# Patient Record
Sex: FEMALE | Race: WHITE | NOT HISPANIC OR LATINO | Employment: FULL TIME | URBAN - NONMETROPOLITAN AREA
[De-identification: names, ages, dates, MRNs, and addresses within clinical notes are randomized per-mention and may not be internally consistent; named-entity substitution may affect disease eponyms.]

---

## 2022-09-01 ENCOUNTER — TELEPHONE (OUTPATIENT)
Dept: FAMILY MEDICINE CLINIC | Facility: CLINIC | Age: 47
End: 2022-09-01

## 2022-09-01 NOTE — TELEPHONE ENCOUNTER
Caller: Veronica Goldsmith    Relationship to patient: Self    Best call back number: [phone]    Date of exposure: UNKNOWN    Date of positive COVID19 test: 09/01/2022    Date if possible COVID19 exposure: N/A    COVID19 symptoms: COUGH, HEADACHE, CONGESTION, CHEST PRESSURE, BODY ACHES, CHILLS, FEVER, LOSS OF TASTE     Date of initial quarantine: 09/01/2022    Additional information or concerns: PATIENT STARTED HAVING SYMPTOMS YESTERDAY. SHE TOOK A HOME COVID TEST AND IT WAS POSITIVE TODAY 09/01/22. PATIENT NEEDS TO KNOW IF SHE NEEDS TO BE SEEN OR IF MEDICATION CAN BE CALLED IN AS SHE IS VERY SICK. PLEASE ADVISE ASA     What is the patients preferred pharmacy: [facility] [address] [address]  [phone]  - [phone] FX

## 2022-09-02 NOTE — TELEPHONE ENCOUNTER
Veronica needs to also monitor her symptoms.  She needs to continue to push fluids and watch for signs of dehydration.  If her oxygen level drops or she has worsening symptoms she may need to go to the emergency department.  Otherwise if her symptoms worsen she should call the on-call number this weekend.

## 2022-10-07 ENCOUNTER — OFFICE VISIT (OUTPATIENT)
Dept: FAMILY MEDICINE CLINIC | Facility: CLINIC | Age: 47
End: 2022-10-07

## 2022-10-07 VITALS
OXYGEN SATURATION: 96 % | WEIGHT: 187 LBS | DIASTOLIC BLOOD PRESSURE: 68 MMHG | HEART RATE: 70 BPM | SYSTOLIC BLOOD PRESSURE: 100 MMHG | BODY MASS INDEX: 31.16 KG/M2 | HEIGHT: 65 IN | TEMPERATURE: 97.7 F

## 2022-10-07 DIAGNOSIS — I10 PRIMARY HYPERTENSION: Primary | ICD-10-CM

## 2022-10-07 DIAGNOSIS — E78.2 MIXED HYPERLIPIDEMIA: ICD-10-CM

## 2022-10-07 DIAGNOSIS — K21.9 GASTROESOPHAGEAL REFLUX DISEASE WITHOUT ESOPHAGITIS: ICD-10-CM

## 2022-10-07 DIAGNOSIS — E11.9 TYPE 2 DIABETES MELLITUS WITHOUT COMPLICATION, WITHOUT LONG-TERM CURRENT USE OF INSULIN: ICD-10-CM

## 2022-10-07 DIAGNOSIS — Z00.00 WELL ADULT EXAM: ICD-10-CM

## 2022-10-07 DIAGNOSIS — F41.9 ANXIETY: ICD-10-CM

## 2022-10-07 DIAGNOSIS — K75.81 NASH (NONALCOHOLIC STEATOHEPATITIS): ICD-10-CM

## 2022-10-07 DIAGNOSIS — Z23 NEED FOR VACCINATION: ICD-10-CM

## 2022-10-07 LAB
ALBUMIN SERPL-MCNC: 4.5 G/DL (ref 3.5–5.2)
ALBUMIN UR-MCNC: 2.2 MG/DL
ALBUMIN/GLOB SERPL: 1.7 G/DL
ALP SERPL-CCNC: 93 U/L (ref 39–117)
ALT SERPL W P-5'-P-CCNC: 19 U/L (ref 1–33)
ANION GAP SERPL CALCULATED.3IONS-SCNC: 6.7 MMOL/L (ref 5–15)
AST SERPL-CCNC: 26 U/L (ref 1–32)
BILIRUB SERPL-MCNC: 0.5 MG/DL (ref 0–1.2)
BUN SERPL-MCNC: 9 MG/DL (ref 6–20)
BUN/CREAT SERPL: 9.6 (ref 7–25)
CALCIUM SPEC-SCNC: 9.7 MG/DL (ref 8.6–10.5)
CHLORIDE SERPL-SCNC: 105 MMOL/L (ref 98–107)
CHOLEST SERPL-MCNC: 103 MG/DL (ref 0–200)
CO2 SERPL-SCNC: 29.3 MMOL/L (ref 22–29)
CREAT SERPL-MCNC: 0.94 MG/DL (ref 0.57–1)
DEPRECATED RDW RBC AUTO: 41.6 FL (ref 37–54)
EGFRCR SERPLBLD CKD-EPI 2021: 76.4 ML/MIN/1.73
ERYTHROCYTE [DISTWIDTH] IN BLOOD BY AUTOMATED COUNT: 12.8 % (ref 12.3–15.4)
GLOBULIN UR ELPH-MCNC: 2.7 GM/DL
GLUCOSE SERPL-MCNC: 72 MG/DL (ref 65–99)
HBA1C MFR BLD: 5.4 % (ref 4.8–5.6)
HCT VFR BLD AUTO: 46.3 % (ref 34–46.6)
HCV AB SER DONR QL: NORMAL
HDLC SERPL-MCNC: 30 MG/DL (ref 40–60)
HGB BLD-MCNC: 16.1 G/DL (ref 12–15.9)
LDLC SERPL CALC-MCNC: 56 MG/DL (ref 0–100)
LDLC/HDLC SERPL: 1.88 {RATIO}
MCH RBC QN AUTO: 31.1 PG (ref 26.6–33)
MCHC RBC AUTO-ENTMCNC: 34.8 G/DL (ref 31.5–35.7)
MCV RBC AUTO: 89.4 FL (ref 79–97)
PLATELET # BLD AUTO: 169 10*3/MM3 (ref 140–450)
PMV BLD AUTO: 11.2 FL (ref 6–12)
POTASSIUM SERPL-SCNC: 4.6 MMOL/L (ref 3.5–5.2)
PROT SERPL-MCNC: 7.2 G/DL (ref 6–8.5)
RBC # BLD AUTO: 5.18 10*6/MM3 (ref 3.77–5.28)
SODIUM SERPL-SCNC: 141 MMOL/L (ref 136–145)
TRIGL SERPL-MCNC: 83 MG/DL (ref 0–150)
TSH SERPL DL<=0.05 MIU/L-ACNC: 1.26 UIU/ML (ref 0.27–4.2)
VIT B12 BLD-MCNC: 248 PG/ML (ref 211–946)
VLDLC SERPL-MCNC: 17 MG/DL (ref 5–40)
WBC NRBC COR # BLD: 8.19 10*3/MM3 (ref 3.4–10.8)

## 2022-10-07 PROCEDURE — 99396 PREV VISIT EST AGE 40-64: CPT | Performed by: FAMILY MEDICINE

## 2022-10-07 PROCEDURE — 90732 PPSV23 VACC 2 YRS+ SUBQ/IM: CPT | Performed by: FAMILY MEDICINE

## 2022-10-07 PROCEDURE — 82043 UR ALBUMIN QUANTITATIVE: CPT | Performed by: FAMILY MEDICINE

## 2022-10-07 PROCEDURE — 90471 IMMUNIZATION ADMIN: CPT | Performed by: FAMILY MEDICINE

## 2022-10-07 PROCEDURE — 80061 LIPID PANEL: CPT | Performed by: FAMILY MEDICINE

## 2022-10-07 PROCEDURE — 99214 OFFICE O/P EST MOD 30 MIN: CPT | Performed by: FAMILY MEDICINE

## 2022-10-07 PROCEDURE — 82607 VITAMIN B-12: CPT | Performed by: FAMILY MEDICINE

## 2022-10-07 PROCEDURE — 80050 GENERAL HEALTH PANEL: CPT | Performed by: FAMILY MEDICINE

## 2022-10-07 PROCEDURE — 86803 HEPATITIS C AB TEST: CPT | Performed by: FAMILY MEDICINE

## 2022-10-07 PROCEDURE — 83036 HEMOGLOBIN GLYCOSYLATED A1C: CPT | Performed by: FAMILY MEDICINE

## 2022-10-07 RX ORDER — MELOXICAM 15 MG/1
15 TABLET ORAL DAILY
COMMUNITY
Start: 2022-09-26 | End: 2023-02-11

## 2022-10-07 RX ORDER — AMLODIPINE BESYLATE 10 MG/1
10 TABLET ORAL DAILY
COMMUNITY
Start: 2022-09-26 | End: 2022-10-07

## 2022-10-07 RX ORDER — ALBUTEROL SULFATE 90 UG/1
AEROSOL, METERED RESPIRATORY (INHALATION)
COMMUNITY
Start: 2022-09-26

## 2022-10-07 RX ORDER — ROSUVASTATIN CALCIUM 40 MG/1
40 TABLET, COATED ORAL DAILY
COMMUNITY
Start: 2022-09-26 | End: 2023-01-26

## 2022-10-07 RX ORDER — SEMAGLUTIDE 1.34 MG/ML
INJECTION, SOLUTION SUBCUTANEOUS
COMMUNITY
Start: 2022-09-26 | End: 2022-10-07 | Stop reason: SDUPTHER

## 2022-10-07 RX ORDER — LOSARTAN POTASSIUM 25 MG/1
25 TABLET ORAL DAILY
COMMUNITY
Start: 2022-09-02 | End: 2023-02-10 | Stop reason: SDUPTHER

## 2022-10-07 RX ORDER — SEMAGLUTIDE 2.68 MG/ML
2 INJECTION, SOLUTION SUBCUTANEOUS WEEKLY
Qty: 3 ML | Refills: 11 | Status: SHIPPED | OUTPATIENT
Start: 2022-10-07 | End: 2023-03-03 | Stop reason: SDUPTHER

## 2022-10-07 RX ORDER — ESOMEPRAZOLE MAGNESIUM 40 MG/1
40 CAPSULE, DELAYED RELEASE ORAL 2 TIMES DAILY
COMMUNITY
Start: 2022-09-26 | End: 2023-01-26

## 2022-10-07 NOTE — PROGRESS NOTES
Female Physical Note      Date: 10/07/2022   Patient Name: Veronica Goldsmith  : 1975   MRN: [id number]     Chief Complaint:    Chief Complaint   Patient presents with   • Follow-up     3 month DM,HTN       History of Present Illness: Veronica Goldsmith is a 47 y.o. female who is here today for their annual health maintenance and physical.  Patient has been doing well since last being seen without problems noted.  She does continue take her medication as prescribed without difficulty.  Patient has lost approximately 30 pounds of weight since she has started her medications.  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  She is eating and sleeping relatively well.  She denies any change in activity level.  She has not had chest pain, PND, shortness of breath orthopnea.  She denies any change in bowel bladder habits except for constipation as result of using the Ozempic.  No other complaints as mentioned.  She does continue with her arthritis medicine as well as her reflux medication blood pressure medicine and her Zoloft.  Patient has had a full series of hepatitis B and she will obtain that.  She probably will not receive the COVID-vaccine booster.  She will make arrangements for her to have a eye exam.      Subjective      Review of Systems:   Review of Systems   Constitutional: Negative for activity change, appetite change and fatigue.   Respiratory: Negative for cough and shortness of breath.    Cardiovascular: Negative for chest pain, palpitations and leg swelling.   Gastrointestinal: Negative for abdominal pain, constipation, diarrhea, nausea and vomiting.   Genitourinary: Negative for dysuria, flank pain, frequency and urgency.   Neurological: Negative for dizziness, weakness and memory problem.       Past Medical History, Social History, Family History and Care Team were all reviewed with patient and updated as appropriate.     Medications:     Current Outpatient  Medications:   •  albuterol sulfate  (90 Base) MCG/ACT inhaler, INHALE 3 PUFFS BY MOUTH EVERY 6 HOURS AS NEEDED, Disp: , Rfl:   •  esomeprazole (nexIUM) 40 MG capsule, Take 40 mg by mouth 2 (Two) Times a Day., Disp: , Rfl:   •  losartan (COZAAR) 25 MG tablet, Take 25 mg by mouth Daily., Disp: , Rfl:   •  meloxicam (MOBIC) 15 MG tablet, Take 15 mg by mouth Daily., Disp: , Rfl:   •  rosuvastatin (CRESTOR) 40 MG tablet, Take 40 mg by mouth Daily., Disp: , Rfl:   •  sertraline (ZOLOFT) 50 MG tablet, Take 50 mg by mouth Daily., Disp: , Rfl:   •  Semaglutide, 2 MG/DOSE, (Ozempic, 2 MG/DOSE,) 8 MG/3ML solution pen-injector, Inject 2 mg under the skin into the appropriate area as directed 1 (One) Time Per Week., Disp: 3 mL, Rfl: 11    Allergies:   Allergies   Allergen Reactions   • Amoxicillin Provider Review Needed     patient denies 4/13/2022   • Drug Class [Tetracyclines & Related] Provider Review Needed   • Sulfa Antibiotics Other (See Comments)     fever       Immunizations:  Health Maintenance Summary          Overdue - ANNUAL PHYSICAL (Yearly) Overdue - never done    No completion, postpone, or frequency change history exists for this topic.          Overdue - Pneumococcal Vaccine 0-64 (1 - PCV) Overdue - never done    No completion, postpone, or frequency change history exists for this topic.          Overdue - COVID-19 Vaccine (3 - Booster for Pfizer series) Overdue since 8/15/2022    06/20/2022  Imm Admin: Covid-19 (Pfizer) Gray Cap    05/24/2022  Imm Admin: Covid-19 (Pfizer) Gray Cap          Ordered - HEPATITIS C SCREENING (Once) Ordered on 10/7/2022    No completion, postpone, or frequency change history exists for this topic.          Ordered - LIPID PANEL (Yearly) Ordered on 10/7/2022    No completion, postpone, or frequency change history exists for this topic.          COLORECTAL CANCER SCREENING (COLONOSCOPY - Every 10 Years) Next due on 3/30/2028    03/30/2018   COLONOSCOPY          TDAP/TD  VACCINES (2 - Td or Tdap) Next due on 2/8/2029 02/08/2019  Imm Admin: Tdap          INFLUENZA VACCINE  Completed    10/04/2022  Imm Admin: FluLaval/Fluzone >6mos    02/08/2019  Imm Admin: Influenza, Unspecified    02/08/2019  Imm Admin: Flu Vaccine Quad PF >36MO    01/09/2019  Imm Admin: Flublok 18+yrs                 Orders Placed This Encounter   Procedures   • Pneumococcal Polysaccharide Vaccine 23-Valent Greater Than or Equal To 1yo Subcutaneous / IM        Colorectal Screening: Up-to-date.  Last Completed Colonoscopy          COLORECTAL CANCER SCREENING (COLONOSCOPY - Every 10 Years) Next due on 3/30/2028    03/30/2018   COLONOSCOPY              Pap: Patient has had a complete total abdominal hysterectomy.  Last Completed Pap Smear     This patient has no relevant Health Maintenance data.         Mammogram: We will schedule at next appointment.  Last Completed Mammogram     This patient has no relevant Health Maintenance data.           CT for Smoker (Age 50-80, 20 pk yr):   Not applicable.  Bone Density/DEXA (Age 65 or high risk): Low risk.    Hep C (Age 18-79 once): Ordered.  HIV (Age 15-65 once): No results found for: HIV1X2  A1c: No results found for: HGBA1C   Lipid panel:  No results found for: LIPIDEXCLUSI    The ASCVD Risk score (Todd DULCE Jr., et al., 2013) failed to calculate for the following reasons:    Cannot find a previous HDL lab    Cannot find a previous total cholesterol lab    Dermatology: Up-to-date.  Ophthalmologist: Up-to-date  Dentist: Up-to-date    Tobacco Use: High Risk   • Smoking Tobacco Use: Current Every Day Smoker   • Smokeless Tobacco Use: Never Used       Social History     Substance and Sexual Activity   Alcohol Use Not Currently        Social History     Substance and Sexual Activity   Drug Use Never        Diet/Physical activity: Well-balanced/exercises daily.    Sexual Health: No problems.   Menopause: No symptoms  Menstrual Cycles: None due to total abdominal  "hysterectomy.    Depression: PHQ-2 Depression Screening  PHQ-9 Total Score: 0     Measures:   Advanced Care Planning:   Patient does not have an advance directive, information provided.    Smoking Cessation:   less than 3 minutes spent counseling Not agreeable to stopping    Objective     Physical Exam:  Vital Signs:   Vitals:    10/07/22 1126   BP: 100/68   Pulse: 70   Temp: 97.7 °F (36.5 °C)   SpO2: 96%   Weight: 84.8 kg (187 lb)   Height: 165.1 cm (65\")   PainSc:   4   PainLoc: Hip  Comment: right     Body mass index is 31.12 kg/m².     Physical Exam  Vitals and nursing note reviewed.   Constitutional:       Appearance: Normal appearance. She is well-developed.   HENT:      Head: Normocephalic and atraumatic.      Right Ear: Tympanic membrane, ear canal and external ear normal.      Left Ear: Tympanic membrane, ear canal and external ear normal.      Nose: Nose normal.      Mouth/Throat:      Mouth: Mucous membranes are dry.      Pharynx: Oropharynx is clear.   Eyes:      General: Lids are normal.      Extraocular Movements: Extraocular movements intact.      Conjunctiva/sclera: Conjunctivae normal.      Pupils: Pupils are equal, round, and reactive to light.   Neck:      Thyroid: No thyroid mass or thyromegaly.      Trachea: Trachea normal.   Cardiovascular:      Rate and Rhythm: Normal rate and regular rhythm.      Pulses: Normal pulses. No decreased pulses.      Heart sounds: Normal heart sounds.   Pulmonary:      Effort: Pulmonary effort is normal.      Breath sounds: Normal breath sounds.   Abdominal:      General: Abdomen is flat. Bowel sounds are normal.      Palpations: Abdomen is soft.      Tenderness: There is no abdominal tenderness.   Musculoskeletal:         General: Normal range of motion.      Cervical back: Normal, full passive range of motion without pain and neck supple.      Thoracic back: Normal.      Lumbar back: Normal.      Right lower leg: No edema.      Left lower leg: No edema. "   Lymphadenopathy:      Cervical: No cervical adenopathy.   Skin:     General: Skin is warm and dry.   Neurological:      General: No focal deficit present.      Mental Status: She is alert and oriented to person, place, and time.      Cranial Nerves: Cranial nerves are intact.      Sensory: Sensation is intact.      Motor: Motor function is intact.      Coordination: Coordination is intact.   Psychiatric:         Attention and Perception: Attention normal.         Mood and Affect: Mood normal.         Speech: Speech normal.         Behavior: Behavior normal.         POCT Results (if applicable);   No results found for this or any previous visit.     Procedures    Assessment / Plan      Assessment/Plan:   Diagnoses and all orders for this visit:    1. Primary hypertension (Primary)   Patient's blood pressure is relatively on the low side.  We will hold her amlodipine and consider cutting her losartan in half if it remains low.  She does become dizzy at times when she rises and we will continue to monitor and make adjustments based on these findings.    2. Mixed hyperlipidemia   Patient is due for lipid profile we will make arrangements for this and just to keep her LDLs less than 70.    3. Type 2 diabetes mellitus without complication, without long-term current use of insulin (HCC)   Patient needs a hemoglobin A1c we will further pursue and treat accordingly.  She did have a diabetic foot exam that did not reveal any abnormality except for dry skin.  She has been started to continue to do as she is doing and she is doing well.  She had full neurovascular function distally.    4. TAM (nonalcoholic steatohepatitis)   Patient is tolerating Ozempic I do hope that this will continue to improve her visceral fat.  She is also been instructed to try to get 300 minutes of aerobic exercise weekly.  -     Semaglutide, 2 MG/DOSE, (Ozempic, 2 MG/DOSE,) 8 MG/3ML solution pen-injector; Inject 2 mg under the skin into the  appropriate area as directed 1 (One) Time Per Week.  Dispense: 3 mL; Refill: 11    5. Anxiety   Stable at present time we will continue to monitor her symptoms and adjust accordingly.    6. Gastroesophageal reflux disease without esophagitis   Doing well currently no adjustments are necessary.    7. Need for vaccination   Patient received her pneumococcal vaccine today.  She received her flu vaccine at work on Tuesday.  -     Pneumococcal Polysaccharide Vaccine 23-Valent Greater Than or Equal To 1yo Subcutaneous / IM    8. Well adult exam  Patient appears to be doing well with respect to her wellness exam.  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  We did discuss anticipatory guidance as well as safety issues and she we will continue to try to discontinue her smoking.  We will obtain laboratory data to see what this reveals and will make adjustments as necessary.  We did discuss her care gaps that she will see if she can find hepatitis B series.  She will consider the COVID-19 vaccine.  She will also make arrangements for her diabetic eye exam.  Other wise we will continue as we are doing as she is doing well.-     CBC (No Diff); Future  -     Comprehensive Metabolic Panel; Future  -     Hepatitis C Antibody; Future  -     Hemoglobin A1c; Future  -     Lipid Panel; Future  -     MicroAlbumin, Urine, Random - Urine, Clean Catch; Future  -     TSH Rfx On Abnormal To Free T4; Future  -     Vitamin B12; Future         Healthcare Maintenance:  Counseling provided based on age appropriate USPSTF guidelines.  BMI is >= 30 and <35. (Class 1 Obesity). The following options were offered after discussion;: exercise counseling/recommendations, nutrition counseling/recommendations and pharmacological intervention options    Veronica Goldsmith voices understanding and acceptance of this advice and will call back with any further questions or concerns.  AVS with preventive healthcare tips printed for patient.     Follow Up:   No follow-ups on file.         At [facility], we believe that sharing information builds trust and better relationships. You are receiving this note because you recently visited [facility]. It is possible you will see health information before a provider has talked with you about it. This kind of information can be easy to misunderstand. To help you fully understand what it means for your health, we urge you to discuss this note with your provider.    Greg Levine MD  [facility]

## 2022-11-09 ENCOUNTER — OFFICE VISIT (OUTPATIENT)
Dept: FAMILY MEDICINE CLINIC | Facility: CLINIC | Age: 47
End: 2022-11-09

## 2022-11-09 VITALS
HEART RATE: 80 BPM | WEIGHT: 185 LBS | SYSTOLIC BLOOD PRESSURE: 102 MMHG | OXYGEN SATURATION: 98 % | BODY MASS INDEX: 30.82 KG/M2 | HEIGHT: 65 IN | DIASTOLIC BLOOD PRESSURE: 70 MMHG | TEMPERATURE: 97.8 F

## 2022-11-09 DIAGNOSIS — R11.2 NAUSEA AND VOMITING, UNSPECIFIED VOMITING TYPE: Primary | ICD-10-CM

## 2022-11-09 PROBLEM — E78.2 MIXED HYPERLIPIDEMIA: Status: ACTIVE | Noted: 2022-11-09

## 2022-11-09 PROBLEM — K21.9 GASTROESOPHAGEAL REFLUX DISEASE WITHOUT ESOPHAGITIS: Status: ACTIVE | Noted: 2022-11-09

## 2022-11-09 PROBLEM — F41.9 ANXIETY: Status: ACTIVE | Noted: 2022-11-09

## 2022-11-09 PROBLEM — E11.9 TYPE 2 DIABETES MELLITUS, WITHOUT LONG-TERM CURRENT USE OF INSULIN: Status: ACTIVE | Noted: 2022-11-09

## 2022-11-09 PROBLEM — K75.81 NASH (NONALCOHOLIC STEATOHEPATITIS): Status: ACTIVE | Noted: 2022-11-09

## 2022-11-09 PROBLEM — I10 ESSENTIAL HYPERTENSION, BENIGN: Status: ACTIVE | Noted: 2022-11-09

## 2022-11-09 PROBLEM — G62.9 PERIPHERAL NEUROPATHY: Status: ACTIVE | Noted: 2022-11-09

## 2022-11-09 PROCEDURE — 99213 OFFICE O/P EST LOW 20 MIN: CPT | Performed by: PHYSICIAN ASSISTANT

## 2022-11-09 RX ORDER — ONDANSETRON 8 MG/1
8 TABLET, ORALLY DISINTEGRATING ORAL EVERY 8 HOURS PRN
Qty: 30 TABLET | Refills: 1 | Status: SHIPPED | OUTPATIENT
Start: 2022-11-09 | End: 2023-02-10

## 2022-11-09 NOTE — PROGRESS NOTES
"    Follow Up Office Visit      Date: 2022   Patient Name: Veronica Goldsmith  : 1975   MRN: [id number]     Chief Complaint:    Chief Complaint   Patient presents with   • Vomiting     Vomiting x 2 days, with coffee ground emesis past 2 episodes, occurred approx. 2 weeks ago, denies fever       History of Present Illness: Veronica Goldsmith is a 47 y.o. female who is here today for intermittent nausea for the past few days.  Noted a quarter size area of \"coffee ground\" look.  Has had no dark stools.  Notes that she resumed Ozempic after being off for a month before vomiting started.    Subjective      Review of Systems:   Review of Systems   Constitutional: Negative.    HENT: Negative.    Respiratory: Negative.    Cardiovascular: Negative.    Gastrointestinal: Positive for diarrhea, nausea, vomiting, GERD and indigestion. Negative for abdominal distention, abdominal pain and blood in stool.       I have reviewed the patients family history, social history, past medical history, past surgical history and have updated it as appropriate.     Medications:     Current Outpatient Medications:   •  albuterol sulfate  (90 Base) MCG/ACT inhaler, INHALE 3 PUFFS BY MOUTH EVERY 6 HOURS AS NEEDED, Disp: , Rfl:   •  esomeprazole (nexIUM) 40 MG capsule, Take 40 mg by mouth 2 (Two) Times a Day., Disp: , Rfl:   •  losartan (COZAAR) 25 MG tablet, Take 25 mg by mouth Daily., Disp: , Rfl:   •  meloxicam (MOBIC) 15 MG tablet, Take 15 mg by mouth Daily., Disp: , Rfl:   •  rosuvastatin (CRESTOR) 40 MG tablet, Take 40 mg by mouth Daily., Disp: , Rfl:   •  Semaglutide, 2 MG/DOSE, (Ozempic, 2 MG/DOSE,) 8 MG/3ML solution pen-injector, Inject 2 mg under the skin into the appropriate area as directed 1 (One) Time Per Week., Disp: 3 mL, Rfl: 11  •  sertraline (ZOLOFT) 50 MG tablet, Take 50 mg by mouth Daily., Disp: , Rfl:   •  ondansetron ODT (Zofran ODT) 8 MG disintegrating tablet, Place 1 tablet on the tongue Every 8 (Eight) Hours As " "Needed for Nausea or Vomiting., Disp: 30 tablet, Rfl: 1    Allergies:   Allergies   Allergen Reactions   • Amoxicillin Provider Review Needed     patient denies 4/13/2022   • Drug Class [Tetracyclines & Related] Provider Review Needed     Patient denies   • Sulfa Antibiotics Other (See Comments)     fever       Objective     Physical Exam: Please see above  Vital Signs:   Vitals:    11/09/22 1102   BP: 102/70   Pulse: 80   Temp: 97.8 °F (36.6 °C)   SpO2: 98%   Weight: 83.9 kg (185 lb)   Height: 165.1 cm (65\")   PainSc:   2   PainLoc: Rib Cage     Body mass index is 30.79 kg/m².    Physical Exam  Vitals and nursing note reviewed.   Constitutional:       General: She is not in acute distress.     Appearance: She is not toxic-appearing.   Cardiovascular:      Rate and Rhythm: Normal rate and regular rhythm.      Heart sounds: No murmur heard.    No friction rub. No gallop.   Neurological:      Mental Status: She is alert.         Procedures    Assessment / Plan      Assessment/Plan:   1. Nausea and vomiting, unspecified vomiting type  May be due to resumption of Ozempic at a high dose.  This one episode of coffee ground emesis has not been repeated.  We will back off on Ozempic dose and she was given a starter package to gradually increase dose.  If she has more coffee-like emesis or dark stools she will seek care immediately.  - ondansetron ODT (Zofran ODT) 8 MG disintegrating tablet; Place 1 tablet on the tongue Every 8 (Eight) Hours As Needed for Nausea or Vomiting.  Dispense: 30 tablet; Refill: 1       Follow Up:   No follow-ups on file.      At [facility], we believe that sharing information builds trust and better relationships. You are receiving this note because you recently visited [facility]. It is possible you will see health information before a provider has talked with you about it. This kind of information can be easy to misunderstand. To help you fully understand what it means for your health, we " urge you to discuss this note with your provider.    Anastasiya Levine PA-C  [facility]

## 2022-12-05 ENCOUNTER — OFFICE VISIT (OUTPATIENT)
Dept: FAMILY MEDICINE CLINIC | Facility: CLINIC | Age: 47
End: 2022-12-05

## 2022-12-05 VITALS
DIASTOLIC BLOOD PRESSURE: 70 MMHG | WEIGHT: 184 LBS | TEMPERATURE: 99.1 F | BODY MASS INDEX: 30.66 KG/M2 | SYSTOLIC BLOOD PRESSURE: 142 MMHG | OXYGEN SATURATION: 100 % | HEIGHT: 65 IN | HEART RATE: 96 BPM

## 2022-12-05 DIAGNOSIS — N21.9 CALCULUS OF LOWER URINARY TRACT: Primary | ICD-10-CM

## 2022-12-05 DIAGNOSIS — M62.838 MUSCLE SPASM: ICD-10-CM

## 2022-12-05 PROCEDURE — 99214 OFFICE O/P EST MOD 30 MIN: CPT | Performed by: FAMILY MEDICINE

## 2022-12-05 RX ORDER — CYCLOBENZAPRINE HCL 10 MG
10 TABLET ORAL 3 TIMES DAILY PRN
Qty: 90 TABLET | Refills: 3 | Status: SHIPPED | OUTPATIENT
Start: 2022-12-05

## 2022-12-05 RX ORDER — PRENATAL VIT 91/IRON/FOLIC/DHA 28-975-200
COMBINATION PACKAGE (EA) ORAL
COMMUNITY
Start: 2022-11-25 | End: 2023-02-10

## 2022-12-05 RX ORDER — HYDROCODONE BITARTRATE AND ACETAMINOPHEN 7.5; 325 MG/1; MG/1
TABLET ORAL
COMMUNITY
Start: 2022-12-02 | End: 2023-02-10

## 2022-12-05 RX ORDER — TAMSULOSIN HYDROCHLORIDE 0.4 MG/1
1 CAPSULE ORAL DAILY
Qty: 30 CAPSULE | Refills: 11 | Status: SHIPPED | OUTPATIENT
Start: 2022-12-05 | End: 2023-02-17 | Stop reason: SDUPTHER

## 2022-12-05 NOTE — PROGRESS NOTES
Follow Up Office Visit      Date: 2022   Patient Name: Veronica Goldsmith  : 1975   MRN: [id number]     Chief Complaint:    Chief Complaint   Patient presents with   • Flank Pain     @[facility] ER for kidney stone, continues to have flank pain/spams       History of Present Illness: Veronica Goldsmith is a 47 y.o. female who is here today for evaluation of a recent emergency department visit where she was noted to have a sign.  Patient was in her usual state of health until last Thursday when she developed intense right flank pain.  Patient presented to the emergency department at [facility] where she was noted to have a 7 mm nonobstructing kidney stone on the right side with mild hydronephrosis.  She was given pain medicine and patient states that she is feeling improved since last being seen.  Her current pain score is 4 out of 10.  She has not had any narcotics over the previous 24 hours but states that she has had to take 5 of the Lortab since in the emergency department.  As far she knows she has never had kidney stones in the past.  She has been drinking plenty of fluids and feels a little bit nauseous at times but is not throwing up.  No other problems been NOAA currently as she denies fever or chills.  She has noted no hematuria.  She has been screening her urine and has not seen any kidney stones at present time.    Subjective      Review of Systems:   Review of Systems   Constitutional: Negative for activity change, appetite change and fatigue.   Respiratory: Negative for cough and shortness of breath.    Cardiovascular: Negative for chest pain, palpitations and leg swelling.   Gastrointestinal: Negative for abdominal pain, constipation, diarrhea, nausea and vomiting.   Genitourinary: Positive for flank pain. Negative for dysuria, frequency and urgency.   Neurological: Negative for dizziness, weakness and memory problem.       I have reviewed the patients family history, social history, past medical  history, past surgical history and have updated it as appropriate.     Medications:     Current Outpatient Medications:   •  albuterol sulfate  (90 Base) MCG/ACT inhaler, INHALE 3 PUFFS BY MOUTH EVERY 6 HOURS AS NEEDED, Disp: , Rfl:   •  esomeprazole (nexIUM) 40 MG capsule, Take 40 mg by mouth 2 (Two) Times a Day., Disp: , Rfl:   •  HYDROcodone-acetaminophen (NORCO) 7.5-325 MG per tablet, TAKE ONE TABLET EVERY 4 TO 6 HOURS AS NEEDED FOR PAIN, Disp: , Rfl:   •  losartan (COZAAR) 25 MG tablet, Take 25 mg by mouth Daily. Taking 1/2, Disp: , Rfl:   •  meloxicam (MOBIC) 15 MG tablet, Take 15 mg by mouth Daily., Disp: , Rfl:   •  ondansetron ODT (Zofran ODT) 8 MG disintegrating tablet, Place 1 tablet on the tongue Every 8 (Eight) Hours As Needed for Nausea or Vomiting., Disp: 30 tablet, Rfl: 1  •  rosuvastatin (CRESTOR) 40 MG tablet, Take 40 mg by mouth Daily., Disp: , Rfl:   •  Semaglutide, 2 MG/DOSE, (Ozempic, 2 MG/DOSE,) 8 MG/3ML solution pen-injector, Inject 2 mg under the skin into the appropriate area as directed 1 (One) Time Per Week., Disp: 3 mL, Rfl: 11  •  sertraline (ZOLOFT) 50 MG tablet, Take 50 mg by mouth Daily., Disp: , Rfl:   •  terbinafine (lamISIL) 1 % cream, APPLY TO AFFECTED AREA TWO TIMES A DAY, Disp: , Rfl:   •  cyclobenzaprine (FLEXERIL) 10 MG tablet, Take 1 tablet by mouth 3 (Three) Times a Day As Needed for Muscle Spasms., Disp: 90 tablet, Rfl: 3  •  tamsulosin (FLOMAX) 0.4 MG capsule 24 hr capsule, Take 1 capsule by mouth Daily., Disp: 30 capsule, Rfl: 11    Allergies:   Allergies   Allergen Reactions   • Sulfa Antibiotics Unknown - High Severity     fever   • Drug Class [Tetracyclines & Related] Rash     Patient denies       Immunizations:   Immunization History   Administered Date(s) Administered   • Covid-19 (Pfizer) Gray Cap 05/24/2022, 06/20/2022   • Flu Vaccine Quad PF >36MO 02/08/2019   • FluLaval/Fluzone >6mos 10/04/2022   • Flublok 18+yrs 01/09/2019   • Hepatitis A 01/09/2019   •  "Influenza, Unspecified 02/08/2019   • MMR 02/08/2019   • Pneumococcal Polysaccharide (PPSV23) 10/07/2022   • Tdap 02/08/2019        Objective     Physical Exam: Please see above  Vital Signs:   Vitals:    12/05/22 0835   BP: 142/70   BP Location: Left arm   Patient Position: Sitting   Cuff Size: Adult   Pulse: 96   Temp: 99.1 °F (37.3 °C)   TempSrc: Temporal   SpO2: 100%   Weight: 83.5 kg (184 lb)   Height: 165.1 cm (65\")   PainSc:   4   PainLoc: Abdomen     Body mass index is 30.62 kg/m².  BMI is >= 30 and <35. (Class 1 Obesity). The following options were offered after discussion;: exercise counseling/recommendations, nutrition counseling/recommendations and pharmacological intervention options       Physical Exam  Vitals and nursing note reviewed.   Constitutional:       Appearance: Normal appearance.   HENT:      Head: Normocephalic and atraumatic.      Nose: Nose normal.      Mouth/Throat:      Pharynx: Oropharynx is clear.   Eyes:      Extraocular Movements: Extraocular movements intact.      Pupils: Pupils are equal, round, and reactive to light.   Neck:      Thyroid: No thyroid mass or thyromegaly.      Trachea: Trachea normal.   Cardiovascular:      Rate and Rhythm: Normal rate and regular rhythm.      Pulses: Normal pulses. No decreased pulses.      Heart sounds: Normal heart sounds.   Pulmonary:      Effort: Pulmonary effort is normal.      Breath sounds: Normal breath sounds.   Abdominal:      General: Abdomen is flat. Bowel sounds are normal.      Palpations: Abdomen is soft.      Tenderness: There is no abdominal tenderness. There is no right CVA tenderness or left CVA tenderness.   Musculoskeletal:      Cervical back: Neck supple.      Right lower leg: No edema.      Left lower leg: No edema.   Lymphadenopathy:      Cervical: No cervical adenopathy.   Skin:     General: Skin is warm and dry.   Neurological:      General: No focal deficit present.      Mental Status: She is alert and oriented to " person, place, and time.      Cranial Nerves: Cranial nerves are intact.      Sensory: Sensation is intact.      Motor: Motor function is intact.      Coordination: Coordination is intact.   Psychiatric:         Attention and Perception: Attention normal.         Mood and Affect: Mood normal.         Speech: Speech normal.         Behavior: Behavior normal.         Procedures    Results:   Labs:   Hemoglobin A1C   Date Value Ref Range Status   10/07/2022 5.40 4.80 - 5.60 % Final     TSH   Date Value Ref Range Status   10/07/2022 1.260 0.270 - 4.200 uIU/mL Final        POCT Results (if applicable):   Results for orders placed or performed in visit on 10/07/22   CBC (No Diff)    Specimen: Arm, Left; Blood   Result Value Ref Range    WBC 8.19 3.40 - 10.80 10*3/mm3    RBC 5.18 3.77 - 5.28 10*6/mm3    Hemoglobin 16.1 (H) 12.0 - 15.9 g/dL    Hematocrit 46.3 34.0 - 46.6 %    MCV 89.4 79.0 - 97.0 fL    MCH 31.1 26.6 - 33.0 pg    MCHC 34.8 31.5 - 35.7 g/dL    RDW 12.8 12.3 - 15.4 %    RDW-SD 41.6 37.0 - 54.0 fl    MPV 11.2 6.0 - 12.0 fL    Platelets 169 140 - 450 10*3/mm3   Comprehensive Metabolic Panel    Specimen: Arm, Left; Blood   Result Value Ref Range    Glucose 72 65 - 99 mg/dL    BUN 9 6 - 20 mg/dL    Creatinine 0.94 0.57 - 1.00 mg/dL    Sodium 141 136 - 145 mmol/L    Potassium 4.6 3.5 - 5.2 mmol/L    Chloride 105 98 - 107 mmol/L    CO2 29.3 (H) 22.0 - 29.0 mmol/L    Calcium 9.7 8.6 - 10.5 mg/dL    Total Protein 7.2 6.0 - 8.5 g/dL    Albumin 4.50 3.50 - 5.20 g/dL    ALT (SGPT) 19 1 - 33 U/L    AST (SGOT) 26 1 - 32 U/L    Alkaline Phosphatase 93 39 - 117 U/L    Total Bilirubin 0.5 0.0 - 1.2 mg/dL    Globulin 2.7 gm/dL    A/G Ratio 1.7 g/dL    BUN/Creatinine Ratio 9.6 7.0 - 25.0    Anion Gap 6.7 5.0 - 15.0 mmol/L    eGFR 76.4 >60.0 mL/min/1.73   Hepatitis C Antibody    Specimen: Arm, Left; Blood   Result Value Ref Range    Hepatitis C Ab Non-Reactive Non-Reactive   Hemoglobin A1c    Specimen: Arm, Left; Blood   Result  Value Ref Range    Hemoglobin A1C 5.40 4.80 - 5.60 %   Lipid Panel    Specimen: Arm, Left; Blood   Result Value Ref Range    Total Cholesterol 103 0 - 200 mg/dL    Triglycerides 83 0 - 150 mg/dL    HDL Cholesterol 30 (L) 40 - 60 mg/dL    LDL Cholesterol  56 0 - 100 mg/dL    VLDL Cholesterol 17 5 - 40 mg/dL    LDL/HDL Ratio 1.88    MicroAlbumin, Urine, Random - Urine, Clean Catch    Specimen: Urine, Clean Catch   Result Value Ref Range    Microalbumin, Urine 2.2 mg/dL   TSH Rfx On Abnormal To Free T4    Specimen: Arm, Left; Blood   Result Value Ref Range    TSH 1.260 0.270 - 4.200 uIU/mL   Vitamin B12    Specimen: Arm, Left; Blood   Result Value Ref Range    Vitamin B-12 248 211 - 946 pg/mL       Imaging:   No valid procedures specified.     Measures:   Advanced Care Planning:   Patient does not have an advance directive, information provided.    Smoking Cessation:   less than 3 minutes spent counseling Will try to cut down    Assessment / Plan      Assessment/Plan:   Diagnoses and all orders for this visit:    1. Calculus of lower urinary tract (Primary)  Patient does appear to be suffering from a kidney stone on the right side.  I am uncertain if she will be able to pass this stone but currently is doing better with respect to his symptoms.  We will continue to have him push fluids and we will pursue with starting her on Flomax.  She will continue to drink lemonade and if she has worsening symptoms we will not only continue the narcotic pain medicine but will also refer her to a urologist for further assessment and treatment.  Hopefully she will be able to pass the stone on her own.  She has been instructed to strain her urine and to bring the stone in for analysis.  -     tamsulosin (FLOMAX) 0.4 MG capsule 24 hr capsule; Take 1 capsule by mouth Daily.  Dispense: 30 capsule; Refill: 11    2. Muscle spasm  Patient has had some muscle spasm symptomatology.  This most likely is due to the kidney stone causing her  symptoms.  We will try her on cyclobenzaprine.  She has tolerated this in the past with some mild drowsiness.  We will monitor her symptoms and if she has other problems or complaints further assessment treatment will be necessary.  -     cyclobenzaprine (FLEXERIL) 10 MG tablet; Take 1 tablet by mouth 3 (Three) Times a Day As Needed for Muscle Spasms.  Dispense: 90 tablet; Refill: 3        Follow Up:   No follow-ups on file.      At [facility], we believe that sharing information builds trust and better relationships. You are receiving this note because you recently visited [facility]. It is possible you will see health information before a provider has talked with you about it. This kind of information can be easy to misunderstand. To help you fully understand what it means for your health, we urge you to discuss this note with your provider.    Greg Levine MD  [facility]

## 2022-12-30 ENCOUNTER — OFFICE VISIT (OUTPATIENT)
Dept: FAMILY MEDICINE CLINIC | Facility: CLINIC | Age: 47
End: 2022-12-30

## 2022-12-30 VITALS
OXYGEN SATURATION: 96 % | TEMPERATURE: 97.7 F | BODY MASS INDEX: 31.16 KG/M2 | DIASTOLIC BLOOD PRESSURE: 76 MMHG | HEIGHT: 65 IN | SYSTOLIC BLOOD PRESSURE: 128 MMHG | HEART RATE: 76 BPM | WEIGHT: 187 LBS

## 2022-12-30 DIAGNOSIS — L02.91 ABSCESS: Primary | ICD-10-CM

## 2022-12-30 PROCEDURE — 99213 OFFICE O/P EST LOW 20 MIN: CPT

## 2022-12-30 RX ORDER — CEPHALEXIN 500 MG/1
500 CAPSULE ORAL 4 TIMES DAILY
Qty: 28 CAPSULE | Refills: 0 | Status: SHIPPED | OUTPATIENT
Start: 2022-12-30 | End: 2022-12-30 | Stop reason: DRUGHIGH

## 2022-12-30 RX ORDER — CEPHALEXIN 500 MG/1
500 CAPSULE ORAL 2 TIMES DAILY
Qty: 14 CAPSULE | Refills: 0 | Status: SHIPPED | OUTPATIENT
Start: 2022-12-30 | End: 2023-01-06

## 2022-12-30 RX ORDER — MUPIROCIN CALCIUM 20 MG/G
1 CREAM TOPICAL 3 TIMES DAILY
Qty: 15 G | Refills: 0 | Status: SHIPPED | OUTPATIENT
Start: 2022-12-30 | End: 2023-02-10
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
## 2023-01-26 RX ORDER — ROSUVASTATIN CALCIUM 40 MG/1
TABLET, COATED ORAL
Qty: 90 TABLET | Refills: 3 | Status: SHIPPED | OUTPATIENT
Start: 2023-01-26

## 2023-01-26 RX ORDER — ESOMEPRAZOLE MAGNESIUM 40 MG/1
CAPSULE, DELAYED RELEASE ORAL
Qty: 60 CAPSULE | Refills: 3 | Status: SHIPPED | OUTPATIENT
Start: 2023-01-26

## 2023-01-26 RX ORDER — AMLODIPINE BESYLATE 10 MG/1
TABLET ORAL
Qty: 90 TABLET | Refills: 3 | Status: SHIPPED | OUTPATIENT
Start: 2023-01-26 | End: 2023-02-10

## 2023-02-03 PROBLEM — N20.0 RIGHT KIDNEY STONE: Status: ACTIVE | Noted: 2023-02-03

## 2023-02-10 ENCOUNTER — OFFICE VISIT (OUTPATIENT)
Dept: FAMILY MEDICINE CLINIC | Facility: CLINIC | Age: 48
End: 2023-02-10
Payer: COMMERCIAL

## 2023-02-10 VITALS
OXYGEN SATURATION: 98 % | HEART RATE: 78 BPM | TEMPERATURE: 98.2 F | DIASTOLIC BLOOD PRESSURE: 100 MMHG | WEIGHT: 184.2 LBS | BODY MASS INDEX: 30.65 KG/M2 | SYSTOLIC BLOOD PRESSURE: 150 MMHG

## 2023-02-10 DIAGNOSIS — I10 PRIMARY HYPERTENSION: Primary | ICD-10-CM

## 2023-02-10 DIAGNOSIS — E11.9 TYPE 2 DIABETES MELLITUS WITHOUT COMPLICATION, WITHOUT LONG-TERM CURRENT USE OF INSULIN: ICD-10-CM

## 2023-02-10 DIAGNOSIS — F17.210 CIGARETTE NICOTINE DEPENDENCE WITHOUT COMPLICATION: ICD-10-CM

## 2023-02-10 DIAGNOSIS — N28.9 RENAL INSUFFICIENCY: ICD-10-CM

## 2023-02-10 DIAGNOSIS — F41.9 ANXIETY: ICD-10-CM

## 2023-02-10 DIAGNOSIS — N21.9 CALCULUS OF LOWER URINARY TRACT: ICD-10-CM

## 2023-02-10 DIAGNOSIS — K75.81 NASH (NONALCOHOLIC STEATOHEPATITIS): ICD-10-CM

## 2023-02-10 DIAGNOSIS — E78.2 MIXED HYPERLIPIDEMIA: ICD-10-CM

## 2023-02-10 LAB
BILIRUB UR QL STRIP: NEGATIVE
CLARITY UR: CLEAR
COLOR UR: YELLOW
GLUCOSE UR STRIP-MCNC: NEGATIVE MG/DL
HBA1C MFR BLD: 5.3 % (ref 4.8–5.6)
HGB UR QL STRIP.AUTO: NEGATIVE
KETONES UR QL STRIP: NEGATIVE
LEUKOCYTE ESTERASE UR QL STRIP.AUTO: NEGATIVE
NITRITE UR QL STRIP: NEGATIVE
PH UR STRIP.AUTO: 7.5 [PH] (ref 5–8)
PROT UR QL STRIP: NEGATIVE
SP GR UR STRIP: 1.02 (ref 1–1.03)
UROBILINOGEN UR QL STRIP: NORMAL

## 2023-02-10 PROCEDURE — 81003 URINALYSIS AUTO W/O SCOPE: CPT | Performed by: FAMILY MEDICINE

## 2023-02-10 PROCEDURE — 80061 LIPID PANEL: CPT | Performed by: FAMILY MEDICINE

## 2023-02-10 PROCEDURE — 83036 HEMOGLOBIN GLYCOSYLATED A1C: CPT | Performed by: FAMILY MEDICINE

## 2023-02-10 PROCEDURE — 99214 OFFICE O/P EST MOD 30 MIN: CPT | Performed by: FAMILY MEDICINE

## 2023-02-10 PROCEDURE — 80053 COMPREHEN METABOLIC PANEL: CPT | Performed by: FAMILY MEDICINE

## 2023-02-10 RX ORDER — CLOTRIMAZOLE AND BETAMETHASONE DIPROPIONATE 10; .64 MG/G; MG/G
CREAM TOPICAL
Qty: 15 G | Refills: 0 | Status: SHIPPED | OUTPATIENT
Start: 2023-02-10

## 2023-02-10 RX ORDER — LOSARTAN POTASSIUM 25 MG/1
25 TABLET ORAL DAILY
Qty: 90 TABLET | Refills: 3
Start: 2023-02-10

## 2023-02-10 NOTE — PROGRESS NOTES
Follow Up Office Visit      Date: 02/10/2023   Patient Name: Veronica Goldsmith  : 1975   MRN: [id number]     Chief Complaint:    Chief Complaint   Patient presents with   • Annual Exam       History of Present Illness: Veronica Goldsmith is a 47 y.o. female who is here today for follow-up of her medical conditions.  Patient has been doing well since last being seen without too many problems noted.  She does tolerate the Ozempic but has slowly had to increase the dose as she has had some nausea associated with it.  She has tolerated her other medications without difficulty.  She denies any change in her activity, appetite or sleep.  She has no other cardiovascular, respiratory, gastrointestinal, urologic or neurologic complaints except for occasional diarrhea constipation as related to the Ozempic.    Subjective      Review of Systems:   Review of Systems   Constitutional: Negative for activity change, appetite change and fatigue.   Respiratory: Negative for cough and shortness of breath.    Cardiovascular: Negative for chest pain, palpitations and leg swelling.   Gastrointestinal: Positive for constipation and diarrhea. Negative for abdominal distention, abdominal pain, blood in stool, nausea, vomiting, GERD and indigestion.   Genitourinary: Negative for dysuria, flank pain, frequency and urgency.   Musculoskeletal: Negative for arthralgias, back pain, gait problem, joint swelling and myalgias.   Neurological: Negative for dizziness, weakness and memory problem.   Psychiatric/Behavioral: Negative for sleep disturbance and depressed mood. The patient is not nervous/anxious.        I have reviewed the patients family history, social history, past medical history, past surgical history and have updated it as appropriate.     Medications:     Current Outpatient Medications:   •  albuterol sulfate  (90 Base) MCG/ACT inhaler, INHALE 3 PUFFS BY MOUTH EVERY 6 HOURS AS NEEDED, Disp: , Rfl:   •  cyclobenzaprine  (FLEXERIL) 10 MG tablet, Take 1 tablet by mouth 3 (Three) Times a Day As Needed for Muscle Spasms., Disp: 90 tablet, Rfl: 3  •  esomeprazole (nexIUM) 40 MG capsule, TAKE ONE CAPSULE BY MOUTH TWO TIMES A DAY, Disp: 60 capsule, Rfl: 3  •  losartan (COZAAR) 25 MG tablet, Take 1 tablet by mouth Daily. Taking 1/2, Disp: 90 tablet, Rfl: 3  •  meloxicam (MOBIC) 15 MG tablet, Take 15 mg by mouth Daily., Disp: , Rfl:   •  rosuvastatin (CRESTOR) 40 MG tablet, TAKE 1 TABLET BY MOUTH DAILY, Disp: 90 tablet, Rfl: 3  •  Semaglutide, 2 MG/DOSE, (Ozempic, 2 MG/DOSE,) 8 MG/3ML solution pen-injector, Inject 2 mg under the skin into the appropriate area as directed 1 (One) Time Per Week., Disp: 3 mL, Rfl: 11  •  sertraline (ZOLOFT) 50 MG tablet, TAKE ONE TABLET BY MOUTH EVERY DAY, Disp: 90 tablet, Rfl: 3  •  tamsulosin (FLOMAX) 0.4 MG capsule 24 hr capsule, Take 1 capsule by mouth Daily., Disp: 30 capsule, Rfl: 11  •  clotrimazole-betamethasone (LOTRISONE) 1-0.05 % cream, APPLY TO AFFECTED AREA TWO TIMES A DAY, Disp: 15 g, Rfl: 0    Allergies:   Allergies   Allergen Reactions   • Sulfa Antibiotics Unknown - High Severity     fever       Immunizations:   Immunization History   Administered Date(s) Administered   • Covid-19 (Pfizer) Gray Cap 05/24/2022, 06/20/2022   • Flu Vaccine Quad PF >36MO 02/08/2019   • FluLaval/Fluzone >6mos 10/04/2022   • Flublok 18+yrs 01/09/2019   • Hepatitis A 01/09/2019   • Influenza, Unspecified 02/08/2019   • MMR 02/08/2019   • Pneumococcal Polysaccharide (PPSV23) 10/07/2022   • Tdap 02/08/2019        Objective     Physical Exam: Please see above  Vital Signs:   Vitals:    02/10/23 0950   BP: 150/100   BP Location: Left arm   Patient Position: Sitting   Cuff Size: Adult   Pulse: 78   Temp: 98.2 °F (36.8 °C)   TempSrc: Temporal   SpO2: 98%   Weight: 83.6 kg (184 lb 3.2 oz)   PainSc: 0-No pain     Body mass index is 30.65 kg/m².  BMI is >= 30 and <35. (Class 1 Obesity). The following options were offered  after discussion;: exercise counseling/recommendations, nutrition counseling/recommendations and pharmacological intervention options       Physical Exam  Vitals and nursing note reviewed.   Constitutional:       Appearance: Normal appearance.   HENT:      Head: Normocephalic and atraumatic.      Nose: Nose normal.      Mouth/Throat:      Pharynx: Oropharynx is clear.   Eyes:      Extraocular Movements: Extraocular movements intact.      Pupils: Pupils are equal, round, and reactive to light.   Neck:      Thyroid: No thyroid mass or thyromegaly.      Trachea: Trachea normal.   Cardiovascular:      Rate and Rhythm: Normal rate and regular rhythm.      Pulses: Normal pulses. No decreased pulses.      Heart sounds: Normal heart sounds.   Pulmonary:      Effort: Pulmonary effort is normal.      Breath sounds: Normal breath sounds.   Abdominal:      General: Abdomen is flat. Bowel sounds are normal.      Palpations: Abdomen is soft.      Tenderness: There is no abdominal tenderness.   Musculoskeletal:      Cervical back: Neck supple.      Right lower leg: No edema.      Left lower leg: No edema.   Lymphadenopathy:      Cervical: No cervical adenopathy.   Skin:     General: Skin is warm and dry.   Neurological:      General: No focal deficit present.      Mental Status: She is alert and oriented to person, place, and time.      Sensory: Sensation is intact.      Motor: Motor function is intact.      Coordination: Coordination is intact.   Psychiatric:         Attention and Perception: Attention normal.         Mood and Affect: Mood normal.         Speech: Speech normal.         Behavior: Behavior normal.         Procedures    Results:   Labs:   Hemoglobin A1C   Date Value Ref Range Status   02/10/2023 5.30 4.80 - 5.60 % Final     TSH   Date Value Ref Range Status   10/07/2022 1.260 0.270 - 4.200 uIU/mL Final        POCT Results (if applicable):   Results for orders placed or performed in visit on 02/10/23   Comprehensive  Metabolic Panel    Specimen: Arm, Left; Blood   Result Value Ref Range    Glucose 93 65 - 99 mg/dL    BUN 10 6 - 20 mg/dL    Creatinine 1.22 (H) 0.57 - 1.00 mg/dL    Sodium 142 136 - 145 mmol/L    Potassium 4.0 3.5 - 5.2 mmol/L    Chloride 106 98 - 107 mmol/L    CO2 27.0 22.0 - 29.0 mmol/L    Calcium 9.4 8.6 - 10.5 mg/dL    Total Protein 7.3 6.0 - 8.5 g/dL    Albumin 4.4 3.5 - 5.2 g/dL    ALT (SGPT) 22 1 - 33 U/L    AST (SGOT) 20 1 - 32 U/L    Alkaline Phosphatase 96 39 - 117 U/L    Total Bilirubin 0.3 0.0 - 1.2 mg/dL    Globulin 2.9 gm/dL    A/G Ratio 1.5 g/dL    BUN/Creatinine Ratio 8.2 7.0 - 25.0    Anion Gap 9.0 5.0 - 15.0 mmol/L    eGFR 55.9 (L) >60.0 mL/min/1.73   Hemoglobin A1c    Specimen: Arm, Left; Blood   Result Value Ref Range    Hemoglobin A1C 5.30 4.80 - 5.60 %   Lipid Panel    Specimen: Arm, Left; Blood   Result Value Ref Range    Total Cholesterol 113 0 - 200 mg/dL    Triglycerides 116 0 - 150 mg/dL    HDL Cholesterol 31 (L) 40 - 60 mg/dL    LDL Cholesterol  61 0 - 100 mg/dL    VLDL Cholesterol 21 5 - 40 mg/dL    LDL/HDL Ratio 1.90    Urinalysis With Culture If Indicated - Urine, Clean Catch    Specimen: Urine, Clean Catch   Result Value Ref Range    Color, UA Yellow Yellow, Straw    Appearance, UA Clear Clear    pH, UA 7.5 5.0 - 8.0    Specific Gravity, UA 1.017 1.005 - 1.030    Glucose, UA Negative Negative    Ketones, UA Negative Negative    Bilirubin, UA Negative Negative    Blood, UA Negative Negative    Protein, UA Negative Negative    Leuk Esterase, UA Negative Negative    Nitrite, UA Negative Negative    Urobilinogen, UA 0.2 E.U./dL 0.2 - 1.0 E.U./dL       Imaging:   No valid procedures specified.     Measures:   Advanced Care Planning:   Patient does not have an advance directive, information provided.    Smoking Cessation:   less than 3 minutes spent counseling Not agreeable to stopping    Assessment / Plan      Assessment/Plan:   Diagnoses and all orders for this visit:    1. Primary  hypertension (Primary)   Patient's blood pressure is a little bit elevated today.  She has discontinued her amlodipine previously.  We will increase her losartan back up to a whole tablet daily.  If her blood pressure remains elevated we can increase her losartan up to 100 mg a day.  If it does continue to remain elevated we will restart her amlodipine.  -     Comprehensive Metabolic Panel; Future  -     Urinalysis With Culture If Indicated - Urine, Clean Catch; Future  -     losartan (COZAAR) 25 MG tablet; Take 1 tablet by mouth Daily. Taking 1/2  Dispense: 90 tablet; Refill: 3  -     Comprehensive Metabolic Panel  -     Urinalysis With Culture If Indicated - Urine, Clean Catch    2. Mixed hyperlipidemia   Patient has tolerated her Crestor thus far.  We will obtain a lipid profile and make adjustments to keep her LDL less than 70.  -     Lipid Panel; Future  -     Lipid Panel    3. Type 2 diabetes mellitus without complication, without long-term current use of insulin (HCC)   Patient is tolerating her medications at present time.  We will obtain hemoglobin A1c and will adjust to keep her hemoglobin A1c less than 7.0%.  -     Hemoglobin A1c; Future  -     Hemoglobin A1c    4. TAM (nonalcoholic steatohepatitis)   Patient has lost some weight.  It does appear that she is tolerating the GLP-1 agonist relatively well.  We will continue to have her increase it as to hopefully continue to lose weight.  Hopefully her liver function test will remain improved and that she has no increase in her liver function test secondary to fatty liver.    5. Anxiety   Patient's anxiety is doing well at present time.  We will continue on current regimen and will monitor and if she has other complaints further assessment treatment will be necessary.    6. Cigarette nicotine dependence without complication   Patient does continue to smoke.  We have encouraged her to discontinue her smoking.  She will consider this in the future.    7.  Calculus of lower urinary tract   Patient still having intermittent complaints of pain in her right side.  She had a recent CT scan that revealed that she had a kidney stone at the UPJ junction.  We will continue to push fluids and monitor and will treat accordingly.  -     Ambulatory Referral to Urology    8. Renal insufficiency   Patient did have a laboratory data that showed that she has some renal insufficiency.  We will obtain a repeat BMP after she has discontinued her meloxicam.-     Basic Metabolic Panel; Future        Follow Up:   Return in about 3 months (around 5/10/2023) for Recheck.      At [facility], we believe that sharing information builds trust and better relationships. You are receiving this note because you recently visited [facility]. It is possible you will see health information before a provider has talked with you about it. This kind of information can be easy to misunderstand. To help you fully understand what it means for your health, we urge you to discuss this note with your provider.    Greg Levine MD  [facility]

## 2023-02-11 LAB
ALBUMIN SERPL-MCNC: 4.4 G/DL (ref 3.5–5.2)
ALBUMIN/GLOB SERPL: 1.5 G/DL
ALP SERPL-CCNC: 96 U/L (ref 39–117)
ALT SERPL W P-5'-P-CCNC: 22 U/L (ref 1–33)
ANION GAP SERPL CALCULATED.3IONS-SCNC: 9 MMOL/L (ref 5–15)
AST SERPL-CCNC: 20 U/L (ref 1–32)
BILIRUB SERPL-MCNC: 0.3 MG/DL (ref 0–1.2)
BUN SERPL-MCNC: 10 MG/DL (ref 6–20)
BUN/CREAT SERPL: 8.2 (ref 7–25)
CALCIUM SPEC-SCNC: 9.4 MG/DL (ref 8.6–10.5)
CHLORIDE SERPL-SCNC: 106 MMOL/L (ref 98–107)
CHOLEST SERPL-MCNC: 113 MG/DL (ref 0–200)
CO2 SERPL-SCNC: 27 MMOL/L (ref 22–29)
CREAT SERPL-MCNC: 1.22 MG/DL (ref 0.57–1)
EGFRCR SERPLBLD CKD-EPI 2021: 55.9 ML/MIN/1.73
GLOBULIN UR ELPH-MCNC: 2.9 GM/DL
GLUCOSE SERPL-MCNC: 93 MG/DL (ref 65–99)
HDLC SERPL-MCNC: 31 MG/DL (ref 40–60)
LDLC SERPL CALC-MCNC: 61 MG/DL (ref 0–100)
LDLC/HDLC SERPL: 1.9 {RATIO}
POTASSIUM SERPL-SCNC: 4 MMOL/L (ref 3.5–5.2)
PROT SERPL-MCNC: 7.3 G/DL (ref 6–8.5)
SODIUM SERPL-SCNC: 142 MMOL/L (ref 136–145)
TRIGL SERPL-MCNC: 116 MG/DL (ref 0–150)
VLDLC SERPL-MCNC: 21 MG/DL (ref 5–40)

## 2023-02-17 ENCOUNTER — OFFICE VISIT (OUTPATIENT)
Dept: UROLOGY | Facility: CLINIC | Age: 48
End: 2023-02-17
Payer: COMMERCIAL

## 2023-02-17 VITALS
BODY MASS INDEX: 31.41 KG/M2 | HEART RATE: 78 BPM | RESPIRATION RATE: 18 BRPM | WEIGHT: 184 LBS | SYSTOLIC BLOOD PRESSURE: 138 MMHG | HEIGHT: 64 IN | TEMPERATURE: 97.6 F | DIASTOLIC BLOOD PRESSURE: 90 MMHG | OXYGEN SATURATION: 98 %

## 2023-02-17 DIAGNOSIS — N20.0 KIDNEY STONE: Primary | ICD-10-CM

## 2023-02-17 DIAGNOSIS — N28.1 BILATERAL RENAL CYSTS: ICD-10-CM

## 2023-02-17 PROCEDURE — 99204 OFFICE O/P NEW MOD 45 MIN: CPT | Performed by: NURSE PRACTITIONER

## 2023-02-17 RX ORDER — ONDANSETRON 4 MG/1
4 TABLET, FILM COATED ORAL EVERY 8 HOURS PRN
Qty: 28 TABLET | Refills: 0 | Status: SHIPPED | OUTPATIENT
Start: 2023-02-17 | End: 2023-03-03

## 2023-02-17 RX ORDER — TAMSULOSIN HYDROCHLORIDE 0.4 MG/1
1 CAPSULE ORAL NIGHTLY
Qty: 30 CAPSULE | Refills: 0 | Status: SHIPPED | OUTPATIENT
Start: 2023-02-17 | End: 2023-03-02 | Stop reason: HOSPADM

## 2023-02-17 RX ORDER — ACETAMINOPHEN 500 MG
500 TABLET ORAL EVERY 6 HOURS PRN
Qty: 56 TABLET | Refills: 0 | Status: SHIPPED | OUTPATIENT
Start: 2023-02-17 | End: 2023-03-02 | Stop reason: HOSPADM
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
## 2023-02-20 DIAGNOSIS — N20.0 KIDNEY STONE: Primary | ICD-10-CM

## 2023-02-20 RX ORDER — SODIUM CHLORIDE 9 MG/ML
100 INJECTION, SOLUTION INTRAVENOUS CONTINUOUS
Status: CANCELLED | OUTPATIENT
Start: 2023-02-20

## 2023-02-20 RX ORDER — ACETAMINOPHEN 325 MG/1
975 TABLET ORAL ONCE
Status: CANCELLED | OUTPATIENT
Start: 2023-02-20 | End: 2023-02-20

## 2023-02-23 ENCOUNTER — TELEPHONE (OUTPATIENT)
Dept: PREADMISSION TESTING | Facility: HOSPITAL | Age: 48
End: 2023-02-23

## 2023-02-23 ENCOUNTER — PREP FOR SURGERY (OUTPATIENT)
Dept: OTHER | Facility: HOSPITAL | Age: 48
End: 2023-02-23
Payer: COMMERCIAL

## 2023-02-24 ENCOUNTER — PRE-ADMISSION TESTING (OUTPATIENT)
Dept: PREADMISSION TESTING | Facility: HOSPITAL | Age: 48
End: 2023-02-24
Payer: COMMERCIAL

## 2023-02-24 ENCOUNTER — APPOINTMENT (OUTPATIENT)
Dept: GENERAL RADIOLOGY | Facility: HOSPITAL | Age: 48
End: 2023-02-24
Payer: COMMERCIAL

## 2023-02-24 ENCOUNTER — HOSPITAL ENCOUNTER (OUTPATIENT)
Dept: GENERAL RADIOLOGY | Facility: HOSPITAL | Age: 48
Discharge: HOME | End: 2023-02-24
Payer: COMMERCIAL

## 2023-02-24 VITALS — BODY MASS INDEX: 30.99 KG/M2 | HEIGHT: 65 IN | WEIGHT: 186 LBS

## 2023-02-24 DIAGNOSIS — N20.0 KIDNEY STONE: ICD-10-CM

## 2023-02-24 DIAGNOSIS — N28.9 RENAL INSUFFICIENCY: ICD-10-CM

## 2023-02-24 LAB
ANION GAP SERPL CALCULATED.3IONS-SCNC: 7.1 MMOL/L (ref 5–15)
BUN SERPL-MCNC: 17 MG/DL (ref 6–20)
BUN/CREAT SERPL: 15.3 (ref 7–25)
CALCIUM SPEC-SCNC: 9.2 MG/DL (ref 8.6–10.5)
CHLORIDE SERPL-SCNC: 104 MMOL/L (ref 98–107)
CO2 SERPL-SCNC: 27.9 MMOL/L (ref 22–29)
CREAT SERPL-MCNC: 1.11 MG/DL (ref 0.57–1)
DEPRECATED RDW RBC AUTO: 44.2 FL (ref 37–54)
EGFRCR SERPLBLD CKD-EPI 2021: 62.6 ML/MIN/1.73
ERYTHROCYTE [DISTWIDTH] IN BLOOD BY AUTOMATED COUNT: 13.6 % (ref 12.3–15.4)
GLUCOSE SERPL-MCNC: 99 MG/DL (ref 65–99)
HCT VFR BLD AUTO: 45.4 % (ref 34–46.6)
HGB BLD-MCNC: 15.7 G/DL (ref 12–15.9)
MCH RBC QN AUTO: 30.5 PG (ref 26.6–33)
MCHC RBC AUTO-ENTMCNC: 34.6 G/DL (ref 31.5–35.7)
MCV RBC AUTO: 88.3 FL (ref 79–97)
PLATELET # BLD AUTO: 198 10*3/MM3 (ref 140–450)
PMV BLD AUTO: 11 FL (ref 6–12)
POTASSIUM SERPL-SCNC: 3.7 MMOL/L (ref 3.5–5.2)
RBC # BLD AUTO: 5.14 10*6/MM3 (ref 3.77–5.28)
SODIUM SERPL-SCNC: 139 MMOL/L (ref 136–145)
WBC NRBC COR # BLD: 8.98 10*3/MM3 (ref 3.4–10.8)

## 2023-02-24 PROCEDURE — 85027 COMPLETE CBC AUTOMATED: CPT

## 2023-02-24 PROCEDURE — 80048 BASIC METABOLIC PNL TOTAL CA: CPT

## 2023-02-24 PROCEDURE — 36415 COLL VENOUS BLD VENIPUNCTURE: CPT

## 2023-02-24 PROCEDURE — 74018 RADEX ABDOMEN 1 VIEW: CPT

## 2023-02-24 RX ORDER — AMLODIPINE BESYLATE 10 MG/1
10 TABLET ORAL DAILY
COMMUNITY

## 2023-03-02 ENCOUNTER — ANESTHESIA (OUTPATIENT)
Dept: PERIOP | Facility: HOSPITAL | Age: 48
End: 2023-03-02
Payer: COMMERCIAL

## 2023-03-02 ENCOUNTER — HOSPITAL ENCOUNTER (OUTPATIENT)
Facility: HOSPITAL | Age: 48
Setting detail: HOSPITAL OUTPATIENT SURGERY
Discharge: HOME | End: 2023-03-02
Attending: UROLOGY | Admitting: UROLOGY
Payer: COMMERCIAL

## 2023-03-02 ENCOUNTER — ANESTHESIA EVENT (OUTPATIENT)
Dept: PERIOP | Facility: HOSPITAL | Age: 48
End: 2023-03-02
Payer: COMMERCIAL

## 2023-03-02 VITALS
SYSTOLIC BLOOD PRESSURE: 112 MMHG | OXYGEN SATURATION: 95 % | RESPIRATION RATE: 14 BRPM | HEART RATE: 80 BPM | DIASTOLIC BLOOD PRESSURE: 84 MMHG | TEMPERATURE: 96.8 F

## 2023-03-02 DIAGNOSIS — N20.0 KIDNEY STONE: ICD-10-CM

## 2023-03-02 LAB — GLUCOSE BLDC GLUCOMTR-MCNC: 116 MG/DL (ref 70–130)

## 2023-03-02 PROCEDURE — 25010000002 FENTANYL CITRATE (PF) 100 MCG/2ML SOLUTION: Performed by: NURSE ANESTHETIST, CERTIFIED REGISTERED

## 2023-03-02 PROCEDURE — 52356 CYSTO/URETERO W/LITHOTRIPSY: CPT | Performed by: UROLOGY

## 2023-03-02 PROCEDURE — C1758 CATHETER, URETERAL: HCPCS | Performed by: UROLOGY

## 2023-03-02 PROCEDURE — 82962 GLUCOSE BLOOD TEST: CPT

## 2023-03-02 PROCEDURE — 0 CEFAZOLIN SODIUM-DEXTROSE 2-3 GM-%(50ML) RECONSTITUTED SOLUTION: Performed by: UROLOGY

## 2023-03-02 PROCEDURE — C2617 STENT, NON-COR, TEM W/O DEL: HCPCS | Performed by: UROLOGY

## 2023-03-02 PROCEDURE — 25010000002 ONDANSETRON PER 1 MG: Performed by: NURSE ANESTHETIST, CERTIFIED REGISTERED

## 2023-03-02 PROCEDURE — 25010000002 DEXAMETHASONE PER 1 MG: Performed by: NURSE ANESTHETIST, CERTIFIED REGISTERED

## 2023-03-02 PROCEDURE — 25010000002 PROPOFOL 200 MG/20ML EMULSION: Performed by: NURSE ANESTHETIST, CERTIFIED REGISTERED

## 2023-03-02 PROCEDURE — C1769 GUIDE WIRE: HCPCS | Performed by: UROLOGY

## 2023-03-02 PROCEDURE — 74420 UROGRAPHY RTRGR +-KUB: CPT | Performed by: UROLOGY

## 2023-03-02 PROCEDURE — 25510000001 IOPAMIDOL 61 % SOLUTION: Performed by: UROLOGY

## 2023-03-02 DEVICE — URETERAL STENT
Type: IMPLANTABLE DEVICE | Site: URETER | Status: FUNCTIONAL
Brand: CONTOUR™

## 2023-03-02 RX ORDER — EPHEDRINE SULFATE 5 MG/ML
INJECTION INTRAVENOUS AS NEEDED
Status: DISCONTINUED | OUTPATIENT
Start: 2023-03-02 | End: 2023-03-02 | Stop reason: SURG

## 2023-03-02 RX ORDER — PROPOFOL 10 MG/ML
INJECTION, EMULSION INTRAVENOUS AS NEEDED
Status: DISCONTINUED | OUTPATIENT
Start: 2023-03-02 | End: 2023-03-02 | Stop reason: SURG

## 2023-03-02 RX ORDER — SODIUM CHLORIDE 9 MG/ML
100 INJECTION, SOLUTION INTRAVENOUS CONTINUOUS
Status: DISCONTINUED | OUTPATIENT
Start: 2023-03-02 | End: 2023-03-02 | Stop reason: HOSPADM

## 2023-03-02 RX ORDER — PHENAZOPYRIDINE HYDROCHLORIDE 100 MG/1
100 TABLET, FILM COATED ORAL 3 TIMES DAILY PRN
Qty: 21 TABLET | Refills: 0 | Status: SHIPPED | OUTPATIENT
Start: 2023-03-02

## 2023-03-02 RX ORDER — LORAZEPAM 2 MG/ML
1 INJECTION INTRAMUSCULAR ONCE AS NEEDED
Status: DISCONTINUED | OUTPATIENT
Start: 2023-03-02 | End: 2023-03-02 | Stop reason: HOSPADM

## 2023-03-02 RX ORDER — MAGNESIUM HYDROXIDE 1200 MG/15ML
LIQUID ORAL AS NEEDED
Status: DISCONTINUED | OUTPATIENT
Start: 2023-03-02 | End: 2023-03-02 | Stop reason: HOSPADM

## 2023-03-02 RX ORDER — ACETAMINOPHEN 500 MG
1000 TABLET ORAL EVERY 6 HOURS
Qty: 30 TABLET | Refills: 0 | Status: SHIPPED | OUTPATIENT
Start: 2023-03-02 | End: 2023-03-06

## 2023-03-02 RX ORDER — ONDANSETRON 2 MG/ML
4 INJECTION INTRAMUSCULAR; INTRAVENOUS ONCE AS NEEDED
Status: DISCONTINUED | OUTPATIENT
Start: 2023-03-02 | End: 2023-03-02 | Stop reason: HOSPADM

## 2023-03-02 RX ORDER — OXYCODONE HYDROCHLORIDE 5 MG/1
5 TABLET ORAL EVERY 6 HOURS PRN
Qty: 5 TABLET | Refills: 0 | Status: SHIPPED | OUTPATIENT
Start: 2023-03-02

## 2023-03-02 RX ORDER — TAMSULOSIN HYDROCHLORIDE 0.4 MG/1
1 CAPSULE ORAL NIGHTLY
Qty: 10 CAPSULE | Refills: 0 | Status: SHIPPED | OUTPATIENT
Start: 2023-03-02

## 2023-03-02 RX ORDER — MEPERIDINE HYDROCHLORIDE 25 MG/ML
25 INJECTION INTRAMUSCULAR; INTRAVENOUS; SUBCUTANEOUS ONCE AS NEEDED
Status: DISCONTINUED | OUTPATIENT
Start: 2023-03-02 | End: 2023-03-02 | Stop reason: HOSPADM

## 2023-03-02 RX ORDER — ONDANSETRON 2 MG/ML
INJECTION INTRAMUSCULAR; INTRAVENOUS AS NEEDED
Status: DISCONTINUED | OUTPATIENT
Start: 2023-03-02 | End: 2023-03-02 | Stop reason: SURG

## 2023-03-02 RX ORDER — DOCUSATE SODIUM 100 MG/1
100 CAPSULE, LIQUID FILLED ORAL 2 TIMES DAILY
Qty: 15 CAPSULE | Refills: 1 | Status: SHIPPED | OUTPATIENT
Start: 2023-03-02

## 2023-03-02 RX ORDER — LIDOCAINE HYDROCHLORIDE 20 MG/ML
INJECTION, SOLUTION INTRAVENOUS AS NEEDED
Status: DISCONTINUED | OUTPATIENT
Start: 2023-03-02 | End: 2023-03-02 | Stop reason: SURG

## 2023-03-02 RX ORDER — CEFAZOLIN SODIUM 2 G/50ML
2 SOLUTION INTRAVENOUS ONCE
Status: COMPLETED | OUTPATIENT
Start: 2023-03-02 | End: 2023-03-02

## 2023-03-02 RX ORDER — FENTANYL CITRATE 50 UG/ML
INJECTION, SOLUTION INTRAMUSCULAR; INTRAVENOUS AS NEEDED
Status: DISCONTINUED | OUTPATIENT
Start: 2023-03-02 | End: 2023-03-02 | Stop reason: SURG

## 2023-03-02 RX ORDER — OXYBUTYNIN CHLORIDE 10 MG/1
10 TABLET, EXTENDED RELEASE ORAL DAILY PRN
Qty: 10 TABLET | Refills: 0 | Status: SHIPPED | OUTPATIENT
Start: 2023-03-02

## 2023-03-02 RX ORDER — CIPROFLOXACIN 500 MG/1
500 TABLET, FILM COATED ORAL 2 TIMES DAILY
Qty: 6 TABLET | Refills: 0 | Status: SHIPPED | OUTPATIENT
Start: 2023-03-02 | End: 2023-03-29

## 2023-03-02 RX ORDER — MORPHINE SULFATE 2 MG/ML
2 INJECTION, SOLUTION INTRAMUSCULAR; INTRAVENOUS
Status: DISCONTINUED | OUTPATIENT
Start: 2023-03-02 | End: 2023-03-02 | Stop reason: HOSPADM

## 2023-03-02 RX ORDER — ACETAMINOPHEN 325 MG/1
975 TABLET ORAL ONCE
Status: COMPLETED | OUTPATIENT
Start: 2023-03-02 | End: 2023-03-02

## 2023-03-02 RX ORDER — DEXAMETHASONE SODIUM PHOSPHATE 4 MG/ML
INJECTION, SOLUTION INTRA-ARTICULAR; INTRALESIONAL; INTRAMUSCULAR; INTRAVENOUS; SOFT TISSUE AS NEEDED
Status: DISCONTINUED | OUTPATIENT
Start: 2023-03-02 | End: 2023-03-02 | Stop reason: SURG

## 2023-03-02 RX ADMIN — DEXAMETHASONE SODIUM PHOSPHATE 4 MG: 4 INJECTION, SOLUTION INTRA-ARTICULAR; INTRALESIONAL; INTRAMUSCULAR; INTRAVENOUS; SOFT TISSUE at 09:03

## 2023-03-02 RX ADMIN — PROPOFOL 200 MG: 10 INJECTION, EMULSION INTRAVENOUS at 09:02

## 2023-03-02 RX ADMIN — FENTANYL CITRATE 100 MCG: 50 INJECTION INTRAMUSCULAR; INTRAVENOUS at 09:11

## 2023-03-02 RX ADMIN — SODIUM CHLORIDE 100 ML/HR: 9 INJECTION, SOLUTION INTRAVENOUS at 07:45

## 2023-03-02 RX ADMIN — ACETAMINOPHEN 975 MG: 325 TABLET, FILM COATED ORAL at 07:33

## 2023-03-02 RX ADMIN — EPHEDRINE SULFATE 10 MG: 5 INJECTION INTRAVENOUS at 09:24

## 2023-03-02 RX ADMIN — ONDANSETRON 4 MG: 2 SOLUTION INTRAMUSCULAR; INTRAVENOUS at 09:03

## 2023-03-02 RX ADMIN — CEFAZOLIN SODIUM 2 G: 2 SOLUTION INTRAVENOUS at 09:11

## 2023-03-02 RX ADMIN — LIDOCAINE HYDROCHLORIDE 60 MG: 20 INJECTION, SOLUTION INTRAVENOUS at 09:02

## 2023-03-02 NOTE — OP NOTE
Preoperative diagnosis  right ureteral stone    Postoperative diagnosis  right ureteral stone    Procedure performed  1.  Flexible cystoscopy  2.  right retrograde pyelogram  3.  right ureteroscopy with holmium-YAG laser lithotripsy and basket extraction of stone fragments (37134)  4.  right ureteral stent placement 6 Fr x 24 cm  5.  Fluoroscopy time < 1 hour    Surgeon  Aaron Mart MD    Anesthesia  General    Complications  None    Specimen  Stone fragments for biochemical analysis    EBL  Minimal    Findings  Cystoscopy revealed no tumors, stones or other mucosal abnormalities.  Retrograde pyelogram revealed a delicate system with identification of the stones seen on imaging.    Ureteroscopy revealed right distal stone.     Indications  47 y.o. female with a history of right ureteral stone agreed to undergo the above named procedure after discussion of the alternatives, risks and benefits. Informed consent was obtained.      Procedure  The patient was taken to the operating room and placed supine on the operating table.  Pre-operative antibiotics were administered.  Bilateral lower extremity SCDs were placed.  After induction of general anesthesia the patient was positioned in dorsal lithotomy, prepped and draped in a sterile fashion.  A time-out was performed.      A 14 Surinamese flexible cystoscope was passed carefully via urethra into the bladder.  The right ureteral orifice was identified and a Sensor wire was passed retrograde to the level of the kidney and confirmed by fluoroscopy.  The flexible scope was off-loaded and the bladder emptied with a straight catheter.  A dilator was passed without difficulty and then removed.  The semirigid ureteroscope was passed carefully along the Sensor wire through the urethra and into the distal ureter.  The stone was encountered and fragmented with a 365-micron holmium YAG laser fiber at laser settings of 0.2 Joules and a frequency of 50 Hz.  The fragments were  extracted.  At the completion of the procedure, all clinically significant fragments were removed from the ureter and only dust-like fragments remained.  The ureteroscope was withdrawn.  A 5-Wolof open-ended was passed over the wire and the wire removed.  A retrograde pyelogram was performed by slowly injecting 5 mL of 50% Omnipaque contrast.  A 6 Fr x 24 cm stent was positioned with the upper end in the upper pole and the lower in the bladder confirmed by fluoroscopy. The bladder was emptied and the procedure was complete. The patient tolerated the procedure well and was stable throughout.    The patient will will remove stent at home in 1 week

## 2023-03-02 NOTE — DISCHARGE INSTRUCTIONS
Home Care After Ureteroscopy and Laser Lithotripsy  The following instructions will help you care for yourself, or be cared for upon your return home today.    These are guidelines for your care right after surgery only.     Diet  Drink plenty of liquids and eat light meals today.    Start your regular diet tomorrow.    Activity  Start normal activities in twenty-four (24) hours.    Wound Care and Hygiene  No restrictions, start normal routine.    Anesthesia Precautions & Expectations  After anesthesia, rest for 24 hours.    Do not drive, drink alcoholic beverages or make any important decisions during this time.  General anesthesia may cause a sore throat, jaw discomfort or muscle aches.    These symptoms can last for one or two days.     What to Expect after Surgery  Mild pain with voiding.  Frequency or urgency.  Bladder cramps.  Minimal bleeding with voiding.    Call your Doctor  Passing clots in urine preventing bladder emptying  Severe pain not controlled by oral medication  Temperature above 101.5 degrees  Inability to urinate within eight (8) hours after surgery    After Stent Placement  It is common to have blood tinged urine for 3-5 days.  It is common to have pain in your side and in your back when you urinate for 3-5 days.  It is common to have urgency with urination.  This is a temporary stent and you will remove at home in 1 week from your surgery.     Other Contacts  Urology Office:  [id number]   [place] [id number] [phone] office  [phone] fax    Other Instructions  Take tamsulosin daily until the stent comes out.  Take oxybutynin daily as needed for bladder spasm while the stent is in.  Take Pyridium up to 3 times daily as needed for burning with urination.   Take Tylenol scheduled every 6 hours for the first 3 days.  Take the oxycodone on top of that as needed.  Take all of the antibiotics.     To remove the stent, take off the wrapping, grasp the string, and  pull until you see both curly ends of the blue plastic stent come out.  Take some pain medication before you do this.  Pull stents in 1 week.Do this in the morning so that you can call the office if there are issues.    Follow up Appointment  Please call Dr. Mart's office to schedule follow-up appointment in 8 weeks with kidney ultrasound beforehand.

## 2023-03-02 NOTE — ANESTHESIA POSTPROCEDURE EVALUATION
Patient: Veronica Goldsmith    Procedure Summary     Date: 03/02/23 Room / Location: [facility] /  [facility]    Anesthesia Start: 0901 Anesthesia Stop: 0953    Procedure: URETEROSCOPY LASER LITHOTRIPSY WITH STENT INSERTION (Right) Diagnosis:       Kidney stone      (Kidney stone [N20.0])    Surgeons: Aaron Mart MD Provider: Macario Rivers CRNA    Anesthesia Type: general ASA Status: 3          Anesthesia Type: general    Vitals  Vitals Value Taken Time   BP     Temp     Pulse     Resp     SpO2 96 % 03/02/23 0952   Vitals shown include unvalidated device data.        Post Anesthesia Care and Evaluation    Patient location during evaluation: PACU  Patient participation: complete - patient participated  Level of consciousness: awake  Pain score: 3  Pain management: adequate    Airway patency: patent  Anesthetic complications: No anesthetic complications  PONV Status: controlled  Cardiovascular status: acceptable and stable  Respiratory status: acceptable and face mask  Hydration status: acceptable    Comments: SEE NURSING NOTES FOR POST OP VITAL SIGNS

## 2023-03-02 NOTE — H&P
CC  No chief complaint on file.       HPI  Veronica Goldsmith is a 47 y.o. with history of   1. Kidney stone         No recent fevers or new LUTS  Does not take any blood thinners    Past Medical History  Past Medical History:   Diagnosis Date   • Acne rosacea, erythematous telangiectatic type    • Acute bronchitis    • Acute sinusitis    • Acute upper respiratory infection    • Allergic contact dermatitis    • Allergic rhinitis    • Allergies    • Anxiety and depression    • Asthma    • Back pain, chronic    • Benign paroxysmal positional vertigo    • Blood clots in stool    • Candidal dermatitis    • Candidiasis of vulva and vagina    • Cellulitis     AND ABSCESS, right lower abdomen   • Cervical pain    • Chest pain, unspecified     nothing recent   • Conjunctivitis    • Contusion of ear    • Corneal abrasion    • Depression    • Diabetes mellitus (HCC)    • Dizziness and giddiness    • Dyspnea, unspecified    • Fever and chills    • Fibromyalgia    • Flu vaccine need    • Foot pain, right    • GERD (gastroesophageal reflux disease)    • Hand pain, left    • Hearing loss, right    • Heart palpitations    • Hemorrhoids, external    • Herpes simplex without complication    • Herpes zoster lesion    • Hypertension    • Impetigo herpetiformis    • Injury to tibial blood vessels, unspecified laterality, initial encounter    • Kidney stones    • Left humeral fracture    • Low back pain    • Lymphadenopathy    • Migraine headache    • Mixed hyperlipidemia    • TAM (nonalcoholic steatohepatitis)    • Neuropathy    • Oral candidiasis    • Plantar fasciitis    • Salpingitis and oophoritis, unspecified     not specified as acute, subacute, or chronic   • Scabies    • Stress    • Swelling of limb    • Symptoms involving abdomen and pelvis     OTHER SYMPTOMS INVOLVING ABDOMEN AND PELVIS, ABDOMINAL OR PELVIC SWELLING, MASS, OR LUMP, OTHER SPECIFIED S   • Tattoo    • Tendon pain    • Tobacco use disorder    • Unspecified viral  hepatitis without hepatic coma    • Urinary tract infection    • Vaginitis and vulvovaginitis    • Vaginitis, atrophic        Past Surgical History  Past Surgical History:   Procedure Laterality Date   • BILATERAL OOPHORECTOMY     • CARPAL TUNNEL RELEASE Bilateral    • CHOLECYSTECTOMY     • COLONOSCOPY  03/30/2018   • HUMERUS FRACTURE SURGERY Left    • ROTATOR CUFF REPAIR Right    • TOTAL ABDOMINAL HYSTERECTOMY     • TUBAL ABDOMINAL LIGATION         Medications    Current Facility-Administered Medications:   •  acetaminophen (TYLENOL) tablet 975 mg, 975 mg, Oral, Once, Aaron Mart MD  •  ceFAZolin Sodium-Dextrose (ANCEF) IVPB (duplex) 2 g, 2 g, Intravenous, Once, Aaron Mart MD  •  sodium chloride 0.9 % infusion, 100 mL/hr, Intravenous, Continuous, Aaron Mart MD    Allergies  Allergies   Allergen Reactions   • Sulfa Antibiotics Unknown - High Severity     fever       Social History  Social History     Socioeconomic History   • Marital status:    Tobacco Use   • Smoking status: Every Day     Packs/day: 1.00     Years: 29.00     Pack years: 29.00     Types: Cigarettes     Start date: 1993   • Smokeless tobacco: Never   Vaping Use   • Vaping Use: Never used   Substance and Sexual Activity   • Alcohol use: Not Currently   • Drug use: Never   • Sexual activity: Defer       Review of Systems  Constitutional: No fevers or chills  Skin: Negative for rash  Endocrine: No heat/cold intolerance   Cardiovascular: Negative for chest pain or dyspnea on exertion  Respiratory: Negative for shortness of breath or wheezing  Gastrointestinal: No constipation, nausea or vomiting  Genitourinary: Negative for new lower urinary tract symptoms, current gross hematuria or dysuria.  Musculoskeletal: No flank pain  Neurological:  Negative for frequent headaches or dizziness  Lymph/Heme: Negative for leg swelling or calf pain.    Physical Exam  Visit Vitals  /75 (BP Location: Right arm, Patient  Position: Sitting)   Pulse 78   Temp 98.4 °F (36.9 °C) (Temporal)   Resp 20   SpO2 98%     Constitutional: NAD, WDWN.   HEENT: NCAT. Conjunctivae normal.  MMM.    Cardiovascular: Regular rate.  Pulmonary/Chest: Respirations are even and unlabored bilaterally.  Abdominal: Soft. No distension, tenderness, masses or guarding. No CVA tenderness.  Neurological: A + O x 3.  Cranial Nerves II-XII grossly intact. Normal gait.  Extremities: MELISSA x 4, Warm. No clubbing.  No cyanosis.    Skin: Pink, warm and dry.  No rashes noted.  Psychiatric:  Normal mood and affect    Labs & Imaging  Lab Results   Component Value Date    GLUCOSE 99 02/24/2023    CALCIUM 9.2 02/24/2023     02/24/2023    K 3.7 02/24/2023    CO2 27.9 02/24/2023     02/24/2023    BUN 17 02/24/2023    CREATININE 1.11 (H) 02/24/2023    BCR 15.3 02/24/2023    ANIONGAP 7.1 02/24/2023     Lab Results   Component Value Date    WBC 8.98 02/24/2023    HGB 15.7 02/24/2023    HCT 45.4 02/24/2023    MCV 88.3 02/24/2023     02/24/2023     Brief Urine Lab Results  (Last result in the past 365 days)      Color   Clarity   Blood   Leuk Est   Nitrite   Protein   CREAT   Urine HCG        02/10/23 1025 Yellow   Clear   Negative   Negative   Negative   Negative                    XR Abdomen KUB    Result Date: 2/24/2023  PROCEDURE: XR ABDOMEN KUB-  HISTORY: kidney stone  FINDINGS: The visualized intestinal gas pattern appears unremarkable without evidence to suggest obstruction.  No abnormal radiopacities are seen in the abdomen.  Postcholecystectomy changes are noted.      Unremarkable abdominal view.  This report was signed and finalized on 2/24/2023 4:47 PM by Ivan Melo MD.          Assessment  Veronica Goldsmith is a 47 y.o. female who presents with the following diagnosis:  1. Kidney stone         Plan  1. To OR for R URETEROSCOPY LASER LITHOTRIPSY WITH STENT INSERTION     Aaron Mart MD

## 2023-03-02 NOTE — ANESTHESIA PREPROCEDURE EVALUATION
Anesthesia Evaluation     Patient summary reviewed and Nursing notes reviewed   no history of anesthetic complications:  NPO Solid Status: > 8 hours  NPO Liquid Status: > 8 hours           Airway   Mallampati: II  TM distance: >3 FB  Neck ROM: full  Possible difficult intubation  Dental - normal exam     Pulmonary - normal exam   (+) a smoker Current, asthma,shortness of breath,   Cardiovascular - normal exam    Rhythm: regular  Rate: normal    (+) hypertension, hyperlipidemia,       Neuro/Psych  (+) headaches, numbness, psychiatric history Anxiety and Depression,    GI/Hepatic/Renal/Endo    (+) obesity,  GERD,  hepatitis, liver disease, renal disease stones, diabetes mellitus,     Musculoskeletal     (+) arthralgias, back pain, chronic pain, neck pain,   Abdominal  - normal exam    Abdomen: soft.  Bowel sounds: normal.   Substance History      OB/GYN negative ob/gyn ROS   (-)  Pregnant    Comment: +Hyst      Other        ROS/Med Hx Other: Crt 1.11                  Anesthesia Plan    ASA 3     general     (Risks and benefits discussed including risk of aspiration, recall and dental damage. All patient questions answered. Will continue with POC.)  intravenous induction     Anesthetic plan, risks, benefits, and alternatives have been provided, discussed and informed consent has been obtained with: patient.  Pre-procedure education provided      CODE STATUS:

## 2023-03-02 NOTE — ANESTHESIA PROCEDURE NOTES
Airway  Urgency: elective    Date/Time: 3/2/2023 9:01 AM  Airway not difficult    General Information and Staff    Patient location during procedure: OR  CRNA/CAA: Macario Rivers CRNA    Indications and Patient Condition  Indications for airway management: airway protection    Preoxygenated: yes  Mask difficulty assessment: 0 - not attempted    Final Airway Details  Final airway type: supraglottic airway      Successful airway: classic  Size 3     Number of attempts at approach: 1    Additional Comments  Airway pressure leak at <20cm/h20

## 2023-03-03 RX ORDER — SEMAGLUTIDE 2.68 MG/ML
2 INJECTION, SOLUTION SUBCUTANEOUS WEEKLY
Qty: 3 ML | Refills: 11 | Status: SHIPPED | OUTPATIENT
Start: 2023-03-03 | End: 2023-03-07 | Stop reason: SDUPTHER

## 2023-03-07 RX ORDER — SEMAGLUTIDE 2.68 MG/ML
2 INJECTION, SOLUTION SUBCUTANEOUS WEEKLY
Qty: 3 ML | Refills: 11 | Status: SHIPPED | OUTPATIENT
Start: 2023-03-07

## 2023-03-13 ENCOUNTER — TELEPHONE (OUTPATIENT)
Dept: FAMILY MEDICINE CLINIC | Facility: CLINIC | Age: 48
End: 2023-03-13
Payer: COMMERCIAL

## 2023-03-13 NOTE — TELEPHONE ENCOUNTER
TY, HER  CALLED, WANTING TO KNOW IF YOU WILL GIVE HER OFF MARCH 2 THRU MARCH 8, 2023 SINCE SHE HAD HER KIDNEY STONE SURGERY, OR DOES SHE NEED TO GET IT FROM THE DOCTOR THAT DID HER SURGERY?

## 2023-03-13 NOTE — TELEPHONE ENCOUNTER
We will need to get from the surgeon.  If there is paperwork such as FMLA etc. I do not have the information to be able to complete correctly.

## 2023-03-16 LAB
ANION GAP SERPL CALCULATED.3IONS-SCNC: 8.2 MMOL/L (ref 5–15)
BUN SERPL-MCNC: 11 MG/DL (ref 6–20)
BUN/CREAT SERPL: 9.2 (ref 7–25)
CALCIUM SPEC-SCNC: 9.6 MG/DL (ref 8.6–10.5)
CHLORIDE SERPL-SCNC: 104 MMOL/L (ref 98–107)
CO2 SERPL-SCNC: 28.8 MMOL/L (ref 22–29)
CREAT SERPL-MCNC: 1.2 MG/DL (ref 0.57–1)
EGFRCR SERPLBLD CKD-EPI 2021: 57 ML/MIN/1.73
GLUCOSE SERPL-MCNC: 117 MG/DL (ref 65–99)
POTASSIUM SERPL-SCNC: 4.7 MMOL/L (ref 3.5–5.2)
SODIUM SERPL-SCNC: 141 MMOL/L (ref 136–145)

## 2023-03-16 PROCEDURE — 80048 BASIC METABOLIC PNL TOTAL CA: CPT | Performed by: FAMILY MEDICINE

## 2023-03-29 ENCOUNTER — OFFICE VISIT (OUTPATIENT)
Dept: FAMILY MEDICINE CLINIC | Facility: CLINIC | Age: 48
End: 2023-03-29
Payer: COMMERCIAL

## 2023-03-29 VITALS
WEIGHT: 186 LBS | HEART RATE: 82 BPM | TEMPERATURE: 98 F | HEIGHT: 65 IN | BODY MASS INDEX: 30.99 KG/M2 | RESPIRATION RATE: 17 BRPM | DIASTOLIC BLOOD PRESSURE: 84 MMHG | OXYGEN SATURATION: 98 % | SYSTOLIC BLOOD PRESSURE: 128 MMHG

## 2023-03-29 DIAGNOSIS — R05.1 ACUTE COUGH: ICD-10-CM

## 2023-03-29 DIAGNOSIS — H69.83 DYSFUNCTION OF BOTH EUSTACHIAN TUBES: Primary | ICD-10-CM

## 2023-03-29 RX ORDER — PREDNISONE 10 MG/1
TABLET ORAL
Qty: 36 TABLET | Refills: 0 | Status: SHIPPED | OUTPATIENT
Start: 2023-03-29 | End: 2023-04-06

## 2023-03-29 RX ORDER — FLUTICASONE PROPIONATE 50 MCG
2 SPRAY, SUSPENSION (ML) NASAL DAILY
Qty: 9.9 ML | Refills: 2 | Status: SHIPPED | OUTPATIENT
Start: 2023-03-29

## 2023-03-29 RX ORDER — BENZONATATE 100 MG/1
100 CAPSULE ORAL 3 TIMES DAILY PRN
Qty: 30 CAPSULE | Refills: 0 | Status: SHIPPED | OUTPATIENT
Start: 2023-03-29

## 2023-03-29 NOTE — PROGRESS NOTES
Office Note     Name: Veronica Goldsmith    : 1975     MRN: [id number]     Chief Complaint  Sinus pressure, pressure in ears, cough and congestion    History of Present Illness:  Veronica Goldsmith is a 47 y.o. female who presents today for symptoms of sinus pressure, pressure in ears, cough and congestion.  She reports that her symptoms have been present for 2 to 3 days.  She reports the cough is dry, no sputum production or hemoptysis.  She does report she has been a little wheezy at night.  She does report a history of asthma.  She has been doing nebulizer treatments at home and this has helped to relieve it.  She does report that occasionally when she leans her head forward, she experiences a sensation of dizziness.  But she reports that when she stands up, this is resolved.  She denies lightheadedness or presyncope.  She denies other neurologic symptoms such as changes in vision, slurred speech, facial asymmetry, headache, weakness, asymmetric weakness.  She does report history of vertigo.  She does report that sometimes if she turns her head quickly, this worsens the dizziness.  She denies any ringing in the ears or decreased ability to hear.  She also reports that she is more tired than normal and her appetite is down.  She does report that her  has been sick recently as well.      Subjective     Review of Systems:   Review of Systems   Constitutional: Positive for appetite change and fatigue. Negative for chills and fever.   HENT: Positive for congestion, ear pain, postnasal drip, rhinorrhea, sinus pressure and sore throat. Negative for ear discharge and trouble swallowing.    Eyes: Negative for blurred vision and double vision.   Respiratory: Positive for cough and wheezing. Negative for chest tightness and shortness of breath.    Cardiovascular: Negative for chest pain.   Gastrointestinal: Negative for abdominal pain, constipation, diarrhea, nausea and vomiting.   Musculoskeletal: Negative for  arthralgias and myalgias.   Neurological: Positive for dizziness. Negative for tremors, syncope, facial asymmetry, weakness, light-headedness, numbness, headache and confusion.       I have reviewed the patients family history, social history, past medical history, past surgical history and have updated it as appropriate.     Past Medical History:   Past Medical History:   Diagnosis Date   • Acne rosacea, erythematous telangiectatic type    • Acute bronchitis    • Acute sinusitis    • Acute upper respiratory infection    • Allergic contact dermatitis    • Allergic rhinitis    • Allergies    • Anxiety and depression    • Asthma    • Back pain, chronic    • Benign paroxysmal positional vertigo    • Blood clots in stool    • Candidal dermatitis    • Candidiasis of vulva and vagina    • Cellulitis     AND ABSCESS, right lower abdomen   • Cervical pain    • Chest pain, unspecified     nothing recent   • Conjunctivitis    • Contusion of ear    • Corneal abrasion    • Depression    • Diabetes mellitus (HCC)    • Dizziness and giddiness    • Dyspnea, unspecified    • Fever and chills    • Fibromyalgia    • Flu vaccine need    • Foot pain, right    • GERD (gastroesophageal reflux disease)    • Hand pain, left    • Hearing loss, right    • Heart palpitations    • Hemorrhoids, external    • Herpes simplex without complication    • Herpes zoster lesion    • Hypertension    • Impetigo herpetiformis    • Injury to tibial blood vessels, unspecified laterality, initial encounter    • Kidney stones    • Left humeral fracture    • Low back pain    • Lymphadenopathy    • Migraine headache    • Mixed hyperlipidemia    • TAM (nonalcoholic steatohepatitis)    • Neuropathy    • Oral candidiasis    • Plantar fasciitis    • Salpingitis and oophoritis, unspecified     not specified as acute, subacute, or chronic   • Scabies    • Stress    • Swelling of limb    • Symptoms involving abdomen and pelvis     OTHER SYMPTOMS INVOLVING ABDOMEN AND  PELVIS, ABDOMINAL OR PELVIC SWELLING, MASS, OR LUMP, OTHER SPECIFIED S   • Tattoo    • Tendon pain    • Tobacco use disorder    • 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    • Urinary tract infection    • Vaginitis and vulvovaginitis    • Vaginitis, atrophic        Past Surgical History:   Past Surgical History:   Procedure Laterality Date   • BILATERAL OOPHORECTOMY     • CARPAL TUNNEL RELEASE Bilateral    • CHOLECYSTECTOMY     • COLONOSCOPY  03/30/2018   • HUMERUS FRACTURE SURGERY Left    • ROTATOR CUFF REPAIR Right    • TOTAL ABDOMINAL HYSTERECTOMY     • TUBAL ABDOMINAL LIGATION     • URETEROSCOPY LASER LITHOTRIPSY WITH STENT INSERTION Right 3/2/2023    Procedure: URETEROSCOPY LASER LITHOTRIPSY WITH STENT INSERTION;  Surgeon: Aaron Mart MD;  Location: [facility];  Service: Urology;  Laterality: Right;       Family History:   Family History   Problem Relation Age of Onset   • Diabetes Mother    • Esophageal cancer Father    • Hypertension Father    • Cancer Father    • Cancer Maternal Grandmother    • Diabetes Maternal Grandmother    • Cancer Paternal Grandmother    • Cancer Paternal Grandfather    • Stroke Other    • Cervical cancer Other    • Diabetes Other    • Esophageal cancer Other    • Hypertension Other    • Lung cancer Other    • Pancreatic cancer Other    • Skin cancer Other        Social History:   Social History     Socioeconomic History   • Marital status:    Tobacco Use   • Smoking status: Every Day     Packs/day: 1.00     Years: 29.00     Pack years: 29.00     Types: Cigarettes     Start date: 1993   • Smokeless tobacco: Never   Vaping Use   • Vaping Use: Never used   Substance and Sexual Activity   • Alcohol use: Not Currently   • Drug use: Never   • Sexual activity: Defer       Immunizations:   Immunization History   Administered Date(s) Administered   • Covid-19 (Pfizer) Gray Cap 05/24/2022, 06/20/2022   • Flu Vaccine Quad PF >36MO 02/08/2019   • FluLaval/Fluzone >6mos  10/04/2022   • Flublok 18+yrs 01/09/2019   • Hepatitis A 01/09/2019   • Influenza, Unspecified 02/08/2019   • MMR 02/08/2019   • Pneumococcal Polysaccharide (PPSV23) 10/07/2022   • Tdap 02/08/2019        Medications:     Current Outpatient Medications:   •  albuterol sulfate  (90 Base) MCG/ACT inhaler, INHALE 3 PUFFS BY MOUTH EVERY 6 HOURS AS NEEDED, Disp: , Rfl:   •  amLODIPine (NORVASC) 10 MG tablet, Take 1 tablet by mouth Daily., Disp: , Rfl:   •  benzonatate (Tessalon Perles) 100 MG capsule, Take 1 capsule by mouth 3 (Three) Times a Day As Needed for Cough., Disp: 30 capsule, Rfl: 0  •  clotrimazole-betamethasone (LOTRISONE) 1-0.05 % cream, APPLY TO AFFECTED AREA TWO TIMES A DAY, Disp: 15 g, Rfl: 0  •  cyclobenzaprine (FLEXERIL) 10 MG tablet, Take 1 tablet by mouth 3 (Three) Times a Day As Needed for Muscle Spasms., Disp: 90 tablet, Rfl: 3  •  docusate sodium (Colace) 100 MG capsule, Take 1 capsule by mouth 2 (Two) Times a Day if taking oxycodone, Disp: 15 capsule, Rfl: 1  •  esomeprazole (nexIUM) 40 MG capsule, TAKE ONE CAPSULE BY MOUTH TWO TIMES A DAY, Disp: 60 capsule, Rfl: 3  •  fluticasone (FLONASE) 50 MCG/ACT nasal spray, 2 sprays into the nostril(s) as directed by provider Daily., Disp: 9.9 mL, Rfl: 2  •  losartan (COZAAR) 25 MG tablet, Take 1 tablet by mouth Daily. Taking 1/2, Disp: 90 tablet, Rfl: 3  •  oxybutynin XL (Ditropan XL) 10 MG 24 hr tablet, Take 1 tablet by mouth Daily As Needed for bladder spasm, Disp: 10 tablet, Rfl: 0  •  oxyCODONE (Roxicodone) 5 MG immediate release tablet, Take 1 tablet by mouth Every 6 (Six) Hours As Needed for Moderate Pain or Severe Pain., Disp: 5 tablet, Rfl: 0  •  phenazopyridine (PYRIDIUM) 100 MG tablet, Take 1 tablet by mouth 3 (Three) Times a Day As Needed (urinary burning)., Disp: 21 tablet, Rfl: 0  •  predniSONE (DELTASONE) 10 MG tablet, Take 8 tablets by mouth Daily for 1 day, THEN 7 tablets Daily for 1 day, THEN 6 tablets Daily for 1 day, THEN 5 tablets  "Daily for 1 day, THEN 4 tablets Daily for 1 day, THEN 3 tablets Daily for 1 day, THEN 2 tablets Daily for 1 day, THEN 1 tablet Daily for 1 day., Disp: 36 tablet, Rfl: 0  •  rosuvastatin (CRESTOR) 40 MG tablet, TAKE 1 TABLET BY MOUTH DAILY, Disp: 90 tablet, Rfl: 3  •  Semaglutide, 2 MG/DOSE, (Ozempic, 2 MG/DOSE,) 8 MG/3ML solution pen-injector, Inject 2 mg under the skin into the appropriate area as directed 1 (One) Time Per Week., Disp: 3 mL, Rfl: 11  •  sertraline (ZOLOFT) 50 MG tablet, TAKE ONE TABLET BY MOUTH EVERY DAY, Disp: 90 tablet, Rfl: 3  •  tamsulosin (FLOMAX) 0.4 MG capsule 24 hr capsule, Take 1 capsule by mouth Every Night., Disp: 10 capsule, Rfl: 0    Allergies:   Allergies   Allergen Reactions   • Sulfa Antibiotics Unknown - High Severity     fever       Objective     Vital Signs  Vitals:    03/29/23 1357   BP: 128/84   BP Location: Left arm   Patient Position: Sitting   Cuff Size: Large Adult   Pulse: 82   Resp: 17   Temp: 98 °F (36.7 °C)   SpO2: 98%   Weight: 84.4 kg (186 lb)   Height: 163.8 cm (64.5\")     Estimated body mass index is 31.43 kg/m² as calculated from the following:    Height as of this encounter: 163.8 cm (64.5\").    Weight as of this encounter: 84.4 kg (186 lb).          Physical Exam  Vitals and nursing note reviewed.   Constitutional:       General: She is not in acute distress.     Appearance: Normal appearance. She is not ill-appearing, toxic-appearing or diaphoretic.   HENT:      Head: Normocephalic and atraumatic.      Right Ear: Ear canal and external ear normal. Tympanic membrane is not perforated, erythematous, retracted or bulging.      Left Ear: Ear canal and external ear normal. Tympanic membrane is not perforated, erythematous, retracted or bulging.      Ears:      Comments: Fluid is present behind both TMs bilaterally     Nose: Congestion present. No rhinorrhea.      Mouth/Throat:      Pharynx: Uvula midline. No pharyngeal swelling, oropharyngeal exudate or posterior " oropharyngeal erythema.      Tonsils: No tonsillar exudate.      Comments: Drainage is visible in posterior pharynx  Eyes:      General:         Right eye: No discharge.         Left eye: No discharge.      Extraocular Movements: Extraocular movements intact.      Pupils: Pupils are equal, round, and reactive to light.   Cardiovascular:      Rate and Rhythm: Normal rate and regular rhythm.      Heart sounds: No murmur heard.    No friction rub. No gallop.   Pulmonary:      Effort: No respiratory distress.      Breath sounds: No wheezing, rhonchi or rales.   Musculoskeletal:      Cervical back: Normal range of motion. No rigidity.      Comments: Shoulder abduction and adduction strength 5 out of 5 bilaterally   Lymphadenopathy:      Cervical: No cervical adenopathy.   Skin:     General: Skin is warm.   Neurological:      Mental Status: She is alert and oriented to person, place, and time.      Cranial Nerves: Cranial nerves 2-12 are intact. No dysarthria or facial asymmetry.      Motor: No tremor, abnormal muscle tone or pronator drift.      Coordination: Romberg sign negative. Coordination normal. Finger-Nose-Finger Test and Heel to Shin Test normal.      Gait: Gait normal.      Comments: Hints exam normal   strength equal bilaterally   Psychiatric:         Mood and Affect: Mood normal.         Thought Content: Thought content normal.          Assessment and Plan     1. Dysfunction of both eustachian tubes  -We did discuss that her symptoms appear to be consistent with sinusitis and eustachian tube dysfunction.  -We discussed that because her symptoms have only been present for 3 days, we will treat supportively.  She does not need antibiotic at this time.  We discussed that we treat sinusitis supportively, and unless symptoms have been present for greater than 7 to 10 days.  -Due to the pressure on her ears and occasional dizziness, I have prescribed a course of steroids.  Discussed symptoms of steroids such as  increased hunger, bloating, irritability, increase in blood pressure and blood sugars, and jitteriness.  She has tolerated steroids well in the past.  -I have also prescribed a prescription of Flonase to use twice daily.  -We discussed she can also rinse out her sinuses with saline.  She should never use tap water to rinse out sinuses that include introduce bacteria into the sinuses.  -Other supportive care options include increasing hydration, Vicks VapoRub on the chest, Tylenol for fever.  -Please return to care if symptoms worsen, persist, or fail to improve.  -Patient verbalizes understanding and has no further questions.  - predniSONE (DELTASONE) 10 MG tablet; Take 8 tablets by mouth Daily for 1 day, THEN 7 tablets Daily for 1 day, THEN 6 tablets Daily for 1 day, THEN 5 tablets Daily for 1 day, THEN 4 tablets Daily for 1 day, THEN 3 tablets Daily for 1 day, THEN 2 tablets Daily for 1 day, THEN 1 tablet Daily for 1 day.  Dispense: 36 tablet; Refill: 0  - fluticasone (FLONASE) 50 MCG/ACT nasal spray; 2 sprays into the nostril(s) as directed by provider Daily.  Dispense: 9.9 mL; Refill: 2    2. Acute cough  -Prescription of Tessalon Perles have been sent to the pharmacy to have as needed for cough.  Cough is currently interfering with ability to sleep at night.  Instructed her to drink plenty of water when she takes this medicine.  -Honey is also a natural cough suppressant that can be beneficial.  If not getting relief with either of these, she may try Delsym over-the-counter.  -Please let me know if cough becomes very productive, if you have hemoptysis, or if you begin experiencing shortness of air.  -She verbalizes understanding and has no further questions.  - benzonatate (Tessalon Perles) 100 MG capsule; Take 1 capsule by mouth 3 (Three) Times a Day As Needed for Cough.  Dispense: 30 capsule; Refill: 0       Follow Up  Return if symptoms worsen or fail to improve.    Bambi Cormier PA-C  [facility]

## 2023-04-06 ENCOUNTER — TELEPHONE (OUTPATIENT)
Dept: FAMILY MEDICINE CLINIC | Facility: CLINIC | Age: 48
End: 2023-04-06
Payer: COMMERCIAL

## 2023-04-06 NOTE — TELEPHONE ENCOUNTER
Caller: GoldsmithVeronica    Relationship: Self    Best call back number:     [phone]     CLICK CHARGE NURSE (1)    What medication are you requesting:     ANTIBIOTIC    What are your current symptoms:     FLUID BEHIND EARS  DRAINAGE  COUGH  GREEN/CLEAR MUCUS  PRESSURE BEHIND EYES    How long have you been experiencing symptoms:     1 1/2 WEEKS    Have you had these symptoms before:    [x] Yes  [] No    Have you been treated for these symptoms before:   [x] Yes  [] No    If a prescription is needed, what is your preferred pharmacy and phone number:      [facility] - [place], [place]    TELEPHONE COTACT:    [phone]    ASHOK ALLISON

## 2023-04-13 ENCOUNTER — OFFICE VISIT (OUTPATIENT)
Dept: FAMILY MEDICINE CLINIC | Facility: CLINIC | Age: 48
End: 2023-04-13
Payer: COMMERCIAL

## 2023-04-13 VITALS
RESPIRATION RATE: 17 BRPM | HEART RATE: 81 BPM | DIASTOLIC BLOOD PRESSURE: 80 MMHG | WEIGHT: 184 LBS | OXYGEN SATURATION: 97 % | TEMPERATURE: 98 F | HEIGHT: 65 IN | SYSTOLIC BLOOD PRESSURE: 118 MMHG | BODY MASS INDEX: 30.66 KG/M2

## 2023-04-13 DIAGNOSIS — E11.9 TYPE 2 DIABETES MELLITUS WITHOUT COMPLICATION, WITHOUT LONG-TERM CURRENT USE OF INSULIN: Primary | ICD-10-CM

## 2023-04-13 DIAGNOSIS — I10 PRIMARY HYPERTENSION: ICD-10-CM

## 2023-04-13 DIAGNOSIS — E78.2 MIXED HYPERLIPIDEMIA: ICD-10-CM

## 2023-04-13 DIAGNOSIS — K75.81 NASH (NONALCOHOLIC STEATOHEPATITIS): ICD-10-CM

## 2023-04-13 DIAGNOSIS — J20.9 ACUTE BRONCHITIS, UNSPECIFIED ORGANISM: ICD-10-CM

## 2023-04-13 DIAGNOSIS — F17.210 CIGARETTE NICOTINE DEPENDENCE WITHOUT COMPLICATION: ICD-10-CM

## 2023-04-13 DIAGNOSIS — R30.0 DYSURIA: ICD-10-CM

## 2023-04-13 LAB
BILIRUB BLD-MCNC: ABNORMAL MG/DL
CLARITY, POC: ABNORMAL
COLOR UR: ABNORMAL
EXPIRATION DATE: ABNORMAL
GLUCOSE UR STRIP-MCNC: NEGATIVE MG/DL
KETONES UR QL: ABNORMAL
LEUKOCYTE EST, POC: ABNORMAL
Lab: ABNORMAL
NITRITE UR-MCNC: NEGATIVE MG/ML
PH UR: 6 [PH] (ref 5–8)
PROT UR STRIP-MCNC: ABNORMAL MG/DL
RBC # UR STRIP: NEGATIVE /UL
SP GR UR: 1.03 (ref 1–1.03)
UROBILINOGEN UR QL: NORMAL

## 2023-04-13 RX ORDER — CEFDINIR 300 MG/1
300 CAPSULE ORAL 2 TIMES DAILY
Qty: 20 CAPSULE | Refills: 0 | Status: SHIPPED | OUTPATIENT
Start: 2023-04-13

## 2023-04-13 RX ORDER — PREDNISONE 10 MG/1
TABLET ORAL
Qty: 36 TABLET | Refills: 0 | Status: SHIPPED | OUTPATIENT
Start: 2023-04-13 | End: 2023-04-21

## 2023-04-13 NOTE — PROGRESS NOTES
Follow Up Office Visit      Date: 2023   Patient Name: Veronica Goldsmith  : 1975   MRN: [id number]     Chief Complaint:    Chief Complaint   Patient presents with   • Sinus Problem   • Cough   • Facial Pain     Saw Bambi 2 WEEKS AGO ,  finished steroids     • bladder spasms     Urgency, frequency and incont.       History of Present Illness: Veronica Goldsmith is a 47 y.o. female who is here today for follow-up.  Patient has been having little bit of issues with persistent cough and congestion.  She was seen by Bambi Cormier PA-C 2 weeks ago and was started on steroids.  She initially had improvement but now has had recurrence of her symptoms.  She states that she has been having some problems with cough as well as wheeze.  She has a bit of sinus congestion but no fever at present time.  She is coughing up and blowing out relatively thick stuff.  She also has problems with some urinary frequency and urgency.  She has had some enuresis as well.  Otherwise she continues to take her other medications as prescribed without side effects.  She has tolerated her Ozempic and is slowly increasing the dose to 2 mg weekly.  She denies any change in her activity level.  Her appetite has been diminished due to the medications.  Sleep has been doing about the same.  No other cardiovascular, respiratory, gastrointestinal, urologic or neurologic complaints.    Subjective      Review of Systems:   Review of Systems   Constitutional: Negative for activity change, appetite change and fatigue.   HENT: Positive for congestion and sinus pressure.    Respiratory: Positive for cough and wheezing. Negative for shortness of breath.    Cardiovascular: Negative for chest pain, palpitations and leg swelling.   Gastrointestinal: Negative for abdominal distention, abdominal pain, blood in stool, constipation, diarrhea, nausea, vomiting, GERD and indigestion.   Genitourinary: Positive for difficulty urinating, dysuria, frequency and urgency.  Negative for flank pain.   Musculoskeletal: Negative for arthralgias, back pain, gait problem, joint swelling and myalgias.   Neurological: Negative for dizziness, weakness and memory problem.   Psychiatric/Behavioral: Negative for sleep disturbance and depressed mood. The patient is not nervous/anxious.        I have reviewed the patients family history, social history, past medical history, past surgical history and have updated it as appropriate.     Medications:     Current Outpatient Medications:   •  albuterol sulfate  (90 Base) MCG/ACT inhaler, INHALE 3 PUFFS BY MOUTH EVERY 6 HOURS AS NEEDED, Disp: , Rfl:   •  amLODIPine (NORVASC) 10 MG tablet, Take 1 tablet by mouth Daily., Disp: , Rfl:   •  cefdinir (OMNICEF) 300 MG capsule, Take 1 capsule by mouth 2 (Two) Times a Day., Disp: 20 capsule, Rfl: 0  •  cyclobenzaprine (FLEXERIL) 10 MG tablet, Take 1 tablet by mouth 3 (Three) Times a Day As Needed for Muscle Spasms., Disp: 90 tablet, Rfl: 3  •  docusate sodium (Colace) 100 MG capsule, Take 1 capsule by mouth 2 (Two) Times a Day if taking oxycodone, Disp: 15 capsule, Rfl: 1  •  esomeprazole (nexIUM) 40 MG capsule, TAKE ONE CAPSULE BY MOUTH TWO TIMES A DAY, Disp: 60 capsule, Rfl: 3  •  fluticasone (FLONASE) 50 MCG/ACT nasal spray, 2 sprays into the nostril(s) as directed by provider Daily., Disp: 9.9 mL, Rfl: 2  •  losartan (COZAAR) 25 MG tablet, Take 1 tablet by mouth Daily. Taking 1/2, Disp: 90 tablet, Rfl: 3  •  oxybutynin XL (Ditropan XL) 10 MG 24 hr tablet, Take 1 tablet by mouth Daily As Needed for bladder spasm, Disp: 10 tablet, Rfl: 0  •  predniSONE (DELTASONE) 10 MG tablet, Take 8 tablets by mouth Daily for 1 day, THEN 7 tablets Daily for 1 day, THEN 6 tablets Daily for 1 day, THEN 5 tablets Daily for 1 day, THEN 4 tablets Daily for 1 day, THEN 3 tablets Daily for 1 day, THEN 2 tablets Daily for 1 day, THEN 1 tablet Daily for 1 day., Disp: 36 tablet, Rfl: 0  •  rosuvastatin (CRESTOR) 40 MG tablet,  "TAKE 1 TABLET BY MOUTH DAILY, Disp: 90 tablet, Rfl: 3  •  Semaglutide, 2 MG/DOSE, (Ozempic, 2 MG/DOSE,) 8 MG/3ML solution pen-injector, Inject 2 mg under the skin into the appropriate area as directed 1 (One) Time Per Week., Disp: 3 mL, Rfl: 11  •  sertraline (ZOLOFT) 50 MG tablet, TAKE ONE TABLET BY MOUTH EVERY DAY, Disp: 90 tablet, Rfl: 3  •  tamsulosin (FLOMAX) 0.4 MG capsule 24 hr capsule, Take 1 capsule by mouth Every Night., Disp: 10 capsule, Rfl: 0    Allergies:   Allergies   Allergen Reactions   • Sulfa Antibiotics Unknown - High Severity     fever       Immunizations:   Immunization History   Administered Date(s) Administered   • Covid-19 (Pfizer) Gray Cap 05/24/2022, 06/20/2022   • Flu Vaccine Quad PF >36MO 02/08/2019   • Flu Vaccine Split Quad 01/09/2019   • FluLaval/Fluzone >6mos 10/04/2022   • Flublok 18+yrs 01/09/2019   • Hepatitis A 01/09/2019   • Influenza, Unspecified 02/08/2019   • MMR 02/08/2019   • Pneumococcal Polysaccharide (PPSV23) 10/07/2022   • Tdap 02/08/2019        Objective     Physical Exam: Please see above  Vital Signs:   Vitals:    04/13/23 0845   BP: 118/80   BP Location: Right arm   Patient Position: Sitting   Cuff Size: Large Adult   Pulse: 81   Resp: 17   Temp: 98 °F (36.7 °C)   SpO2: 97%   Weight: 83.5 kg (184 lb)   Height: 163.8 cm (64.5\")     Body mass index is 31.1 kg/m².  BMI is >= 30 and <35. (Class 1 Obesity). The following options were offered after discussion;: exercise counseling/recommendations, nutrition counseling/recommendations and pharmacological intervention options       Physical Exam  Vitals and nursing note reviewed.   Constitutional:       Appearance: Normal appearance.   HENT:      Head: Normocephalic and atraumatic.      Nose: Nose normal.      Mouth/Throat:      Pharynx: Oropharynx is clear.   Eyes:      Extraocular Movements: Extraocular movements intact.      Pupils: Pupils are equal, round, and reactive to light.   Neck:      Thyroid: No thyroid mass or " thyromegaly.      Trachea: Trachea normal.   Cardiovascular:      Rate and Rhythm: Normal rate and regular rhythm.      Pulses: Normal pulses. No decreased pulses.      Heart sounds: Normal heart sounds.   Pulmonary:      Effort: Pulmonary effort is normal.      Breath sounds: Examination of the right-upper field reveals wheezing. Examination of the left-upper field reveals wheezing. Examination of the right-middle field reveals wheezing. Examination of the left-middle field reveals wheezing. Examination of the right-lower field reveals wheezing. Examination of the left-lower field reveals wheezing. Wheezing present.   Abdominal:      General: Abdomen is flat. Bowel sounds are normal.      Palpations: Abdomen is soft.      Tenderness: There is no abdominal tenderness.   Musculoskeletal:      Cervical back: Neck supple.      Right lower leg: No edema.      Left lower leg: No edema.   Lymphadenopathy:      Cervical: No cervical adenopathy.   Skin:     General: Skin is warm and dry.   Neurological:      General: No focal deficit present.      Mental Status: She is alert and oriented to person, place, and time.      Sensory: Sensation is intact.      Motor: Motor function is intact.      Coordination: Coordination is intact.   Psychiatric:         Attention and Perception: Attention normal.         Mood and Affect: Mood normal.         Speech: Speech normal.         Behavior: Behavior normal.         Procedures    Results:   Labs:   Hemoglobin A1C   Date Value Ref Range Status   02/10/2023 5.30 4.80 - 5.60 % Final     TSH   Date Value Ref Range Status   10/07/2022 1.260 0.270 - 4.200 uIU/mL Final        POCT Results (if applicable):   Results for orders placed or performed in visit on 04/13/23   POCT urinalysis dipstick, automated    Specimen: Urine   Result Value Ref Range    Color Erika Yellow, Straw, Dark Yellow, Erika    Clarity, UA Cloudy (A) Clear    Specific Gravity  1.030 1.005 - 1.030    pH, Urine 6.0 5.0 - 8.0     Leukocytes Large (3+) (A) Negative    Nitrite, UA Negative Negative    Protein, POC 2+ (A) Negative mg/dL    Glucose, UA Negative Negative mg/dL    Ketones, UA 1+ (A) Negative    Urobilinogen, UA Normal Normal, 0.2 E.U./dL    Bilirubin Moderate (2+) (A) Negative    Blood, UA Negative Negative    Lot Number [id number]     Expiration Date 21,024        Imaging:   No valid procedures specified.     Measures:   Advanced Care Planning:   Patient does not have an advance directive, information provided.    Smoking Cessation:   less than 3 minutes spent counseling Will try to cut down    Assessment / Plan      Assessment/Plan:   Diagnoses and all orders for this visit:    1. Type 2 diabetes mellitus without complication, without long-term current use of insulin (Primary)   Patient has tolerated the GLP-1 at present time.  She is slowly increasing the dose.  We will continue to have her increase the dose up to 2 mg weekly.  Patient has lost approximately 42 pounds since the initiation of the medication.  We will plan on checking her hemoglobin A1c during the next visit.    2. Primary hypertension   Blood pressure is doing well at present time.  No adjustments are necessary currently.    3. Mixed hyperlipidemia   We will plan on checking a lipid profile when she returns to clinic in May.  We have encouraged her to continue with the medication at present time.    4. TAM (nonalcoholic steatohepatitis)   I will suspect that her fatty liver has improved since she has lost weight.  We will recheck her liver function test and will pursue as necessary.  If her liver function tests remain elevated we may consider the FibroSure testing or possibly checking her liver elasticity.    5. Cigarette nicotine dependence without complication   We have encouraged patient to discontinue smoking.  She is having some issues with respect to breathing and hopefully discontinuing the smoking will be beneficial to her overall general  health.    6. Dysuria   Patient does have dysuria frequency as well as urgency.  Her urinalysis was suggestive of having a bladder infection.  Hopefully the cephalosporin will take care of the infection and she will contact us if her symptoms do not improve.  -     POCT urinalysis dipstick, automated    7. Acute bronchitis, unspecified organism   Patient does appear to have continuation of her symptoms despite the use of prednisone.  She does continue to have some wheezing aspect.  We will restart a course of steroids and obtain a chest x-ray.  Since she is having productive cough we will try her on a cephalosporin and will monitor her symptoms.  If she has other concerns prior to her next scheduled follow-up in 10 days she will contact us.  -     predniSONE (DELTASONE) 10 MG tablet; Take 8 tablets by mouth Daily for 1 day, THEN 7 tablets Daily for 1 day, THEN 6 tablets Daily for 1 day, THEN 5 tablets Daily for 1 day, THEN 4 tablets Daily for 1 day, THEN 3 tablets Daily for 1 day, THEN 2 tablets Daily for 1 day, THEN 1 tablet Daily for 1 day.  Dispense: 36 tablet; Refill: 0  -     cefdinir (OMNICEF) 300 MG capsule; Take 1 capsule by mouth 2 (Two) Times a Day.  Dispense: 20 capsule; Refill: 0  -     XR Chest PA & Lateral; Future        Follow Up:   No follow-ups on file.      At [facility], we believe that sharing information builds trust and better relationships. You are receiving this note because you recently visited [facility]. It is possible you will see health information before a provider has talked with you about it. This kind of information can be easy to misunderstand. To help you fully understand what it means for your health, we urge you to discuss this note with your provider.    Greg Levine MD  [facility]

## 2023-04-17 DIAGNOSIS — I10 PRIMARY HYPERTENSION: ICD-10-CM

## 2023-04-17 RX ORDER — CLOTRIMAZOLE AND BETAMETHASONE DIPROPIONATE 10; .64 MG/G; MG/G
CREAM TOPICAL
Qty: 45 G | Refills: 0 | Status: SHIPPED | OUTPATIENT
Start: 2023-04-17

## 2023-04-17 RX ORDER — LOSARTAN POTASSIUM 25 MG/1
TABLET ORAL
Qty: 90 TABLET | Refills: 3 | Status: SHIPPED | OUTPATIENT
Start: 2023-04-17

## 2023-04-19 ENCOUNTER — HOSPITAL ENCOUNTER (OUTPATIENT)
Dept: ULTRASOUND IMAGING | Facility: HOSPITAL | Age: 48
Discharge: HOME | End: 2023-04-19
Admitting: UROLOGY
Payer: COMMERCIAL

## 2023-04-19 DIAGNOSIS — N20.0 KIDNEY STONE: ICD-10-CM

## 2023-04-19 PROCEDURE — 76775 US EXAM ABDO BACK WALL LIM: CPT

## 2023-04-21 ENCOUNTER — OFFICE VISIT (OUTPATIENT)
Dept: FAMILY MEDICINE CLINIC | Facility: CLINIC | Age: 48
End: 2023-04-21
Payer: COMMERCIAL

## 2023-04-21 VITALS
TEMPERATURE: 98 F | RESPIRATION RATE: 16 BRPM | DIASTOLIC BLOOD PRESSURE: 70 MMHG | BODY MASS INDEX: 31.41 KG/M2 | OXYGEN SATURATION: 97 % | HEART RATE: 72 BPM | HEIGHT: 64 IN | SYSTOLIC BLOOD PRESSURE: 134 MMHG | WEIGHT: 184 LBS

## 2023-04-21 DIAGNOSIS — F17.210 CIGARETTE NICOTINE DEPENDENCE WITHOUT COMPLICATION: ICD-10-CM

## 2023-04-21 DIAGNOSIS — J20.9 ACUTE BRONCHITIS, UNSPECIFIED ORGANISM: Primary | ICD-10-CM

## 2023-04-21 DIAGNOSIS — N28.1 COMPLEX RENAL CYST: ICD-10-CM

## 2023-04-21 NOTE — PROGRESS NOTES
Follow Up Office Visit      Date: 2023   Patient Name: Veronica Goldsmith  : 1975   MRN: [id number]     Chief Complaint:    Chief Complaint   Patient presents with   • Bronchitis     Fu   currently on meds for UTI   has 2 more days left       History of Present Illness: Veronica Goldsmith is a 47 y.o. female who is here today for follow-up.  Patient's been doing relatively well since last being seen.  She states that her breathing is greatly improved.  She is no longer wheezing but does continue with a nonproductive cough..  Patient does continue to take her antibiotics as prescribed.  She is also using her inhalers without difficulty.  Patient does continue to smoke however.  No other complaints been noted currently.  She has continued with normal activity, appetite and sleep.  Patient denies any other cardiovascular, respiratory,, urologic or neurologic complaints    Subjective      Review of Systems:   Review of Systems   Constitutional: Negative for activity change, appetite change and fatigue.   Respiratory: Positive for cough. Negative for shortness of breath and wheezing.    Cardiovascular: Negative for chest pain, palpitations and leg swelling.   Gastrointestinal: Negative for abdominal pain, constipation, diarrhea, nausea and vomiting.   Genitourinary: Negative for dysuria, flank pain, frequency and urgency.   Musculoskeletal: Negative for arthralgias, back pain, gait problem and myalgias.   Neurological: Negative for dizziness, tremors, weakness, light-headedness, headache and memory problem.   Psychiatric/Behavioral: Negative for sleep disturbance and depressed mood. The patient is not nervous/anxious.        I have reviewed the patients family history, social history, past medical history, past surgical history and have updated it as appropriate.     Medications:     Current Outpatient Medications:   •  albuterol sulfate  (90 Base) MCG/ACT inhaler, INHALE 3 PUFFS BY MOUTH EVERY 6 HOURS AS  NEEDED, Disp: , Rfl:   •  amLODIPine (NORVASC) 10 MG tablet, Take 1 tablet by mouth Daily., Disp: , Rfl:   •  cefdinir (OMNICEF) 300 MG capsule, Take 1 capsule by mouth 2 (Two) Times a Day., Disp: 20 capsule, Rfl: 0  •  clotrimazole-betamethasone (LOTRISONE) 1-0.05 % cream, APPLY TO AFFECTED AREA TWO TIMES A DAY, Disp: 45 g, Rfl: 0  •  cyclobenzaprine (FLEXERIL) 10 MG tablet, Take 1 tablet by mouth 3 (Three) Times a Day As Needed for Muscle Spasms., Disp: 90 tablet, Rfl: 3  •  docusate sodium (Colace) 100 MG capsule, Take 1 capsule by mouth 2 (Two) Times a Day if taking oxycodone, Disp: 15 capsule, Rfl: 1  •  esomeprazole (nexIUM) 40 MG capsule, TAKE ONE CAPSULE BY MOUTH TWO TIMES A DAY, Disp: 60 capsule, Rfl: 3  •  fluticasone (FLONASE) 50 MCG/ACT nasal spray, 2 sprays into the nostril(s) as directed by provider Daily., Disp: 9.9 mL, Rfl: 2  •  losartan (COZAAR) 25 MG tablet, TAKE 1 TABLET BY MOUTH DAILY, Disp: 90 tablet, Rfl: 3  •  oxybutynin XL (Ditropan XL) 10 MG 24 hr tablet, Take 1 tablet by mouth Daily As Needed for bladder spasm, Disp: 10 tablet, Rfl: 0  •  rosuvastatin (CRESTOR) 40 MG tablet, TAKE 1 TABLET BY MOUTH DAILY, Disp: 90 tablet, Rfl: 3  •  Semaglutide, 2 MG/DOSE, (Ozempic, 2 MG/DOSE,) 8 MG/3ML solution pen-injector, Inject 2 mg under the skin into the appropriate area as directed 1 (One) Time Per Week., Disp: 3 mL, Rfl: 11  •  sertraline (ZOLOFT) 50 MG tablet, TAKE ONE TABLET BY MOUTH EVERY DAY, Disp: 90 tablet, Rfl: 3  •  tamsulosin (FLOMAX) 0.4 MG capsule 24 hr capsule, Take 1 capsule by mouth Every Night., Disp: 10 capsule, Rfl: 0    Allergies:   Allergies   Allergen Reactions   • Sulfa Antibiotics Unknown - High Severity     fever       Immunizations:   Immunization History   Administered Date(s) Administered   • Covid-19 (Pfizer) Gray Cap 05/24/2022, 06/20/2022   • Flu Vaccine Quad PF >36MO 02/08/2019   • Flu Vaccine Split Quad 01/09/2019   • FluLaval/Fluzone >6mos 10/04/2022   • Flublok  "18+yrs 01/09/2019   • Hepatitis A 01/09/2019   • Influenza, Unspecified 02/08/2019   • MMR 02/08/2019   • Pneumococcal Polysaccharide (PPSV23) 10/07/2022   • Tdap 02/08/2019        Objective     Physical Exam: Please see above  Vital Signs:   Vitals:    04/21/23 0926   BP: 134/70   BP Location: Left arm   Patient Position: Sitting   Cuff Size: Large Adult   Pulse: 72   Resp: 16   Temp: 98 °F (36.7 °C)   SpO2: 97%   Weight: 83.5 kg (184 lb)   Height: 162.6 cm (64\")     Body mass index is 31.58 kg/m².  BMI is >= 30 and <35. (Class 1 Obesity). The following options were offered after discussion;: exercise counseling/recommendations, nutrition counseling/recommendations and pharmacological intervention options       Physical Exam  Vitals and nursing note reviewed.   Constitutional:       Appearance: Normal appearance.   HENT:      Head: Normocephalic and atraumatic.      Nose: Nose normal.      Mouth/Throat:      Pharynx: Oropharynx is clear.   Eyes:      Extraocular Movements: Extraocular movements intact.      Pupils: Pupils are equal, round, and reactive to light.   Neck:      Thyroid: No thyroid mass or thyromegaly.      Trachea: Trachea normal.   Cardiovascular:      Rate and Rhythm: Normal rate and regular rhythm.      Pulses: Normal pulses. No decreased pulses.      Heart sounds: Normal heart sounds.   Pulmonary:      Effort: Pulmonary effort is normal.      Breath sounds: Normal breath sounds.   Abdominal:      General: Abdomen is flat. Bowel sounds are normal.      Palpations: Abdomen is soft.      Tenderness: There is no abdominal tenderness.   Musculoskeletal:      Cervical back: Neck supple.      Right lower leg: No edema.      Left lower leg: No edema.   Lymphadenopathy:      Cervical: No cervical adenopathy.   Skin:     General: Skin is warm and dry.   Neurological:      General: No focal deficit present.      Mental Status: She is alert and oriented to person, place, and time.      Sensory: Sensation is " intact.      Motor: Motor function is intact.      Coordination: Coordination is intact.   Psychiatric:         Attention and Perception: Attention normal.         Mood and Affect: Mood normal.         Speech: Speech normal.         Behavior: Behavior normal.         Procedures    Results:   Labs:   Hemoglobin A1C   Date Value Ref Range Status   02/10/2023 5.30 4.80 - 5.60 % Final     TSH   Date Value Ref Range Status   10/07/2022 1.260 0.270 - 4.200 uIU/mL Final        POCT Results (if applicable):   Results for orders placed or performed in visit on 04/13/23   POCT urinalysis dipstick, automated    Specimen: Urine   Result Value Ref Range    Color Erika Yellow, Straw, Dark Yellow, Erika    Clarity, UA Cloudy (A) Clear    Specific Gravity  1.030 1.005 - 1.030    pH, Urine 6.0 5.0 - 8.0    Leukocytes Large (3+) (A) Negative    Nitrite, UA Negative Negative    Protein, POC 2+ (A) Negative mg/dL    Glucose, UA Negative Negative mg/dL    Ketones, UA 1+ (A) Negative    Urobilinogen, UA Normal Normal, 0.2 E.U./dL    Bilirubin Moderate (2+) (A) Negative    Blood, UA Negative Negative    Lot Number [id number]     Expiration Date 21,024        Imaging:   No valid procedures specified.     Measures:   Advanced Care Planning:   Patient does not have an advance directive, information provided.    Smoking Cessation:   less than 3 minutes spent counseling Will try to cut down    Assessment / Plan      Assessment/Plan:   Diagnoses and all orders for this visit:    1. Acute bronchitis, unspecified organism (Primary)   Patient's acute bronchitis has improved.  She does continue to have a slightly prolonged expiratory phase.  She does not have any appreciable wheeze at present time.  We will continue with our current regiment and will obtain the PFT when she returns to clinic for follow-up.  Patient may have some underlying COPD that has not been diagnosed as of yet.  We have encouraged her to discontinue smoking.    2.  Cigarette nicotine dependence without complication   Patient does continue to smoke.  We have encouraged her to discontinue her smoking.  Patient will try to cut back on her smoking.  We will provide any assistance as necessary.    3. Complex renal cyst   Patient had an abnormal CT scan performed that was suggestive of underlying renal cyst.  She had an ultrasound obtained that did reveal a complex renal cyst.  It was recommended by the radiologist that she have a follow-up in 3 to 6 months.  Patient does have an appointment with urologist in the near future.  We will continue to monitor and will do as requested by the specialist.      Follow Up:   Return As scheduled with a PFT at that time..      At [facility], we believe that sharing information builds trust and better relationships. You are receiving this note because you recently visited [facility]. It is possible you will see health information before a provider has talked with you about it. This kind of information can be easy to misunderstand. To help you fully understand what it means for your health, we urge you to discuss this note with your provider.    Greg Levine MD  [facility]

## 2023-05-08 ENCOUNTER — OFFICE VISIT (OUTPATIENT)
Dept: UROLOGY | Facility: CLINIC | Age: 48
End: 2023-05-08
Payer: COMMERCIAL

## 2023-05-08 ENCOUNTER — OFFICE VISIT (OUTPATIENT)
Dept: FAMILY MEDICINE CLINIC | Facility: CLINIC | Age: 48
End: 2023-05-08
Payer: COMMERCIAL

## 2023-05-08 VITALS
BODY MASS INDEX: 203.76 KG/M2 | OXYGEN SATURATION: 95 % | TEMPERATURE: 98.7 F | DIASTOLIC BLOOD PRESSURE: 94 MMHG | WEIGHT: 184 LBS | HEART RATE: 98 BPM | SYSTOLIC BLOOD PRESSURE: 138 MMHG

## 2023-05-08 VITALS
BODY MASS INDEX: 41.52 KG/M2 | HEART RATE: 83 BPM | WEIGHT: 179.4 LBS | SYSTOLIC BLOOD PRESSURE: 158 MMHG | OXYGEN SATURATION: 95 % | TEMPERATURE: 98 F | HEIGHT: 55 IN | DIASTOLIC BLOOD PRESSURE: 112 MMHG

## 2023-05-08 DIAGNOSIS — N28.1 BILATERAL RENAL CYSTS: Primary | ICD-10-CM

## 2023-05-08 DIAGNOSIS — L23.9 ALLERGIC DERMATITIS: ICD-10-CM

## 2023-05-08 DIAGNOSIS — N20.0 KIDNEY STONE: ICD-10-CM

## 2023-05-08 DIAGNOSIS — J45.30 MILD PERSISTENT ASTHMA, UNSPECIFIED WHETHER COMPLICATED: Primary | ICD-10-CM

## 2023-05-08 DIAGNOSIS — N39.41 URGE INCONTINENCE: ICD-10-CM

## 2023-05-08 PROCEDURE — 99214 OFFICE O/P EST MOD 30 MIN: CPT | Performed by: PHYSICIAN ASSISTANT

## 2023-05-08 PROCEDURE — 99214 OFFICE O/P EST MOD 30 MIN: CPT | Performed by: UROLOGY

## 2023-05-08 RX ORDER — FLUTICASONE FUROATE, UMECLIDINIUM BROMIDE AND VILANTEROL TRIFENATATE 200; 62.5; 25 UG/1; UG/1; UG/1
1 POWDER RESPIRATORY (INHALATION) DAILY
Qty: 28 EACH | Refills: 1 | Status: SHIPPED | OUTPATIENT
Start: 2023-05-08

## 2023-05-08 RX ORDER — CETIRIZINE HYDROCHLORIDE 10 MG/1
10 TABLET ORAL DAILY
Qty: 30 TABLET | Refills: 1 | Status: SHIPPED | OUTPATIENT
Start: 2023-05-08

## 2023-05-08 RX ORDER — ASPIRIN 81 MG/1
81 TABLET ORAL DAILY
COMMUNITY

## 2023-05-08 RX ORDER — OXYBUTYNIN CHLORIDE 10 MG/1
10 TABLET, EXTENDED RELEASE ORAL DAILY
Qty: 30 TABLET | Refills: 11 | Status: SHIPPED | OUTPATIENT
Start: 2023-05-08

## 2023-05-08 NOTE — ADDENDUM NOTE
Addended by: SANTIAGO ELENA on: 5/8/2023 11:33 AM     Modules accepted: Orders, Level of Service

## 2023-05-08 NOTE — PROGRESS NOTES
Chief Complaint   Patient presents with   • Post-op     8 week post op f/u        HPI  Ms. Goldsmith is a 47 y.o. female with history below in assessment, who presents for follow up.     At this visit no flank pain    Past Medical History:   Diagnosis Date   • Acne rosacea, erythematous telangiectatic type    • Acute bronchitis    • Acute sinusitis    • Acute upper respiratory infection    • Allergic contact dermatitis    • Allergic rhinitis    • Allergies    • Anxiety and depression    • Asthma    • Back pain, chronic    • Benign paroxysmal positional vertigo    • Blood clots in stool    • Candidal dermatitis    • Candidiasis of vulva and vagina    • Cellulitis     AND ABSCESS, right lower abdomen   • Cervical pain    • Chest pain, unspecified     nothing recent   • Conjunctivitis    • Contusion of ear    • Corneal abrasion    • Depression    • Diabetes mellitus    • Dizziness and giddiness    • Dyspnea, unspecified    • Fever and chills    • Fibromyalgia    • Flu vaccine need    • Foot pain, right    • GERD (gastroesophageal reflux disease)    • Hand pain, left    • Hearing loss, right    • Heart palpitations    • Hemorrhoids, external    • Herpes simplex without complication    • Herpes zoster lesion    • Hypertension    • Impetigo herpetiformis    • Injury to tibial blood vessels, unspecified laterality, initial encounter    • Kidney cyst     Right kidney   • Kidney stones    • Left humeral fracture    • Low back pain    • Lymphadenopathy    • Migraine headache    • Mixed hyperlipidemia    • TAM (nonalcoholic steatohepatitis)    • Neuropathy    • Oral candidiasis    • Plantar fasciitis    • Salpingitis and oophoritis, unspecified     not specified as acute, subacute, or chronic   • Scabies    • Stress    • Swelling of limb    • Symptoms involving abdomen and pelvis     OTHER SYMPTOMS INVOLVING ABDOMEN AND PELVIS, ABDOMINAL OR PELVIC SWELLING, MASS, OR LUMP, OTHER SPECIFIED S   • Tattoo    • Tendon pain    •  Tobacco use disorder    • 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    • Urinary tract infection    • Vaginitis and vulvovaginitis    • Vaginitis, atrophic        Past Surgical History:   Procedure Laterality Date   • BILATERAL OOPHORECTOMY     • CARPAL TUNNEL RELEASE Bilateral    • CHOLECYSTECTOMY     • COLONOSCOPY  03/30/2018   • HUMERUS FRACTURE SURGERY Left    • ROTATOR CUFF REPAIR Right    • TOTAL ABDOMINAL HYSTERECTOMY     • TUBAL ABDOMINAL LIGATION     • URETEROSCOPY LASER LITHOTRIPSY WITH STENT INSERTION Right 3/2/2023    Procedure: URETEROSCOPY LASER LITHOTRIPSY WITH STENT INSERTION;  Surgeon: Aaron Mart MD;  Location: [facility];  Service: Urology;  Laterality: Right;         Current Outpatient Medications:   •  albuterol sulfate  (90 Base) MCG/ACT inhaler, INHALE 3 PUFFS BY MOUTH EVERY 6 HOURS AS NEEDED, Disp: , Rfl:   •  amLODIPine (NORVASC) 10 MG tablet, Take 1 tablet by mouth Daily., Disp: , Rfl:   •  aspirin 81 MG EC tablet, Take 1 tablet by mouth Daily., Disp: , Rfl:   •  clotrimazole-betamethasone (LOTRISONE) 1-0.05 % cream, APPLY TO AFFECTED AREA TWO TIMES A DAY, Disp: 45 g, Rfl: 0  •  esomeprazole (nexIUM) 40 MG capsule, TAKE ONE CAPSULE BY MOUTH TWO TIMES A DAY, Disp: 60 capsule, Rfl: 3  •  fluticasone (FLONASE) 50 MCG/ACT nasal spray, 2 sprays into the nostril(s) as directed by provider Daily., Disp: 9.9 mL, Rfl: 2  •  losartan (COZAAR) 25 MG tablet, TAKE 1 TABLET BY MOUTH DAILY, Disp: 90 tablet, Rfl: 3  •  rosuvastatin (CRESTOR) 40 MG tablet, TAKE 1 TABLET BY MOUTH DAILY, Disp: 90 tablet, Rfl: 3  •  Semaglutide, 2 MG/DOSE, (Ozempic, 2 MG/DOSE,) 8 MG/3ML solution pen-injector, Inject 2 mg under the skin into the appropriate area as directed 1 (One) Time Per Week., Disp: 3 mL, Rfl: 11  •  sertraline (ZOLOFT) 50 MG tablet, TAKE ONE TABLET BY MOUTH EVERY DAY, Disp: 90 tablet, Rfl: 3  •  cefdinir (OMNICEF) 300 MG capsule, Take 1 capsule by mouth 2 (Two) Times a Day. (Patient  "not taking: Reported on 5/8/2023), Disp: 20 capsule, Rfl: 0  •  cyclobenzaprine (FLEXERIL) 10 MG tablet, Take 1 tablet by mouth 3 (Three) Times a Day As Needed for Muscle Spasms. (Patient not taking: Reported on 5/8/2023), Disp: 90 tablet, Rfl: 3     Physical Exam  Visit Vitals  BP (!) 158/112 (BP Location: Right arm, Patient Position: Sitting, Cuff Size: Adult)   Pulse 83   Temp 98 °F (36.7 °C) (Temporal)   Ht 64 cm (25.2\") Comment: patient stated   Wt 81.4 kg (179 lb 6.4 oz)   SpO2 95%   .67 kg/m²       Labs  Brief Urine Lab Results  (Last result in the past 365 days)      Color   Clarity   Blood   Leuk Est   Nitrite   Protein   CREAT   Urine HCG        04/13/23 0903 Erika   Cloudy   Negative   Large (3+)   Negative   2+                 Lab Results   Component Value Date    GLUCOSE 117 (H) 03/16/2023    CALCIUM 9.6 03/16/2023     03/16/2023    K 4.7 03/16/2023    CO2 28.8 03/16/2023     03/16/2023    BUN 11 03/16/2023    CREATININE 1.20 (H) 03/16/2023    BCR 9.2 03/16/2023    ANIONGAP 8.2 03/16/2023       Lab Results   Component Value Date    WBC 8.98 02/24/2023    HGB 15.7 02/24/2023    HCT 45.4 02/24/2023    MCV 88.3 02/24/2023     02/24/2023            Radiographic Studies  US Renal Bilateral  Result Date: 4/20/2023  Normal left renal ultrasound.  Complex cystic lesion superior pole right kidney appears similar to outside noncontrast CT of 12/02/2022, recommend 3-6 month follow-up.  This report was signed and finalized on 4/20/2023 8:02 AM by Jessika Wheeler MD.      I have reviewed the above labs and imaging.     Assessment  47 y.o. female with comples right renal cyst and recent UPJ stone, s/p URS/LL. FU renal US WNL    Plan  1. CT RMP in 6 mo        "

## 2023-05-10 NOTE — PROGRESS NOTES
Follow Up Office Visit      Date: 2023   Patient Name: Veronica Goldsmith  : 1975   MRN: [id number]     Chief Complaint:    Chief Complaint   Patient presents with   • Rash      Whelping Skin irritation behind ear and back of neck itches if scratches it burns, sensitive with water        History of Present Illness: Veronica Goldsmith is a 47 y.o. female who is here today for itching and erythema behind both of her ears.  She initially had hive type reaction now the skin just feels rough and irritated.  This was followed after a 2-day history of nausea vomiting and diarrhea last week other members of the household had as well.  She also continues to have considerable amount of wheezing despite recent antibiotics.  She continues to have spasms of coughing that is  relieved by her albuterol her dose inhaler only for her to return as soon as it starts to wear off.    Subjective      Review of Systems:   Review of Systems   Constitutional: Negative.    HENT: Positive for postnasal drip and rhinorrhea.    Eyes: Negative.    Respiratory: Positive for cough, chest tightness and shortness of breath.    Cardiovascular: Negative.    Gastrointestinal: Negative.        I have reviewed the patients family history, social history, past medical history, past surgical history and have updated it as appropriate.     Medications:     Current Outpatient Medications:   •  albuterol sulfate  (90 Base) MCG/ACT inhaler, INHALE 3 PUFFS BY MOUTH EVERY 6 HOURS AS NEEDED, Disp: , Rfl:   •  amLODIPine (NORVASC) 10 MG tablet, Take 1 tablet by mouth Daily., Disp: , Rfl:   •  aspirin 81 MG EC tablet, Take 1 tablet by mouth Daily., Disp: , Rfl:   •  clotrimazole-betamethasone (LOTRISONE) 1-0.05 % cream, APPLY TO AFFECTED AREA TWO TIMES A DAY, Disp: 45 g, Rfl: 0  •  esomeprazole (nexIUM) 40 MG capsule, TAKE ONE CAPSULE BY MOUTH TWO TIMES A DAY, Disp: 60 capsule, Rfl: 3  •  fluticasone (FLONASE) 50 MCG/ACT nasal spray, 2 sprays into the  nostril(s) as directed by provider Daily., Disp: 9.9 mL, Rfl: 2  •  losartan (COZAAR) 25 MG tablet, TAKE 1 TABLET BY MOUTH DAILY, Disp: 90 tablet, Rfl: 3  •  oxybutynin XL (Ditropan XL) 10 MG 24 hr tablet, Take 1 tablet by mouth Daily., Disp: 30 tablet, Rfl: 11  •  rosuvastatin (CRESTOR) 40 MG tablet, TAKE 1 TABLET BY MOUTH DAILY, Disp: 90 tablet, Rfl: 3  •  Semaglutide, 2 MG/DOSE, (Ozempic, 2 MG/DOSE,) 8 MG/3ML solution pen-injector, Inject 2 mg under the skin into the appropriate area as directed 1 (One) Time Per Week., Disp: 3 mL, Rfl: 11  •  sertraline (ZOLOFT) 50 MG tablet, TAKE ONE TABLET BY MOUTH EVERY DAY, Disp: 90 tablet, Rfl: 3  •  cetirizine (zyrTEC) 10 MG tablet, Take 1 tablet by mouth Daily., Disp: 30 tablet, Rfl: 1  •  Fluticasone-Umeclidin-Vilant (Trelegy Ellipta) 200-62.5-25 MCG/ACT aerosol powder , Inhale 1 puff Daily., Disp: 28 each, Rfl: 1    Allergies:   Allergies   Allergen Reactions   • Sulfa Antibiotics Unknown - High Severity     fever       Objective     Physical Exam: Please see above  Vital Signs:   Vitals:    05/08/23 1523   BP: 138/94   BP Location: Left arm   Patient Position: Sitting   Cuff Size: Adult   Pulse: 98   Temp: 98.7 °F (37.1 °C)   TempSrc: Temporal   SpO2: 95%   Weight: 83.5 kg (184 lb)   PainSc: 0-No pain     Body mass index is 203.76 kg/m².    Physical Exam  Constitutional:       General: She is not in acute distress.     Appearance: She is not ill-appearing or toxic-appearing.   Eyes:      Extraocular Movements: Extraocular movements intact.      Pupils: Pupils are equal, round, and reactive to light.   Cardiovascular:      Rate and Rhythm: Normal rate.      Heart sounds: No murmur heard.    No friction rub. No gallop.   Pulmonary:      Effort: Pulmonary effort is normal.      Breath sounds: Wheezing (diffuse inspiratory wheeze) present.   Skin:     Coloration: Skin is not cyanotic or mottled.      Findings: Rash (fine maculopapular rash posterior to both ears.) present.    Neurological:      Mental Status: She is alert.         Procedures    Assessment / Plan      Assessment/Plan:   1. Mild persistent asthma, unspecified whether complicated  May be associated with recent infection as well as seasonal allergies.  She has not being able to control her wheezing with just her albuterol inhaler we will start a long-acting bronchioldilator with a steroid component.  The patient has been strongly encouraged to discontinue smoking as her likelihood of COPD is greatly increased.  We have discussed using patches versus oral medications.  She will consider these things.  - Fluticasone-Umeclidin-Vilant (Trelegy Ellipta) 200-62.5-25 MCG/ACT aerosol powder ; Inhale 1 puff Daily.  Dispense: 28 each; Refill: 1    2. Allergic dermatitis  Versus viral exanthem  - cetirizine (zyrTEC) 10 MG tablet; Take 1 tablet by mouth Daily.  Dispense: 30 tablet; Refill: 1       Follow Up:   No follow-ups on file.      At [facility], we believe that sharing information builds trust and better relationships. You are receiving this note because you recently visited [facility]. It is possible you will see health information before a provider has talked with you about it. This kind of information can be easy to misunderstand. To help you fully understand what it means for your health, we urge you to discuss this note with your provider.    Anastasiya eLvine PA-C  [facility]

## 2023-05-11 ENCOUNTER — OFFICE VISIT (OUTPATIENT)
Dept: FAMILY MEDICINE CLINIC | Facility: CLINIC | Age: 48
End: 2023-05-11
Payer: COMMERCIAL

## 2023-05-11 VITALS
OXYGEN SATURATION: 96 % | RESPIRATION RATE: 16 BRPM | BODY MASS INDEX: 42.58 KG/M2 | DIASTOLIC BLOOD PRESSURE: 84 MMHG | SYSTOLIC BLOOD PRESSURE: 126 MMHG | HEIGHT: 55 IN | HEART RATE: 74 BPM | TEMPERATURE: 97 F | WEIGHT: 184 LBS

## 2023-05-11 DIAGNOSIS — J44.1 COPD WITH EXACERBATION: Primary | ICD-10-CM

## 2023-05-11 PROCEDURE — 99213 OFFICE O/P EST LOW 20 MIN: CPT | Performed by: PHYSICIAN ASSISTANT

## 2023-05-11 NOTE — PROGRESS NOTES
Follow Up Office Visit      Date: 2023   Patient Name: Veronica Goldsmith  : 1975   MRN: [id number]     Chief Complaint:    Chief Complaint   Patient presents with   • Cough     Fu with Trelegy       History of Present Illness: Veronica Goldsmith is a 47 y.o. female who is here today for follow-up of recent COPD exacerbation.  She reports that she has had considerable improvement in the past 48 hours since beginning Trelegy.  She still has a productive cough but it is all clear sputum.  Her chest still feels tight but she is much more functional than she was prior to beginning the medication.  She has also been using her albuterol inhaler every 6 hours 3 puffs.  She continues to smoke but does request that we send and the nicotine inhaler so she can begin trying to wean off of cigarettes.  She offers no no new complaints today.    Subjective      Review of Systems:   Review of Systems   Respiratory: Positive for cough (Productive of clear sputum), chest tightness (Less than previous) and wheezing (About 60% better).    Cardiovascular: Negative.        I have reviewed the patients family history, social history, past medical history, past surgical history and have updated it as appropriate.     Medications:     Current Outpatient Medications:   •  albuterol sulfate  (90 Base) MCG/ACT inhaler, INHALE 3 PUFFS BY MOUTH EVERY 6 HOURS AS NEEDED, Disp: , Rfl:   •  amLODIPine (NORVASC) 10 MG tablet, Take 1 tablet by mouth Daily., Disp: , Rfl:   •  aspirin 81 MG EC tablet, Take 1 tablet by mouth Daily., Disp: , Rfl:   •  cetirizine (zyrTEC) 10 MG tablet, Take 1 tablet by mouth Daily., Disp: 30 tablet, Rfl: 1  •  clotrimazole-betamethasone (LOTRISONE) 1-0.05 % cream, APPLY TO AFFECTED AREA TWO TIMES A DAY, Disp: 45 g, Rfl: 0  •  esomeprazole (nexIUM) 40 MG capsule, TAKE ONE CAPSULE BY MOUTH TWO TIMES A DAY, Disp: 60 capsule, Rfl: 3  •  fluticasone (FLONASE) 50 MCG/ACT nasal spray, 2 sprays into the nostril(s) as  "directed by provider Daily., Disp: 9.9 mL, Rfl: 2  •  Fluticasone-Umeclidin-Vilant (Trelegy Ellipta) 200-62.5-25 MCG/ACT aerosol powder , Inhale 1 puff Daily., Disp: 28 each, Rfl: 1  •  losartan (COZAAR) 25 MG tablet, TAKE 1 TABLET BY MOUTH DAILY, Disp: 90 tablet, Rfl: 3  •  oxybutynin XL (Ditropan XL) 10 MG 24 hr tablet, Take 1 tablet by mouth Daily., Disp: 30 tablet, Rfl: 11  •  rosuvastatin (CRESTOR) 40 MG tablet, TAKE 1 TABLET BY MOUTH DAILY, Disp: 90 tablet, Rfl: 3  •  Semaglutide, 2 MG/DOSE, (Ozempic, 2 MG/DOSE,) 8 MG/3ML solution pen-injector, Inject 2 mg under the skin into the appropriate area as directed 1 (One) Time Per Week., Disp: 3 mL, Rfl: 11  •  sertraline (ZOLOFT) 50 MG tablet, TAKE ONE TABLET BY MOUTH EVERY DAY, Disp: 90 tablet, Rfl: 3    Allergies:   Allergies   Allergen Reactions   • Sulfa Antibiotics Unknown - High Severity     fever       Objective     Physical Exam: Please see above  Vital Signs:   Vitals:    05/11/23 1419   BP: 126/84   BP Location: Right arm   Patient Position: Sitting   Cuff Size: Adult   Pulse: 74   Resp: 16   Temp: 97 °F (36.1 °C)   SpO2: 96%   Weight: 83.5 kg (184 lb)   Height: 65 cm (25.59\")     Body mass index is 197.54 kg/m².  Class 3 Severe Obesity (BMI >=40). Obesity-related health conditions include the following: GERD. Obesity is unchanged. BMI is is above average; BMI management plan is completed. We discussed portion control and increasing exercise.       Physical Exam  Vitals and nursing note reviewed.   Cardiovascular:      Rate and Rhythm: Normal rate and regular rhythm.      Heart sounds: No murmur heard.    No friction rub. No gallop.   Pulmonary:      Breath sounds: Wheezing (Decreased wheezing in the upper apices but continued rales and wheeze in the right lower lobe) present.         Procedures    Assessment / Plan      Assessment/Plan:   1. COPD with exacerbation  Due to the persistence of her wheeze we will schedule every 6 hour neb nebulizer " treatments with albuterol she has this at home.  She will return to clinic if she has increasing fever shortness of breath.  I will also send in the occasions requested for smoking cessation      Follow Up:   No follow-ups on file.      At [facility], we believe that sharing information builds trust and better relationships. You are receiving this note because you recently visited [facility]. It is possible you will see health information before a provider has talked with you about it. This kind of information can be easy to misunderstand. To help you fully understand what it means for your health, we urge you to discuss this note with your provider.    Anastasiya Levine PA-C  [facility]

## 2023-07-27 DIAGNOSIS — L23.9 ALLERGIC DERMATITIS: ICD-10-CM

## 2023-07-27 RX ORDER — ESOMEPRAZOLE MAGNESIUM 40 MG/1
CAPSULE, DELAYED RELEASE ORAL
Qty: 180 CAPSULE | Refills: 1 | Status: SHIPPED | OUTPATIENT
Start: 2023-07-27

## 2023-07-27 RX ORDER — CETIRIZINE HYDROCHLORIDE 10 MG/1
TABLET ORAL
Qty: 30 TABLET | Refills: 12 | Status: SHIPPED | OUTPATIENT
Start: 2023-07-27

## 2023-08-16 ENCOUNTER — OFFICE VISIT (OUTPATIENT)
Dept: FAMILY MEDICINE CLINIC | Facility: CLINIC | Age: 48
End: 2023-08-16
Payer: COMMERCIAL

## 2023-08-16 VITALS
TEMPERATURE: 97.7 F | RESPIRATION RATE: 16 BRPM | HEIGHT: 64 IN | SYSTOLIC BLOOD PRESSURE: 152 MMHG | WEIGHT: 179 LBS | BODY MASS INDEX: 30.56 KG/M2 | OXYGEN SATURATION: 95 % | HEART RATE: 76 BPM | DIASTOLIC BLOOD PRESSURE: 98 MMHG

## 2023-08-16 DIAGNOSIS — I10 PRIMARY HYPERTENSION: ICD-10-CM

## 2023-08-16 DIAGNOSIS — N28.9 RENAL INSUFFICIENCY: ICD-10-CM

## 2023-08-16 DIAGNOSIS — E78.2 MIXED HYPERLIPIDEMIA: ICD-10-CM

## 2023-08-16 DIAGNOSIS — J45.30 MILD PERSISTENT ASTHMA, UNSPECIFIED WHETHER COMPLICATED: ICD-10-CM

## 2023-08-16 DIAGNOSIS — F41.9 ANXIETY: ICD-10-CM

## 2023-08-16 DIAGNOSIS — K75.81 NASH (NONALCOHOLIC STEATOHEPATITIS): ICD-10-CM

## 2023-08-16 DIAGNOSIS — E11.9 TYPE 2 DIABETES MELLITUS WITHOUT COMPLICATION, WITHOUT LONG-TERM CURRENT USE OF INSULIN: Primary | ICD-10-CM

## 2023-08-16 DIAGNOSIS — F17.210 CIGARETTE NICOTINE DEPENDENCE WITHOUT COMPLICATION: ICD-10-CM

## 2023-08-16 LAB
ALBUMIN SERPL-MCNC: 4.2 G/DL (ref 3.5–5.2)
ALBUMIN/GLOB SERPL: 1.6 G/DL
ALP SERPL-CCNC: 85 U/L (ref 39–117)
ALT SERPL W P-5'-P-CCNC: 24 U/L (ref 1–33)
ANION GAP SERPL CALCULATED.3IONS-SCNC: 10.8 MMOL/L (ref 5–15)
AST SERPL-CCNC: 27 U/L (ref 1–32)
BILIRUB SERPL-MCNC: 0.3 MG/DL (ref 0–1.2)
BILIRUB UR QL STRIP: NEGATIVE
BUN SERPL-MCNC: 13 MG/DL (ref 6–20)
BUN/CREAT SERPL: 10.7 (ref 7–25)
CALCIUM SPEC-SCNC: 9.8 MG/DL (ref 8.6–10.5)
CHLORIDE SERPL-SCNC: 106 MMOL/L (ref 98–107)
CLARITY UR: CLEAR
CO2 SERPL-SCNC: 26.2 MMOL/L (ref 22–29)
COLOR UR: YELLOW
CREAT SERPL-MCNC: 1.21 MG/DL (ref 0.57–1)
EGFRCR SERPLBLD CKD-EPI 2021: 56.1 ML/MIN/1.73
GLOBULIN UR ELPH-MCNC: 2.7 GM/DL
GLUCOSE SERPL-MCNC: 81 MG/DL (ref 65–99)
GLUCOSE UR STRIP-MCNC: NEGATIVE MG/DL
HBA1C MFR BLD: 5 % (ref 4.8–5.6)
HGB UR QL STRIP.AUTO: NEGATIVE
KETONES UR QL STRIP: NEGATIVE
LEUKOCYTE ESTERASE UR QL STRIP.AUTO: NEGATIVE
NITRITE UR QL STRIP: NEGATIVE
PH UR STRIP.AUTO: 7 [PH] (ref 5–8)
POTASSIUM SERPL-SCNC: 4.1 MMOL/L (ref 3.5–5.2)
PROT SERPL-MCNC: 6.9 G/DL (ref 6–8.5)
PROT UR QL STRIP: ABNORMAL
SODIUM SERPL-SCNC: 143 MMOL/L (ref 136–145)
SP GR UR STRIP: 1.01 (ref 1–1.03)
UROBILINOGEN UR QL STRIP: ABNORMAL

## 2023-08-16 PROCEDURE — 83036 HEMOGLOBIN GLYCOSYLATED A1C: CPT | Performed by: FAMILY MEDICINE

## 2023-08-16 PROCEDURE — 81003 URINALYSIS AUTO W/O SCOPE: CPT | Performed by: FAMILY MEDICINE

## 2023-08-16 PROCEDURE — 80053 COMPREHEN METABOLIC PANEL: CPT | Performed by: FAMILY MEDICINE

## 2023-08-16 PROCEDURE — 82043 UR ALBUMIN QUANTITATIVE: CPT | Performed by: FAMILY MEDICINE

## 2023-08-16 RX ORDER — CYCLOBENZAPRINE HCL 10 MG
10 TABLET ORAL 3 TIMES DAILY PRN
COMMUNITY
Start: 2023-07-27 | End: 2023-08-16

## 2023-08-17 ENCOUNTER — TELEPHONE (OUTPATIENT)
Dept: FAMILY MEDICINE CLINIC | Facility: CLINIC | Age: 48
End: 2023-08-17

## 2023-08-17 LAB — ALBUMIN UR-MCNC: 4.5 MG/DL

## 2023-08-26 DIAGNOSIS — M62.838 MUSCLE SPASM: ICD-10-CM

## 2023-08-26 DIAGNOSIS — H69.83 DYSFUNCTION OF BOTH EUSTACHIAN TUBES: ICD-10-CM

## 2023-08-27 RX ORDER — CYCLOBENZAPRINE HCL 10 MG
TABLET ORAL
Qty: 90 TABLET | Refills: 3 | Status: SHIPPED | OUTPATIENT
Start: 2023-08-27

## 2023-08-27 RX ORDER — FLUTICASONE PROPIONATE 50 MCG
SPRAY, SUSPENSION (ML) NASAL
Qty: 16 G | Refills: 2 | Status: SHIPPED | OUTPATIENT
Start: 2023-08-27

## 2023-10-30 ENCOUNTER — HOSPITAL ENCOUNTER (OUTPATIENT)
Dept: CT IMAGING | Facility: HOSPITAL | Age: 48
Discharge: HOME | End: 2023-10-30
Admitting: UROLOGY
Payer: COMMERCIAL

## 2023-10-30 DIAGNOSIS — N28.1 BILATERAL RENAL CYSTS: ICD-10-CM

## 2023-10-30 DIAGNOSIS — N20.0 KIDNEY STONE: ICD-10-CM

## 2023-10-30 PROCEDURE — 25510000001 IOPAMIDOL 61 % SOLUTION: Performed by: UROLOGY

## 2023-10-30 PROCEDURE — 74170 CT ABD WO CNTRST FLWD CNTRST: CPT

## 2023-10-30 RX ADMIN — IOPAMIDOL 100 ML: 612 INJECTION, SOLUTION INTRAVENOUS at 12:09

## 2023-11-06 ENCOUNTER — OFFICE VISIT (OUTPATIENT)
Dept: UROLOGY | Facility: CLINIC | Age: 48
End: 2023-11-06
Payer: COMMERCIAL

## 2023-11-06 VITALS
HEIGHT: 64 IN | OXYGEN SATURATION: 98 % | RESPIRATION RATE: 17 BRPM | HEART RATE: 50 BPM | SYSTOLIC BLOOD PRESSURE: 138 MMHG | WEIGHT: 179 LBS | TEMPERATURE: 98.2 F | BODY MASS INDEX: 30.56 KG/M2 | DIASTOLIC BLOOD PRESSURE: 95 MMHG

## 2023-11-06 DIAGNOSIS — N18.31 STAGE 3A CHRONIC KIDNEY DISEASE: ICD-10-CM

## 2023-11-06 DIAGNOSIS — N28.89 RENAL MASS, RIGHT: ICD-10-CM

## 2023-11-06 DIAGNOSIS — N20.0 KIDNEY STONE: ICD-10-CM

## 2023-11-06 DIAGNOSIS — N28.1 BILATERAL RENAL CYSTS: Primary | ICD-10-CM

## 2023-11-06 PROCEDURE — 99215 OFFICE O/P EST HI 40 MIN: CPT | Performed by: UROLOGY

## 2023-11-06 PROCEDURE — 51798 US URINE CAPACITY MEASURE: CPT | Performed by: UROLOGY

## 2023-11-06 NOTE — PROGRESS NOTES
CC  Right renal cyst/mass    HPI  Ms. Goldsmith is a 48 y.o. female with history below in assessment, who presents for follow up.     At this visit patient here to discuss CT    Past Medical History:   Diagnosis Date    Acne rosacea, erythematous telangiectatic type     Acute bronchitis     Acute sinusitis     Acute upper respiratory infection     Allergic contact dermatitis     Allergic rhinitis     Allergies     Anxiety and depression     Asthma     Back pain, chronic     Benign paroxysmal positional vertigo     Blood clots in stool     Candidal dermatitis     Candidiasis of vulva and vagina     Cellulitis     AND ABSCESS, right lower abdomen    Cervical pain     Chest pain, unspecified     nothing recent    Conjunctivitis     Contusion of ear     Corneal abrasion     Depression     Diabetes mellitus     Dizziness and giddiness     Dyspnea, unspecified     Fever and chills     Fibromyalgia     Flu vaccine need     Foot pain, right     GERD (gastroesophageal reflux disease)     Hand pain, left     Hearing loss, right     Heart palpitations     Hemorrhoids, external     Herpes simplex without complication     Herpes zoster lesion     Hypertension     Impetigo herpetiformis     Injury to tibial blood vessels, unspecified laterality, initial encounter     Kidney cyst     Right kidney    Kidney stones     Left humeral fracture     Low back pain     Lymphadenopathy     Migraine headache     Mixed hyperlipidemia     TAM (nonalcoholic steatohepatitis)     Neuropathy     Oral candidiasis     Plantar fasciitis     Salpingitis and oophoritis, unspecified     not specified as acute, subacute, or chronic    Scabies     Stress     Swelling of limb     Symptoms involving abdomen and pelvis     OTHER SYMPTOMS INVOLVING ABDOMEN AND PELVIS, ABDOMINAL OR PELVIC SWELLING, MASS, OR LUMP, OTHER SPECIFIED S    Tattoo     Tendon pain     Tobacco use disorder     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     Urinary tract infection      Vaginitis and vulvovaginitis     Vaginitis, atrophic        Past Surgical History:   Procedure Laterality Date    BILATERAL OOPHORECTOMY      CARPAL TUNNEL RELEASE Bilateral     CHOLECYSTECTOMY      COLONOSCOPY  03/30/2018    HUMERUS FRACTURE SURGERY Left     ROTATOR CUFF REPAIR Right     TOTAL ABDOMINAL HYSTERECTOMY      TUBAL ABDOMINAL LIGATION      URETEROSCOPY LASER LITHOTRIPSY WITH STENT INSERTION Right 3/2/2023    Procedure: URETEROSCOPY LASER LITHOTRIPSY WITH STENT INSERTION;  Surgeon: Aaron Mart MD;  Location: [facility];  Service: Urology;  Laterality: Right;         Current Outpatient Medications:     albuterol sulfate  (90 Base) MCG/ACT inhaler, INHALE 3 PUFFS BY MOUTH EVERY 6 HOURS AS NEEDED, Disp: , Rfl:     amLODIPine (NORVASC) 10 MG tablet, Take 1 tablet by mouth Daily., Disp: , Rfl:     aspirin 81 MG EC tablet, Take 1 tablet by mouth Daily., Disp: , Rfl:     cetirizine (zyrTEC) 10 MG tablet, TAKE ONE TABLET BY MOUTH EVERY DAY, Disp: 30 tablet, Rfl: 12    cyclobenzaprine (FLEXERIL) 10 MG tablet, TAKE ONE TABLET BY MOUTH THREE TIMES A DAY AS NEEDED FOR MUSCLE SPASMS., Disp: 90 tablet, Rfl: 3    esomeprazole (nexIUM) 40 MG capsule, TAKE ONE CAPSULE BY MOUTH TWO TIMES A DAY, Disp: 180 capsule, Rfl: 1    fluticasone (FLONASE) 50 MCG/ACT nasal spray, INHALE 2 SPRAYS IN EACH NOSTRIL EVERY DAY AS DIRECTED, Disp: 16 g, Rfl: 2    Fluticasone-Umeclidin-Vilant (Trelegy Ellipta) 200-62.5-25 MCG/ACT aerosol powder , Inhale 1 puff Daily., Disp: 28 each, Rfl: 1    losartan (COZAAR) 25 MG tablet, TAKE 1 TABLET BY MOUTH DAILY, Disp: 90 tablet, Rfl: 3    nicotine (NICOTROL) 10 MG inhaler, Inhale 1 puff As Needed for Smoking Cessation., Disp: 168 each, Rfl: 1    rosuvastatin (CRESTOR) 40 MG tablet, TAKE 1 TABLET BY MOUTH DAILY, Disp: 90 tablet, Rfl: 3    Semaglutide, 2 MG/DOSE, (Ozempic, 2 MG/DOSE,) 8 MG/3ML solution pen-injector, Inject 2 mg under the skin into the appropriate area as directed 1  (One) Time Per Week., Disp: 3 mL, Rfl: 11    sertraline (ZOLOFT) 50 MG tablet, TAKE ONE TABLET BY MOUTH EVERY DAY, Disp: 90 tablet, Rfl: 3     Physical Exam  There were no vitals taken for this visit.    Labs  Brief Urine Lab Results  (Last result in the past 365 days)        Color   Clarity   Blood   Leuk Est   Nitrite   Protein   CREAT   Urine HCG        08/16/23 0823 Yellow   Clear   Negative   Negative   Negative   Trace                   Lab Results   Component Value Date    GLUCOSE 81 08/16/2023    CALCIUM 9.8 08/16/2023     08/16/2023    K 4.1 08/16/2023    CO2 26.2 08/16/2023     08/16/2023    BUN 13 08/16/2023    CREATININE 1.21 (H) 08/16/2023    BCR 10.7 08/16/2023    ANIONGAP 10.8 08/16/2023       Lab Results   Component Value Date    WBC 8.98 02/24/2023    HGB 15.7 02/24/2023    HCT 45.4 02/24/2023    MCV 88.3 02/24/2023     02/24/2023     Lab Results   Component Value Date    HGBA1C 5.00 08/16/2023        Radiographic Studies  CT Abdomen With & Without Contrast  Result Date: 10/30/2023  Multiloculated cystic mass in the upper pole of the right kidney is minimally larger than on the prior exam. There is a small component associated with enhancing septa measuring up to 4 mm in thickness. Continued follow-up is recommended in 6 months using renal mass protocol.      CTDI: 9.07 mGy DLP: 1598.61 mGy.cm   This study was performed with techniques to keep radiation doses as low as reasonably achievable (ALARA). Individualized dose reduction techniques using automated exposure control or adjustment of mA and/or kV according to the patient size were employed.     Images were reviewed, interpreted, and dictated by Dr. Ivan Melo MD Transcribed by Concepcion Jurado PA-C.  This report was signed and finalized on 10/30/2023 2:28 PM by Ivan Melo MD.        March 2023 October 2023    I have reviewed the above labs and imaging. Renal cystic mass has almost doubled in size.      Assessment  48 y.o. female with right complex renal cyst and recent UPJ stone, s/p URS/LL.  The renal cyst now has enhancing septations, consistent with Bosniak 3 classification.  Excision is generally recommended.  I explained to her that biopsy is very difficult, next to impossible and almost never has enough tissue for diagnosis.  In general, 55 to 72% of Bosniak 3 lesions are malignant.     We discussed observation / AS, vs partial nephrectomy (priscilla given her CKDIIIa and DM), vs lap radical nephrectomy. I offered referral to discuss partial nephrectomy vs lap radical nephrectomy with me here in [place].  We discussed the risks and benefits of each option.    Plan  1. Referral to Dr. Arriaga to discuss RAPN  2. Fu with me in 4 weeks to discuss decision.     I spent a total of 41 minutes with the patient and the chart engaging in data gathering and interpretation, patient interaction, as well as counseling on the risks, benefits, and alternatives of the therapy and coordinating care.

## 2023-11-08 ENCOUNTER — TELEPHONE (OUTPATIENT)
Dept: UROLOGY | Facility: CLINIC | Age: 48
End: 2023-11-08

## 2023-11-08 DIAGNOSIS — N28.89 RENAL MASS, RIGHT: Primary | ICD-10-CM

## 2023-11-08 RX ORDER — SODIUM CHLORIDE 9 MG/ML
125 INJECTION, SOLUTION INTRAVENOUS CONTINUOUS
OUTPATIENT
Start: 2023-11-08

## 2023-11-08 NOTE — TELEPHONE ENCOUNTER
I spoke to patient and let her know the provider Dr. Aaron Mart has been messaged to ask about appt.

## 2023-11-08 NOTE — TELEPHONE ENCOUNTER
Caller: MEGAN RESENDIZ     Relationship: SELF    Best call back number: [phone]    What is the best time to reach you: ANYTIME     Who are you requesting to speak with (clinical staff, provider,  specific staff member): DR. ELENA    What was the call regarding: PT SAID SHE DISCUSSED A RADICAL NEPHRECTOMY AND DR. ELENA ASKED ABOUT DOING A PARTIAL, BUT PT HAS DECIDED TO GO AHEAD DO THE FULL THING.     PT SAYS SHE HAS AN APPT FOR DEC 4TH, WANTS TO KNOW IF SHE SHOULD KEEP THIS APPT OR GO AHEAD WITH SCHEDULING PROCEDURE.

## 2023-11-09 PROBLEM — N28.89 RENAL MASS, RIGHT: Status: ACTIVE | Noted: 2023-11-08

## 2023-11-14 ENCOUNTER — TELEPHONE (OUTPATIENT)
Dept: FAMILY MEDICINE CLINIC | Facility: CLINIC | Age: 48
End: 2023-11-14
Payer: COMMERCIAL

## 2023-11-14 DIAGNOSIS — L02.91 ABSCESS: Primary | ICD-10-CM

## 2023-11-14 RX ORDER — AMOXICILLIN AND CLAVULANATE POTASSIUM 875; 125 MG/1; MG/1
1 TABLET, FILM COATED ORAL 2 TIMES DAILY
Qty: 20 TABLET | Refills: 0 | Status: SHIPPED | OUTPATIENT
Start: 2023-11-14 | End: 2023-11-16

## 2023-11-14 NOTE — TELEPHONE ENCOUNTER
This patient was briefly seen in the office today.  She was here with her  who had an appointment.  She did show me a skin abscess present to her left lower abdomen.  She reports it has been present for 3 to 4 days.  It did burst and have purulent drainage 2 days ago.  It is now flat.  She has had no fever or chills.  She is requesting antibiotic at present time.  She has allergy to sulfa but no penicillin allergies or other antibiotic allergies to her knowledge.  Discussed that I can send in Augmentin to the pharmacy.  Did discuss possible GI side effects.  Did encourage her to apply warm compresses to promote natural drainage.  She also has mupirocin at home, which I encouraged her to apply to this area.  She does have follow-up appointment with Dr. Levine in 2 days.  We will relook at the area at that time.  If she develops any fever/chills or worsening of her present symptoms, she is to return to care sooner.  There is no fluctuance to the area that warrants I&D at present time.

## 2023-11-16 ENCOUNTER — OFFICE VISIT (OUTPATIENT)
Dept: FAMILY MEDICINE CLINIC | Facility: CLINIC | Age: 48
End: 2023-11-16
Payer: COMMERCIAL

## 2023-11-16 ENCOUNTER — CLINICAL SUPPORT (OUTPATIENT)
Dept: FAMILY MEDICINE CLINIC | Facility: CLINIC | Age: 48
End: 2023-11-16
Payer: COMMERCIAL

## 2023-11-16 VITALS
HEART RATE: 75 BPM | OXYGEN SATURATION: 97 % | DIASTOLIC BLOOD PRESSURE: 86 MMHG | SYSTOLIC BLOOD PRESSURE: 130 MMHG | RESPIRATION RATE: 18 BRPM | WEIGHT: 182 LBS | BODY MASS INDEX: 31.07 KG/M2 | TEMPERATURE: 97 F | HEIGHT: 64 IN

## 2023-11-16 DIAGNOSIS — F17.210 CIGARETTE NICOTINE DEPENDENCE WITHOUT COMPLICATION: ICD-10-CM

## 2023-11-16 DIAGNOSIS — Z23 NEED FOR VACCINATION: ICD-10-CM

## 2023-11-16 DIAGNOSIS — I10 PRIMARY HYPERTENSION: ICD-10-CM

## 2023-11-16 DIAGNOSIS — K75.81 NASH (NONALCOHOLIC STEATOHEPATITIS): ICD-10-CM

## 2023-11-16 DIAGNOSIS — K21.9 GASTROESOPHAGEAL REFLUX DISEASE WITHOUT ESOPHAGITIS: ICD-10-CM

## 2023-11-16 DIAGNOSIS — N28.9 RENAL INSUFFICIENCY: ICD-10-CM

## 2023-11-16 DIAGNOSIS — Z00.00 WELL ADULT EXAM: ICD-10-CM

## 2023-11-16 DIAGNOSIS — E11.9 TYPE 2 DIABETES MELLITUS WITHOUT COMPLICATION, WITHOUT LONG-TERM CURRENT USE OF INSULIN: ICD-10-CM

## 2023-11-16 DIAGNOSIS — Z00.00 WELL ADULT EXAM: Primary | ICD-10-CM

## 2023-11-16 DIAGNOSIS — E78.2 MIXED HYPERLIPIDEMIA: ICD-10-CM

## 2023-11-16 DIAGNOSIS — F41.9 ANXIETY: ICD-10-CM

## 2023-11-16 DIAGNOSIS — J45.30 MILD PERSISTENT ASTHMA, UNSPECIFIED WHETHER COMPLICATED: ICD-10-CM

## 2023-11-16 LAB
DEPRECATED RDW RBC AUTO: 45.9 FL (ref 37–54)
ERYTHROCYTE [DISTWIDTH] IN BLOOD BY AUTOMATED COUNT: 13.1 % (ref 12.3–15.4)
HBA1C MFR BLD: 5.5 % (ref 4.8–5.6)
HCT VFR BLD AUTO: 48.1 % (ref 34–46.6)
HGB BLD-MCNC: 16.3 G/DL (ref 12–15.9)
MCH RBC QN AUTO: 32 PG (ref 26.6–33)
MCHC RBC AUTO-ENTMCNC: 33.9 G/DL (ref 31.5–35.7)
MCV RBC AUTO: 94.5 FL (ref 79–97)
PLATELET # BLD AUTO: 183 10*3/MM3 (ref 140–450)
PMV BLD AUTO: 11.5 FL (ref 6–12)
RBC # BLD AUTO: 5.09 10*6/MM3 (ref 3.77–5.28)
WBC NRBC COR # BLD AUTO: 7.81 10*3/MM3 (ref 3.4–10.8)

## 2023-11-16 PROCEDURE — 36415 COLL VENOUS BLD VENIPUNCTURE: CPT | Performed by: FAMILY MEDICINE

## 2023-11-16 PROCEDURE — 83036 HEMOGLOBIN GLYCOSYLATED A1C: CPT | Performed by: FAMILY MEDICINE

## 2023-11-16 PROCEDURE — 80050 GENERAL HEALTH PANEL: CPT | Performed by: FAMILY MEDICINE

## 2023-11-16 PROCEDURE — 80061 LIPID PANEL: CPT | Performed by: FAMILY MEDICINE

## 2023-11-16 PROCEDURE — 82607 VITAMIN B-12: CPT | Performed by: FAMILY MEDICINE

## 2023-11-16 RX ORDER — SEMAGLUTIDE 1.34 MG/ML
1 INJECTION, SOLUTION SUBCUTANEOUS WEEKLY
Qty: 3 ML | Refills: 11 | Status: SHIPPED | OUTPATIENT
Start: 2023-11-16

## 2023-11-16 NOTE — PROGRESS NOTES
Female Physical Note      Date: 2023   Patient Name: Veronica Goldsmith  : 1975   MRN: [id number]     Chief Complaint:    Chief Complaint   Patient presents with    Annual Exam    Diabetes    Hypertension       History of Present Illness: Veronica Goldsmith is a 48 y.o. female who is here today for their annual health maintenance and physical.  Patient has been doing relatively well since last being seen.  She is scheduled to have a hand assist nephrectomy in  December.  Patient has had some difficulty getting her Ozempic dose and has not had a dose for 2 to 3 weeks.  She does continue to take her other medications without difficulty without any side effects.  She has continue with her usual activity, appetite and sleep.  She denies any other cardiovascular, respiratory, gastrointestinal, urologic or neurologic complaints.      Subjective      Review of Systems:   Review of Systems   Constitutional:  Negative for activity change, appetite change and fatigue.   Respiratory:  Negative for cough and shortness of breath.    Cardiovascular:  Negative for chest pain, palpitations and leg swelling.   Gastrointestinal:  Negative for abdominal distention, abdominal pain, blood in stool, constipation, diarrhea, nausea, vomiting, GERD and indigestion.   Genitourinary:  Negative for dysuria, flank pain, frequency and urgency.   Musculoskeletal:  Negative for arthralgias, joint swelling and myalgias.   Neurological:  Negative for dizziness, tremors, seizures, weakness, light-headedness, numbness, headache, memory problem and confusion.   Psychiatric/Behavioral:  Negative for sleep disturbance and depressed mood. The patient is not nervous/anxious.        Past Medical History, Social History, Family History and Care Team were all reviewed with patient and updated as appropriate.     Medications:     Current Outpatient Medications:     albuterol sulfate  (90 Base) MCG/ACT inhaler, INHALE 3 PUFFS BY MOUTH EVERY 6  HOURS AS NEEDED, Disp: , Rfl:     aspirin 81 MG EC tablet, Take 1 tablet by mouth Daily., Disp: , Rfl:     cetirizine (zyrTEC) 10 MG tablet, TAKE ONE TABLET BY MOUTH EVERY DAY, Disp: 30 tablet, Rfl: 12    esomeprazole (nexIUM) 40 MG capsule, TAKE ONE CAPSULE BY MOUTH TWO TIMES A DAY, Disp: 180 capsule, Rfl: 1    fluticasone (FLONASE) 50 MCG/ACT nasal spray, INHALE 2 SPRAYS IN EACH NOSTRIL EVERY DAY AS DIRECTED, Disp: 16 g, Rfl: 2    Fluticasone-Umeclidin-Vilant (Trelegy Ellipta) 200-62.5-25 MCG/ACT aerosol powder , Inhale 1 puff Daily., Disp: 28 each, Rfl: 1    losartan (COZAAR) 25 MG tablet, TAKE 1 TABLET BY MOUTH DAILY, Disp: 90 tablet, Rfl: 3    nicotine (NICOTROL) 10 MG inhaler, Inhale 1 puff As Needed for Smoking Cessation., Disp: 168 each, Rfl: 1    Ozempic, 1 MG/DOSE, 4 MG/3ML solution pen-injector, Inject 1 mg under the skin into the appropriate area as directed 1 (One) Time Per Week., Disp: 3 mL, Rfl: 11    rosuvastatin (CRESTOR) 40 MG tablet, TAKE 1 TABLET BY MOUTH DAILY, Disp: 90 tablet, Rfl: 3    sertraline (ZOLOFT) 50 MG tablet, TAKE ONE TABLET BY MOUTH EVERY DAY, Disp: 90 tablet, Rfl: 3    Allergies:   Allergies   Allergen Reactions    Sulfa Antibiotics Unknown - High Severity     fever       Immunizations:  Health Maintenance Summary            Postponed - COVID-19 Vaccine (3 - 2023-24 season) Postponed until 12/12/2112 11/16/2023  Postponed until 12/12/2112 by Greg Levine MD (Patient Refused)    02/10/2023  Postponed until 3/1/2024 by Greg Levine MD (Pending event - Advised.)    06/20/2022  Imm Admin: Covid-19 (Pfizer) Gray Cap Monovalent    05/24/2022  Imm Admin: Covid-19 (Pfizer) Gray Cap Monovalent              DIABETIC EYE EXAM (Yearly) Next due on 1/4/2024 01/04/2023  SCANNED - EYE EXAM    11/23/2020   DIABETES EYE EXAM              Ordered - LIPID PANEL (Yearly) Ordered on 11/16/2023      02/10/2023  Lipid Panel    10/07/2022  Lipid Panel               Ordered - HEMOGLOBIN A1C (Every 6 Months) Ordered on 11/16/2023 08/16/2023  Hemoglobin A1C component of Hemoglobin A1c    02/10/2023  Hemoglobin A1C component of Hemoglobin A1c    10/07/2022  Hemoglobin A1C component of Hemoglobin A1c              BMI FOLLOWUP (Yearly) Next due on 5/11/2024 11/16/2023  Registry Metric: BMI Follow-up    05/11/2023  SmartData: WORKFLOW - QUALITY MEASUREMENT - BMI FOLLOW UP CARE PLAN DOCUMENTED    05/11/2023  SmartData: WORKFLOW - QUALITY MEASUREMENT - DOCUMENTED WEIGHT FOLLOW-UP PLAN    04/21/2023  SmartData: WORKFLOW - QUALITY MEASUREMENT - DOCUMENTED WEIGHT FOLLOW-UP PLAN    04/13/2023  SmartData: WORKFLOW - QUALITY MEASUREMENT - DOCUMENTED WEIGHT FOLLOW-UP PLAN    Only the first 5 history entries have been loaded, but more history exists.              URINE MICROALBUMIN (Yearly) Next due on 8/16/2024 08/16/2023  Microalbumin, Urine component of MicroAlbumin, Urine, Random - Urine, Clean Catch    10/07/2022  Microalbumin, Urine component of MicroAlbumin, Urine, Random - Urine, Clean Catch              Pneumococcal Vaccine 0-64 (2 - PCV) Next due on 11/16/2024 11/16/2023  Imm Admin: Pneumococcal Polysaccharide (PPSV23)    10/07/2022  Imm Admin: Pneumococcal Polysaccharide (PPSV23)              ANNUAL PHYSICAL (Yearly) Next due on 11/16/2024 11/16/2023  Done    10/07/2022  Done              DIABETIC FOOT EXAM (Yearly) Next due on 11/16/2024 11/16/2023  Done    10/07/2022  Done              COLORECTAL CANCER SCREENING (COLONOSCOPY - Every 10 Years) Next due on 3/30/2028      03/30/2018  [facility] COLONOSCOPY              TDAP/TD VACCINES (2 - Td or Tdap) Next due on 2/8/2029 02/08/2019  Imm Admin: Tdap              HEPATITIS C SCREENING  Completed      10/07/2022  Hepatitis C Antibody              INFLUENZA VACCINE  Completed      10/10/2023  Imm Admin: Influenza, Unspecified    10/04/2022  Imm Admin: Fluzone (or Fluarix & Flulaval for VFC) >6mos     "02/08/2019  Imm Admin: Influenza, Unspecified    02/08/2019  Imm Admin: Flu Vaccine Quad PF >36MO    01/09/2019  Imm Admin: Flublok 18+yrs    Only the first 5 history entries have been loaded, but more history exists.              Discontinued - Hepatitis B  Discontinued      02/10/2023  Frequency changed to Never by Greg Levine MD (Immunity confirmed 7/7/2022.)                     Orders Placed This Encounter   Procedures    Pneumococcal Polysaccharide Vaccine 23-Valent Greater Than or Equal To 1yo Subcutaneous / IM        Colorectal Screening:   Up-to-date.  Last Completed Colonoscopy            COLORECTAL CANCER SCREENING (COLONOSCOPY - Every 10 Years) Next due on 3/30/2028      03/30/2018   COLONOSCOPY                  Pap: Patient had complete total abdominal hysterectomy.  Last Completed Pap Smear       This patient has no relevant Health Maintenance data.           Mammogram: Up-to-date.  Last Completed Mammogram       This patient has no relevant Health Maintenance data.             CT for Smoker (Age 50-80, 20 pk yr):   Deferred.  Bone Density/DEXA (Age 65 or high risk): Deferred.  Hep C (Age 18-79 once): Previously ordered.  HIV (Age 15-65 once): No results found for: \"HIV1X2\"  A1c:   Hemoglobin A1C   Date Value Ref Range Status   08/16/2023 5.00 4.80 - 5.60 % Final      Lipid panel:  No results found for: \"LIPIDEXCLUSI\"    [unfilled]    Dermatology: Up-to-date if necessary.  Ophthalmologist: Up-to-date.  Dentist: Up-to-date.    Tobacco Use: High Risk (11/16/2023)    Patient History     Smoking Tobacco Use: Every Day     Smokeless Tobacco Use: Never     Passive Exposure: Current       Social History     Substance and Sexual Activity   Alcohol Use Not Currently        Social History     Substance and Sexual Activity   Drug Use Never        Diet/Physical activity: Well-balanced diet/daily exercise.    Sexual Health: No issues at present time.   Menopause: No issues at present " "time.  Menstrual Cycles: Amenorrheic.    Depression: PHQ-2 Depression Screening  PHQ-9 Total Score: 0     Measures:   Advanced Care Planning:   Patient has an advance directive in EMR which is still valid.     Smoking Cessation:   less than 3 minutes spent counseling Will try to cut down    Objective     Physical Exam:  Vital Signs:   Vitals:    11/16/23 0820   BP: 130/86   BP Location: Right arm   Patient Position: Sitting   Cuff Size: Adult   Pulse: 75   Resp: 18   Temp: 97 °F (36.1 °C)   TempSrc: Temporal   SpO2: 97%   Weight: 82.6 kg (182 lb)   Height: 162.6 cm (64.02\")     Body mass index is 31.22 kg/m².     Physical Exam  Vitals and nursing note reviewed.   Constitutional:       Appearance: Normal appearance.   HENT:      Head: Normocephalic and atraumatic.      Nose: Nose normal.      Mouth/Throat:      Pharynx: Oropharynx is clear.   Eyes:      Extraocular Movements: Extraocular movements intact.      Pupils: Pupils are equal, round, and reactive to light.   Neck:      Thyroid: No thyroid mass or thyromegaly.      Trachea: Trachea normal.   Cardiovascular:      Rate and Rhythm: Normal rate and regular rhythm.      Pulses: Normal pulses. No decreased pulses.           Dorsalis pedis pulses are 2+ on the right side and 2+ on the left side.        Posterior tibial pulses are 2+ on the right side and 2+ on the left side.      Heart sounds: Normal heart sounds.   Pulmonary:      Effort: Pulmonary effort is normal.      Breath sounds: Normal breath sounds.   Abdominal:      General: Abdomen is flat. Bowel sounds are normal.      Palpations: Abdomen is soft.      Tenderness: There is no abdominal tenderness.   Musculoskeletal:      Cervical back: Neck supple.      Right lower leg: No edema.      Left lower leg: No edema.      Right foot: No deformity.      Left foot: No deformity.   Feet:      Right foot:      Protective Sensation: 5 sites tested.  5 sites sensed.      Skin integrity: Skin integrity normal. No " ulcer, skin breakdown, erythema or callus.      Toenail Condition: Right toenails are normal.      Left foot:      Protective Sensation: 5 sites tested.  5 sites sensed.      Skin integrity: Skin integrity normal. No ulcer, skin breakdown, erythema or callus.      Toenail Condition: Left toenails are normal.   Lymphadenopathy:      Cervical: No cervical adenopathy.   Skin:     General: Skin is warm and dry.   Neurological:      General: No focal deficit present.      Mental Status: She is alert and oriented to person, place, and time.      Sensory: Sensation is intact.      Motor: Motor function is intact.      Coordination: Coordination is intact.   Psychiatric:         Attention and Perception: Attention normal.         Mood and Affect: Mood normal.         Speech: Speech normal.         Behavior: Behavior normal.         POCT Results (if applicable);   Results for orders placed or performed in visit on 08/16/23   Comprehensive Metabolic Panel    Specimen: Arm, Left; Blood   Result Value Ref Range    Glucose 81 65 - 99 mg/dL    BUN 13 6 - 20 mg/dL    Creatinine 1.21 (H) 0.57 - 1.00 mg/dL    Sodium 143 136 - 145 mmol/L    Potassium 4.1 3.5 - 5.2 mmol/L    Chloride 106 98 - 107 mmol/L    CO2 26.2 22.0 - 29.0 mmol/L    Calcium 9.8 8.6 - 10.5 mg/dL    Total Protein 6.9 6.0 - 8.5 g/dL    Albumin 4.2 3.5 - 5.2 g/dL    ALT (SGPT) 24 1 - 33 U/L    AST (SGOT) 27 1 - 32 U/L    Alkaline Phosphatase 85 39 - 117 U/L    Total Bilirubin 0.3 0.0 - 1.2 mg/dL    Globulin 2.7 gm/dL    A/G Ratio 1.6 g/dL    BUN/Creatinine Ratio 10.7 7.0 - 25.0    Anion Gap 10.8 5.0 - 15.0 mmol/L    eGFR 56.1 (L) >60.0 mL/min/1.73   Hemoglobin A1c    Specimen: Arm, Left; Blood   Result Value Ref Range    Hemoglobin A1C 5.00 4.80 - 5.60 %   MicroAlbumin, Urine, Random - Urine, Clean Catch    Specimen: Urine, Clean Catch   Result Value Ref Range    Microalbumin, Urine 4.5 mg/dL   Urinalysis without microscopic (no culture) - Urine, Clean Catch     Specimen: Urine, Clean Catch   Result Value Ref Range    Color, UA Yellow Yellow, Straw    Appearance, UA Clear Clear    pH, UA 7.0 5.0 - 8.0    Specific Gravity, UA 1.014 1.005 - 1.030    Glucose, UA Negative Negative    Ketones, UA Negative Negative    Bilirubin, UA Negative Negative    Blood, UA Negative Negative    Protein, UA Trace (A) Negative    Leuk Esterase, UA Negative Negative    Nitrite, UA Negative Negative    Urobilinogen, UA 0.2 E.U./dL 0.2 - 1.0 E.U./dL        Procedures    Assessment / Plan      Assessment/Plan:   Diagnoses and all orders for this visit:    1. Well adult exam (Primary)  Patient did have a wellness exam performed today that did not reveal any abnormality.  She did well with respect to her anxiety/depression symptomatology.  We had discussed anticipatory guidance as well as safety concerns.  We will monitor her symptoms and if there is other questions or concerns further assessment may be necessary before her scheduled follow-up.  -     CBC (No Diff); Future  -     Comprehensive Metabolic Panel; Future  -     Hemoglobin A1c; Future  -     Lipid Panel; Future  -     Vitamin B12; Future  -     TSH Rfx On Abnormal To Free T4; Future    2. Type 2 diabetes mellitus without complication, without long-term current use of insulin  Patient states that she has been doing relatively well with respect to her diabetes but has not had any Ozempic in quite some time.  We will send a prescription for 1 mg dose and she will continue with this dosing until the 2 mg becomes available.  She also had diabetic foot exam that did not reveal any abnormality.  We have encouraged daily inspection as well as continuation of close observation for signs of callus/ulcers etc.  We have also encouraged her to not take the Ozempic 2 weeks prior to her nephrectomy due to risk of aspiration.  This is a chronic condition that requires modification of her dosing.  -     Ozempic, 1 MG/DOSE, 4 MG/3ML solution pen-injector;  Inject 1 mg under the skin into the appropriate area as directed 1 (One) Time Per Week.  Dispense: 3 mL; Refill: 11    3. Primary hypertension   Blood pressure doing well at present time.  No adjustments are necessary currently.    4. Mixed hyperlipidemia   We will obtain lipid profile and will make adjustments as necessary.  Our goal is for LDL be less than 70.    5. TAM (nonalcoholic steatohepatitis)   We will obtain liver function test.  If he does continue to show increase in her LFTs we will make arrangements for possible FibroSure and/or elastography.    6. Cigarette nicotine dependence without complication   She does continue to smoke.  We have encouraged her to reduce her dose.  She will continue her current diet and will pursue and provide assistance as necessary.    7. Renal insufficiency   We will repeat her renal function test.  She has been avoiding anti-inflammatories and has been drinking plain fluids.      8. Mild persistent asthma, unspecified whether complicated   Patient's been well with respect to her asthma.  She has been having some issues with thrush intermittently.  She does use the medicine at night.  We have encouraged her to try using the medication prior to brushing her teeth early in the morning.  We will have her attempt using this on a daily basis to see if it helps with her breathing.  We have also encouraged her to discontinue smoking.    9. Anxiety   Anxiety is doing well present time.  No adjustments are necessary.    10. Gastroesophageal reflux disease without esophagitis   Reflux is doing well.  If she does develop alarm symptoms we will refer for an EGD.    11. Need for vaccination  Patient will receive a pneumonia shot today.  -     Pneumococcal Polysaccharide Vaccine 23-Valent Greater Than or Equal To 1yo Subcutaneous / IM         Healthcare Maintenance:  Counseling provided based on age appropriate USPSTF guidelines.       Veronica Goldsmith voices understanding and acceptance of  this advice and will call back with any further questions or concerns. AVS with preventive healthcare tips printed for patient.     Follow Up:   Return in about 3 months (around 2/16/2024).    I  At [facility], we believe that sharing information builds trust and better relationships. You are receiving this note because you recently visited [facility]. It is possible you will see health information before a provider has talked with you about it. This kind of information can be easy to misunderstand. To help you fully understand what it means for your health, we urge you to discuss this note with your provider.    Greg Levine MD  [facility]

## 2023-11-17 LAB
ALBUMIN SERPL-MCNC: 4.4 G/DL (ref 3.5–5.2)
ALBUMIN/GLOB SERPL: 1.6 G/DL
ALP SERPL-CCNC: 91 U/L (ref 39–117)
ALT SERPL W P-5'-P-CCNC: 28 U/L (ref 1–33)
ANION GAP SERPL CALCULATED.3IONS-SCNC: 7.5 MMOL/L (ref 5–15)
AST SERPL-CCNC: 26 U/L (ref 1–32)
BILIRUB SERPL-MCNC: 0.4 MG/DL (ref 0–1.2)
BUN SERPL-MCNC: 12 MG/DL (ref 6–20)
BUN/CREAT SERPL: 11.4 (ref 7–25)
CALCIUM SPEC-SCNC: 9.2 MG/DL (ref 8.6–10.5)
CHLORIDE SERPL-SCNC: 107 MMOL/L (ref 98–107)
CHOLEST SERPL-MCNC: 143 MG/DL (ref 0–200)
CO2 SERPL-SCNC: 28.5 MMOL/L (ref 22–29)
CREAT SERPL-MCNC: 1.05 MG/DL (ref 0.57–1)
EGFRCR SERPLBLD CKD-EPI 2021: 66.5 ML/MIN/1.73
GLOBULIN UR ELPH-MCNC: 2.8 GM/DL
GLUCOSE SERPL-MCNC: 100 MG/DL (ref 65–99)
HDLC SERPL-MCNC: 35 MG/DL (ref 40–60)
LDLC SERPL CALC-MCNC: 89 MG/DL (ref 0–100)
LDLC/HDLC SERPL: 2.52 {RATIO}
POTASSIUM SERPL-SCNC: 4.6 MMOL/L (ref 3.5–5.2)
PROT SERPL-MCNC: 7.2 G/DL (ref 6–8.5)
SODIUM SERPL-SCNC: 143 MMOL/L (ref 136–145)
TRIGL SERPL-MCNC: 99 MG/DL (ref 0–150)
TSH SERPL DL<=0.05 MIU/L-ACNC: 1.1 UIU/ML (ref 0.27–4.2)
VIT B12 BLD-MCNC: 217 PG/ML (ref 211–946)
VLDLC SERPL-MCNC: 19 MG/DL (ref 5–40)

## 2023-11-22 ENCOUNTER — PRIOR AUTHORIZATION (OUTPATIENT)
Dept: FAMILY MEDICINE CLINIC | Facility: CLINIC | Age: 48
End: 2023-11-22
Payer: COMMERCIAL

## 2023-11-28 ENCOUNTER — TELEPHONE (OUTPATIENT)
Dept: FAMILY MEDICINE CLINIC | Facility: CLINIC | Age: 48
End: 2023-11-28
Payer: COMMERCIAL

## 2023-11-28 DIAGNOSIS — E11.9 TYPE 2 DIABETES MELLITUS WITHOUT COMPLICATION, WITHOUT LONG-TERM CURRENT USE OF INSULIN: Primary | ICD-10-CM

## 2023-11-28 RX ORDER — TIRZEPATIDE 10 MG/.5ML
10 INJECTION, SOLUTION SUBCUTANEOUS WEEKLY
Qty: 2 ML | Refills: 11 | Status: SHIPPED | OUTPATIENT
Start: 2023-11-28

## 2023-11-28 NOTE — TELEPHONE ENCOUNTER
I will try switching her over to Mounjaro.  Will initially start 10 mg weekly as she is taking 1 mg of the Ozempic.  If she has not been using the  Ozempic, please let me know so I can adjust dosing.

## 2023-12-05 ENCOUNTER — TELEPHONE (OUTPATIENT)
Dept: PREADMISSION TESTING | Facility: HOSPITAL | Age: 48
End: 2023-12-05

## 2023-12-06 ENCOUNTER — PRE-ADMISSION TESTING (OUTPATIENT)
Dept: PREADMISSION TESTING | Facility: HOSPITAL | Age: 48
End: 2023-12-06
Payer: COMMERCIAL

## 2023-12-06 ENCOUNTER — PREP FOR SURGERY (OUTPATIENT)
Dept: OTHER | Facility: HOSPITAL | Age: 48
End: 2023-12-06
Payer: COMMERCIAL

## 2023-12-06 VITALS — WEIGHT: 181 LBS | BODY MASS INDEX: 31.05 KG/M2

## 2023-12-06 DIAGNOSIS — N28.89 RENAL MASS, RIGHT: Primary | ICD-10-CM

## 2023-12-06 DIAGNOSIS — N28.89 RENAL MASS, RIGHT: ICD-10-CM

## 2023-12-06 LAB
ABO GROUP BLD: NORMAL
ALBUMIN SERPL-MCNC: 4.6 G/DL (ref 3.5–5.2)
ALBUMIN/GLOB SERPL: 1.4 G/DL
ALP SERPL-CCNC: 105 U/L (ref 39–117)
ALT SERPL W P-5'-P-CCNC: 19 U/L (ref 1–33)
ANION GAP SERPL CALCULATED.3IONS-SCNC: 10.2 MMOL/L (ref 5–15)
APTT PPP: 28 SECONDS (ref 23.5–35.5)
AST SERPL-CCNC: 20 U/L (ref 1–32)
BASOPHILS # BLD AUTO: 0.07 10*3/MM3 (ref 0–0.2)
BASOPHILS NFR BLD AUTO: 0.6 % (ref 0–1.5)
BILIRUB SERPL-MCNC: 0.2 MG/DL (ref 0–1.2)
BUN SERPL-MCNC: 12 MG/DL (ref 6–20)
BUN/CREAT SERPL: 11.3 (ref 7–25)
CALCIUM SPEC-SCNC: 9.4 MG/DL (ref 8.6–10.5)
CHLORIDE SERPL-SCNC: 107 MMOL/L (ref 98–107)
CO2 SERPL-SCNC: 25.8 MMOL/L (ref 22–29)
CREAT SERPL-MCNC: 1.06 MG/DL (ref 0.57–1)
DEPRECATED RDW RBC AUTO: 45.1 FL (ref 37–54)
EGFRCR SERPLBLD CKD-EPI 2021: 65.7 ML/MIN/1.73
EOSINOPHIL # BLD AUTO: 0.4 10*3/MM3 (ref 0–0.4)
EOSINOPHIL NFR BLD AUTO: 3.4 % (ref 0.3–6.2)
ERYTHROCYTE [DISTWIDTH] IN BLOOD BY AUTOMATED COUNT: 13.4 % (ref 12.3–15.4)
GLOBULIN UR ELPH-MCNC: 3.3 GM/DL
GLUCOSE SERPL-MCNC: 79 MG/DL (ref 65–99)
HCT VFR BLD AUTO: 46.8 % (ref 34–46.6)
HGB BLD-MCNC: 16.1 G/DL (ref 12–15.9)
IMM GRANULOCYTES # BLD AUTO: 0.04 10*3/MM3 (ref 0–0.05)
IMM GRANULOCYTES NFR BLD AUTO: 0.3 % (ref 0–0.5)
INR PPP: 0.94 (ref 0.9–1.1)
LYMPHOCYTES # BLD AUTO: 3.34 10*3/MM3 (ref 0.7–3.1)
LYMPHOCYTES NFR BLD AUTO: 28 % (ref 19.6–45.3)
MCH RBC QN AUTO: 31.9 PG (ref 26.6–33)
MCHC RBC AUTO-ENTMCNC: 34.4 G/DL (ref 31.5–35.7)
MCV RBC AUTO: 92.9 FL (ref 79–97)
MONOCYTES # BLD AUTO: 0.72 10*3/MM3 (ref 0.1–0.9)
MONOCYTES NFR BLD AUTO: 6 % (ref 5–12)
NEUTROPHILS NFR BLD AUTO: 61.7 % (ref 42.7–76)
NEUTROPHILS NFR BLD AUTO: 7.36 10*3/MM3 (ref 1.7–7)
NRBC BLD AUTO-RTO: 0 /100 WBC (ref 0–0.2)
PLATELET # BLD AUTO: 210 10*3/MM3 (ref 140–450)
PMV BLD AUTO: 11 FL (ref 6–12)
POTASSIUM SERPL-SCNC: 4.2 MMOL/L (ref 3.5–5.2)
PROT SERPL-MCNC: 7.9 G/DL (ref 6–8.5)
PROTHROMBIN TIME: 13 SECONDS (ref 12.3–15.1)
RBC # BLD AUTO: 5.04 10*6/MM3 (ref 3.77–5.28)
RH BLD: POSITIVE
SODIUM SERPL-SCNC: 143 MMOL/L (ref 136–145)
WBC NRBC COR # BLD AUTO: 11.93 10*3/MM3 (ref 3.4–10.8)

## 2023-12-06 PROCEDURE — 85730 THROMBOPLASTIN TIME PARTIAL: CPT

## 2023-12-06 PROCEDURE — 86901 BLOOD TYPING SEROLOGIC RH(D): CPT

## 2023-12-06 PROCEDURE — 80053 COMPREHEN METABOLIC PANEL: CPT

## 2023-12-06 PROCEDURE — 86900 BLOOD TYPING SEROLOGIC ABO: CPT

## 2023-12-06 PROCEDURE — 85025 COMPLETE CBC W/AUTO DIFF WBC: CPT

## 2023-12-06 PROCEDURE — 36415 COLL VENOUS BLD VENIPUNCTURE: CPT

## 2023-12-06 PROCEDURE — 85610 PROTHROMBIN TIME: CPT

## 2023-12-06 RX ORDER — SODIUM CHLORIDE 9 MG/ML
125 INJECTION, SOLUTION INTRAVENOUS CONTINUOUS
OUTPATIENT
Start: 2023-12-06

## 2023-12-06 NOTE — DISCHARGE INSTRUCTIONS
Introduction to anesthesia video viewed by pt in PAT.      PAT PASS GIVEN/REVIEWED WITH PT.  VERBALIZED UNDERSTANDING OF THE FOLLOWING:  DO NOT EAT, DRINK, SMOKE, USE SMOKELESS TOBACCO OR CHEW GUM AFTER MIDNIGHT THE NIGHT BEFORE SURGERY.  THIS ALSO INCLUDES HARD CANDIES AND MINTS.    DO NOT SHAVE THE AREA TO BE OPERATED ON AT LEAST 48 HOURS PRIOR TO THE PROCEDURE.  DO NOT WEAR MAKE UP OR NAIL POLISH.  DO NOT LEAVE IN ANY PIERCING OR WEAR JEWELRY THE DAY OF SURGERY.      DO NOT USE ADHESIVES IF YOU WEAR DENTURES.    DO NOT WEAR EYE CONTACTS; BRING IN YOUR GLASSES.    ONLY TAKE MEDICATION THE MORNING OF YOUR PROCEDURE IF INSTRUCTED BY YOUR SURGEON WITH ENOUGH WATER TO SWALLOW THE MEDICATION.  IF YOUR SURGEON DID NOT SPECIFY WHICH MEDICATIONS TO TAKE, YOU WILL NEED TO CALL THEIR OFFICE FOR FURTHER INSTRUCTIONS AND DO AS THEY INSTRUCT.    LEAVE ANYTHING YOU CONSIDER VALUABLE AT HOME.    YOU WILL NEED TO ARRANGE FOR SOMEONE TO DRIVE YOU HOME AFTER SURGERY.  IT IS RECOMMENDED THAT YOU DO NOT DRIVE, WORK, DRINK ALCOHOL OR MAKE MAJOR DECISIONS FOR AT LEAST 24 HOURS AFTER YOUR PROCEDURE IS COMPLETE.      THE DAY OF YOUR PROCEDURE, BRING IN THE FOLLOWING IF APPLICABLE:   PICTURE ID AND INSURANCE/MEDICARE OR MEDICAID CARDS/ANY CO-PAY THAT MAY BE DUE   COPY OF ADVANCED DIRECTIVE/LIVING WILL/POWER OR    CPAP/BIPAP/INHALERS   SKIN PREP SHEET   YOUR PREADMISSION TESTING PASS (IF NOT A PHONE HISTORY)    Chlorhexidine wipes along with instruction/verification sheet given to pt.  Instructed pt to date, time, and initial the verification sheet once skin prep has been  completed, and to return to [facility] the day of the procedure.  Pt. Verbalizes understanding.     Pt informed to return to [facility] for a type and screen lab two days prior to nephrectomy.      Medication instructions given to pt by RN per anesthesia protocol.  Pt referred back to surgeon for further instructions if he/she is on any blood  thinners.    Verbalized understanding of above instructions.

## 2023-12-11 DIAGNOSIS — B37.0 STOMATITIS MONILIAL: Primary | ICD-10-CM

## 2023-12-11 RX ORDER — CLOTRIMAZOLE 10 MG/1
10 LOZENGE ORAL; TOPICAL
Qty: 40 TABLET | Refills: 1 | Status: SHIPPED | OUTPATIENT
Start: 2023-12-11

## 2023-12-19 ENCOUNTER — LAB (OUTPATIENT)
Dept: LAB | Facility: HOSPITAL | Age: 48
End: 2023-12-19
Payer: COMMERCIAL

## 2023-12-20 ENCOUNTER — ANESTHESIA (OUTPATIENT)
Dept: PERIOP | Facility: HOSPITAL | Age: 48
End: 2023-12-20
Payer: COMMERCIAL

## 2023-12-20 ENCOUNTER — ANESTHESIA EVENT (OUTPATIENT)
Dept: PERIOP | Facility: HOSPITAL | Age: 48
End: 2023-12-20
Payer: COMMERCIAL

## 2023-12-20 ENCOUNTER — ANESTHESIA EVENT CONVERTED (OUTPATIENT)
Dept: ANESTHESIOLOGY | Facility: HOSPITAL | Age: 48
End: 2023-12-20
Payer: COMMERCIAL

## 2023-12-20 ENCOUNTER — HOSPITAL ENCOUNTER (INPATIENT)
Facility: HOSPITAL | Age: 48
LOS: 1 days | Discharge: HOME | End: 2023-12-21
Attending: UROLOGY | Admitting: UROLOGY
Payer: COMMERCIAL

## 2023-12-20 DIAGNOSIS — N28.89 RENAL MASS, RIGHT: ICD-10-CM

## 2023-12-20 LAB
ABO GROUP BLD: NORMAL
BLD GP AB SCN SERPL QL: NEGATIVE
GLUCOSE BLDC GLUCOMTR-MCNC: 145 MG/DL (ref 70–130)
GLUCOSE BLDC GLUCOMTR-MCNC: 164 MG/DL (ref 70–130)
GLUCOSE BLDC GLUCOMTR-MCNC: 176 MG/DL (ref 70–130)
RH BLD: POSITIVE
T&S EXPIRATION DATE: NORMAL

## 2023-12-20 PROCEDURE — S0260 H&P FOR SURGERY: HCPCS | Performed by: UROLOGY

## 2023-12-20 PROCEDURE — 25010000002 PROPOFOL 10 MG/ML EMULSION: Performed by: NURSE ANESTHETIST, CERTIFIED REGISTERED

## 2023-12-20 PROCEDURE — 86920 COMPATIBILITY TEST SPIN: CPT

## 2023-12-20 PROCEDURE — 86850 RBC ANTIBODY SCREEN: CPT | Performed by: UROLOGY

## 2023-12-20 PROCEDURE — 25010000002 MIDAZOLAM PER 1MG: Performed by: NURSE ANESTHETIST, CERTIFIED REGISTERED

## 2023-12-20 PROCEDURE — 25010000002 SUGAMMADEX 200 MG/2ML SOLUTION: Performed by: NURSE ANESTHETIST, CERTIFIED REGISTERED

## 2023-12-20 PROCEDURE — 25010000002 DEXAMETHASONE PER 1 MG: Performed by: NURSE ANESTHETIST, CERTIFIED REGISTERED

## 2023-12-20 PROCEDURE — 25810000003 SODIUM CHLORIDE 0.9 % SOLUTION: Performed by: UROLOGY

## 2023-12-20 PROCEDURE — 86901 BLOOD TYPING SEROLOGIC RH(D): CPT | Performed by: UROLOGY

## 2023-12-20 PROCEDURE — 25010000002 HALOPERIDOL LACTATE PER 5 MG: Performed by: NURSE ANESTHETIST, CERTIFIED REGISTERED

## 2023-12-20 PROCEDURE — 25010000002 ROPIVACAINE PER 1 MG: Performed by: NURSE ANESTHETIST, CERTIFIED REGISTERED

## 2023-12-20 PROCEDURE — 25010000002 ONDANSETRON PER 1 MG: Performed by: FAMILY MEDICINE

## 2023-12-20 PROCEDURE — 0TT04ZZ RESECTION OF RIGHT KIDNEY, PERCUTANEOUS ENDOSCOPIC APPROACH: ICD-10-PCS | Performed by: UROLOGY

## 2023-12-20 PROCEDURE — 63710000001 INSULIN ASPART PER 5 UNITS: Performed by: FAMILY MEDICINE

## 2023-12-20 PROCEDURE — 25010000002 HYDROMORPHONE PER 4 MG: Performed by: NURSE ANESTHETIST, CERTIFIED REGISTERED

## 2023-12-20 PROCEDURE — 82948 REAGENT STRIP/BLOOD GLUCOSE: CPT

## 2023-12-20 PROCEDURE — 25010000002 ONDANSETRON PER 1 MG: Performed by: NURSE ANESTHETIST, CERTIFIED REGISTERED

## 2023-12-20 PROCEDURE — 25010000002 LABETALOL 5 MG/ML SOLUTION: Performed by: NURSE ANESTHETIST, CERTIFIED REGISTERED

## 2023-12-20 PROCEDURE — 86900 BLOOD TYPING SEROLOGIC ABO: CPT | Performed by: UROLOGY

## 2023-12-20 PROCEDURE — 25810000003 SODIUM CHLORIDE 0.9 % SOLUTION: Performed by: FAMILY MEDICINE

## 2023-12-20 PROCEDURE — 25010000002 CEFAZOLIN SODIUM-DEXTROSE 2-3 GM-%(50ML) RECONSTITUTED SOLUTION: Performed by: UROLOGY

## 2023-12-20 PROCEDURE — G0378 HOSPITAL OBSERVATION PER HR: HCPCS

## 2023-12-20 PROCEDURE — 25010000002 FENTANYL CITRATE PF 50 MCG/ML SOLUTION PREFILLED SYRINGE: Performed by: NURSE ANESTHETIST, CERTIFIED REGISTERED

## 2023-12-20 PROCEDURE — 99221 1ST HOSP IP/OBS SF/LOW 40: CPT | Performed by: FAMILY MEDICINE

## 2023-12-20 PROCEDURE — 50545 LAPARO RADICAL NEPHRECTOMY: CPT | Performed by: UROLOGY

## 2023-12-20 DEVICE — DEV CONTRL TISS STRATAFIX SPIRAL MNCRYL UD 3/0 PLS 60CM: Type: IMPLANTABLE DEVICE | Site: ABDOMEN | Status: FUNCTIONAL

## 2023-12-20 DEVICE — ENDOPATH ECHELON ENDOSCOPIC LINEAR CUTTER RELOADS, WHITE, 60MM
Type: IMPLANTABLE DEVICE | Site: ABDOMEN | Status: FUNCTIONAL
Brand: ECHELON ENDOPATH

## 2023-12-20 DEVICE — FLOSEAL WITH RECOTHROM - 10ML.
Type: IMPLANTABLE DEVICE | Site: ABDOMEN | Status: FUNCTIONAL
Brand: FLOSEAL HEMOSTATIC MATRIX

## 2023-12-20 DEVICE — ABSORBABLE HEMOSTAT (OXIDIZED REGENERATED CELLULOSE, U.S.P.)
Type: IMPLANTABLE DEVICE | Site: ABDOMEN | Status: FUNCTIONAL
Brand: SURGICEL

## 2023-12-20 RX ORDER — ROCURONIUM BROMIDE 50 MG/5 ML
SYRINGE (ML) INTRAVENOUS AS NEEDED
Status: DISCONTINUED | OUTPATIENT
Start: 2023-12-20 | End: 2023-12-20 | Stop reason: SURG

## 2023-12-20 RX ORDER — LORAZEPAM 2 MG/ML
1 INJECTION INTRAMUSCULAR
Status: DISCONTINUED | OUTPATIENT
Start: 2023-12-20 | End: 2023-12-20 | Stop reason: HOSPADM

## 2023-12-20 RX ORDER — ACETAMINOPHEN 650 MG/1
650 SUPPOSITORY RECTAL EVERY 6 HOURS
Status: DISCONTINUED | OUTPATIENT
Start: 2023-12-20 | End: 2023-12-21 | Stop reason: HOSPADM

## 2023-12-20 RX ORDER — NALOXONE HCL 0.4 MG/ML
0.1 VIAL (ML) INJECTION
Status: DISCONTINUED | OUTPATIENT
Start: 2023-12-20 | End: 2023-12-21 | Stop reason: HOSPADM

## 2023-12-20 RX ORDER — BISACODYL 5 MG/1
5 TABLET, DELAYED RELEASE ORAL DAILY PRN
Status: DISCONTINUED | OUTPATIENT
Start: 2023-12-20 | End: 2023-12-21 | Stop reason: HOSPADM

## 2023-12-20 RX ORDER — NITROGLYCERIN 0.4 MG/1
0.4 TABLET SUBLINGUAL
Status: DISCONTINUED | OUTPATIENT
Start: 2023-12-20 | End: 2023-12-21 | Stop reason: HOSPADM

## 2023-12-20 RX ORDER — ALBUTEROL SULFATE 90 UG/1
2 AEROSOL, METERED RESPIRATORY (INHALATION)
Status: DISCONTINUED | OUTPATIENT
Start: 2023-12-20 | End: 2023-12-21 | Stop reason: HOSPADM

## 2023-12-20 RX ORDER — SODIUM CHLORIDE 0.9 % (FLUSH) 0.9 %
10 SYRINGE (ML) INJECTION EVERY 12 HOURS SCHEDULED
Status: DISCONTINUED | OUTPATIENT
Start: 2023-12-20 | End: 2023-12-21 | Stop reason: HOSPADM

## 2023-12-20 RX ORDER — BISACODYL 10 MG
10 SUPPOSITORY, RECTAL RECTAL DAILY PRN
Status: DISCONTINUED | OUTPATIENT
Start: 2023-12-20 | End: 2023-12-20 | Stop reason: SDUPTHER

## 2023-12-20 RX ORDER — HYDRALAZINE HYDROCHLORIDE 20 MG/ML
10 INJECTION INTRAMUSCULAR; INTRAVENOUS EVERY 6 HOURS PRN
Status: DISCONTINUED | OUTPATIENT
Start: 2023-12-20 | End: 2023-12-21 | Stop reason: HOSPADM

## 2023-12-20 RX ORDER — POLYETHYLENE GLYCOL 3350 17 G/17G
17 POWDER, FOR SOLUTION ORAL DAILY PRN
Status: DISCONTINUED | OUTPATIENT
Start: 2023-12-20 | End: 2023-12-21 | Stop reason: HOSPADM

## 2023-12-20 RX ORDER — CHOLECALCIFEROL (VITAMIN D3) 125 MCG
5 CAPSULE ORAL NIGHTLY PRN
Status: DISCONTINUED | OUTPATIENT
Start: 2023-12-20 | End: 2023-12-21 | Stop reason: HOSPADM

## 2023-12-20 RX ORDER — HYDRALAZINE HYDROCHLORIDE 20 MG/ML
5 INJECTION INTRAMUSCULAR; INTRAVENOUS ONCE AS NEEDED
Status: DISCONTINUED | OUTPATIENT
Start: 2023-12-20 | End: 2023-12-20 | Stop reason: HOSPADM

## 2023-12-20 RX ORDER — MIDAZOLAM HYDROCHLORIDE 2 MG/2ML
INJECTION, SOLUTION INTRAMUSCULAR; INTRAVENOUS AS NEEDED
Status: DISCONTINUED | OUTPATIENT
Start: 2023-12-20 | End: 2023-12-20 | Stop reason: SURG

## 2023-12-20 RX ORDER — OXYCODONE HYDROCHLORIDE 5 MG/1
5 TABLET ORAL EVERY 6 HOURS PRN
Qty: 10 TABLET | Refills: 0 | Status: SHIPPED | OUTPATIENT
Start: 2023-12-20

## 2023-12-20 RX ORDER — CEFAZOLIN SODIUM 2 G/50ML
2000 SOLUTION INTRAVENOUS EVERY 8 HOURS
Status: COMPLETED | OUTPATIENT
Start: 2023-12-20 | End: 2023-12-21

## 2023-12-20 RX ORDER — FENTANYL CITRATE 50 UG/ML
INJECTION, SOLUTION INTRAMUSCULAR; INTRAVENOUS AS NEEDED
Status: DISCONTINUED | OUTPATIENT
Start: 2023-12-20 | End: 2023-12-20 | Stop reason: SURG

## 2023-12-20 RX ORDER — MAGNESIUM HYDROXIDE 1200 MG/15ML
LIQUID ORAL AS NEEDED
Status: DISCONTINUED | OUTPATIENT
Start: 2023-12-20 | End: 2023-12-20 | Stop reason: HOSPADM

## 2023-12-20 RX ORDER — ACETAMINOPHEN 500 MG
1000 TABLET ORAL EVERY 6 HOURS
Qty: 30 TABLET | Refills: 0 | Status: SHIPPED | OUTPATIENT
Start: 2023-12-20 | End: 2023-12-23

## 2023-12-20 RX ORDER — SODIUM CHLORIDE 0.9 % (FLUSH) 0.9 %
10 SYRINGE (ML) INJECTION AS NEEDED
Status: DISCONTINUED | OUTPATIENT
Start: 2023-12-20 | End: 2023-12-21 | Stop reason: HOSPADM

## 2023-12-20 RX ORDER — ONDANSETRON 2 MG/ML
4 INJECTION INTRAMUSCULAR; INTRAVENOUS EVERY 6 HOURS PRN
Status: DISCONTINUED | OUTPATIENT
Start: 2023-12-20 | End: 2023-12-21 | Stop reason: HOSPADM

## 2023-12-20 RX ORDER — ONDANSETRON 2 MG/ML
INJECTION INTRAMUSCULAR; INTRAVENOUS AS NEEDED
Status: DISCONTINUED | OUTPATIENT
Start: 2023-12-20 | End: 2023-12-20 | Stop reason: SURG

## 2023-12-20 RX ORDER — NICOTINE POLACRILEX 4 MG
15 LOZENGE BUCCAL
Status: DISCONTINUED | OUTPATIENT
Start: 2023-12-20 | End: 2023-12-21 | Stop reason: HOSPADM

## 2023-12-20 RX ORDER — SEMAGLUTIDE 1.34 MG/ML
1 INJECTION, SOLUTION SUBCUTANEOUS WEEKLY
COMMUNITY

## 2023-12-20 RX ORDER — ONDANSETRON 2 MG/ML
4 INJECTION INTRAMUSCULAR; INTRAVENOUS ONCE AS NEEDED
Status: DISCONTINUED | OUTPATIENT
Start: 2023-12-20 | End: 2023-12-20 | Stop reason: HOSPADM

## 2023-12-20 RX ORDER — CEFAZOLIN SODIUM 2 G/50ML
2000 SOLUTION INTRAVENOUS ONCE
Status: COMPLETED | OUTPATIENT
Start: 2023-12-20 | End: 2023-12-20

## 2023-12-20 RX ORDER — KETAMINE HCL IN NACL, ISO-OSM 100MG/10ML
SYRINGE (ML) INJECTION AS NEEDED
Status: DISCONTINUED | OUTPATIENT
Start: 2023-12-20 | End: 2023-12-20 | Stop reason: SURG

## 2023-12-20 RX ORDER — LABETALOL HYDROCHLORIDE 5 MG/ML
5 INJECTION, SOLUTION INTRAVENOUS
Status: DISCONTINUED | OUTPATIENT
Start: 2023-12-20 | End: 2023-12-20 | Stop reason: HOSPADM

## 2023-12-20 RX ORDER — ROPIVACAINE HYDROCHLORIDE 5 MG/ML
INJECTION, SOLUTION EPIDURAL; INFILTRATION; PERINEURAL AS NEEDED
Status: DISCONTINUED | OUTPATIENT
Start: 2023-12-20 | End: 2023-12-20 | Stop reason: SURG

## 2023-12-20 RX ORDER — DEXAMETHASONE SODIUM PHOSPHATE 4 MG/ML
INJECTION, SOLUTION INTRA-ARTICULAR; INTRALESIONAL; INTRAMUSCULAR; INTRAVENOUS; SOFT TISSUE AS NEEDED
Status: DISCONTINUED | OUTPATIENT
Start: 2023-12-20 | End: 2023-12-20 | Stop reason: SURG

## 2023-12-20 RX ORDER — PANTOPRAZOLE SODIUM 40 MG/1
40 TABLET, DELAYED RELEASE ORAL
Status: DISCONTINUED | OUTPATIENT
Start: 2023-12-21 | End: 2023-12-21 | Stop reason: HOSPADM

## 2023-12-20 RX ORDER — SODIUM CHLORIDE 9 MG/ML
40 INJECTION, SOLUTION INTRAVENOUS AS NEEDED
Status: DISCONTINUED | OUTPATIENT
Start: 2023-12-20 | End: 2023-12-21 | Stop reason: HOSPADM

## 2023-12-20 RX ORDER — HALOPERIDOL 5 MG/ML
INJECTION INTRAMUSCULAR AS NEEDED
Status: DISCONTINUED | OUTPATIENT
Start: 2023-12-20 | End: 2023-12-20 | Stop reason: SURG

## 2023-12-20 RX ORDER — OXYCODONE HYDROCHLORIDE 5 MG/1
5 TABLET ORAL EVERY 4 HOURS PRN
Status: DISCONTINUED | OUTPATIENT
Start: 2023-12-20 | End: 2023-12-21 | Stop reason: HOSPADM

## 2023-12-20 RX ORDER — INSULIN ASPART 100 [IU]/ML
2-7 INJECTION, SOLUTION INTRAVENOUS; SUBCUTANEOUS
Status: DISCONTINUED | OUTPATIENT
Start: 2023-12-20 | End: 2023-12-21 | Stop reason: HOSPADM

## 2023-12-20 RX ORDER — DEXTROSE MONOHYDRATE 25 G/50ML
25 INJECTION, SOLUTION INTRAVENOUS
Status: DISCONTINUED | OUTPATIENT
Start: 2023-12-20 | End: 2023-12-21 | Stop reason: HOSPADM

## 2023-12-20 RX ORDER — BISACODYL 10 MG
10 SUPPOSITORY, RECTAL RECTAL DAILY PRN
Status: DISCONTINUED | OUTPATIENT
Start: 2023-12-20 | End: 2023-12-21 | Stop reason: HOSPADM

## 2023-12-20 RX ORDER — ACETAMINOPHEN 325 MG/1
650 TABLET ORAL EVERY 6 HOURS
Status: DISCONTINUED | OUTPATIENT
Start: 2023-12-20 | End: 2023-12-21 | Stop reason: HOSPADM

## 2023-12-20 RX ORDER — ASPIRIN 81 MG/1
81 TABLET ORAL DAILY
Status: DISCONTINUED | OUTPATIENT
Start: 2023-12-20 | End: 2023-12-21 | Stop reason: HOSPADM

## 2023-12-20 RX ORDER — PROPOFOL 10 MG/ML
VIAL (ML) INTRAVENOUS AS NEEDED
Status: DISCONTINUED | OUTPATIENT
Start: 2023-12-20 | End: 2023-12-20 | Stop reason: SURG

## 2023-12-20 RX ORDER — SODIUM CHLORIDE 9 MG/ML
125 INJECTION, SOLUTION INTRAVENOUS CONTINUOUS
Status: DISCONTINUED | OUTPATIENT
Start: 2023-12-20 | End: 2023-12-21 | Stop reason: HOSPADM

## 2023-12-20 RX ORDER — CETIRIZINE HYDROCHLORIDE 10 MG/1
10 TABLET ORAL DAILY
Status: DISCONTINUED | OUTPATIENT
Start: 2023-12-20 | End: 2023-12-21 | Stop reason: HOSPADM

## 2023-12-20 RX ORDER — DOCUSATE SODIUM 100 MG/1
100 CAPSULE, LIQUID FILLED ORAL 2 TIMES DAILY
Qty: 15 CAPSULE | Refills: 1 | Status: SHIPPED | OUTPATIENT
Start: 2023-12-20

## 2023-12-20 RX ORDER — SODIUM CHLORIDE 9 MG/ML
125 INJECTION, SOLUTION INTRAVENOUS CONTINUOUS
Status: DISCONTINUED | OUTPATIENT
Start: 2023-12-20 | End: 2023-12-20

## 2023-12-20 RX ORDER — HYDROMORPHONE HYDROCHLORIDE 2 MG/ML
INJECTION, SOLUTION INTRAMUSCULAR; INTRAVENOUS; SUBCUTANEOUS AS NEEDED
Status: DISCONTINUED | OUTPATIENT
Start: 2023-12-20 | End: 2023-12-20 | Stop reason: SURG

## 2023-12-20 RX ORDER — ONDANSETRON 4 MG/1
4 TABLET, ORALLY DISINTEGRATING ORAL EVERY 6 HOURS PRN
Status: DISCONTINUED | OUTPATIENT
Start: 2023-12-20 | End: 2023-12-21 | Stop reason: HOSPADM

## 2023-12-20 RX ORDER — AMOXICILLIN 250 MG
2 CAPSULE ORAL 2 TIMES DAILY
Status: DISCONTINUED | OUTPATIENT
Start: 2023-12-20 | End: 2023-12-21 | Stop reason: HOSPADM

## 2023-12-20 RX ORDER — DOCUSATE SODIUM 100 MG/1
100 CAPSULE, LIQUID FILLED ORAL 2 TIMES DAILY
Status: DISCONTINUED | OUTPATIENT
Start: 2023-12-20 | End: 2023-12-21 | Stop reason: HOSPADM

## 2023-12-20 RX ORDER — NICOTINE 21 MG/24HR
1 PATCH, TRANSDERMAL 24 HOURS TRANSDERMAL EVERY 24 HOURS
Status: DISCONTINUED | OUTPATIENT
Start: 2023-12-20 | End: 2023-12-21 | Stop reason: HOSPADM

## 2023-12-20 RX ADMIN — MIDAZOLAM HYDROCHLORIDE 2 MG: 1 INJECTION, SOLUTION INTRAMUSCULAR; INTRAVENOUS at 07:27

## 2023-12-20 RX ADMIN — SODIUM CHLORIDE 125 ML/HR: 9 INJECTION, SOLUTION INTRAVENOUS at 16:10

## 2023-12-20 RX ADMIN — OXYCODONE 5 MG: 5 TABLET ORAL at 16:09

## 2023-12-20 RX ADMIN — SUGAMMADEX 200 MG: 100 INJECTION, SOLUTION INTRAVENOUS at 09:54

## 2023-12-20 RX ADMIN — HYDROMORPHONE HYDROCHLORIDE 1 MG: 2 INJECTION, SOLUTION INTRAMUSCULAR; INTRAVENOUS; SUBCUTANEOUS at 08:59

## 2023-12-20 RX ADMIN — SODIUM CHLORIDE: 9 INJECTION, SOLUTION INTRAVENOUS at 08:08

## 2023-12-20 RX ADMIN — PROPOFOL 200 MG: 10 INJECTION, EMULSION INTRAVENOUS at 07:31

## 2023-12-20 RX ADMIN — Medication 10 MG: at 09:24

## 2023-12-20 RX ADMIN — CEFAZOLIN SODIUM 2000 MG: 2 SOLUTION INTRAVENOUS at 22:26

## 2023-12-20 RX ADMIN — SODIUM CHLORIDE 125 ML/HR: 9 INJECTION, SOLUTION INTRAVENOUS at 07:01

## 2023-12-20 RX ADMIN — Medication 1 PATCH: at 16:09

## 2023-12-20 RX ADMIN — ACETAMINOPHEN 650 MG: 325 TABLET, FILM COATED ORAL at 16:09

## 2023-12-20 RX ADMIN — SODIUM CHLORIDE: 9 INJECTION, SOLUTION INTRAVENOUS at 09:26

## 2023-12-20 RX ADMIN — LABETALOL HYDROCHLORIDE 5 MG: 5 INJECTION, SOLUTION INTRAVENOUS at 10:32

## 2023-12-20 RX ADMIN — ONDANSETRON 4 MG: 2 INJECTION INTRAMUSCULAR; INTRAVENOUS at 08:02

## 2023-12-20 RX ADMIN — Medication 30 MG: at 07:52

## 2023-12-20 RX ADMIN — HYDROMORPHONE HYDROCHLORIDE 1 MG: 2 INJECTION, SOLUTION INTRAMUSCULAR; INTRAVENOUS; SUBCUTANEOUS at 09:24

## 2023-12-20 RX ADMIN — ONDANSETRON 4 MG: 2 INJECTION INTRAMUSCULAR; INTRAVENOUS at 22:25

## 2023-12-20 RX ADMIN — ACETAMINOPHEN 650 MG: 325 TABLET, FILM COATED ORAL at 22:25

## 2023-12-20 RX ADMIN — CEFAZOLIN SODIUM 2000 MG: 2 SOLUTION INTRAVENOUS at 16:10

## 2023-12-20 RX ADMIN — DEXAMETHASONE SODIUM PHOSPHATE 8 MG: 4 INJECTION, SOLUTION INTRA-ARTICULAR; INTRALESIONAL; INTRAMUSCULAR; INTRAVENOUS; SOFT TISSUE at 08:02

## 2023-12-20 RX ADMIN — Medication 50 MG: at 07:31

## 2023-12-20 RX ADMIN — LIDOCAINE HYDROCHLORIDE 60 MG: 20 INJECTION, SOLUTION INTRAVENOUS at 07:31

## 2023-12-20 RX ADMIN — INSULIN ASPART 2 UNITS: 100 INJECTION, SOLUTION INTRAVENOUS; SUBCUTANEOUS at 18:08

## 2023-12-20 RX ADMIN — Medication 20 MG: at 08:55

## 2023-12-20 RX ADMIN — HALOPERIDOL LACTATE 0.5 MG: 5 INJECTION, SOLUTION INTRAMUSCULAR at 08:13

## 2023-12-20 RX ADMIN — Medication 20 MG: at 08:23

## 2023-12-20 RX ADMIN — ASPIRIN 81 MG: 81 TABLET, COATED ORAL at 16:10

## 2023-12-20 RX ADMIN — CETIRIZINE HYDROCHLORIDE 10 MG: 10 TABLET, FILM COATED ORAL at 16:09

## 2023-12-20 RX ADMIN — ROPIVACAINE HYDROCHLORIDE 30 ML: 5 INJECTION EPIDURAL; INFILTRATION; PERINEURAL at 07:40

## 2023-12-20 RX ADMIN — FENTANYL CITRATE 50 MCG: 50 INJECTION, SOLUTION INTRAMUSCULAR; INTRAVENOUS at 07:52

## 2023-12-20 RX ADMIN — FENTANYL CITRATE 50 MCG: 50 INJECTION, SOLUTION INTRAMUSCULAR; INTRAVENOUS at 07:31

## 2023-12-20 RX ADMIN — LABETALOL HYDROCHLORIDE 5 MG: 5 INJECTION, SOLUTION INTRAVENOUS at 10:56

## 2023-12-20 RX ADMIN — DOCUSATE SODIUM 100 MG: 100 CAPSULE, LIQUID FILLED ORAL at 22:25

## 2023-12-20 RX ADMIN — CEFAZOLIN SODIUM 2000 MG: 2 SOLUTION INTRAVENOUS at 07:27

## 2023-12-20 RX ADMIN — Medication 20 MG: at 07:31

## 2023-12-20 RX ADMIN — DOCUSATE SODIUM 50MG AND SENNOSIDES 8.6MG 2 TABLET: 8.6; 5 TABLET, FILM COATED ORAL at 22:26

## 2023-12-20 NOTE — H&P
CC  No chief complaint on file.       HPI  Veronica Goldsmith is a 48 y.o. with history of   1. Renal mass, right         No recent fevers or new LUTS  Does not take any blood thinners    Past Medical History  Past Medical History:   Diagnosis Date    Acne rosacea, erythematous telangiectatic type     Acute bronchitis     history of    Acute sinusitis     Acute upper respiratory infection     hisotry of    Allergic contact dermatitis     Allergic rhinitis     Anxiety and depression     Asthma     Back pain, chronic     Benign paroxysmal positional vertigo     Blood clots in stool     Candidal dermatitis     Candidiasis of vulva and vagina     Cellulitis     AND ABSCESS, right lower abdomen    Cervical pain     Conjunctivitis     Contusion of ear     Corneal abrasion     Depression     Diabetes mellitus     type 2 - A1C better after weight loss with use of Semiglutide    Dyspnea, unspecified     Fibromyalgia     Flu vaccine need     Foot pain, right     GERD (gastroesophageal reflux disease)     Hand pain, left     Hearing loss, right     Heart palpitations     Hemorrhoids, external     Herpes simplex without complication     Herpes zoster lesion     Hypertension     Impetigo herpetiformis     Injury to tibial blood vessels, unspecified laterality, initial encounter     Kidney cyst     Right kidney    Kidney stones     Left humeral fracture     Low back pain     Lymphadenopathy     Migraine headache     Mixed hyperlipidemia     TAM (nonalcoholic steatohepatitis)     Neuropathy     Oral candidiasis     Plantar fasciitis     Recent weight loss     60lbs with use of Ozempic - per pt 12/6/23    Salpingitis and oophoritis, unspecified     not specified as acute, subacute, or chronic    Scabies     Seasonal allergies     Stress     Swelling of limb     Symptoms involving abdomen and pelvis     OTHER SYMPTOMS INVOLVING ABDOMEN AND PELVIS, ABDOMINAL OR PELVIC SWELLING, MASS, OR LUMP, OTHER SPECIFIED S    Tattoo     Tendon pain      Tobacco use disorder     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     Urinary tract infection     Vaginitis and vulvovaginitis     Vaginitis, atrophic        Past Surgical History  Past Surgical History:   Procedure Laterality Date    BILATERAL OOPHORECTOMY      CARPAL TUNNEL RELEASE Bilateral     CHOLECYSTECTOMY      COLONOSCOPY  03/30/2018    HUMERUS FRACTURE SURGERY Left     has implants - plate, screws    LIPOMA EXCISION      low back    ROTATOR CUFF REPAIR Right     TOTAL ABDOMINAL HYSTERECTOMY      TUBAL ABDOMINAL LIGATION      URETEROSCOPY LASER LITHOTRIPSY WITH STENT INSERTION Right 03/02/2023    Procedure: URETEROSCOPY LASER LITHOTRIPSY WITH STENT INSERTION;  Surgeon: Aaron Mart MD;  Location: [facility];  Service: Urology;  Laterality: Right;       Medications    Current Facility-Administered Medications:     ceFAZolin Sodium-Dextrose (ANCEF) IVPB (duplex) 2,000 mg, 2,000 mg, Intravenous, Once, Aaron Mart MD    sodium chloride 0.9 % infusion, 125 mL/hr, Intravenous, Continuous, Aaron Mart MD, Last Rate: 125 mL/hr at 12/20/23 0701, 125 mL/hr at 12/20/23 0701    Allergies  Allergies   Allergen Reactions    Sulfa Antibiotics Unknown - High Severity     high fever - pt states 106F    Morphine Other (See Comments)     drop in O2 sats; needed oxygent; pt states she has to be reminded to breathe;        Social History  Social History     Socioeconomic History    Marital status:    Tobacco Use    Smoking status: Every Day     Packs/day: 1.00     Years: 29.00     Additional pack years: 0.00     Total pack years: 29.00     Types: Cigarettes     Start date: 1993     Passive exposure: Current    Smokeless tobacco: Never   Vaping Use    Vaping Use: Never used   Substance and Sexual Activity    Alcohol use: Not Currently    Drug use: Never    Sexual activity: Defer       Review of Systems  Constitutional: No fevers or chills  Skin: Negative for rash  Endocrine: No  heat/cold intolerance   Cardiovascular: Negative for chest pain or dyspnea on exertion  Respiratory: Negative for shortness of breath or wheezing  Gastrointestinal: No constipation, nausea or vomiting  Genitourinary: Negative for new lower urinary tract symptoms, current gross hematuria or dysuria.  Musculoskeletal: No flank pain  Neurological:  Negative for frequent headaches or dizziness  Lymph/Heme: Negative for leg swelling or calf pain.    Physical Exam  Visit Vitals  /93 (BP Location: Right arm, Patient Position: Sitting)   Pulse 81   Temp 97.7 °F (36.5 °C) (Temporal)   Resp 20   SpO2 99%     Constitutional: NAD, WDWN.   HEENT: NCAT. Conjunctivae normal.  MMM.    Cardiovascular: Regular rate.  Pulmonary/Chest: Respirations are even and unlabored bilaterally.  Abdominal: Soft. No distension, tenderness, masses or guarding. No CVA tenderness.  Neurological: A + O x 3.  Cranial Nerves II-XII grossly intact. Normal gait.  Extremities: MELISSA x 4, Warm. No clubbing.  No cyanosis.    Skin: Pink, warm and dry.  No rashes noted.  Psychiatric:  Normal mood and affect    Labs & Imaging  Lab Results   Component Value Date    GLUCOSE 79 12/06/2023    CALCIUM 9.4 12/06/2023     12/06/2023    K 4.2 12/06/2023    CO2 25.8 12/06/2023     12/06/2023    BUN 12 12/06/2023    CREATININE 1.06 (H) 12/06/2023    BCR 11.3 12/06/2023    ANIONGAP 10.2 12/06/2023     Lab Results   Component Value Date    WBC 11.93 (H) 12/06/2023    HGB 16.1 (H) 12/06/2023    HCT 46.8 (H) 12/06/2023    MCV 92.9 12/06/2023     12/06/2023     Brief Urine Lab Results  (Last result in the past 365 days)        Color   Clarity   Blood   Leuk Est   Nitrite   Protein   CREAT   Urine HCG        08/16/23 0823 Yellow   Clear   Negative   Negative   Negative   Trace                      No results found.        Assessment  Veronica Goldsmith is a 48 y.o. female who presents with the following diagnosis:  1. Renal mass, right         Plan  1. To OR  for NEPHRECTOMY LAPAROSCOPIC HAND ASSISTED right    Aaron Mart MD

## 2023-12-20 NOTE — PLAN OF CARE
Problem: Adult Inpatient Plan of Care  Goal: Plan of Care Review  Outcome: Ongoing, Progressing  Flowsheets  Taken 12/20/2023 1848 by Lina Hairston, RN  Progress: no change  Taken 12/20/2023 1329 by Fay Joseph, RN  Plan of Care Reviewed With: patient   Goal Outcome Evaluation:           Progress: no change          Patient admitted to 3rd floor from the PACU. Patient resting well. Pain controlled with PRN medications as needed. No acute events to report, will continue to monitor.

## 2023-12-20 NOTE — CONSULTS
[facility]   CONSULTATION      Name:  Veronica Goldsmith   Age:  48 y.o.  Sex:  female  :  1975  MRN:  [id number]   Visit Number:  [id number]  Admission Date:  2023  Date Of Service:  23  Primary Care Physician:  Greg Levine MD    Consulting Physician:    Aleah Amador MD    Referring Physician:    Dr. Mart    Reason For Consult:    Medical management    Chief Complaint:     Status post right laparoscopic nephrectomy    History Of Presenting Illness:      Patient is a 48 years old female with a past medical history of asthma, depression, diabetes mellitus type 2, fibromyalgia, GERD and hypertension who is currently being admitted after she had right nephrectomy done by Dr. Mart on  for a right renal mass.  Patient has tolerated well after surgery.      Patient laying comfortably in bed with no distress.  She reports mild tenderness on her right side/flank as expected postsurgically.  Patient with no other acute complaints.  She has a history of diabetes mellitus type 2 for which she reports that she has been controlled and her A1c was 5.1.  She is currently taking Ozempic.  Losartan for hypertension. Preop labs reviewed.  Patient vitals are stable, afebrile and on 2 L nasal cannula.    Review Of Systems:     All systems were reviewed and negative except for: As per HPI    Past Medical History: Patient  has a past medical history of Acne rosacea, erythematous telangiectatic type, Acute bronchitis, Acute sinusitis, Acute upper respiratory infection, Allergic contact dermatitis, Allergic rhinitis, Anxiety and depression, Asthma, Back pain, chronic, Benign paroxysmal positional vertigo, Blood clots in stool, Candidal dermatitis, Candidiasis of vulva and vagina, Cellulitis, Cervical pain, Conjunctivitis, Contusion of ear, Corneal abrasion, Depression, Diabetes mellitus, Dyspnea, unspecified, Fibromyalgia, Flu vaccine need, Foot pain, right, GERD  (gastroesophageal reflux disease), Hand pain, left, Hearing loss, right, Heart palpitations, Hemorrhoids, external, Herpes simplex without complication, Herpes zoster lesion, Hypertension, Impetigo herpetiformis, Injury to tibial blood vessels, unspecified laterality, initial encounter, Kidney cyst, Kidney stones, Left humeral fracture, Low back pain, Lymphadenopathy, Migraine headache, Mixed hyperlipidemia, TAM (nonalcoholic steatohepatitis), Neuropathy, Oral candidiasis, Plantar fasciitis, Recent weight loss, Salpingitis and oophoritis, unspecified, Scabies, Seasonal allergies, Stress, Swelling of limb, Symptoms involving abdomen and pelvis, Tattoo, Tendon pain, Tobacco use disorder, 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, Urinary tract infection, Vaginitis and vulvovaginitis, and Vaginitis, atrophic.    Past Surgical History: Patient  has a past surgical history that includes Cholecystectomy; Total abdominal hysterectomy; Bilateral oophorectomy; Colonoscopy (03/30/2018); Humerus fracture surgery (Left); Rotator cuff repair (Right); Tubal ligation; Carpal tunnel release (Bilateral); ureteroscopy laser lithotripsy with stent insertion (Right, 03/02/2023); and Lipoma Excision.    Social History: Patient  reports that she has been smoking cigarettes. She started smoking about 30 years ago. She has a 29.00 pack-year smoking history. She has been exposed to tobacco smoke. She has never used smokeless tobacco. She reports that she does not currently use alcohol. She reports that she does not use drugs.    Family History: Patient's family history has been reviewed and found to be noncontributory.     Allergies:      Sulfa antibiotics and Morphine    Home Medications:    Prior to Admission Medications       Prescriptions Last Dose Informant Patient Reported? Taking?    albuterol sulfate  (90 Base) MCG/ACT inhaler 12/20/2023  Yes Yes    INHALE 3 PUFFS BY MOUTH EVERY 6 HOURS AS NEEDED    aspirin 81 MG EC  tablet 12/13/2023  Yes No    Take 1 tablet by mouth Daily.    cetirizine (zyrTEC) 10 MG tablet 12/19/2023  No Yes    TAKE ONE TABLET BY MOUTH EVERY DAY    clotrimazole (MYCELEX) 10 MG vinay 12/19/2023  No Yes    Take 1 tablet by mouth 5 (Five) Times a Day.    esomeprazole (nexIUM) 40 MG capsule 12/19/2023  No Yes    TAKE ONE CAPSULE BY MOUTH TWO TIMES A DAY    fluticasone (FLONASE) 50 MCG/ACT nasal spray Unknown  No No    INHALE 2 SPRAYS IN EACH NOSTRIL EVERY DAY AS DIRECTED    Patient not taking:  Reported on 12/6/2023    Fluticasone-Umeclidin-Vilant (Trelegy Ellipta) 200-62.5-25 MCG/ACT aerosol powder  12/20/2023  No Yes    Inhale 1 puff Daily.    losartan (COZAAR) 25 MG tablet 12/19/2023  No Yes    TAKE 1 TABLET BY MOUTH DAILY    nicotine (NICOTROL) 10 MG inhaler Unknown  No No    Inhale 1 puff As Needed for Smoking Cessation.    rosuvastatin (CRESTOR) 40 MG tablet 12/19/2023  No Yes    TAKE 1 TABLET BY MOUTH DAILY    Semaglutide,0.25 or 0.5MG/DOS, (Ozempic, 0.25 or 0.5 MG/DOSE,) 2 MG/1.5ML solution pen-injector 11/29/2023  Yes No    Inject 1 mg under the skin into the appropriate area as directed 1 (One) Time Per Week.    sertraline (ZOLOFT) 50 MG tablet Unknown  No No    TAKE ONE TABLET BY MOUTH EVERY DAY    Patient not taking:  Reported on 12/6/2023    Tirzepatide (Mounjaro) 10 MG/0.5ML solution pen-injector   No No    Inject 0.5 mL under the skin into the appropriate area as directed 1 (One) Time Per Week.    Patient not taking:  Reported on 12/6/2023             Medication Review:     I have reviewed the patient's active and prn medications.      Vital Signs:    Temp:  [97.4 °F (36.3 °C)-98.3 °F (36.8 °C)] 97.4 °F (36.3 °C)  Heart Rate:  [54-94] 65  Resp:  [14-20] 16  BP: (134-197)/() 154/92    There were no vitals filed for this visit.    There is no height or weight on file to calculate BMI.    Physical Exam:     General Appearance:  Alert and cooperative.  No acute distress.   Head:  Atraumatic and  "normocephalic.   Eyes: Conjunctivae and sclerae normal, no icterus. No pallor.   Ears:  Ears with no abnormalities noted.   Throat: No oral lesions, no thrush, oral mucosa moist.   Neck: Supple, trachea midline, no thyromegaly.   Back:   No kyphoscoliosis present. No tenderness to palpation.   Lungs:   Breath sounds heard bilaterally equally.  No crackles or wheezing. No pleural rub or bronchial breathing.   Heart:  Normal S1 and S2, no murmur, no gallop, no rub. No JVD.   Abdomen:   Normal bowel sounds, no masses, no organomegaly. nondistended, no rebound tenderness.  Right flank surgical wound with wound adhesive glue and appears with no bleeding or drainage.  Mild tenderness to right flank as expected postsurgically otherwise abdomen nontender and soft..   Extremities: Supple, no edema, no cyanosis, no clubbing.   Pulses: Pulses palpable bilaterally.   Skin: No bleeding or rash.   Neurologic: Alert and oriented x 3. No facial asymmetry. Moves all four limbs. No tremors.      Laboratory data:          Invalid input(s): \"LABALBU\", \"PROT\"                                    Invalid input(s): \"USDES\", \"NITRITITE\", \"BACT\", \"EP\"  Pain Management Panel           No data to display                    Radiology:    Transversus Abdominis Plane (TAP) Block - Bilateral    Result Date: 12/20/2023  Dominic Cervantes CRNA     12/20/2023  7:53 AM Transversus Abdominis Plane (TAP) Block - Bilateral Patient reassessed immediately prior to procedure Start time: 12/20/2023 7:35 AM Stop time: 12/20/2023 7:40 AM Reason for block: at surgeon's request and post-op pain management Performed by CRNA/CAA: Dominic Cervantes CRNA Preanesthetic Checklist Completed: patient identified, IV checked, site marked, risks and benefits discussed, surgical consent, monitors and equipment checked, pre-op evaluation and timeout performed Prep: Pt Position: supine Sterile barriers:gloves, cap, sterile barriers and mask Prep: ChloraPrep Patient " monitoring: blood pressure monitoring, continuous pulse oximetry and EKG Procedure Performed under: general Guidance:ultrasound guided ULTRASOUND INTERPRETATION.  Using ultrasound guidance a 20 G gauge needle was placed in close proximity to the nerve, at which point, under ultrasound guidance anesthetic was injected in the area of the nerve and spread of the anesthesia was seen on ultrasound in close proximity thereto.  There were no abnormalities seen on ultrasound; a digital image was taken; and the patient tolerated the procedure with no complications. Images:still images obtained Laterality:Bilateral Block Type:TAP Injection Technique:single-shot Needle Type:echogenic Resistance on Injection: none Post Assessment Injection Assessment: negative aspiration for heme, no paresthesia on injection and incremental injection Patient Tolerance:comfortable throughout block Complications:no Additional Notes Procedure:      BILATERAL TAP BLOCKS                          Patient analgesia was achieved with General Anesthesia The pt was placed in the Supine Position and under Ultrasound guidance, an echogenic or touhy needle was advanced with Normal Saline hydro dissection of tissue.  The Internal Oblique and Transversus Abdominus muscles were visualized.  At or before the aponeurosis of Internal Oblique, the local anesthetic spread was visualized in the Transversus Abdominus Plane. Injection was made incrementally with aspiration every 5 mls.  There was no intravascular injection;  injection pressure was normal; there was no neural injection; and the procedure was completed without difficulty.      Assessment:     1.Status post right laparoscopic nephrectomy   2.  Hypertension  3.  Diabetes mellitus type 2  4.  Fibromyalgia  5.  Depression  6.  GERD  7.  Hyperlipidemia  8. nicotine dependence    Recommendations/Plan:     Patient is admitted for observation.    Status post right laparoscopic nephrectomy  -had right  nephrectomy done by Dr. Mart on 12/24 for a right renal mass  -Pain control  -On IV fluids  -Antiemetics and supportive care  -Receiving Ancef per surgery recommendations  -SCDs for DVT prophylaxis for now.  -Monitor with a.m. labs    Hypertension  -Holding lisinopril  -Hydralazine as needed    Diabetes mellitus type 2  -Sliding scale insulin coverage while in the hospital  -Will check hemoglobin A1c with a.m. labs    Nicotine dependence  -Nicotine patch ordered  -Counseling on smoking cessation    -Continue home meds as warranted.  -Further orders as indicated per clinical course.    Thank you for this consult. Please do not hesitate to call me for any questions.    Aleah Amador MD  12/20/23  14:39 EST    Dictated utilizing Dragon dictation.

## 2023-12-20 NOTE — OP NOTE
Preoperative diagnosis  Right renal mass    Postoperative diagnosis  Right renal mass    Procedure performed  1.  Right laparoscopic hand-assisted radical nephrectomy    Attending surgeon  Aaron Mart MD    Anesthesia  General    EBL  20 mL    Complications  None    Specimen  Right kidney with mass    Findings  Gross margins around mass looked intact    Indications  Veronica Goldsmith is a 48 y.o. patient who was found to have a right cystic renal mass. After discussing different treatment options, she opted for a laparoscopic radical nephrectomy.  SHe now presents for this procedure.   Informed consent was obtained.    Procedure  The patient was taken to the operating room and placed supine on the operating table.  Pre-operative antibiotics were administered.  Bilateral lower extremity SCDs were placed.  After induction of general anesthesia, a Brasher catheter was placed without difficulty as was an orogastric tube.  The patient then received a four-quadrant TAP / rectus block with anesthesia. He was then repositioned in the lateral position with his left side up. All bony prominences were appropriately padded. He was secured to the table with beanbag, foam and tape.  He was prepped and draped in a sterile fashion and a timeout was performed.    Initial entrance was gained by making a right lower quadrant incision just long enough for the hand port.  Electrocautery dissection was carried down to the fascia which was incised and opened.  The epigastrics were encountered identified clipped and  with minimal bleeding. The peritoneum was opened sharply and care was taken not to injure any bowel.  After this was open, the Manuel retractor was then placed into the wound and the GelPort cover was placed over top.  The camera trocar was then placed through the GelPort and the abdomen was insufflated.  We then placed a a 5-mm trocar under direct vision for the camera and a 12 mm port for the working  instrument/stapler and a line from the hand port to the ASIS. The right colon was mobilized from the pelvis to around the hepatic flexure. A good plane was developed between the colonic mesentery and Gerota's fascia. This was fully mobilized medially.  I then kocherized the duodenum down enough to visualize the IVC.  I then dissected a plane along the IVC and identify the right renal vein.  The ureter and gonadal vein were identified inferiorly and reflected medially.  A window was created beneath the lower pole of the kidney along the IVC and then carried up towards the hilum. Dissection commenced along the psoas fascia up towards the level of the renal hilum in the area posterior was dissected. A single renal artery and renal vein were identified.  A window was created cephalad to the hilum to allow entry of the stapler. The renal artery and renal vein were divided with an Endo LUNA stapling device. The lateral and superior attachments were taken down.     The right adrenal gland was spared.  We did have to clip the vein, as it was oozing. All the posterior, lateral and superior attachments were fully taken down. The ureter was clipped and divided and gonadal vein was clipped and ligated. The specimen was then fully freed and placed in an Endo Catch bag.     The specimen was then extracted through the GelPort.  We closed the peritoneum and rectus fascias together in a running #1 PDS. Subcutaneous tissues were irrigated and closed with 3-0 Vicryl suture and skin incisions were closed with subcuticular stratafix suture. Dermabond was applied. The patient was repositioned supine.    The patient tolerated the procedure well.  All sponge and instrument counts were correct x 2. The patient was taken to the recovery unit without incident.

## 2023-12-20 NOTE — ANESTHESIA POSTPROCEDURE EVALUATION
Patient: Veronica Goldsmith    Procedure Summary       Date: 12/20/23 Room / Location: [facility] OR  /  [facility]    Anesthesia Start: 0727 Anesthesia Stop: 1021    Procedure: NEPHRECTOMY LAPAROSCOPIC HAND ASSISTED (Right: Abdomen) Diagnosis:       Renal mass, right      (Renal mass, right [N28.89])    Surgeons: Aaron Mart MD Provider: Selvin Patel CRNA    Anesthesia Type: general with block ASA Status: 3            Anesthesia Type: general with block    Vitals  No vitals data found for the desired time range.          Post Anesthesia Care and Evaluation    Patient location during evaluation: PACU  Patient participation: complete - patient participated  Level of consciousness: awake and alert  Pain score: 2  Pain management: satisfactory to patient    Airway patency: patent  Anesthetic complications: No anesthetic complications  PONV Status: none  Cardiovascular status: acceptable and stable  Respiratory status: acceptable and spontaneous ventilation  Hydration status: acceptable    Comments: Vitals signs as noted in nursing documentation as per protocol.

## 2023-12-20 NOTE — DISCHARGE INSTRUCTIONS
Home Care after Nephrectomy  Follow these guidelines for your care after your surgery to help your recovery.    Activity  Limit your activity for the first 5 days after surgery to light activity.  You may return to work in 1-2 weeks  Limit lifting, pushing or pulling to less than 30 pounds for the next 4 weeks. Also limit running and long walks.     Incision care  Stitches will dissolve and do not need to be removed.   Expect a small amount of blood may stain the dressings for up to 72 hours after surgery.   For the first few days, apply two or three gauze pads to the site each day and as needed.    Bathing or showering  You may shower 24 hours after surgery.  Allow the water to wash over the incision but do not scrub the incision. Dry the site gently by patting it with a clean towel.   Tub baths should be avoided for 14 days after surgery.   Swimming should be avoided for 14 days after surgery.      Pain control  You will be sent home with a prescription for a few days of pain medicine.  Use this only as needed.  Take the scheduled Tylenol for the first 3 to 5 days every 6 hours.  After 48 hours, most patients can take extra strength Tylenol (acetaminophen), following the label directions. Pain most often is eased after 5 to 7 days.    Sexual activity  May begin when you are comfortable.    When to call your doctor  Call the clinic number listed below if you have:  A large amount of fluid drainage that soaks several pads per day  Pain that is not controlled with pain medicine and use of ice packs  Any signs of infection such as:  Increased redness or tenderness around the incision site  Pus type drainage from the incision  Fever of greater than 101 degrees F    Also call the office if you have any questions or concerns about your care.     Other Contacts  Urology Office:  [address] [facility] [id number]   [place] [address] [phone] office  [phone] fax    Follow up Appointment  Schedule follow-up in 1  week with Dr. Mart to go over pathology.

## 2023-12-20 NOTE — ANESTHESIA PROCEDURE NOTES
Airway  Urgency: elective    Date/Time: 12/20/2023 7:33 AM  End Time:12/20/2023 7:35 AM  Airway not difficult    General Information and Staff    Patient location during procedure: OR  CRNA/CAA: Bill Leavitt CRNA    Indications and Patient Condition  Indications for airway management: airway protection    Preoxygenated: yes  Mask difficulty assessment: 1 - vent by mask    Final Airway Details  Final airway type: endotracheal airway      Successful airway: ETT  Cuffed: yes   Successful intubation technique: direct laryngoscopy  Facilitating devices/methods: anterior pressure/BURP  Endotracheal tube insertion site: oral  Blade: Lizeth  Blade size: 3  ETT size (mm): 7.0  Cormack-Lehane Classification: grade IIa - partial view of glottis  Placement verified by: chest auscultation and capnometry   Cuff volume (mL): 6  Measured from: lips  ETT/EBT  to lips (cm): 20  Number of attempts at approach: 1  Assessment: lips, teeth, and gum same as pre-op and atraumatic intubation    Additional Comments  +ETCO2, BBS, atraumatic, teeth as preop

## 2023-12-20 NOTE — ANESTHESIA PROCEDURE NOTES
Transversus Abdominis Plane (TAP) Block - Bilateral      Patient reassessed immediately prior to procedure    Start time: 12/20/2023 7:35 AM  Stop time: 12/20/2023 7:40 AM  Reason for block: at surgeon's request and post-op pain management  Performed by  CRNA/CAA: Dominic Cervantes CRNA  Preanesthetic Checklist  Completed: patient identified, IV checked, site marked, risks and benefits discussed, surgical consent, monitors and equipment checked, pre-op evaluation and timeout performed  Prep:  Pt Position: supine  Sterile barriers:gloves, cap, sterile barriers and mask  Prep: ChloraPrep  Patient monitoring: blood pressure monitoring, continuous pulse oximetry and EKG  Procedure  Performed under: general  Guidance:ultrasound guided    ULTRASOUND INTERPRETATION.  Using ultrasound guidance a 20 G gauge needle was placed in close proximity to the nerve, at which point, under ultrasound guidance anesthetic was injected in the area of the nerve and spread of the anesthesia was seen on ultrasound in close proximity thereto.  There were no abnormalities seen on ultrasound; a digital image was taken; and the patient tolerated the procedure with no complications. Images:still images obtained    Laterality:Bilateral  Block Type:TAP  Injection Technique:single-shot  Needle Type:echogenic  Resistance on Injection: none          Post Assessment  Injection Assessment: negative aspiration for heme, no paresthesia on injection and incremental injection  Patient Tolerance:comfortable throughout block  Complications:no  Additional Notes  Procedure:      BILATERAL TAP BLOCKS                             Patient analgesia was achieved with General Anesthesia    The pt was placed in the Supine Position and under Ultrasound guidance, an echogenic or touhy needle was advanced with Normal Saline hydro dissection of tissue.  The Internal Oblique and Transversus Abdominus muscles were visualized.  At or before the aponeurosis of Internal  Oblique, the local anesthetic spread was visualized in the Transversus Abdominus Plane. Injection was made incrementally with aspiration every 5 mls.  There was no intravascular injection;  injection pressure was normal; there was no neural injection; and the procedure was completed without difficulty.

## 2023-12-20 NOTE — ANESTHESIA PREPROCEDURE EVALUATION
Anesthesia Evaluation     Patient summary reviewed and Nursing notes reviewed   no history of anesthetic complications:   NPO Solid Status: > 8 hours  NPO Liquid Status: > 8 hours           Airway   Mallampati: II  TM distance: >3 FB  Neck ROM: full  Possible difficult intubation  Dental      Pulmonary - normal exam   (+) a smoker Current, asthma,shortness of breath  Cardiovascular - normal exam    Rhythm: regular  Rate: normal    (+) hypertension, hyperlipidemia      Neuro/Psych  (+) headaches, numbness, psychiatric history Anxiety and Depression  GI/Hepatic/Renal/Endo    (+) obesity, GERD, hepatitis, liver disease, renal disease- stones, diabetes mellitus    Musculoskeletal     (+) arthralgias, back pain, chronic pain, neck pain  Abdominal  - normal exam   Substance History - negative use     OB/GYN negative ob/gyn ROS   (-)  Pregnant    Comment: S/p hysterectomy      Other - negative ROS                         Anesthesia Plan    ASA 3     general with block     (Risks and benefits discussed including risk of aspiration, recall and dental damage. All patient questions answered. Will continue with POC.)  intravenous induction     Anesthetic plan, risks, benefits, and alternatives have been provided, discussed and informed consent has been obtained with: patient.  Pre-procedure education provided  Plan discussed with CRNA.        CODE STATUS:

## 2023-12-21 VITALS
WEIGHT: 185.85 LBS | DIASTOLIC BLOOD PRESSURE: 80 MMHG | TEMPERATURE: 97.5 F | HEART RATE: 70 BPM | BODY MASS INDEX: 31.73 KG/M2 | OXYGEN SATURATION: 91 % | SYSTOLIC BLOOD PRESSURE: 131 MMHG | HEIGHT: 64 IN | RESPIRATION RATE: 18 BRPM

## 2023-12-21 LAB
ALBUMIN SERPL-MCNC: 3.7 G/DL (ref 3.5–5.2)
ALBUMIN/GLOB SERPL: 1.4 G/DL
ALP SERPL-CCNC: 69 U/L (ref 39–117)
ALT SERPL W P-5'-P-CCNC: 62 U/L (ref 1–33)
ANION GAP SERPL CALCULATED.3IONS-SCNC: 7.7 MMOL/L (ref 5–15)
AST SERPL-CCNC: 36 U/L (ref 1–32)
BILIRUB SERPL-MCNC: 0.4 MG/DL (ref 0–1.2)
BUN SERPL-MCNC: 13 MG/DL (ref 6–20)
BUN/CREAT SERPL: 12.3 (ref 7–25)
CALCIUM SPEC-SCNC: 8.6 MG/DL (ref 8.6–10.5)
CHLORIDE SERPL-SCNC: 106 MMOL/L (ref 98–107)
CO2 SERPL-SCNC: 24.3 MMOL/L (ref 22–29)
CREAT SERPL-MCNC: 1.06 MG/DL (ref 0.57–1)
DEPRECATED RDW RBC AUTO: 48.3 FL (ref 37–54)
EGFRCR SERPLBLD CKD-EPI 2021: 65.7 ML/MIN/1.73
ERYTHROCYTE [DISTWIDTH] IN BLOOD BY AUTOMATED COUNT: 13.8 % (ref 12.3–15.4)
GLOBULIN UR ELPH-MCNC: 2.7 GM/DL
GLUCOSE BLDC GLUCOMTR-MCNC: 126 MG/DL (ref 70–130)
GLUCOSE SERPL-MCNC: 144 MG/DL (ref 65–99)
HBA1C MFR BLD: 5.3 % (ref 4.8–5.6)
HCT VFR BLD AUTO: 39.1 % (ref 34–46.6)
HGB BLD-MCNC: 12.8 G/DL (ref 12–15.9)
MCH RBC QN AUTO: 31.1 PG (ref 26.6–33)
MCHC RBC AUTO-ENTMCNC: 32.7 G/DL (ref 31.5–35.7)
MCV RBC AUTO: 94.9 FL (ref 79–97)
PLATELET # BLD AUTO: 163 10*3/MM3 (ref 140–450)
PMV BLD AUTO: 10.5 FL (ref 6–12)
POTASSIUM SERPL-SCNC: 4.5 MMOL/L (ref 3.5–5.2)
PROT SERPL-MCNC: 6.4 G/DL (ref 6–8.5)
RBC # BLD AUTO: 4.12 10*6/MM3 (ref 3.77–5.28)
SODIUM SERPL-SCNC: 138 MMOL/L (ref 136–145)
WBC NRBC COR # BLD AUTO: 13.59 10*3/MM3 (ref 3.4–10.8)

## 2023-12-21 PROCEDURE — 80053 COMPREHEN METABOLIC PANEL: CPT | Performed by: FAMILY MEDICINE

## 2023-12-21 PROCEDURE — 85027 COMPLETE CBC AUTOMATED: CPT | Performed by: FAMILY MEDICINE

## 2023-12-21 PROCEDURE — 25010000002 CEFAZOLIN SODIUM-DEXTROSE 2-3 GM-%(50ML) RECONSTITUTED SOLUTION: Performed by: UROLOGY

## 2023-12-21 PROCEDURE — 82948 REAGENT STRIP/BLOOD GLUCOSE: CPT

## 2023-12-21 PROCEDURE — 83036 HEMOGLOBIN GLYCOSYLATED A1C: CPT | Performed by: FAMILY MEDICINE

## 2023-12-21 RX ORDER — ALBUTEROL SULFATE 90 UG/1
AEROSOL, METERED RESPIRATORY (INHALATION)
Qty: 8.5 G | Refills: 12 | Status: SHIPPED | OUTPATIENT
Start: 2023-12-21

## 2023-12-21 RX ADMIN — DOCUSATE SODIUM 100 MG: 100 CAPSULE, LIQUID FILLED ORAL at 09:19

## 2023-12-21 RX ADMIN — CETIRIZINE HYDROCHLORIDE 10 MG: 10 TABLET, FILM COATED ORAL at 09:19

## 2023-12-21 RX ADMIN — PANTOPRAZOLE SODIUM 40 MG: 40 TABLET, DELAYED RELEASE ORAL at 05:26

## 2023-12-21 RX ADMIN — ASPIRIN 81 MG: 81 TABLET, COATED ORAL at 09:19

## 2023-12-21 RX ADMIN — ACETAMINOPHEN 650 MG: 325 TABLET, FILM COATED ORAL at 09:19

## 2023-12-21 RX ADMIN — ACETAMINOPHEN 650 MG: 325 TABLET, FILM COATED ORAL at 03:29

## 2023-12-21 RX ADMIN — CEFAZOLIN SODIUM 2000 MG: 2 SOLUTION INTRAVENOUS at 08:33

## 2023-12-21 NOTE — CASE MANAGEMENT/SOCIAL WORK
Case Management Discharge Note                Selected Continued Care - Admitted Since 12/20/2023       Destination    No services have been selected for the patient.                Durable Medical Equipment    No services have been selected for the patient.                Dialysis/Infusion    No services have been selected for the patient.                Home Medical Care    No services have been selected for the patient.                Therapy    No services have been selected for the patient.                Community Resources    No services have been selected for the patient.                Community [facility]    No services have been selected for the patient.                    Transportation Services  Private: Car    Final Discharge Disposition Code: 01 - home or self-care

## 2023-12-21 NOTE — PAYOR COMM NOTE
"    D/c summary  Ur manager; jorge champion [phone] and fax [phone]      Veronica Goldsmith (48 y.o. Female)       Date of Birth   1975    Social Security Number       Address   [address] [place], [place] [address]    Home Phone   [phone]    MRN   [id number]       Alevism   Religious    Marital Status                               Admission Date   23    Admission Type   Elective    Admitting Provider   Aaron Mart MD    Attending Provider       [facility]       Discharge Date   2023    Discharge Disposition   Home or Self Care    Discharge Destination                                 Attending Provider: (none)   Allergies: Sulfa Antibiotics, Morphine    Isolation: None   Infection: None   Code Status: CPR    Ht: 162.6 cm (64.02\")   Wt: 84.3 kg (185 lb 13.6 oz)    Admission Cmt: None   Principal Problem: Renal mass, right [N28.89]                   Active Insurance as of 2023       Primary Coverage       Payor Plan Insurance Group Employer/Plan Group    [employer]HEM BLUE CROSS ANTHEM BLUE CROSS BLUE SHIELD PPO [id number]       Payor Plan Address Payor Plan Phone Number Payor Plan Fax Number Effective Dates    [address] [phone]  2017 - None Entered    [place] [address]         Subscriber Name Subscriber Birth Date Member ID       VERONICA GOLDSMITH 1975 [id number]                     Emergency Contacts        (Rel.) Home Phone Work Phone Mobile Phone    TY GOLDSMITH (Spouse) -- -- [phone]              Physician Progress Notes (last 24 hours)  Notes from 23 1058 through 23 1058   No notes of this type exist for this encounter.          Discharge Summary        Aaron Mart MD at 23 0712              [facility]   DISCHARGE SUMMARY      Name:  Veronica Goldsmith   Age:  48 y.o.  Sex:  female  :  1975  MRN:  [id number]   Visit Number:  " [id number]  Primary Care Physician:  Greg Levine MD  Date of Discharge:  12/21/2023  Admission Date:  12/20/2023      Discharge Diagnosis:   Active Hospital Problems    Diagnosis  POA    **Renal mass, right [N28.89]  Unknown    Renal mass [N28.89]  Yes      Resolved Hospital Problems   No resolved problems to display.         Presenting Problem/History of Present Illness:    Renal mass, right [N28.89]  Renal mass [N28.89]       Hospital Course:  48-year-old female with large right cystic renal mass, status post right laparoscopic nephrectomy.  She was discharged on postop day 1 ambulating, tolerating a diet, appropriate pain.  Excellent urine output and labs within normal limits.  She will follow-up with me in 1 week for pathology discussion    Procedures Performed:    Procedure(s):  NEPHRECTOMY LAPAROSCOPIC HAND ASSISTED       Consults:     Consults       Date and Time Order Name Status Description    12/20/2023  2:49 PM Inpatient Hospitalist Consult              Pertinent Test Results:     Lab Results (all)       Procedure Component Value Units Date/Time    Hemoglobin A1c [[id number]]  (Normal) Collected: 12/21/23 0547    Specimen: Blood Updated: 12/21/23 0636     Hemoglobin A1C 5.30 %     Narrative:      Hemoglobin A1C Ranges:    Increased Risk for Diabetes  5.7% to 6.4%  Diabetes                     >= 6.5%  Diabetic Goal                < 7.0%    Comprehensive Metabolic Panel [[id number]]  (Abnormal) Collected: 12/21/23 0547    Specimen: Blood Updated: 12/21/23 0632     Glucose 144 mg/dL      BUN 13 mg/dL      Creatinine 1.06 mg/dL      Sodium 138 mmol/L      Potassium 4.5 mmol/L      Chloride 106 mmol/L      CO2 24.3 mmol/L      Calcium 8.6 mg/dL      Total Protein 6.4 g/dL      Albumin 3.7 g/dL      ALT (SGPT) 62 U/L      AST (SGOT) 36 U/L      Alkaline Phosphatase 69 U/L      Total Bilirubin 0.4 mg/dL      Globulin 2.7 gm/dL      A/G Ratio 1.4 g/dL      BUN/Creatinine Ratio 12.3     Anion Gap  "7.7 mmol/L      eGFR 65.7 mL/min/1.73     Narrative:      GFR Normal >60  Chronic Kidney Disease <60  Kidney Failure <15      CBC (No Diff) [[id number]]  (Abnormal) Collected: 12/21/23 0547    Specimen: Blood Updated: 12/21/23 0612     WBC 13.59 10*3/mm3      RBC 4.12 10*6/mm3      Hemoglobin 12.8 g/dL      Hematocrit 39.1 %      MCV 94.9 fL      MCH 31.1 pg      MCHC 32.7 g/dL      RDW 13.8 %      RDW-SD 48.3 fl      MPV 10.5 fL      Platelets 163 10*3/mm3     POC Glucose Once [[id number]]  (Abnormal) Collected: 12/20/23 2031    Specimen: Blood Updated: 12/20/23 2044     Glucose 176 mg/dL      Comment: Serial Number: [id number]:  [id number]       POC Glucose Once [[id number]]  (Abnormal) Collected: 12/20/23 1756    Specimen: Blood Updated: 12/20/23 1758     Glucose 164 mg/dL      Comment: Serial Number: [id number]:  [id number]       TISSUE EXAM, [facility] ([facility]) [[id number]] Collected: 12/20/23 0934    Specimen: Tissue from Kidney, Right Updated: 12/20/23 1120    POC Glucose Once [[id number]]  (Abnormal) Collected: 12/20/23 0654    Specimen: Blood Updated: 12/20/23 0707     Glucose 145 mg/dL      Comment: Serial Number: [id number]:  [id number]               Imaging Results (All)       None            Condition on Discharge:      Stable    Vital Signs:    /77 (BP Location: Right arm, Patient Position: Lying)   Pulse 70   Temp 98.5 °F (36.9 °C) (Oral)   Resp 16   Ht 162.6 cm (64.02\")   Wt 84.3 kg (185 lb 13.6 oz)   SpO2 95%   BMI 31.88 kg/m²     Discharge Disposition:    Home or Self Care    Discharge Medication:       Discharge Medications        New Medications        Instructions Start Date   acetaminophen 500 MG tablet  Commonly known as: TYLENOL   1,000 mg, Oral, Every 6 Hours      docusate sodium 100 MG capsule  Commonly known as: Colace   100 mg, Oral, 2 Times Daily, If taking pain pill      oxyCODONE 5 MG immediate release tablet  Commonly known as: Roxicodone   5 mg, Oral, Every 6 " Hours PRN             Continue These Medications        Instructions Start Date   albuterol sulfate  (90 Base) MCG/ACT inhaler  Commonly known as: PROVENTIL HFA;VENTOLIN HFA;PROAIR HFA   INHALE 3 PUFFS BY MOUTH EVERY 6 HOURS AS NEEDED      aspirin 81 MG EC tablet   81 mg, Oral, Daily      cetirizine 10 MG tablet  Commonly known as: zyrTEC   TAKE ONE TABLET BY MOUTH EVERY DAY      clotrimazole 10 MG vinay  Commonly known as: MYCELEX   10 mg, Oral, 5 Times Daily      esomeprazole 40 MG capsule  Commonly known as: nexIUM   TAKE ONE CAPSULE BY MOUTH TWO TIMES A DAY      losartan 25 MG tablet  Commonly known as: COZAAR   TAKE 1 TABLET BY MOUTH DAILY      nicotine 10 MG inhaler  Commonly known as: NICOTROL   1 puff, Inhalation, As Needed      Ozempic (0.25 or 0.5 MG/DOSE) 2 MG/1.5ML solution pen-injector  Generic drug: Semaglutide(0.25 or 0.5MG/DOS)   1 mg, Subcutaneous, Weekly      rosuvastatin 40 MG tablet  Commonly known as: CRESTOR   TAKE 1 TABLET BY MOUTH DAILY      Trelegy Ellipta 200-62.5-25 MCG/ACT aerosol powder   Generic drug: Fluticasone-Umeclidin-Vilant   1 puff, Inhalation, Daily             Stop These Medications      fluticasone 50 MCG/ACT nasal spray  Commonly known as: FLONASE     Mounjaro 10 MG/0.5ML solution pen-injector pen  Generic drug: Tirzepatide     sertraline 50 MG tablet  Commonly known as: ZOLOFT              Discharge Diet:         Activity at Discharge:         Follow-up Appointments:    Future Appointments   Date Time Provider Department Center   12/28/2023 11:50 AM Aaron Mart MD [facility] (   2/16/2024  8:45 AM Greg Levine MD [facility]         Test Results Pending at Discharge:    Pending Labs       Order Current Status    TISSUE EXAM, [facility] ([facility]) In process               Aaron Mart MD  12/21/23  07:13 EST      Electronically signed by Aaron Mart MD at 12/21/23 6796

## 2023-12-21 NOTE — DISCHARGE SUMMARY
[facility]   DISCHARGE SUMMARY      Name:  Veronica Goldsmith   Age:  48 y.o.  Sex:  female  :  1975  MRN:  [id number]   Visit Number:  [id number]  Primary Care Physician:  Greg Levine MD  Date of Discharge:  2023  Admission Date:  2023      Discharge Diagnosis:   Active Hospital Problems    Diagnosis  POA    **Renal mass, right [N28.89]  Unknown    Renal mass [N28.89]  Yes      Resolved Hospital Problems   No resolved problems to display.         Presenting Problem/History of Present Illness:    Renal mass, right [N28.89]  Renal mass [N28.89]       Hospital Course:  48-year-old female with large right cystic renal mass, status post right laparoscopic nephrectomy.  She was discharged on postop day 1 ambulating, tolerating a diet, appropriate pain.  Excellent urine output and labs within normal limits.  She will follow-up with me in 1 week for pathology discussion    Procedures Performed:    Procedure(s):  NEPHRECTOMY LAPAROSCOPIC HAND ASSISTED       Consults:     Consults       Date and Time Order Name Status Description    2023  2:49 PM Inpatient Hospitalist Consult              Pertinent Test Results:     Lab Results (all)       Procedure Component Value Units Date/Time    Hemoglobin A1c [[id number]]  (Normal) Collected: 23    Specimen: Blood Updated: 23     Hemoglobin A1C 5.30 %     Narrative:      Hemoglobin A1C Ranges:    Increased Risk for Diabetes  5.7% to 6.4%  Diabetes                     >= 6.5%  Diabetic Goal                < 7.0%    Comprehensive Metabolic Panel [[id number]]  (Abnormal) Collected: 23    Specimen: Blood Updated: 23     Glucose 144 mg/dL      BUN 13 mg/dL      Creatinine 1.06 mg/dL      Sodium 138 mmol/L      Potassium 4.5 mmol/L      Chloride 106 mmol/L      CO2 24.3 mmol/L      Calcium 8.6 mg/dL      Total Protein 6.4 g/dL      Albumin 3.7 g/dL      ALT (SGPT) 62 U/L      AST (SGOT) 36 U/L       "Alkaline Phosphatase 69 U/L      Total Bilirubin 0.4 mg/dL      Globulin 2.7 gm/dL      A/G Ratio 1.4 g/dL      BUN/Creatinine Ratio 12.3     Anion Gap 7.7 mmol/L      eGFR 65.7 mL/min/1.73     Narrative:      GFR Normal >60  Chronic Kidney Disease <60  Kidney Failure <15      CBC (No Diff) [[id number]]  (Abnormal) Collected: 12/21/23 0547    Specimen: Blood Updated: 12/21/23 0612     WBC 13.59 10*3/mm3      RBC 4.12 10*6/mm3      Hemoglobin 12.8 g/dL      Hematocrit 39.1 %      MCV 94.9 fL      MCH 31.1 pg      MCHC 32.7 g/dL      RDW 13.8 %      RDW-SD 48.3 fl      MPV 10.5 fL      Platelets 163 10*3/mm3     POC Glucose Once [[id number]]  (Abnormal) Collected: 12/20/23 2031    Specimen: Blood Updated: 12/20/23 2044     Glucose 176 mg/dL      Comment: Serial Number: [id number]:  [id number]       POC Glucose Once [[id number]]  (Abnormal) Collected: 12/20/23 1756    Specimen: Blood Updated: 12/20/23 1758     Glucose 164 mg/dL      Comment: Serial Number: [id number]:  [id number]       TISSUE EXAM, [facility] ([facility]) [[id number]] Collected: 12/20/23 0934    Specimen: Tissue from Kidney, Right Updated: 12/20/23 1120    POC Glucose Once [[id number]]  (Abnormal) Collected: 12/20/23 0654    Specimen: Blood Updated: 12/20/23 0707     Glucose 145 mg/dL      Comment: Serial Number: [id number]:  [id number]               Imaging Results (All)       None            Condition on Discharge:      Stable    Vital Signs:    /77 (BP Location: Right arm, Patient Position: Lying)   Pulse 70   Temp 98.5 °F (36.9 °C) (Oral)   Resp 16   Ht 162.6 cm (64.02\")   Wt 84.3 kg (185 lb 13.6 oz)   SpO2 95%   BMI 31.88 kg/m²     Discharge Disposition:    Home or Self Care    Discharge Medication:       Discharge Medications        New Medications        Instructions Start Date   acetaminophen 500 MG tablet  Commonly known as: TYLENOL   1,000 mg, Oral, Every 6 Hours      docusate sodium 100 MG capsule  Commonly known as: " Colace   100 mg, Oral, 2 Times Daily, If taking pain pill      oxyCODONE 5 MG immediate release tablet  Commonly known as: Roxicodone   5 mg, Oral, Every 6 Hours PRN             Continue These Medications        Instructions Start Date   albuterol sulfate  (90 Base) MCG/ACT inhaler  Commonly known as: PROVENTIL HFA;VENTOLIN HFA;PROAIR HFA   INHALE 3 PUFFS BY MOUTH EVERY 6 HOURS AS NEEDED      aspirin 81 MG EC tablet   81 mg, Oral, Daily      cetirizine 10 MG tablet  Commonly known as: zyrTEC   TAKE ONE TABLET BY MOUTH EVERY DAY      clotrimazole 10 MG vinay  Commonly known as: MYCELEX   10 mg, Oral, 5 Times Daily      esomeprazole 40 MG capsule  Commonly known as: nexIUM   TAKE ONE CAPSULE BY MOUTH TWO TIMES A DAY      losartan 25 MG tablet  Commonly known as: COZAAR   TAKE 1 TABLET BY MOUTH DAILY      nicotine 10 MG inhaler  Commonly known as: NICOTROL   1 puff, Inhalation, As Needed      Ozempic (0.25 or 0.5 MG/DOSE) 2 MG/1.5ML solution pen-injector  Generic drug: Semaglutide(0.25 or 0.5MG/DOS)   1 mg, Subcutaneous, Weekly      rosuvastatin 40 MG tablet  Commonly known as: CRESTOR   TAKE 1 TABLET BY MOUTH DAILY      Trelegy Ellipta 200-62.5-25 MCG/ACT aerosol powder   Generic drug: Fluticasone-Umeclidin-Vilant   1 puff, Inhalation, Daily             Stop These Medications      fluticasone 50 MCG/ACT nasal spray  Commonly known as: FLONASE     Mounjaro 10 MG/0.5ML solution pen-injector pen  Generic drug: Tirzepatide     sertraline 50 MG tablet  Commonly known as: ZOLOFT              Discharge Diet:         Activity at Discharge:         Follow-up Appointments:    Future Appointments   Date Time Provider Department Center   12/28/2023 11:50 AM Aaron Mart MD [facility] (   2/16/2024  8:45 AM Greg Levine MD [facility]         Test Results Pending at Discharge:    Pending Labs       Order Current Status    TISSUE EXAM, [facility] ([facility]) In process                Aaron Mart MD  12/21/23  07:13 EST

## 2023-12-21 NOTE — PLAN OF CARE
Problem: Adult Inpatient Plan of Care  Goal: Plan of Care Review  Outcome: Ongoing, Progressing  Goal: Patient-Specific Goal (Individualized)  Outcome: Ongoing, Progressing  Goal: Absence of Hospital-Acquired Illness or Injury  Outcome: Ongoing, Progressing  Intervention: Identify and Manage Fall Risk  Recent Flowsheet Documentation  Taken 12/21/2023 0000 by Adia Hayes RN  Safety Promotion/Fall Prevention: safety round/check completed  Taken 12/20/2023 2200 by Adia Hayes RN  Safety Promotion/Fall Prevention: safety round/check completed  Taken 12/20/2023 2000 by Adia Hayes RN  Safety Promotion/Fall Prevention: safety round/check completed  Intervention: Prevent Skin Injury  Recent Flowsheet Documentation  Taken 12/21/2023 0000 by Adia Hayes RN  Body Position: supine, legs elevated  Taken 12/20/2023 2200 by Adia Hayes RN  Body Position: sitting up in bed  Taken 12/20/2023 2000 by Adia Hayes RN  Body Position: sitting up in bed  Intervention: Prevent and Manage VTE (Venous Thromboembolism) Risk  Recent Flowsheet Documentation  Taken 12/21/2023 0000 by Adia Hayes RN  Activity Management: activity encouraged  Taken 12/20/2023 2200 by Adia Hayes RN  Activity Management:   ambulated outside room   ambulated in room  Taken 12/20/2023 2000 by Adia Hayes RN  Activity Management: activity encouraged  Intervention: Prevent Infection  Recent Flowsheet Documentation  Taken 12/21/2023 0000 by Adia Hayes RN  Infection Prevention: environmental surveillance performed  Taken 12/20/2023 2200 by Adia Hayes RN  Infection Prevention: environmental surveillance performed  Taken 12/20/2023 2000 by Adia Hayes RN  Infection Prevention: environmental surveillance performed  Goal: Optimal Comfort and Wellbeing  Outcome: Ongoing, Progressing  Goal: Readiness for Transition of Care  Outcome: Ongoing, Progressing     Problem: Asthma Comorbidity  Goal: Maintenance of Asthma  Control  Outcome: Ongoing, Progressing     Problem: Diabetes Comorbidity  Goal: Blood Glucose Level Within Targeted Range  Outcome: Ongoing, Progressing     Problem: Hypertension Comorbidity  Goal: Blood Pressure in Desired Range  Outcome: Ongoing, Progressing     Problem: Obstructive Sleep Apnea Risk or Actual Comorbidity Management  Goal: Unobstructed Breathing During Sleep  Outcome: Ongoing, Progressing   Goal Outcome Evaluation:

## 2023-12-22 ENCOUNTER — TRANSITIONAL CARE MANAGEMENT TELEPHONE ENCOUNTER (OUTPATIENT)
Dept: CALL CENTER | Facility: HOSPITAL | Age: 48
End: 2023-12-22
Payer: COMMERCIAL

## 2023-12-22 ENCOUNTER — READMISSION MANAGEMENT (OUTPATIENT)
Dept: CALL CENTER | Facility: HOSPITAL | Age: 48
End: 2023-12-22
Payer: COMMERCIAL

## 2023-12-22 NOTE — OUTREACH NOTE
Prep Survey      Flowsheet Row Responses   [facility] patient discharged from? [place]   Is LACE score < 7 ? Yes   Eligibility [facility]   Date of Admission 12/20/23   Date of Discharge 12/21/23   Discharge Disposition Home or Self Care   Discharge diagnosis NEPHRECTOMY LAPAROSCOPIC HAND ASSISTED   Does the patient have one of the following disease processes/diagnoses(primary or secondary)? General Surgery   Does the patient have Home health ordered? No   Is there a DME ordered? No   Prep survey completed? Yes            Eloise CHAUDHARY - Registered Nurse

## 2023-12-22 NOTE — OUTREACH NOTE
Call Center TCM Note      Flowsheet Row Responses   [facility] patient discharged from? [facility]   Does the patient have one of the following disease processes/diagnoses(primary or secondary)? General Surgery   TCM attempt successful? Yes   Call start time 1400   Call end time 1402   Discharge diagnosis NEPHRECTOMY LAPAROSCOPIC HAND ASSISTED   Meds reviewed with patient/caregiver? Yes   Is the patient having any side effects they believe may be caused by any medication additions or changes? No   Does the patient have all medications related to this admission filled (includes all antibiotics, pain medications, etc.) Yes   Is the patient taking all medications as directed (includes completed medication regime)? Yes   Comments Hospital f/u with PCP on 12/27   Does the patient have an appointment with their PCP within 7-14 days of discharge? Yes   Has home health visited the patient within 72 hours of discharge? N/A   Psychosocial issues? No   Did the patient receive a copy of their discharge instructions? Yes   Nursing interventions Reviewed instructions with patient   What is the patient's perception of their health status since discharge? Improving   Nursing interventions Nurse provided patient education   Is the patient /caregiver able to teach back basic post-op care? Keep incision areas clean,dry and protected, Do not remove steri-strips, Lifting as instructed by MD in discharge instructions, No tub bath, swimming, or hot tub until instructed by MD, Take showers only when approved by MD-sponge bathe until then, Drive as instructed by MD in discharge instructions, Practice 'cough and deep breath', Continue use of incentive spirometry at least 1 week post discharge   Is the patient/caregiver able to teach back signs and symptoms of incisional infection? Fever, Pus or odor from incision, Incisional warmth, Increased drainage or bleeding, Increased redness, swelling or pain at the incisonal site   Is the  patient/caregiver able to teach back steps to recovery at home? Set small, achievable goals for return to baseline health, Rest and rebuild strength, gradually increase activity   Is the patient/caregiver able to teach back the hierarchy of who to call/visit for symptoms/problems? PCP, Specialist, Home health nurse, Urgent Care, ED, 911 Yes   TCM call completed? Yes   Wrap up additional comments Doing well, all concerns addressed, confirmed hospital f/u appt with PCP for 12/27.   Call end time 1402   Would this patient benefit from a Referral to [facility] Social Work? No   Is the patient interested in additional calls from an ambulatory ? No            Awa Iraheta RN    12/22/2023, 14:02 EST

## 2023-12-22 NOTE — OUTREACH NOTE
Call Center TCM Note      Flowsheet Row Responses   [facility] patient discharged from? [facility]   Does the patient have one of the following disease processes/diagnoses(primary or secondary)? General Surgery   TCM attempt successful? No   Unsuccessful attempts Attempt 1            Awa Iraheta RN    12/22/2023, 09:05 EST

## 2023-12-25 LAB
BH BB BLOOD EXPIRATION DATE: NORMAL
BH BB BLOOD EXPIRATION DATE: NORMAL
BH BB BLOOD TYPE BARCODE: 5100
BH BB BLOOD TYPE BARCODE: 5100
BH BB DISPENSE STATUS: NORMAL
BH BB DISPENSE STATUS: NORMAL
BH BB PRODUCT CODE: NORMAL
BH BB PRODUCT CODE: NORMAL
BH BB UNIT NUMBER: NORMAL
BH BB UNIT NUMBER: NORMAL
CROSSMATCH INTERPRETATION: NORMAL
CROSSMATCH INTERPRETATION: NORMAL
UNIT  ABO: NORMAL
UNIT  ABO: NORMAL
UNIT  RH: NORMAL
UNIT  RH: NORMAL

## 2023-12-26 ENCOUNTER — TELEPHONE (OUTPATIENT)
Dept: UROLOGY | Facility: CLINIC | Age: 48
End: 2023-12-26

## 2023-12-26 LAB — REF LAB TEST METHOD: NORMAL

## 2023-12-26 NOTE — TELEPHONE ENCOUNTER
Caller: Veronica Goldsmith    Relationship: Self    Best call back number: [phone]    What form or medical record are you requesting: FMLA    Who is requesting this form or medical record from you: [facility]     How would you like to receive the form or medical records (pick-up, mail, fax): FAX  SAYS INFO WAS GIVEN IN PERSON    Timeframe paperwork needed: ASAP    Additional notes: WOULD LIKE A CALL BACK, OK TO LEAVE A VM

## 2023-12-27 ENCOUNTER — TELEPHONE (OUTPATIENT)
Dept: UROLOGY | Facility: CLINIC | Age: 48
End: 2023-12-27
Payer: COMMERCIAL

## 2023-12-27 ENCOUNTER — OFFICE VISIT (OUTPATIENT)
Dept: FAMILY MEDICINE CLINIC | Facility: CLINIC | Age: 48
End: 2023-12-27
Payer: COMMERCIAL

## 2023-12-27 ENCOUNTER — LAB (OUTPATIENT)
Dept: FAMILY MEDICINE CLINIC | Facility: CLINIC | Age: 48
End: 2023-12-27
Payer: COMMERCIAL

## 2023-12-27 VITALS
HEIGHT: 64 IN | TEMPERATURE: 98 F | OXYGEN SATURATION: 97 % | DIASTOLIC BLOOD PRESSURE: 70 MMHG | SYSTOLIC BLOOD PRESSURE: 124 MMHG | WEIGHT: 181 LBS | BODY MASS INDEX: 30.9 KG/M2 | RESPIRATION RATE: 18 BRPM | HEART RATE: 105 BPM

## 2023-12-27 DIAGNOSIS — K75.81 NASH (NONALCOHOLIC STEATOHEPATITIS): ICD-10-CM

## 2023-12-27 DIAGNOSIS — J01.90 ACUTE NON-RECURRENT SINUSITIS, UNSPECIFIED LOCATION: ICD-10-CM

## 2023-12-27 DIAGNOSIS — N28.89 RENAL MASS, RIGHT: ICD-10-CM

## 2023-12-27 DIAGNOSIS — I10 PRIMARY HYPERTENSION: ICD-10-CM

## 2023-12-27 DIAGNOSIS — M54.16 LUMBAR BACK PAIN WITH RADICULOPATHY AFFECTING LOWER EXTREMITY: ICD-10-CM

## 2023-12-27 DIAGNOSIS — N28.9 RENAL INSUFFICIENCY: ICD-10-CM

## 2023-12-27 DIAGNOSIS — R50.9 FEVER, UNSPECIFIED FEVER CAUSE: ICD-10-CM

## 2023-12-27 DIAGNOSIS — E11.9 TYPE 2 DIABETES MELLITUS WITHOUT COMPLICATION, WITHOUT LONG-TERM CURRENT USE OF INSULIN: Primary | ICD-10-CM

## 2023-12-27 LAB
ALBUMIN SERPL-MCNC: 3.5 G/DL (ref 3.5–5.2)
ALBUMIN/GLOB SERPL: 0.9 G/DL
ALP SERPL-CCNC: 83 U/L (ref 39–117)
ALT SERPL W P-5'-P-CCNC: 16 U/L (ref 1–33)
AMORPH URATE CRY URNS QL MICRO: ABNORMAL /HPF
ANION GAP SERPL CALCULATED.3IONS-SCNC: 12 MMOL/L (ref 5–15)
AST SERPL-CCNC: 16 U/L (ref 1–32)
BACTERIA UR QL AUTO: ABNORMAL /HPF
BILIRUB SERPL-MCNC: 0.3 MG/DL (ref 0–1.2)
BILIRUB UR QL STRIP: NEGATIVE
BUN SERPL-MCNC: 11 MG/DL (ref 6–20)
BUN/CREAT SERPL: 9.2 (ref 7–25)
CALCIUM SPEC-SCNC: 8.9 MG/DL (ref 8.6–10.5)
CHLORIDE SERPL-SCNC: 99 MMOL/L (ref 98–107)
CLARITY UR: ABNORMAL
CO2 SERPL-SCNC: 25 MMOL/L (ref 22–29)
COLOR UR: ABNORMAL
CREAT SERPL-MCNC: 1.19 MG/DL (ref 0.57–1)
DEPRECATED RDW RBC AUTO: 43.7 FL (ref 37–54)
EGFRCR SERPLBLD CKD-EPI 2021: 57.2 ML/MIN/1.73
ERYTHROCYTE [DISTWIDTH] IN BLOOD BY AUTOMATED COUNT: 12.9 % (ref 12.3–15.4)
GLOBULIN UR ELPH-MCNC: 3.7 GM/DL
GLUCOSE SERPL-MCNC: 183 MG/DL (ref 65–99)
GLUCOSE UR STRIP-MCNC: NEGATIVE MG/DL
HCT VFR BLD AUTO: 40.5 % (ref 34–46.6)
HGB BLD-MCNC: 13.9 G/DL (ref 12–15.9)
HGB UR QL STRIP.AUTO: NEGATIVE
HOLD SPECIMEN: NORMAL
HYALINE CASTS UR QL AUTO: ABNORMAL /LPF
KETONES UR QL STRIP: ABNORMAL
LEUKOCYTE ESTERASE UR QL STRIP.AUTO: NEGATIVE
MCH RBC QN AUTO: 32.1 PG (ref 26.6–33)
MCHC RBC AUTO-ENTMCNC: 34.3 G/DL (ref 31.5–35.7)
MCV RBC AUTO: 93.5 FL (ref 79–97)
NITRITE UR QL STRIP: NEGATIVE
PH UR STRIP.AUTO: 5.5 [PH] (ref 5–8)
PLATELET # BLD AUTO: 224 10*3/MM3 (ref 140–450)
PMV BLD AUTO: 11.8 FL (ref 6–12)
POTASSIUM SERPL-SCNC: 4.4 MMOL/L (ref 3.5–5.2)
PROT SERPL-MCNC: 7.2 G/DL (ref 6–8.5)
PROT UR QL STRIP: ABNORMAL
RBC # BLD AUTO: 4.33 10*6/MM3 (ref 3.77–5.28)
RBC # UR STRIP: ABNORMAL /HPF
REF LAB TEST METHOD: ABNORMAL
SODIUM SERPL-SCNC: 136 MMOL/L (ref 136–145)
SP GR UR STRIP: >=1.03 (ref 1–1.03)
SQUAMOUS #/AREA URNS HPF: ABNORMAL /HPF
UROBILINOGEN UR QL STRIP: ABNORMAL
WBC # UR STRIP: ABNORMAL /HPF
WBC NRBC COR # BLD AUTO: 9.56 10*3/MM3 (ref 3.4–10.8)

## 2023-12-27 PROCEDURE — 80053 COMPREHEN METABOLIC PANEL: CPT | Performed by: FAMILY MEDICINE

## 2023-12-27 PROCEDURE — 81001 URINALYSIS AUTO W/SCOPE: CPT | Performed by: FAMILY MEDICINE

## 2023-12-27 PROCEDURE — 87086 URINE CULTURE/COLONY COUNT: CPT | Performed by: FAMILY MEDICINE

## 2023-12-27 PROCEDURE — 85027 COMPLETE CBC AUTOMATED: CPT | Performed by: FAMILY MEDICINE

## 2023-12-27 RX ORDER — AMOXICILLIN AND CLAVULANATE POTASSIUM 875; 125 MG/1; MG/1
1 TABLET, FILM COATED ORAL 2 TIMES DAILY
Qty: 20 TABLET | Refills: 0 | Status: SHIPPED | OUTPATIENT
Start: 2023-12-27

## 2023-12-27 NOTE — TELEPHONE ENCOUNTER
Spoke to patient told her paperwork has been filed out and faxed and she can get a copy at her appt tomorrow.  BROOKS AntoineA

## 2023-12-27 NOTE — TELEPHONE ENCOUNTER
Provider: DR ELENA    Caller: CIRILO    Relationship to Patient: PROVIDER      Reason for Call: CALLING REGARDING PATHOLOGY REPORT, PLEASE CALL

## 2023-12-27 NOTE — PROGRESS NOTES
Follow Up Office Visit      Date: 2023   Patient Name: Veronica Goldsmith  : 1975   MRN: [id number]     Chief Complaint:    Chief Complaint   Patient presents with    Hospital Follow Up Visit     After surgery follow up        History of Present Illness: Veronica Goldsmith is a 48 y.o. female who is here today for follow-up of her recent hospital stay for her right nephrectomy.  Patient was admitted  and underwent right nephrectomy with discharged on .  Patient had hand-assisted but apparently she has significant amount of he adhesions and required longer surgery than what was anticipated.  She is recovering nicely and has minimal pain.  She is doing better with respect to her fatigue and weakness compared to time of discharge.  She has had a little bit of cough and runny nose but denies any fever or chills at present time.  She felt that she did have an elevated temperature 1 time but has not had it recently.  Her appetite is slowly improving.  Her activity is still diminished.  Sleep is slowly improving.  Patient does continue to take her medications as prescribed.  She denies any other cardiovascular, respiratory, gastrointestinal, urologic or neurologic complaints.    Subjective      Review of Systems:   Review of Systems   Constitutional:  Positive for fatigue. Negative for activity change and appetite change.   HENT:  Positive for congestion and rhinorrhea.    Respiratory:  Positive for cough. Negative for shortness of breath and wheezing.    Cardiovascular:  Negative for chest pain, palpitations and leg swelling.   Gastrointestinal:  Positive for abdominal pain. Negative for abdominal distention, constipation, diarrhea, nausea, vomiting, GERD and indigestion.   Genitourinary:  Negative for dysuria, flank pain, frequency and urgency.   Musculoskeletal:  Positive for arthralgias and myalgias.   Neurological:  Positive for weakness. Negative for dizziness and memory problem.    Psychiatric/Behavioral:  Negative for sleep disturbance.        I have reviewed the patients family history, social history, past medical history, past surgical history and have updated it as appropriate.     Medications:     Current Outpatient Medications:     albuterol sulfate  (90 Base) MCG/ACT inhaler, INHALE 3 PUFFS BY MOUTH EVERY 6 HOURS AS NEEDED, Disp: 8.5 g, Rfl: 12    aspirin 81 MG EC tablet, Take 1 tablet by mouth Daily., Disp: , Rfl:     cetirizine (zyrTEC) 10 MG tablet, TAKE ONE TABLET BY MOUTH EVERY DAY, Disp: 30 tablet, Rfl: 12    clotrimazole (MYCELEX) 10 MG vinay, Take 1 tablet by mouth 5 (Five) Times a Day., Disp: 40 tablet, Rfl: 1    docusate sodium (Colace) 100 MG capsule, Take 1 capsule by mouth 2 (Two) Times a Day. If taking pain pill, Disp: 15 capsule, Rfl: 1    esomeprazole (nexIUM) 40 MG capsule, TAKE ONE CAPSULE BY MOUTH TWO TIMES A DAY, Disp: 180 capsule, Rfl: 1    Fluticasone-Umeclidin-Vilant (Trelegy Ellipta) 200-62.5-25 MCG/ACT aerosol powder , Inhale 1 puff Daily., Disp: 28 each, Rfl: 1    losartan (COZAAR) 25 MG tablet, TAKE 1 TABLET BY MOUTH DAILY, Disp: 90 tablet, Rfl: 3    nicotine (NICOTROL) 10 MG inhaler, Inhale 1 puff As Needed for Smoking Cessation., Disp: 168 each, Rfl: 1    oxyCODONE (Roxicodone) 5 MG immediate release tablet, Take 1 tablet by mouth Every 6 (Six) Hours As Needed for Moderate Pain or Severe Pain., Disp: 10 tablet, Rfl: 0    rosuvastatin (CRESTOR) 40 MG tablet, TAKE 1 TABLET BY MOUTH DAILY, Disp: 90 tablet, Rfl: 3    Semaglutide,0.25 or 0.5MG/DOS, (Ozempic, 0.25 or 0.5 MG/DOSE,) 2 MG/1.5ML solution pen-injector, Inject 1 mg under the skin into the appropriate area as directed 1 (One) Time Per Week., Disp: , Rfl:     amoxicillin-clavulanate (AUGMENTIN) 875-125 MG per tablet, Take 1 tablet by mouth 2 (Two) Times a Day., Disp: 20 tablet, Rfl: 0    Allergies:   Allergies   Allergen Reactions    Sulfa Antibiotics Unknown - High Severity     high fever - pt  "states 106F    Morphine Other (See Comments)     drop in O2 sats; needed oxygent; pt states she has to be reminded to breathe;        Immunizations:   Immunization History   Administered Date(s) Administered    Covid-19 (Pfizer) Gray Cap Monovalent 05/24/2022, 06/20/2022    Flu Vaccine Quad PF >36MO 02/08/2019    Flu Vaccine Split Quad 01/09/2019    Flublok 18+yrs 01/09/2019    Fluzone (or Fluarix & Flulaval for VFC) >6mos 10/04/2022    Hepatitis A 01/09/2019    Influenza, Unspecified 02/08/2019, 10/10/2023    MMR 02/08/2019    Pneumococcal Polysaccharide (PPSV23) 10/07/2022, 11/16/2023    Tdap 02/08/2019        Objective     Physical Exam: Please see above  Vital Signs:   Vitals:    12/27/23 1107   BP: 124/70   BP Location: Right arm   Patient Position: Sitting   Cuff Size: Adult   Pulse: 105   Resp: 18   Temp: 98 °F (36.7 °C)   TempSrc: Temporal   SpO2: 97%   Weight: 82.1 kg (181 lb)   Height: 162.6 cm (64.02\")     Body mass index is 31.05 kg/m².          Physical Exam  Vitals and nursing note reviewed.   Constitutional:       Appearance: Normal appearance.   HENT:      Head: Normocephalic and atraumatic.      Nose: Nose normal.      Mouth/Throat:      Pharynx: Oropharynx is clear.   Eyes:      Extraocular Movements: Extraocular movements intact.      Pupils: Pupils are equal, round, and reactive to light.   Neck:      Thyroid: No thyroid mass or thyromegaly.      Trachea: Trachea normal.   Cardiovascular:      Rate and Rhythm: Normal rate and regular rhythm.      Pulses: Normal pulses. No decreased pulses.      Heart sounds: Normal heart sounds.   Pulmonary:      Effort: Pulmonary effort is normal.      Breath sounds: Normal breath sounds.   Abdominal:      General: Abdomen is flat. Bowel sounds are normal.      Palpations: Abdomen is soft.      Tenderness: There is no abdominal tenderness.   Musculoskeletal:      Cervical back: Neck supple.      Right lower leg: No edema.      Left lower leg: No edema. "   Lymphadenopathy:      Cervical: No cervical adenopathy.   Skin:     General: Skin is warm and dry.   Neurological:      General: No focal deficit present.      Mental Status: She is alert and oriented to person, place, and time.      Sensory: Sensation is intact.      Motor: Motor function is intact.      Coordination: Coordination is intact.   Psychiatric:         Attention and Perception: Attention normal.         Mood and Affect: Mood normal.         Speech: Speech normal.         Behavior: Behavior normal.         Procedures    Results:   Labs:   Hemoglobin A1C   Date Value Ref Range Status   12/21/2023 5.30 4.80 - 5.60 % Final     TSH   Date Value Ref Range Status   11/16/2023 1.100 0.270 - 4.200 uIU/mL Final        POCT Results (if applicable):   Results for orders placed or performed during the hospital encounter of 12/20/23   CBC (No Diff)    Specimen: Blood   Result Value Ref Range    WBC 13.59 (H) 3.40 - 10.80 10*3/mm3    RBC 4.12 3.77 - 5.28 10*6/mm3    Hemoglobin 12.8 12.0 - 15.9 g/dL    Hematocrit 39.1 34.0 - 46.6 %    MCV 94.9 79.0 - 97.0 fL    MCH 31.1 26.6 - 33.0 pg    MCHC 32.7 31.5 - 35.7 g/dL    RDW 13.8 12.3 - 15.4 %    RDW-SD 48.3 37.0 - 54.0 fl    MPV 10.5 6.0 - 12.0 fL    Platelets 163 140 - 450 10*3/mm3   Comprehensive Metabolic Panel    Specimen: Blood   Result Value Ref Range    Glucose 144 (H) 65 - 99 mg/dL    BUN 13 6 - 20 mg/dL    Creatinine 1.06 (H) 0.57 - 1.00 mg/dL    Sodium 138 136 - 145 mmol/L    Potassium 4.5 3.5 - 5.2 mmol/L    Chloride 106 98 - 107 mmol/L    CO2 24.3 22.0 - 29.0 mmol/L    Calcium 8.6 8.6 - 10.5 mg/dL    Total Protein 6.4 6.0 - 8.5 g/dL    Albumin 3.7 3.5 - 5.2 g/dL    ALT (SGPT) 62 (H) 1 - 33 U/L    AST (SGOT) 36 (H) 1 - 32 U/L    Alkaline Phosphatase 69 39 - 117 U/L    Total Bilirubin 0.4 0.0 - 1.2 mg/dL    Globulin 2.7 gm/dL    A/G Ratio 1.4 g/dL    BUN/Creatinine Ratio 12.3 7.0 - 25.0    Anion Gap 7.7 5.0 - 15.0 mmol/L    eGFR 65.7 >60.0 mL/min/1.73    Hemoglobin A1c    Specimen: Blood   Result Value Ref Range    Hemoglobin A1C 5.30 4.80 - 5.60 %   POC Glucose Once    Specimen: Blood   Result Value Ref Range    Glucose 145 (H) 70 - 130 mg/dL   POC Glucose Once    Specimen: Blood   Result Value Ref Range    Glucose 164 (H) 70 - 130 mg/dL   POC Glucose Once    Specimen: Blood   Result Value Ref Range    Glucose 176 (H) 70 - 130 mg/dL   POC Glucose Once    Specimen: Blood   Result Value Ref Range    Glucose 126 70 - 130 mg/dL   Prepare RBC, 2 Units   Result Value Ref Range    Product Code P6371F13     Unit Number [id number]     UNIT  ABO O     UNIT  RH POS     Crossmatch Interpretation Compatible     Dispense Status RE     Blood Expiration Date      Blood Type Barcode 5100     Product Code A8533U45     Unit Number [id number]     UNIT  ABO O     UNIT  RH POS     Crossmatch Interpretation Compatible     Dispense Status RE     Blood Expiration Date [id number]     Blood Type Barcode 5100    Type & Screen    Specimen: Blood   Result Value Ref Range    ABO Type O     RH type Positive     Antibody Screen Negative     T&S Expiration Date 2023 11:59:59 PM    TISSUE EXAM, [facility] ([facility])    Specimen: Kidney, Right; Tissue   Result Value Ref Range    Reference Lab Report       [facility]  [address] [place] [address]  Phone: [phone] or [phone]  Fax: [phone]  Clint Chamorro M.D., Medical Director    PATIENT NAME                           LABORATORY NO.  MEGAN VIRK                        [id number]  [id number]                         AGE              SEX  SSN           CLIENT REF #  [facility]            48      1975  F                  [id number]    [address]                REQUESTING M.D.     ATTENDING [facility]     COPY TO.  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,  margins clear  Benign adrenal gland  Benign lymph node (0/1)    Procedure: Right Radical  nephrectomy  Tumor Focality: Unifocal  Tumor Size: 3.5 x 3.5 x 3 cm  Histologic Type: Multilocular cystic clear cell renal cell neoplasm of low  malignant potential  Histologic Grade (WHO / ISUP): G1  Tumor Extent: Limited to kidney  Sarcomatoid Features: Not identified  Rhabdoid Features: Not identified  Tumor Necrosis: Not identified  Margin Status: All margins negative for invasive carcinoma  Regional Lymph Node Status: all regional lymph nodes negative for tumor  Number of Lymph Nodes Examined: 1  PATHOLOGIC STAGE (pTNM, AJCC 8th Edition): pT1a, pN0  Additional Findings in Nonneoplastic Kidney: None identified    CLINICAL HISTORY:  Renal mass, right    SPECIMENS RECEIVED:  RIGHT KIDNEY MASS, RESECTION    MICROSCOPIC DESCRIPTION:  Tissue blocks are prepared and slides are examined microscopically on all  specimens. See diagnosis for details.    Professional interpretation rendered by Clint Chamorro M.D., F.C.A.P. at  [facility] [address] [place] [address].    GROSS  DESCRIPTION:  Labeled \"right kidney mass\" is a 410 g, 14 x 10 x 5 cm radical nephrectomy with  a 2.5 x 1 x 1 cm portion of adrenal gland and a 7 x 0.5 x 0.5 cm ureter.  At the  superior pole is a 3.5 x 3.5 x 3 cm multiloculated cystic mass, 0.1 cm from the  capsule (inked blue), 0.5 cm from the renal sinus, 2.5 cm from the vascular  margins, and 11 cm from the ureter margin.  The mass contains yellow-orange,  watery fluid and is confined to the kidney.  The remaining renal parenchyma and  adrenal gland are unremarkable.  Representative sections: A1-ureter, artery and  vein margins; A2-4-mass; A5-uninvolved " parenchyma; A6-adrenal gland; A7-  18-remainder of mass.  [facility] 12/23/2023    REVIEWED, DIAGNOSED AND ELECTRONICALLY  SIGNED BY:    Clint Chamorro M.D., F.C.A.P.  CPT CODES:  70294         Imaging:   No valid procedures specified.     Measures:    Advanced Care Planning:   Patient does not have an advance directive, information provided.    Smoking Cessation:   less than 3 minutes spent counseling Will try to cut down    Assessment / Plan      Assessment/Plan:   Diagnoses and all orders for this visit:    1. Type 2 diabetes mellitus without complication, without long-term current use of insulin (Primary)   Patient does appear to be doing relatively well with respect to her blood sugars after discharge.  We have encouraged her to continue with the medication and to try to increase her appetite.  We will monitor closely and will treat accordingly.    2. Primary hypertension   Patient's blood pressure has been doing well.  We will continue with her current regiment and will monitor.  If she does have decrease in her blood pressure further adjustments will be necessary.    3. TAM (nonalcoholic steatohepatitis)  Patient did have increase in liver function test after surgery.  It could have been due to to the surgery itself including anesthesia.  Nonetheless she had some increase prior to the procedure.  We will repeat a CMP to see where we stand we will make adjustments based on these findings.  Hopefully, the liver function test will be improved.  -     Comprehensive Metabolic Panel; Future  -     CBC (No Diff); Future    4. Renal insufficiency   Patient has had a little bit of renal insufficiency.  We have asked her to push fluids and we will follow her CMP and will make adjustments based on these findings.  We will obtain a CBC to assure us that she does not have underlying anemia.    5. Renal mass, right   Patient's removal of her right kidney was successful.  She is slowly recovering.  Her pathology did return  "while she was in clinic and we did review the findings together.  It does appear that she does have renal cell carcinoma but with \"low malignant potential\" with clear margins.  Her lymph nodes were also noted to be negative.  But she will discuss for treatment options with Dr. Mart.  Patient and I are pleased with his successful treatment at present time.  Hopefully she will not have to have any adjuvant therapy.  However, she would be willing to pursue it if recommended.    6. Fever, unspecified fever cause  Patient has had low fever according to her report.  We will obtain a chest x-ray to assure us that she has not had any aspiration as she has had a GLP-1 agonist in the past.  We will also obtain urine culture as she has had catheterization.   -     Urinalysis With Culture If Indicated -; Future  -     XR Chest PA & Lateral; Future    7. Acute non-recurrent sinusitis, unspecified location  Patient states that she has had congestion with sore throat and signs and symptoms of a sinus infection now for approximately 7 days that has worsening.  She is blowing out thick purulent drainage currently.  We have encouraged her to use normal saline as well as Flonase as necessary.  Since she has had significant difficulties we will try a course of Augmentin.  We did encourage her that this would help with possible aspiration and even a urinary tract infection if she has develops well.  She will start the antibiotics and we will monitor closely.  If she has worsening symptoms further evaluation treatment will be necessary.  -     amoxicillin-clavulanate (AUGMENTIN) 875-125 MG per tablet; Take 1 tablet by mouth 2 (Two) Times a Day.  Dispense: 20 tablet; Refill: 0        Follow Up:   No follow-ups on file.      At [facility], we believe that sharing information builds trust and better relationships. You are receiving this note because you recently visited [facility]. It is possible you will see health information " before a provider has talked with you about it. This kind of information can be easy to misunderstand. To help you fully understand what it means for your health, we urge you to discuss this note with your provider.    Greg Levine MD  [facility]

## 2023-12-28 ENCOUNTER — TELEPHONE (OUTPATIENT)
Dept: FAMILY MEDICINE CLINIC | Facility: CLINIC | Age: 48
End: 2023-12-28

## 2023-12-28 ENCOUNTER — OFFICE VISIT (OUTPATIENT)
Dept: UROLOGY | Facility: CLINIC | Age: 48
End: 2023-12-28
Payer: COMMERCIAL

## 2023-12-28 VITALS
OXYGEN SATURATION: 100 % | HEART RATE: 48 BPM | WEIGHT: 181 LBS | HEIGHT: 64 IN | DIASTOLIC BLOOD PRESSURE: 70 MMHG | TEMPERATURE: 97.8 F | SYSTOLIC BLOOD PRESSURE: 126 MMHG | BODY MASS INDEX: 30.9 KG/M2

## 2023-12-28 DIAGNOSIS — N28.89 RENAL MASS, RIGHT: Primary | ICD-10-CM

## 2023-12-28 DIAGNOSIS — M54.6 CHRONIC RIGHT-SIDED THORACIC BACK PAIN: ICD-10-CM

## 2023-12-28 DIAGNOSIS — C64.1 RENAL CELL CARCINOMA OF RIGHT KIDNEY: ICD-10-CM

## 2023-12-28 DIAGNOSIS — G89.29 CHRONIC RIGHT-SIDED THORACIC BACK PAIN: ICD-10-CM

## 2023-12-28 NOTE — PROGRESS NOTES
CC  Renal cell carcinoma  Back pain    HPI  Ms. Goldsmith is a 48 y.o. female with history below in assessment, who presents for follow up.     At this visit patient states that she is eating and pooping, but complains of worsening of her chronic back pain    Past Medical History:   Diagnosis Date    Acne rosacea, erythematous telangiectatic type     Acute bronchitis     history of    Acute sinusitis     Acute upper respiratory infection     hisotry of    Allergic contact dermatitis     Allergic rhinitis     Anxiety and depression     Asthma     Back pain, chronic     Benign paroxysmal positional vertigo     Blood clots in stool     Candidal dermatitis     Candidiasis of vulva and vagina     Cellulitis     AND ABSCESS, right lower abdomen    Cervical pain     Conjunctivitis     Contusion of ear     Corneal abrasion     Depression     Diabetes mellitus     type 2 - A1C better after weight loss with use of Semiglutide    Dyspnea, unspecified     Fibromyalgia     Flu vaccine need     Foot pain, right     GERD (gastroesophageal reflux disease)     Hand pain, left     Hearing loss, right     Heart palpitations     Hemorrhoids, external     Herpes simplex without complication     Herpes zoster lesion     Hypertension     Impetigo herpetiformis     Injury to tibial blood vessels, unspecified laterality, initial encounter     Kidney cyst     Right kidney    Kidney stones     Left humeral fracture     Low back pain     Lymphadenopathy     Migraine headache     Mixed hyperlipidemia     TAM (nonalcoholic steatohepatitis)     Neuropathy     Oral candidiasis     Plantar fasciitis     Recent weight loss     60lbs with use of Ozempic - per pt 12/6/23    Salpingitis and oophoritis, unspecified     not specified as acute, subacute, or chronic    Scabies     Seasonal allergies     Stress     Swelling of limb     Symptoms involving abdomen and pelvis     OTHER SYMPTOMS INVOLVING ABDOMEN AND PELVIS, ABDOMINAL OR PELVIC SWELLING, MASS, OR  LUMP, OTHER SPECIFIED S    Tattoo     Tendon pain     Tobacco use disorder     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     Urinary tract infection     Vaginitis and vulvovaginitis     Vaginitis, atrophic        Past Surgical History:   Procedure Laterality Date    BILATERAL OOPHORECTOMY      CARPAL TUNNEL RELEASE Bilateral     CHOLECYSTECTOMY      COLONOSCOPY  03/30/2018    HUMERUS FRACTURE SURGERY Left     has implants - plate, screws    LIPOMA EXCISION      low back    NEPHRECTOMY Right 12/20/2023    Procedure: NEPHRECTOMY LAPAROSCOPIC HAND ASSISTED;  Surgeon: Aaron Mart MD;  Location: [facility];  Service: Urology;  Laterality: Right;    ROTATOR CUFF REPAIR Right     TOTAL ABDOMINAL HYSTERECTOMY      TUBAL ABDOMINAL LIGATION      URETEROSCOPY LASER LITHOTRIPSY WITH STENT INSERTION Right 03/02/2023    Procedure: URETEROSCOPY LASER LITHOTRIPSY WITH STENT INSERTION;  Surgeon: Aaron Mart MD;  Location: [facility];  Service: Urology;  Laterality: Right;         Current Outpatient Medications:     albuterol sulfate  (90 Base) MCG/ACT inhaler, INHALE 3 PUFFS BY MOUTH EVERY 6 HOURS AS NEEDED, Disp: 8.5 g, Rfl: 12    amoxicillin-clavulanate (AUGMENTIN) 875-125 MG per tablet, Take 1 tablet by mouth 2 (Two) Times a Day., Disp: 20 tablet, Rfl: 0    aspirin 81 MG EC tablet, Take 1 tablet by mouth Daily., Disp: , Rfl:     cetirizine (zyrTEC) 10 MG tablet, TAKE ONE TABLET BY MOUTH EVERY DAY, Disp: 30 tablet, Rfl: 12    clotrimazole (MYCELEX) 10 MG vinay, Take 1 tablet by mouth 5 (Five) Times a Day., Disp: 40 tablet, Rfl: 1    docusate sodium (Colace) 100 MG capsule, Take 1 capsule by mouth 2 (Two) Times a Day. If taking pain pill, Disp: 15 capsule, Rfl: 1    esomeprazole (nexIUM) 40 MG capsule, TAKE ONE CAPSULE BY MOUTH TWO TIMES A DAY, Disp: 180 capsule, Rfl: 1    Fluticasone-Umeclidin-Vilant (Trelegy Ellipta) 200-62.5-25 MCG/ACT aerosol powder , Inhale 1 puff Daily., Disp: 28 each, Rfl:  "1    losartan (COZAAR) 25 MG tablet, TAKE 1 TABLET BY MOUTH DAILY, Disp: 90 tablet, Rfl: 3    nicotine (NICOTROL) 10 MG inhaler, Inhale 1 puff As Needed for Smoking Cessation., Disp: 168 each, Rfl: 1    oxyCODONE (Roxicodone) 5 MG immediate release tablet, Take 1 tablet by mouth Every 6 (Six) Hours As Needed for Moderate Pain or Severe Pain., Disp: 10 tablet, Rfl: 0    rosuvastatin (CRESTOR) 40 MG tablet, TAKE 1 TABLET BY MOUTH DAILY, Disp: 90 tablet, Rfl: 3    Semaglutide,0.25 or 0.5MG/DOS, (Ozempic, 0.25 or 0.5 MG/DOSE,) 2 MG/1.5ML solution pen-injector, Inject 1 mg under the skin into the appropriate area as directed 1 (One) Time Per Week., Disp: , Rfl:      Physical Exam  Visit Vitals  /70   Pulse (!) 48   Temp 97.8 °F (36.6 °C)   Ht 162.6 cm (64.02\")   Wt 82.1 kg (181 lb)   SpO2 100%   BMI 31.05 kg/m²       Labs  Brief Urine Lab Results  (Last result in the past 365 days)        Color   Clarity   Blood   Leuk Est   Nitrite   Protein   CREAT   Urine HCG        12/27/23 1204 Dark Yellow   Turbid   Negative   Negative   Negative   100 mg/dL (2+)                   Lab Results   Component Value Date    GLUCOSE 183 (H) 12/27/2023    CALCIUM 8.9 12/27/2023     12/27/2023    K 4.4 12/27/2023    CO2 25.0 12/27/2023    CL 99 12/27/2023    BUN 11 12/27/2023    CREATININE 1.19 (H) 12/27/2023    BCR 9.2 12/27/2023    ANIONGAP 12.0 12/27/2023       Lab Results   Component Value Date    WBC 9.56 12/27/2023    HGB 13.9 12/27/2023    HCT 40.5 12/27/2023    MCV 93.5 12/27/2023     12/27/2023     DIAGNOSIS:  RIGHT KIDNEY MASS, RESECTION:  Multilocular cystic clear cell renal cell neoplasm of low malignant potential,  margins clear  Benign adrenal gland  Benign lymph node (0/1)    Procedure: Right Radical nephrectomy  Tumor Focality: Unifocal  Tumor Size: 3.5 x 3.5 x 3 cm  Histologic Type: Multilocular cystic clear cell renal cell neoplasm of low  malignant potential  Histologic Grade (WHO / ISUP): G1  Tumor " Extent: Limited to kidney  Sarcomatoid Features: Not identified  Rhabdoid Features: Not identified  Tumor Necrosis: Not identified  Margin Status: All margins negative for invasive carcinoma  Regional Lymph Node Status: all regional lymph nodes negative for tumor  Number of Lymph Nodes Examined: 1  PATHOLOGIC STAGE (pTNM, AJCC 8th Edition): pT1a, pN0  Additional Findings in Nonneoplastic Kidney: None identified        Radiographic Studies  XR Chest PA & Lateral  Result Date: 12/27/2023  Unremarkable chest exam.    This report was signed and finalized on 12/27/2023 12:18 PM by Ivan Melo MD.      CT Abdomen With & Without Contrast  Result Date: 10/30/2023  Multiloculated cystic mass in the upper pole of the right kidney is minimally larger than on the prior exam. There is a small component associated with enhancing septa measuring up to 4 mm in thickness. Continued follow-up is recommended in 6 months using renal mass protocol.      CTDI: 9.07 mGy DLP: 1598.61 mGy.cm   This study was performed with techniques to keep radiation doses as low as reasonably achievable (ALARA). Individualized dose reduction techniques using automated exposure control or adjustment of mA and/or kV according to the patient size were employed.     Images were reviewed, interpreted, and dictated by Dr. Ivan Melo MD Transcribed by Concepcion Jurado PA-C.  This report was signed and finalized on 10/30/2023 2:28 PM by Ivan Melo MD.        I have reviewed the above labs and imaging.     Assessment  48 y.o. female with 3.5 cm cystic mass of right kidney, status post right laparoscopic radical nephrectomy.  Pathology is pT1a clear-cell RCC grade 1.  Margins negative.  She has had worsening of her chronic back pain, likely secondary to positioning for nephrectomy, this is a separate pre-existing process that we are addressing within the postoperative period    Plan  1.  Referral to Dr. Zavala for any possible blocks or assistance in pain  management.  2.  I recommended that she get MRI of thoracic and lumbar spine with Dr. Levine and may need orthopedic referral depending on the results.  3.  She will follow-up with me for CT and labs in 6 months for RCC surveillance.

## 2023-12-28 NOTE — TELEPHONE ENCOUNTER
Caller: Jeovanny Goldsmith    Relationship: Self    Best call back number: [phone]     Which medication are you concerned about: clotrimazole (MYCELEX) 10 MG vinay     Who prescribed you this medication: DR MENDOZA    When did you start taking this medication: [id number]    What are your concerns: THIS ISN'T WORKING,      [facility] - [place], [place] - [facility] [address] - [phone]  - [phone]  [phone]

## 2023-12-29 LAB — BACTERIA SPEC AEROBE CULT: NO GROWTH

## 2023-12-29 NOTE — PROGRESS NOTES
"Enter Query Response Below      Query Response: yes this is consistent with my finding and clinical plan             If applicable, please update the problem list.       Patient: Veronica Goldsmith        : 1975  Account: [id number]           Admit Date: 2023        How to Respond to this query:       a. Click New Note     b. Answer query within the yellow box.                c. Update the Problem List, if applicable.      If you have any questions about this query contact me at: [email or web address]     Dr. Mart,    Based on inpatient coding guidelines, [facility] are not allowed to use diagnoses from pathology reports as the sole basis for billing.  Any findings noted on a pathology report must be affirmed by the treating physician before billing.     The pathologist reported that the specimen shows:    \"DIAGNOSIS:   RIGHT KIDNEY MASS, RESECTION:   Multilocular cystic clear cell renal cell neoplasm of low malignant potential,   margins clear   Benign adrenal gland   Benign lymph node (0/1)\"     Is this finding consistent with your clinical impression and treatment plan for this patient?    - yes  - no  - other explanation for clinical impression and treatment plan_________  - unable to determine      By submitting this query, we are merely seeking further clarification of documentation to accurately reflect all conditions that you are monitoring, evaluating, treating or that extend the hospitalization or utilize additional resources of care. Please utilize your independent clinical judgment when addressing the question(s) above.     This query and your response, once completed, will be entered into the legal medical record.    Sincerely,  Kriss Morrison RN CCDS [facility]  Clinical Documentation Integrity Program   "

## 2024-01-03 ENCOUNTER — HOSPITAL ENCOUNTER (INPATIENT)
Facility: HOSPITAL | Age: 49
LOS: 2 days | Discharge: HOME | End: 2024-01-05
Attending: EMERGENCY MEDICINE | Admitting: INTERNAL MEDICINE
Payer: COMMERCIAL

## 2024-01-03 ENCOUNTER — OFFICE VISIT (OUTPATIENT)
Dept: FAMILY MEDICINE CLINIC | Facility: CLINIC | Age: 49
End: 2024-01-03
Payer: COMMERCIAL

## 2024-01-03 ENCOUNTER — APPOINTMENT (OUTPATIENT)
Dept: CT IMAGING | Facility: HOSPITAL | Age: 49
End: 2024-01-03
Payer: COMMERCIAL

## 2024-01-03 VITALS
OXYGEN SATURATION: 96 % | HEART RATE: 112 BPM | WEIGHT: 178.6 LBS | BODY MASS INDEX: 30.49 KG/M2 | TEMPERATURE: 98.6 F | DIASTOLIC BLOOD PRESSURE: 74 MMHG | SYSTOLIC BLOOD PRESSURE: 122 MMHG | HEIGHT: 64 IN

## 2024-01-03 DIAGNOSIS — R19.7 DIARRHEA OF PRESUMED INFECTIOUS ORIGIN: ICD-10-CM

## 2024-01-03 DIAGNOSIS — D62 ANEMIA DUE TO ACUTE BLOOD LOSS: ICD-10-CM

## 2024-01-03 DIAGNOSIS — T88.8XXA FLUID COLLECTION AT SURGICAL SITE, INITIAL ENCOUNTER: Primary | ICD-10-CM

## 2024-01-03 DIAGNOSIS — R53.83 OTHER FATIGUE: Primary | ICD-10-CM

## 2024-01-03 DIAGNOSIS — B37.9 CANDIDIASIS: ICD-10-CM

## 2024-01-03 DIAGNOSIS — M62.838 MUSCLE SPASM: ICD-10-CM

## 2024-01-03 DIAGNOSIS — C64.1 RENAL CELL CARCINOMA OF RIGHT KIDNEY: ICD-10-CM

## 2024-01-03 PROBLEM — T81.49XA POSTOPERATIVE ABSCESS: Status: ACTIVE | Noted: 2024-01-03

## 2024-01-03 LAB
ALBUMIN SERPL-MCNC: 3.3 G/DL (ref 3.5–5.2)
ALBUMIN/GLOB SERPL: 0.7 G/DL
ALP SERPL-CCNC: 67 U/L (ref 39–117)
ALT SERPL W P-5'-P-CCNC: 17 U/L (ref 1–33)
ANION GAP SERPL CALCULATED.3IONS-SCNC: 10.7 MMOL/L (ref 5–15)
AST SERPL-CCNC: 25 U/L (ref 1–32)
BACTERIA UR QL AUTO: ABNORMAL /HPF
BASOPHILS # BLD AUTO: 0.08 10*3/MM3 (ref 0–0.2)
BASOPHILS NFR BLD AUTO: 0.6 % (ref 0–1.5)
BILIRUB SERPL-MCNC: 0.4 MG/DL (ref 0–1.2)
BILIRUB UR QL STRIP: NEGATIVE
BUN SERPL-MCNC: 8 MG/DL (ref 6–20)
BUN/CREAT SERPL: 6.7 (ref 7–25)
CALCIUM SPEC-SCNC: 8.8 MG/DL (ref 8.6–10.5)
CHLORIDE SERPL-SCNC: 96 MMOL/L (ref 98–107)
CLARITY UR: ABNORMAL
CO2 SERPL-SCNC: 26.3 MMOL/L (ref 22–29)
COLOR UR: YELLOW
CREAT SERPL-MCNC: 1.2 MG/DL (ref 0.57–1)
D-LACTATE SERPL-SCNC: 1.1 MMOL/L (ref 0.5–2)
DEPRECATED RDW RBC AUTO: 43.1 FL (ref 37–54)
EGFRCR SERPLBLD CKD-EPI 2021: 56.7 ML/MIN/1.73
EOSINOPHIL # BLD AUTO: 0.12 10*3/MM3 (ref 0–0.4)
EOSINOPHIL NFR BLD AUTO: 0.8 % (ref 0.3–6.2)
ERYTHROCYTE [DISTWIDTH] IN BLOOD BY AUTOMATED COUNT: 12.9 % (ref 12.3–15.4)
EXPIRATION DATE: NORMAL
GLOBULIN UR ELPH-MCNC: 4.6 GM/DL
GLUCOSE SERPL-MCNC: 116 MG/DL (ref 65–99)
GLUCOSE UR STRIP-MCNC: NEGATIVE MG/DL
HCT VFR BLD AUTO: 37 % (ref 34–46.6)
HGB BLD-MCNC: 12.6 G/DL (ref 12–15.9)
HGB BLDA-MCNC: 12.1 G/DL (ref 12–17)
HGB UR QL STRIP.AUTO: NEGATIVE
HOLD SPECIMEN: NORMAL
HOLD SPECIMEN: NORMAL
HYALINE CASTS UR QL AUTO: ABNORMAL /LPF
IMM GRANULOCYTES # BLD AUTO: 0.16 10*3/MM3 (ref 0–0.05)
IMM GRANULOCYTES NFR BLD AUTO: 1.1 % (ref 0–0.5)
KETONES UR QL STRIP: NEGATIVE
LEUKOCYTE ESTERASE UR QL STRIP.AUTO: NEGATIVE
LIPASE SERPL-CCNC: 44 U/L (ref 13–60)
LYMPHOCYTES # BLD AUTO: 2.17 10*3/MM3 (ref 0.7–3.1)
LYMPHOCYTES NFR BLD AUTO: 15.2 % (ref 19.6–45.3)
Lab: NORMAL
MCH RBC QN AUTO: 30.7 PG (ref 26.6–33)
MCHC RBC AUTO-ENTMCNC: 34.1 G/DL (ref 31.5–35.7)
MCV RBC AUTO: 90.2 FL (ref 79–97)
MONOCYTES # BLD AUTO: 0.97 10*3/MM3 (ref 0.1–0.9)
MONOCYTES NFR BLD AUTO: 6.8 % (ref 5–12)
NEUTROPHILS NFR BLD AUTO: 10.78 10*3/MM3 (ref 1.7–7)
NEUTROPHILS NFR BLD AUTO: 75.5 % (ref 42.7–76)
NITRITE UR QL STRIP: NEGATIVE
NRBC BLD AUTO-RTO: 0 /100 WBC (ref 0–0.2)
PH UR STRIP.AUTO: 5.5 [PH] (ref 5–8)
PLATELET # BLD AUTO: 352 10*3/MM3 (ref 140–450)
PMV BLD AUTO: 9.9 FL (ref 6–12)
POTASSIUM SERPL-SCNC: 4.2 MMOL/L (ref 3.5–5.2)
PROCALCITONIN SERPL-MCNC: 0.22 NG/ML (ref 0–0.25)
PROT SERPL-MCNC: 7.9 G/DL (ref 6–8.5)
PROT UR QL STRIP: ABNORMAL
RBC # BLD AUTO: 4.1 10*6/MM3 (ref 3.77–5.28)
RBC # UR STRIP: ABNORMAL /HPF
REF LAB TEST METHOD: ABNORMAL
SODIUM SERPL-SCNC: 133 MMOL/L (ref 136–145)
SP GR UR STRIP: >=1.03 (ref 1–1.03)
SQUAMOUS #/AREA URNS HPF: ABNORMAL /HPF
UROBILINOGEN UR QL STRIP: ABNORMAL
WBC # UR STRIP: ABNORMAL /HPF
WBC NRBC COR # BLD AUTO: 14.28 10*3/MM3 (ref 3.4–10.8)
WHOLE BLOOD HOLD COAG: NORMAL
WHOLE BLOOD HOLD SPECIMEN: NORMAL

## 2024-01-03 PROCEDURE — 74177 CT ABD & PELVIS W/CONTRAST: CPT

## 2024-01-03 PROCEDURE — 99223 1ST HOSP IP/OBS HIGH 75: CPT | Performed by: STUDENT IN AN ORGANIZED HEALTH CARE EDUCATION/TRAINING PROGRAM

## 2024-01-03 PROCEDURE — 83605 ASSAY OF LACTIC ACID: CPT | Performed by: EMERGENCY MEDICINE

## 2024-01-03 PROCEDURE — 25810000003 SODIUM CHLORIDE 0.9 % SOLUTION: Performed by: EMERGENCY MEDICINE

## 2024-01-03 PROCEDURE — 36415 COLL VENOUS BLD VENIPUNCTURE: CPT

## 2024-01-03 PROCEDURE — 80053 COMPREHEN METABOLIC PANEL: CPT | Performed by: EMERGENCY MEDICINE

## 2024-01-03 PROCEDURE — 99285 EMERGENCY DEPT VISIT HI MDM: CPT

## 2024-01-03 PROCEDURE — 85025 COMPLETE CBC W/AUTO DIFF WBC: CPT

## 2024-01-03 PROCEDURE — 25010000002 PIPERACILLIN SOD-TAZOBACTAM PER 1 G: Performed by: EMERGENCY MEDICINE

## 2024-01-03 PROCEDURE — 87040 BLOOD CULTURE FOR BACTERIA: CPT | Performed by: EMERGENCY MEDICINE

## 2024-01-03 PROCEDURE — 25510000001 IOPAMIDOL 61 % SOLUTION: Performed by: EMERGENCY MEDICINE

## 2024-01-03 PROCEDURE — 81001 URINALYSIS AUTO W/SCOPE: CPT | Performed by: EMERGENCY MEDICINE

## 2024-01-03 PROCEDURE — 83690 ASSAY OF LIPASE: CPT | Performed by: EMERGENCY MEDICINE

## 2024-01-03 PROCEDURE — 25010000002 DIAZEPAM PER 5 MG: Performed by: EMERGENCY MEDICINE

## 2024-01-03 PROCEDURE — 84145 PROCALCITONIN (PCT): CPT | Performed by: EMERGENCY MEDICINE

## 2024-01-03 RX ORDER — SODIUM CHLORIDE 0.9 % (FLUSH) 0.9 %
10 SYRINGE (ML) INJECTION AS NEEDED
Status: DISCONTINUED | OUTPATIENT
Start: 2024-01-03 | End: 2024-01-05 | Stop reason: HOSPADM

## 2024-01-03 RX ORDER — ACETAMINOPHEN 500 MG
1000 TABLET ORAL ONCE
Status: COMPLETED | OUTPATIENT
Start: 2024-01-03 | End: 2024-01-03

## 2024-01-03 RX ORDER — FLUCONAZOLE 100 MG/1
100 TABLET ORAL DAILY
Qty: 14 TABLET | Refills: 0 | Status: SHIPPED | OUTPATIENT
Start: 2024-01-03

## 2024-01-03 RX ORDER — OXYCODONE HYDROCHLORIDE 10 MG/1
10 TABLET ORAL EVERY 6 HOURS PRN
Qty: 10 TABLET | Refills: 0 | Status: SHIPPED | OUTPATIENT
Start: 2024-01-03

## 2024-01-03 RX ORDER — DIAZEPAM 5 MG/ML
5 INJECTION, SOLUTION INTRAMUSCULAR; INTRAVENOUS ONCE
Status: COMPLETED | OUTPATIENT
Start: 2024-01-03 | End: 2024-01-03

## 2024-01-03 RX ADMIN — SODIUM CHLORIDE 1000 ML: 9 INJECTION, SOLUTION INTRAVENOUS at 20:47

## 2024-01-03 RX ADMIN — ACETAMINOPHEN 1000 MG: 500 TABLET, FILM COATED ORAL at 20:47

## 2024-01-03 RX ADMIN — PIPERACILLIN SODIUM AND TAZOBACTAM SODIUM 4.5 G: 4; .5 INJECTION, SOLUTION INTRAVENOUS at 22:49

## 2024-01-03 RX ADMIN — DIAZEPAM 5 MG: 10 INJECTION, SOLUTION INTRAMUSCULAR; INTRAVENOUS at 20:48

## 2024-01-03 RX ADMIN — IOPAMIDOL 100 ML: 612 INJECTION, SOLUTION INTRAVENOUS at 21:12

## 2024-01-03 RX ADMIN — SODIUM CHLORIDE 1000 ML: 9 INJECTION, SOLUTION INTRAVENOUS at 22:51

## 2024-01-03 NOTE — PROGRESS NOTES
"    Follow Up Office Visit      Date: 2024   Patient Name: Veronica Goldsmith  : 1975   MRN: [id number]     Chief Complaint:    Chief Complaint   Patient presents with    Fatigue    Thrush       History of Present Illness: Veronica Goldsmith is a 48 y.o. female who is here today for evaluation of fatigue.  Patient has not been feeling very well since her surgery with worsening fatigue symptoms excetra.  She has not been eating or drinking very well and has continued to have difficulty with irritation of her tongue.  She was recently seen by the urologist who felt that things were doing well and that he will reassess in 6 months time.  Patient does continue to have abdominal pain that she describes as cramping as well as muscle contractions mainly on the right side.  She has a firm \"mass\" of her right flank that has been persistent since her surgery.  It is slightly tender to touch but her major complaint is the pain around the region associated with muscle spasms.  She does not have any radiation of pain down her leg.  She does not think this is from her back.  She has had some nausea.  Diarrhea does appear to be persistent.  She denies any fever or chills.  She has not had any dysuria excetra.  No other issues have been noted except she feels quite weak and has not been able to eat and drink what she would normally do so.  Her activity has been diminished.  Sleep has been diminished.  Appetite is diminished.  She has continued to take her medication as prescribed.  She has not had any side effects but does believe that she is having difficulty with respect to her pain.    Subjective      Review of Systems:   Review of Systems   Constitutional:  Positive for fatigue. Negative for activity change and appetite change.   Respiratory:  Positive for wheezing. Negative for cough and shortness of breath.    Cardiovascular:  Negative for chest pain, palpitations and leg swelling.   Gastrointestinal:  Positive for " abdominal pain, diarrhea and nausea. Negative for abdominal distention, blood in stool, constipation, vomiting, GERD and indigestion.   Genitourinary:  Negative for dysuria, flank pain, frequency and urgency.   Musculoskeletal:  Positive for arthralgias, back pain and myalgias.   Neurological:  Positive for dizziness, weakness and light-headedness. Negative for tremors, numbness and memory problem.   Psychiatric/Behavioral:  Negative for sleep disturbance.        I have reviewed the patients family history, social history, past medical history, past surgical history and have updated it as appropriate.     Medications:     Current Outpatient Medications:     oxyCODONE (ROXICODONE) 10 MG tablet, Take 1 tablet by mouth Every 6 (Six) Hours As Needed for Moderate Pain or Severe Pain., Disp: 10 tablet, Rfl: 0    albuterol sulfate  (90 Base) MCG/ACT inhaler, INHALE 3 PUFFS BY MOUTH EVERY 6 HOURS AS NEEDED, Disp: 8.5 g, Rfl: 12    aspirin 81 MG EC tablet, Take 1 tablet by mouth Daily., Disp: , Rfl:     cetirizine (zyrTEC) 10 MG tablet, TAKE ONE TABLET BY MOUTH EVERY DAY, Disp: 30 tablet, Rfl: 12    docusate sodium (Colace) 100 MG capsule, Take 1 capsule by mouth 2 (Two) Times a Day. If taking pain pill, Disp: 15 capsule, Rfl: 1    esomeprazole (nexIUM) 40 MG capsule, TAKE ONE CAPSULE BY MOUTH TWO TIMES A DAY, Disp: 180 capsule, Rfl: 1    fluconazole (Diflucan) 100 MG tablet, Take 1 tablet by mouth Daily., Disp: 14 tablet, Rfl: 0    Fluticasone-Umeclidin-Vilant (Trelegy Ellipta) 200-62.5-25 MCG/ACT aerosol powder , Inhale 1 puff Daily., Disp: 28 each, Rfl: 1    losartan (COZAAR) 25 MG tablet, TAKE 1 TABLET BY MOUTH DAILY, Disp: 90 tablet, Rfl: 3    rosuvastatin (CRESTOR) 40 MG tablet, TAKE 1 TABLET BY MOUTH DAILY, Disp: 90 tablet, Rfl: 3    Allergies:   Allergies   Allergen Reactions    Sulfa Antibiotics Unknown - High Severity     high fever - pt states 106F    Morphine Other (See Comments)     drop in O2 sats;  "needed oxygent; pt states she has to be reminded to breathe;        Immunizations:   Immunization History   Administered Date(s) Administered    Covid-19 (Pfizer) Gray Cap Monovalent 05/24/2022, 06/20/2022    Flu Vaccine Quad PF >36MO 02/08/2019    Flu Vaccine Split Quad 01/09/2019    Flublok 18+yrs 01/09/2019    Fluzone (or Fluarix & Flulaval for VFC) >6mos 10/04/2022    Hepatitis A 01/09/2019    Influenza, Unspecified 02/08/2019, 10/10/2023    MMR 02/08/2019    Pneumococcal Polysaccharide (PPSV23) 10/07/2022, 11/16/2023    Tdap 02/08/2019        Objective     Physical Exam: Please see above  Vital Signs:   Vitals:    01/03/24 1603   BP: 122/74   BP Location: Left arm   Patient Position: Sitting   Cuff Size: Adult   Pulse: 112   Temp: 98.6 °F (37 °C)   TempSrc: Temporal   SpO2: 96%   Weight: 81 kg (178 lb 9.6 oz)   Height: 162.6 cm (64\")   PainSc:   6   PainLoc: Abdomen     Body mass index is 30.66 kg/m².          Physical Exam  Vitals and nursing note reviewed.   Constitutional:       Appearance: She is ill-appearing.   HENT:      Head: Normocephalic and atraumatic.      Nose: Nose normal.      Mouth/Throat:      Pharynx: Oropharynx is clear.   Eyes:      Extraocular Movements: Extraocular movements intact.      Pupils: Pupils are equal, round, and reactive to light.   Neck:      Thyroid: No thyroid mass or thyromegaly.      Trachea: Trachea normal.   Cardiovascular:      Rate and Rhythm: Normal rate and regular rhythm.      Pulses: Normal pulses. No decreased pulses.      Heart sounds: Normal heart sounds.   Pulmonary:      Effort: Pulmonary effort is normal.      Breath sounds: Normal breath sounds.   Abdominal:      General: Abdomen is flat. A surgical scar is present. Bowel sounds are normal. There is no distension or abdominal bruit.      Palpations: Abdomen is soft. There is hepatomegaly and mass. There is no pulsatile mass.      Tenderness: There is no abdominal tenderness.      Hernia: No hernia is " present.   Musculoskeletal:      Cervical back: Neck supple.      Right lower leg: No edema.      Left lower leg: No edema.   Lymphadenopathy:      Cervical: No cervical adenopathy.   Skin:     General: Skin is warm and dry.   Neurological:      General: No focal deficit present.      Mental Status: She is alert and oriented to person, place, and time.      Sensory: Sensation is intact.      Motor: Motor function is intact.      Coordination: Coordination is intact.   Psychiatric:         Attention and Perception: Attention normal.         Mood and Affect: Mood normal.         Speech: Speech normal.         Behavior: Behavior normal.         Procedures    Results:   Labs:   Hemoglobin A1C   Date Value Ref Range Status   12/21/2023 5.30 4.80 - 5.60 % Final     TSH   Date Value Ref Range Status   11/16/2023 1.100 0.270 - 4.200 uIU/mL Final        POCT Results (if applicable):   Results for orders placed or performed in visit on 01/03/24   POCT hemoglobin    Specimen: Blood   Result Value Ref Range    Hemoglobin 12.1 12.0 - 17.0 g/dL    Lot Number [id number]     Expiration Date 01/01/2025        Imaging:   No valid procedures specified.     Measures:   Advanced Care Planning:   Patient has an advance directive in EMR which is still valid.     Smoking Cessation:   less than 3 minutes spent counseling Will try to cut down    Assessment / Plan      Assessment/Plan:   Diagnoses and all orders for this visit:    1. Other fatigue (Primary)   Patient does have significant amount of fatigue since her surgery.  She does look more ill-appearing than she did immediately postoperatively.  Her hemoglobin is low.  She has been lightheaded and dizzy and so we did not obtain orthostasis.  She does have a firm palpable mass of her right flank that may be slightly larger than previous.  She has had a drop in her hemoglobin from 16.1 down to 12.1.  We did obtain a hemoglobin last week and was noted to be 13.9.  I am uncertain if she is  "having some persistent bleeding or if this is just postsurgical.  She does have that firm mass that could represent a hematoma in her abdominal wall.  I am also concerned about the possibility of a retroperitoneal bleed.  We do have discussed options with her and we will refer her to the emergency department for a emergent CT scan to assure his there is no underlying pathology.    2. Diarrhea of presumed infectious origin  Patient has had some diarrhea but has recently been on antibiotics.  She states it does not smell like C. difficile.  We will have her hold her GLP-1 agonist as this may be a possible source as well.  She has used Imodium A-D with some success.  We will make arrangements for a gastrointestinal panel if they do not obtain a stool study while in the emergency department.  We will make adjustments based on these findings.  -     Cancel: Gastrointestinal Panel, PCR - Stool, Per Rectum; Future    3. Muscle spasm  I believe her muscle spasm may be hydration in nature.  I do suspect she will need to have a CMP as well as electrolytes obtained.  We will obtain laboratory data if it is not obtained in the emergency department will make adjustments based on his findings.  We will repeat her renal function test to assure us that she does not have any worsening kidney function and possible \"dumping of electrolytes\".  -     Cancel: Magnesium; Future  -     Cancel: Phosphorus; Future  -     Cancel: Comprehensive Metabolic Panel; Future  -     Cancel: Sedimentation Rate; Future  -     Cancel: CBC With Manual Differential; Future  -     Cancel: C-reactive Protein; Future  -     Cancel: CK; Future    4. Renal cell carcinoma of right kidney  She is still have some discomfort due to her surgery mainly from this \"palpable mass\" on her right flank.  We will provide pain medicine to help her get some sleep and be able to eat better.  We will monitor her symptoms with reassessment in the near future once we have the " answer from the CT scan.  -     oxyCODONE (ROXICODONE) 10 MG tablet; Take 1 tablet by mouth Every 6 (Six) Hours As Needed for Moderate Pain or Severe Pain.  Dispense: 10 tablet; Refill: 0    5. Candidiasis  Patient has tried lozenges without improvement.  She does continue to have significant amount of irritation with lesions that she describes as being consistent with thrush.  We will discontinue the lozenge and will try fluconazole 100 mg daily for 2 weeks and will make adjustments based on these findings.  -     fluconazole (Diflucan) 100 MG tablet; Take 1 tablet by mouth Daily.  Dispense: 14 tablet; Refill: 0    6. Anemia due to acute blood loss  Patient's hemoglobin has dropped 4 points.  I am uncertain if this is postsurgical or if there is some underlying process due to her significant mild pain that she has in her right flank.  We will make arrangements for her to be seen in the emergency department for CT scan to assure us there is no underlying pathology.  -     POCT hemoglobin        Follow Up:   Return in about 6 weeks (around 2/16/2024).      At [facility], we believe that sharing information builds trust and better relationships. You are receiving this note because you recently visited [facility]. It is possible you will see health information before a provider has talked with you about it. This kind of information can be easy to misunderstand. To help you fully understand what it means for your health, we urge you to discuss this note with your provider.    Greg Levine MD  [facility]

## 2024-01-04 ENCOUNTER — ANESTHESIA (OUTPATIENT)
Dept: PERIOP | Facility: HOSPITAL | Age: 49
End: 2024-01-04
Payer: COMMERCIAL

## 2024-01-04 ENCOUNTER — ANESTHESIA EVENT (OUTPATIENT)
Dept: PERIOP | Facility: HOSPITAL | Age: 49
End: 2024-01-04
Payer: COMMERCIAL

## 2024-01-04 PROBLEM — T88.8XXA FLUID COLLECTION AT SURGICAL SITE: Status: ACTIVE | Noted: 2024-01-03

## 2024-01-04 LAB
ANION GAP SERPL CALCULATED.3IONS-SCNC: 10.8 MMOL/L (ref 5–15)
BASOPHILS # BLD AUTO: 0.05 10*3/MM3 (ref 0–0.2)
BASOPHILS NFR BLD AUTO: 0.4 % (ref 0–1.5)
BUN SERPL-MCNC: 9 MG/DL (ref 6–20)
BUN/CREAT SERPL: 8.2 (ref 7–25)
CALCIUM SPEC-SCNC: 8.4 MG/DL (ref 8.6–10.5)
CHLORIDE SERPL-SCNC: 105 MMOL/L (ref 98–107)
CO2 SERPL-SCNC: 23.2 MMOL/L (ref 22–29)
CREAT SERPL-MCNC: 1.1 MG/DL (ref 0.57–1)
DEPRECATED RDW RBC AUTO: 43.6 FL (ref 37–54)
EGFRCR SERPLBLD CKD-EPI 2021: 62.9 ML/MIN/1.73
EOSINOPHIL # BLD AUTO: 0.13 10*3/MM3 (ref 0–0.4)
EOSINOPHIL NFR BLD AUTO: 1.1 % (ref 0.3–6.2)
ERYTHROCYTE [DISTWIDTH] IN BLOOD BY AUTOMATED COUNT: 13.1 % (ref 12.3–15.4)
GLUCOSE SERPL-MCNC: 124 MG/DL (ref 65–99)
HCT VFR BLD AUTO: 33.6 % (ref 34–46.6)
HGB BLD-MCNC: 11.3 G/DL (ref 12–15.9)
IMM GRANULOCYTES # BLD AUTO: 0.16 10*3/MM3 (ref 0–0.05)
IMM GRANULOCYTES NFR BLD AUTO: 1.4 % (ref 0–0.5)
LYMPHOCYTES # BLD AUTO: 1.08 10*3/MM3 (ref 0.7–3.1)
LYMPHOCYTES NFR BLD AUTO: 9.1 % (ref 19.6–45.3)
MCH RBC QN AUTO: 30.7 PG (ref 26.6–33)
MCHC RBC AUTO-ENTMCNC: 33.6 G/DL (ref 31.5–35.7)
MCV RBC AUTO: 91.3 FL (ref 79–97)
MONOCYTES # BLD AUTO: 1 10*3/MM3 (ref 0.1–0.9)
MONOCYTES NFR BLD AUTO: 8.5 % (ref 5–12)
NEUTROPHILS NFR BLD AUTO: 79.5 % (ref 42.7–76)
NEUTROPHILS NFR BLD AUTO: 9.4 10*3/MM3 (ref 1.7–7)
NRBC BLD AUTO-RTO: 0 /100 WBC (ref 0–0.2)
PLATELET # BLD AUTO: 328 10*3/MM3 (ref 140–450)
PMV BLD AUTO: 9.7 FL (ref 6–12)
POTASSIUM SERPL-SCNC: 4 MMOL/L (ref 3.5–5.2)
RBC # BLD AUTO: 3.68 10*6/MM3 (ref 3.77–5.28)
SODIUM SERPL-SCNC: 139 MMOL/L (ref 136–145)
WBC NRBC COR # BLD AUTO: 11.82 10*3/MM3 (ref 3.4–10.8)

## 2024-01-04 PROCEDURE — 87186 SC STD MICRODIL/AGAR DIL: CPT | Performed by: INTERNAL MEDICINE

## 2024-01-04 PROCEDURE — 87070 CULTURE OTHR SPECIMN AEROBIC: CPT | Performed by: INTERNAL MEDICINE

## 2024-01-04 PROCEDURE — 87206 SMEAR FLUORESCENT/ACID STAI: CPT | Performed by: UROLOGY

## 2024-01-04 PROCEDURE — C1769 GUIDE WIRE: HCPCS | Performed by: UROLOGY

## 2024-01-04 PROCEDURE — 94640 AIRWAY INHALATION TREATMENT: CPT

## 2024-01-04 PROCEDURE — 25010000002 DEXAMETHASONE PER 1 MG: Performed by: NURSE ANESTHETIST, CERTIFIED REGISTERED

## 2024-01-04 PROCEDURE — 87205 SMEAR GRAM STAIN: CPT | Performed by: INTERNAL MEDICINE

## 2024-01-04 PROCEDURE — 87102 FUNGUS ISOLATION CULTURE: CPT | Performed by: UROLOGY

## 2024-01-04 PROCEDURE — 25810000003 SODIUM CHLORIDE 0.9 % SOLUTION: Performed by: INTERNAL MEDICINE

## 2024-01-04 PROCEDURE — C1729 CATH, DRAINAGE: HCPCS | Performed by: UROLOGY

## 2024-01-04 PROCEDURE — 94799 UNLISTED PULMONARY SVC/PX: CPT

## 2024-01-04 PROCEDURE — 85025 COMPLETE CBC W/AUTO DIFF WBC: CPT | Performed by: STUDENT IN AN ORGANIZED HEALTH CARE EDUCATION/TRAINING PROGRAM

## 2024-01-04 PROCEDURE — 25010000002 PROPOFOL 10 MG/ML EMULSION: Performed by: NURSE ANESTHETIST, CERTIFIED REGISTERED

## 2024-01-04 PROCEDURE — 80048 BASIC METABOLIC PNL TOTAL CA: CPT | Performed by: STUDENT IN AN ORGANIZED HEALTH CARE EDUCATION/TRAINING PROGRAM

## 2024-01-04 PROCEDURE — 25010000002 KETOROLAC TROMETHAMINE PER 15 MG: Performed by: STUDENT IN AN ORGANIZED HEALTH CARE EDUCATION/TRAINING PROGRAM

## 2024-01-04 PROCEDURE — 76998 US GUIDE INTRAOP: CPT | Performed by: UROLOGY

## 2024-01-04 PROCEDURE — 25010000002 FENTANYL CITRATE PF 50 MCG/ML SOLUTION PREFILLED SYRINGE: Performed by: NURSE ANESTHETIST, CERTIFIED REGISTERED

## 2024-01-04 PROCEDURE — 87116 MYCOBACTERIA CULTURE: CPT | Performed by: UROLOGY

## 2024-01-04 PROCEDURE — 49060 DRAIN OPEN RETROPERI ABSCESS: CPT | Performed by: UROLOGY

## 2024-01-04 PROCEDURE — 25010000002 MIDAZOLAM PER 1MG: Performed by: NURSE ANESTHETIST, CERTIFIED REGISTERED

## 2024-01-04 PROCEDURE — 99232 SBSQ HOSP IP/OBS MODERATE 35: CPT | Performed by: INTERNAL MEDICINE

## 2024-01-04 PROCEDURE — C1758 CATHETER, URETERAL: HCPCS | Performed by: UROLOGY

## 2024-01-04 PROCEDURE — 0W9H30Z DRAINAGE OF RETROPERITONEUM WITH DRAINAGE DEVICE, PERCUTANEOUS APPROACH: ICD-10-PCS | Performed by: UROLOGY

## 2024-01-04 PROCEDURE — 99222 1ST HOSP IP/OBS MODERATE 55: CPT | Performed by: UROLOGY

## 2024-01-04 PROCEDURE — 25010000002 CEFAZOLIN SODIUM-DEXTROSE 2-3 GM-%(50ML) RECONSTITUTED SOLUTION: Performed by: UROLOGY

## 2024-01-04 PROCEDURE — 87075 CULTR BACTERIA EXCEPT BLOOD: CPT | Performed by: UROLOGY

## 2024-01-04 PROCEDURE — 25810000003 SODIUM CHLORIDE 0.9 % SOLUTION: Performed by: STUDENT IN AN ORGANIZED HEALTH CARE EDUCATION/TRAINING PROGRAM

## 2024-01-04 PROCEDURE — S0260 H&P FOR SURGERY: HCPCS | Performed by: UROLOGY

## 2024-01-04 PROCEDURE — 25010000002 ONDANSETRON PER 1 MG: Performed by: NURSE ANESTHETIST, CERTIFIED REGISTERED

## 2024-01-04 PROCEDURE — C1894 INTRO/SHEATH, NON-LASER: HCPCS | Performed by: UROLOGY

## 2024-01-04 PROCEDURE — 25010000002 PIPERACILLIN SOD-TAZOBACTAM PER 1 G: Performed by: UROLOGY

## 2024-01-04 PROCEDURE — 25010000002 PIPERACILLIN SOD-TAZOBACTAM PER 1 G: Performed by: STUDENT IN AN ORGANIZED HEALTH CARE EDUCATION/TRAINING PROGRAM

## 2024-01-04 PROCEDURE — 25810000003 LACTATED RINGERS PER 1000 ML: Performed by: UROLOGY

## 2024-01-04 RX ORDER — DEXAMETHASONE SODIUM PHOSPHATE 4 MG/ML
INJECTION, SOLUTION INTRA-ARTICULAR; INTRALESIONAL; INTRAMUSCULAR; INTRAVENOUS; SOFT TISSUE AS NEEDED
Status: DISCONTINUED | OUTPATIENT
Start: 2024-01-04 | End: 2024-01-04 | Stop reason: SURG

## 2024-01-04 RX ORDER — ACETAMINOPHEN 325 MG/1
650 TABLET ORAL EVERY 4 HOURS PRN
Status: DISCONTINUED | OUTPATIENT
Start: 2024-01-04 | End: 2024-01-05 | Stop reason: HOSPADM

## 2024-01-04 RX ORDER — SODIUM CHLORIDE 0.9 % (FLUSH) 0.9 %
10 SYRINGE (ML) INJECTION AS NEEDED
Status: DISCONTINUED | OUTPATIENT
Start: 2024-01-04 | End: 2024-01-05 | Stop reason: HOSPADM

## 2024-01-04 RX ORDER — PROPOFOL 10 MG/ML
VIAL (ML) INTRAVENOUS AS NEEDED
Status: DISCONTINUED | OUTPATIENT
Start: 2024-01-04 | End: 2024-01-04 | Stop reason: SURG

## 2024-01-04 RX ORDER — ONDANSETRON 2 MG/ML
4 INJECTION INTRAMUSCULAR; INTRAVENOUS EVERY 6 HOURS PRN
Status: DISCONTINUED | OUTPATIENT
Start: 2024-01-04 | End: 2024-01-05 | Stop reason: HOSPADM

## 2024-01-04 RX ORDER — NITROGLYCERIN 0.4 MG/1
0.4 TABLET SUBLINGUAL
Status: DISCONTINUED | OUTPATIENT
Start: 2024-01-04 | End: 2024-01-05 | Stop reason: HOSPADM

## 2024-01-04 RX ORDER — SODIUM CHLORIDE 9 MG/ML
40 INJECTION, SOLUTION INTRAVENOUS AS NEEDED
Status: DISCONTINUED | OUTPATIENT
Start: 2024-01-04 | End: 2024-01-05 | Stop reason: HOSPADM

## 2024-01-04 RX ORDER — BISACODYL 5 MG/1
5 TABLET, DELAYED RELEASE ORAL DAILY PRN
Status: DISCONTINUED | OUTPATIENT
Start: 2024-01-04 | End: 2024-01-05 | Stop reason: HOSPADM

## 2024-01-04 RX ORDER — BUDESONIDE AND FORMOTEROL FUMARATE DIHYDRATE 160; 4.5 UG/1; UG/1
2 AEROSOL RESPIRATORY (INHALATION)
Status: DISCONTINUED | OUTPATIENT
Start: 2024-01-04 | End: 2024-01-05 | Stop reason: HOSPADM

## 2024-01-04 RX ORDER — ONDANSETRON 2 MG/ML
INJECTION INTRAMUSCULAR; INTRAVENOUS AS NEEDED
Status: DISCONTINUED | OUTPATIENT
Start: 2024-01-04 | End: 2024-01-04 | Stop reason: SURG

## 2024-01-04 RX ORDER — BISACODYL 10 MG
10 SUPPOSITORY, RECTAL RECTAL DAILY PRN
Status: DISCONTINUED | OUTPATIENT
Start: 2024-01-04 | End: 2024-01-05 | Stop reason: HOSPADM

## 2024-01-04 RX ORDER — POLYETHYLENE GLYCOL 3350 17 G/17G
17 POWDER, FOR SOLUTION ORAL DAILY PRN
Status: DISCONTINUED | OUTPATIENT
Start: 2024-01-04 | End: 2024-01-05 | Stop reason: HOSPADM

## 2024-01-04 RX ORDER — ACETAMINOPHEN 650 MG/1
650 SUPPOSITORY RECTAL EVERY 4 HOURS PRN
Status: DISCONTINUED | OUTPATIENT
Start: 2024-01-04 | End: 2024-01-05 | Stop reason: HOSPADM

## 2024-01-04 RX ORDER — ONDANSETRON 2 MG/ML
4 INJECTION INTRAMUSCULAR; INTRAVENOUS ONCE AS NEEDED
Status: DISCONTINUED | OUTPATIENT
Start: 2024-01-04 | End: 2024-01-04 | Stop reason: HOSPADM

## 2024-01-04 RX ORDER — SODIUM CHLORIDE 9 MG/ML
150 INJECTION, SOLUTION INTRAVENOUS CONTINUOUS
Status: DISCONTINUED | OUTPATIENT
Start: 2024-01-04 | End: 2024-01-05 | Stop reason: HOSPADM

## 2024-01-04 RX ORDER — SODIUM CHLORIDE, SODIUM LACTATE, POTASSIUM CHLORIDE, CALCIUM CHLORIDE 600; 310; 30; 20 MG/100ML; MG/100ML; MG/100ML; MG/100ML
1000 INJECTION, SOLUTION INTRAVENOUS CONTINUOUS
Status: DISCONTINUED | OUTPATIENT
Start: 2024-01-04 | End: 2024-01-05 | Stop reason: HOSPADM

## 2024-01-04 RX ORDER — CEFAZOLIN SODIUM 2 G/50ML
2000 SOLUTION INTRAVENOUS ONCE
Status: COMPLETED | OUTPATIENT
Start: 2024-01-04 | End: 2024-01-04

## 2024-01-04 RX ORDER — AMOXICILLIN 250 MG
2 CAPSULE ORAL 2 TIMES DAILY
Status: DISCONTINUED | OUTPATIENT
Start: 2024-01-04 | End: 2024-01-05 | Stop reason: HOSPADM

## 2024-01-04 RX ORDER — FENTANYL CITRATE 50 UG/ML
INJECTION, SOLUTION INTRAMUSCULAR; INTRAVENOUS AS NEEDED
Status: DISCONTINUED | OUTPATIENT
Start: 2024-01-04 | End: 2024-01-04 | Stop reason: SURG

## 2024-01-04 RX ORDER — MIDAZOLAM HYDROCHLORIDE 2 MG/2ML
INJECTION, SOLUTION INTRAMUSCULAR; INTRAVENOUS AS NEEDED
Status: DISCONTINUED | OUTPATIENT
Start: 2024-01-04 | End: 2024-01-04 | Stop reason: SURG

## 2024-01-04 RX ORDER — BUPIVACAINE HYDROCHLORIDE AND EPINEPHRINE 5; 5 MG/ML; UG/ML
INJECTION, SOLUTION EPIDURAL; INTRACAUDAL; PERINEURAL AS NEEDED
Status: DISCONTINUED | OUTPATIENT
Start: 2024-01-04 | End: 2024-01-04 | Stop reason: HOSPADM

## 2024-01-04 RX ORDER — ACETAMINOPHEN 160 MG/5ML
650 SOLUTION ORAL EVERY 4 HOURS PRN
Status: DISCONTINUED | OUTPATIENT
Start: 2024-01-04 | End: 2024-01-05 | Stop reason: HOSPADM

## 2024-01-04 RX ORDER — SODIUM CHLORIDE 0.9 % (FLUSH) 0.9 %
10 SYRINGE (ML) INJECTION EVERY 12 HOURS SCHEDULED
Status: DISCONTINUED | OUTPATIENT
Start: 2024-01-04 | End: 2024-01-05 | Stop reason: HOSPADM

## 2024-01-04 RX ORDER — KETOROLAC TROMETHAMINE 30 MG/ML
30 INJECTION, SOLUTION INTRAMUSCULAR; INTRAVENOUS EVERY 6 HOURS PRN
Status: DISCONTINUED | OUTPATIENT
Start: 2024-01-04 | End: 2024-01-04

## 2024-01-04 RX ADMIN — PIPERACILLIN SODIUM AND TAZOBACTAM SODIUM 3.38 G: 3; .375 INJECTION, SOLUTION INTRAVENOUS at 20:07

## 2024-01-04 RX ADMIN — LIDOCAINE HYDROCHLORIDE 60 MG: 20 INJECTION, SOLUTION INTRAVENOUS at 08:54

## 2024-01-04 RX ADMIN — CEFAZOLIN SODIUM 2000 MG: 2 SOLUTION INTRAVENOUS at 08:52

## 2024-01-04 RX ADMIN — BUDESONIDE AND FORMOTEROL FUMARATE DIHYDRATE 2 PUFF: 160; 4.5 AEROSOL RESPIRATORY (INHALATION) at 20:10

## 2024-01-04 RX ADMIN — SODIUM CHLORIDE, POTASSIUM CHLORIDE, SODIUM LACTATE AND CALCIUM CHLORIDE 1000 ML: 600; 310; 30; 20 INJECTION, SOLUTION INTRAVENOUS at 08:46

## 2024-01-04 RX ADMIN — MIDAZOLAM HYDROCHLORIDE 2 MG: 1 INJECTION, SOLUTION INTRAMUSCULAR; INTRAVENOUS at 08:51

## 2024-01-04 RX ADMIN — DEXAMETHASONE SODIUM PHOSPHATE 4 MG: 4 INJECTION, SOLUTION INTRA-ARTICULAR; INTRALESIONAL; INTRAMUSCULAR; INTRAVENOUS; SOFT TISSUE at 09:13

## 2024-01-04 RX ADMIN — FENTANYL CITRATE 50 MCG: 50 INJECTION, SOLUTION INTRAMUSCULAR; INTRAVENOUS at 08:55

## 2024-01-04 RX ADMIN — PROPOFOL 100 MCG/KG/MIN: 10 INJECTION, EMULSION INTRAVENOUS at 08:55

## 2024-01-04 RX ADMIN — TIOTROPIUM BROMIDE INHALATION SPRAY 2 PUFF: 3.12 SPRAY, METERED RESPIRATORY (INHALATION) at 06:53

## 2024-01-04 RX ADMIN — PIPERACILLIN SODIUM AND TAZOBACTAM SODIUM 3.38 G: 3; .375 INJECTION, SOLUTION INTRAVENOUS at 05:18

## 2024-01-04 RX ADMIN — Medication 10 ML: at 22:35

## 2024-01-04 RX ADMIN — BUDESONIDE AND FORMOTEROL FUMARATE DIHYDRATE 2 PUFF: 160; 4.5 AEROSOL RESPIRATORY (INHALATION) at 06:54

## 2024-01-04 RX ADMIN — ONDANSETRON 4 MG: 2 INJECTION INTRAMUSCULAR; INTRAVENOUS at 08:51

## 2024-01-04 RX ADMIN — SODIUM CHLORIDE 75 ML/HR: 9 INJECTION, SOLUTION INTRAVENOUS at 05:18

## 2024-01-04 RX ADMIN — KETOROLAC TROMETHAMINE 30 MG: 30 INJECTION, SOLUTION INTRAMUSCULAR; INTRAVENOUS at 05:31

## 2024-01-04 RX ADMIN — SODIUM CHLORIDE 1000 ML: 9 INJECTION, SOLUTION INTRAVENOUS at 18:37

## 2024-01-04 RX ADMIN — PIPERACILLIN SODIUM AND TAZOBACTAM SODIUM 3.38 G: 3; .375 INJECTION, SOLUTION INTRAVENOUS at 14:18

## 2024-01-04 RX ADMIN — PROPOFOL 50 MG: 10 INJECTION, EMULSION INTRAVENOUS at 08:54

## 2024-01-04 RX ADMIN — SODIUM CHLORIDE 500 ML: 9 INJECTION, SOLUTION INTRAVENOUS at 07:56

## 2024-01-04 NOTE — ANESTHESIA PREPROCEDURE EVALUATION
Anesthesia Evaluation     Patient summary reviewed and Nursing notes reviewed   no history of anesthetic complications:   NPO Solid Status: > 8 hours  NPO Liquid Status: > 8 hours           Airway   Mallampati: II  TM distance: >3 FB  Neck ROM: full  Possible difficult intubation  Dental      Pulmonary - normal exam   (+) a smoker Current, asthma,shortness of breath  Cardiovascular - normal exam    Rhythm: regular  Rate: normal    (+) hypertension, hyperlipidemia      Neuro/Psych  (+) headaches, numbness, psychiatric history Anxiety and Depression  GI/Hepatic/Renal/Endo    (+) obesity, GERD, hepatitis, liver disease, renal disease- stones, diabetes mellitus    Musculoskeletal     (+) arthralgias, back pain, chronic pain, neck pain  Abdominal  - normal exam   Substance History - negative use     OB/GYN negative ob/gyn ROS   (-)  Pregnant    Comment: S/p hysterectomy      Other - negative ROS                     Anesthesia Plan    ASA 3     MAC     (Risks and benefits discussed including risk of aspiration, recall and dental damage. All patient questions answered. Will continue with POC. )  intravenous induction     Anesthetic plan, risks, benefits, and alternatives have been provided, discussed and informed consent has been obtained with: patient.  Pre-procedure education provided  Plan discussed with CRNA.      CODE STATUS:    Code Status (Patient has no pulse and is not breathing): CPR (Attempt to Resuscitate)  Medical Interventions (Patient has pulse or is breathing): Full Support

## 2024-01-04 NOTE — PROGRESS NOTES
"    [facility]    Name:  Veronica Goldsmith   Age:  48 y.o.  Sex:  female  :  1975  MRN:  [id number]   Visit Number:  [id number]  Admission Date:  1/3/2024  Date Of Service:  24  Primary Care Physician:  Greg Levine MD     LOS: 1 day :    Chief Complaint:      Right flank pain.    Subjective:    Ms. Goldsmith was seen and examined this morning in the emergency room.  She was admitted overnight secondary to post nephrectomy abscess and she is currently awaiting to go to the OR for abscess drainage and possible drain placement by Dr. Mart.  She does have low blood pressures but denies any chest pain or shortness of breath.  She states that her blood pressures are usually high and she takes medications.  Previous physician documentation, laboratory and imaging data have been reviewed.    Hospital Course:    Veronica Goldsmith is a 48-year-old woman past medical history of type 2 diabetes, hypertension, mixed hyperlipidemia, nonalcoholic steatohepatitis, tobacco use, renal insufficiency, mild persistent asthma, anxiety and depression, GERD, on Ozempic, migraines, recent nephrectomy for renal cell carcinoma.  Presented to [facility] ED on 1/3/2024 directed by primary care visit earlier in the day with concern for fatigue, decreased p.o. intake, abdominal pain mainly on the right side where she had a nephrectomy, a \"firm mass\" on her right flank persistent since surgery tender to the touch.  Patient apparently recently had some upper respiratory symptoms and was treated by primary care provider with a course of Augmentin for 7 days.     ED summary: Tmax 100, tachycardic, otherwise vital signs stable on room air.  Sodium 133, creatinine 1.2, blood glucose 116, lactate not elevated, procalcitonin not elevated, lipase not elevated, leukocytosis, hemoglobin 12.6.  Blood cultures collected.  CT abdomen/pelvis large fluid collection containing small amount of gas in the previous " location of the right kidney consistent with abscess, small right pleural effusion. She was provided Zosyn, 2 L normal saline bolus, Valium, Tylenol.  ED spoke with urology-Dr. Mart who recommended admission for abscess drainage and antibiotics therapy.    Review of Systems:     All systems were reviewed and negative except as mentioned in subjective, assessment and plan.    Vital Signs:    Temp:  [97.1 °F (36.2 °C)-100 °F (37.8 °C)] 97.1 °F (36.2 °C)  Heart Rate:  [] 85  Resp:  [18] 18  BP: ()/(51-81) 91/51    Intake and output:    I/O last 3 completed shifts:  In: 2100 [IV Piggyback:2100]  Out: -   I/O this shift:  In: 650 [I.V.:600; IV Piggyback:50]  Out: -     Physical Examination:    General Appearance:  Alert and cooperative.    Head:  Atraumatic and normocephalic.   Eyes: Conjunctivae and sclerae normal, no icterus. No pallor.   Throat: No oral lesions, no thrush, oral mucosa moist.   Neck: Supple, trachea midline, no thyromegaly.   Lungs:   Breath sounds heard bilaterally equally.  No wheezing or crackles. No Pleural rub or bronchial breathing.   Heart:  Normal S1 and S2, no murmur, no gallop, no rub. No JVD.   Abdomen:   Normal bowel sounds, no masses, no organomegaly. Soft, minimal right-sided tenderness, nondistended, no rebound tenderness.  Laparoscopic surgical puncture wounds as well as horizontal surgical scar noted in the right lower abdomen.  No abdominal wall erythema noted.   Extremities: Supple, no edema, no cyanosis, no clubbing.   Skin: No bleeding or rash.   Neurologic: Alert and oriented x 3. No facial asymmetry. Moves all four limbs. No tremors.      Laboratory results:    Results from last 7 days   Lab Units 01/04/24  0624 01/03/24  1933   SODIUM mmol/L 139 133*   POTASSIUM mmol/L 4.0 4.2   CHLORIDE mmol/L 105 96*   CO2 mmol/L 23.2 26.3   BUN mg/dL 9 8   CREATININE mg/dL 1.10* 1.20*   CALCIUM mg/dL 8.4* 8.8   BILIRUBIN mg/dL  --  0.4   ALK PHOS U/L  --  67   ALT (SGPT) U/L  --   17   AST (SGOT) U/L  --  25   GLUCOSE mg/dL 124* 116*     Results from last 7 days   Lab Units 01/04/24  0624 01/03/24  1933 01/03/24  1701   WBC 10*3/mm3 11.82* 14.28*  --    HEMOGLOBIN g/dL 11.3* 12.6  --    HEMOGLOBIN, POC g/dL  --   --  12.1   HEMATOCRIT % 33.6* 37.0  --    PLATELETS 10*3/mm3 328 352  --      I have reviewed the patient's laboratory results.    Radiology results:    CT Abdomen Pelvis With Contrast    Result Date: 1/3/2024  FINAL REPORT TECHNIQUE: null CLINICAL HISTORY: recent right nephrectomy, pain, fever COMPARISON: null FINDINGS: CT abdomen and pelvis with contrast Comparison: None Findings: There is a small right pleural effusion. There is mild atelectasis in the right lower lobe. Patient is status post right nephrectomy. There is a rim enhancing fluid collection containing a small amount of gas within the previous location of the right kidney measuring 10.6 x 7.8 cm axially and 18 cm vertically. There is a small amount of gas in the right retroperitoneum just inferior to the fluid collection as well. There is linear fat stranding and a small amount of gas in the right abdominal wall consistent with the recent surgical incision. There is no free air. The fluid collection in the right nephrectomy bed produces mass effect on the right lobe of the liver. Liver is otherwise unremarkable. Patient is status post cholecystectomy. The pancreas, spleen, left adrenal gland, and left kidney are unremarkable. The right adrenal gland appears to be compressed by the fluid collection and is not well-visualized. The gastrointestinal tract is unremarkable. Patient is status post hysterectomy. Aorta is normal diameter. There are no enlarged lymph nodes. There is no fracture or suspicious lytic or sclerotic lesion.     Impression: Impression: 1. Large fluid collection containing small amount of gas in the previous location of the right kidney consistent with an abscess. 2. Small right pleural effusion.  Authenticated and Electronically Signed by Shayne Stoner MD on 01/03/2024 09:50:45 PM   I have reviewed the patient's radiology reports.    Medication Review:     I have reviewed the patient's active and prn medications.     Problem List:      Postoperative abscess    Essential hypertension, benign    Gastroesophageal reflux disease without esophagitis    TAM (nonalcoholic steatohepatitis)    Type 2 diabetes mellitus, without long-term current use of insulin    Fluid collection at surgical site    Assessment:    Postoperative surgical site abscess, POA.  Sepsis with hypotension secondary to #1, POA.  Recent history of right nephrectomy for renal cell carcinoma on 12/20/2023.  Nonalcoholic fatty liver disease.  Chronic kidney disease stage II.  Diabetes mellitus type 2.  Mild persistent asthma.  Gastroesophageal reflux disease.  Essential hypertension.    Plan:    Sepsis/postoperative abscess.  - Continue IV antibiotics therapy with Zosyn.  - Blood cultures have been done.  - Dr. Mart from urology has been consulted.  - Patient scheduled for OR today for abscess drainage.  - We will give her another normal saline bolus of 500 mL prior to surgery and continue on normal saline at 150 mL/h.    Diabetes mellitus type 2/hypertension/CKD.  - Blood sugars are fairly stable with a recent hemoglobin A1c at 5.3 on 12/21/2023.  - Hold off on blood pressure medications at this time due to hypotension.    Discussed with nursing staff at the bedside.    DVT Prophylaxis: SCDs  Code Status: Full  Diet: N.p.o.  Discharge Plan: Pending-hopefully home in 1 to 2 days.    Demetrius Ortiz MD  01/04/24  10:05 EST    Dictated utilizing Dragon dictation.

## 2024-01-04 NOTE — ANESTHESIA POSTPROCEDURE EVALUATION
Patient: Veronica Goldsmith    Procedure Summary       Date: 01/04/24 Room / Location: [facility] OR 3 /  [facility]    Anesthesia Start: 0849 Anesthesia Stop: 0956    Procedure: PERCUTANEOUS US GUIDED DRAIN PLACEMENT (Right) Diagnosis:       Fluid collection at surgical site, initial encounter      (Fluid collection at surgical site, initial encounter [T88.8XXA])    Surgeons: Aaron Mart MD Provider: Wellington Matthews CRNA    Anesthesia Type: MAC ASA Status: 3            Anesthesia Type: MAC    Vitals  No vitals data found for the desired time range.          Post Anesthesia Care and Evaluation    Patient location during evaluation: PACU  Patient participation: complete - patient participated  Level of consciousness: awake and alert  Pain score: 2  Pain management: satisfactory to patient    Airway patency: patent  Anesthetic complications: No anesthetic complications  PONV Status: none  Cardiovascular status: acceptable and stable  Respiratory status: acceptable  Hydration status: acceptable    Comments: Vitals signs as noted in nursing documentation as per protocol.

## 2024-01-04 NOTE — PAYOR COMM NOTE
"TO:BC  FROM:SARAH MONDRAGON, RN PHONE [phone]  CLINICALS REF# [id number]    Veronica Goldsmith (48 y.o. Female)       Date of Birth   1975    Social Security Number       Address   [address] [place], [place] [address]    Home Phone   [phone]    MRN   [id number]       Gnosticism   Adventist    Marital Status                               Admission Date   1/3/24    Admission Type   Emergency    Admitting Provider   Demetrius Ortiz MD    Attending Provider   Demetrius Ortiz MD    [facility]       Discharge Date       Discharge Disposition       Discharge Destination                                 Attending Provider: Demetrius Ortiz MD    Allergies: Sulfa Antibiotics, Morphine    Isolation: None   Infection: None   Code Status: CPR    Ht: 165.1 cm (65\")   Wt: 80.4 kg (177 lb 3.2 oz)    Admission Cmt: None   Principal Problem: Postoperative abscess [T81.49XA]                   Active Insurance as of 1/3/2024       Primary Coverage       Payor Plan Insurance Group Employer/Plan Group    [employer] BLUE [employer] PPO [id number]       Payor Plan Address Payor Plan Phone Number Payor Plan Fax Number Effective Dates    [phone]  1/1/2017 - None Entered    [place] [address]         Subscriber Name Subscriber Birth Date Member ID       VERONICA GOLDSMITH 1975 [id number]                     Emergency Contacts        (Rel.) Home Phone Work Phone Mobile Phone    GOLDSMITHTY DANIELS (Spouse) -- -- [phone]                 History & Physical        BarronAaron MD at 01/04/24 0828          CC  Chief Complaint   Patient presents with    Flank Pain        HPI  Veronica Goldsmith is a 48 y.o. with history of   1. Fluid collection at surgical site, initial encounter         + recent fevers   Does not take any blood thinners    Past Medical History  Past Medical History:   Diagnosis Date    Acne " rosacea, erythematous telangiectatic type     Acute bronchitis     history of    Acute sinusitis     Acute upper respiratory infection     hisotry of    Allergic contact dermatitis     Allergic rhinitis     Anxiety and depression     Asthma     Back pain, chronic     Benign paroxysmal positional vertigo     Blood clots in stool     Candidal dermatitis     Candidiasis of vulva and vagina     Cellulitis     AND ABSCESS, right lower abdomen    Cervical pain     Conjunctivitis     Contusion of ear     Corneal abrasion     Depression     Diabetes mellitus     type 2 - A1C better after weight loss with use of Semiglutide    Dyspnea, unspecified     Fibromyalgia     Flu vaccine need     Foot pain, right     GERD (gastroesophageal reflux disease)     Hand pain, left     Hearing loss, right     Heart palpitations     Hemorrhoids, external     Herpes simplex without complication     Herpes zoster lesion     Hypertension     Impetigo herpetiformis     Injury to tibial blood vessels, unspecified laterality, initial encounter     Kidney cyst     Right kidney    Kidney stones     Left humeral fracture     Low back pain     Lymphadenopathy     Migraine headache     Mixed hyperlipidemia     TAM (nonalcoholic steatohepatitis)     Neuropathy     Oral candidiasis     Plantar fasciitis     Recent weight loss     60lbs with use of Ozempic - per pt 12/6/23    Salpingitis and oophoritis, unspecified     not specified as acute, subacute, or chronic    Scabies     Seasonal allergies     Stress     Swelling of limb     Symptoms involving abdomen and pelvis     OTHER SYMPTOMS INVOLVING ABDOMEN AND PELVIS, ABDOMINAL OR PELVIC SWELLING, MASS, OR LUMP, OTHER SPECIFIED S    Tattoo     Tendon pain     Tobacco use disorder     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     Urinary tract infection     Vaginitis and vulvovaginitis     Vaginitis, atrophic        Past Surgical History  Past Surgical History:   Procedure Laterality Date     BILATERAL OOPHORECTOMY      CARPAL TUNNEL RELEASE Bilateral     CHOLECYSTECTOMY      COLONOSCOPY  03/30/2018    HUMERUS FRACTURE SURGERY Left     has implants - plate, screws    LIPOMA EXCISION      low back    NEPHRECTOMY Right 12/20/2023    Procedure: NEPHRECTOMY LAPAROSCOPIC HAND ASSISTED;  Surgeon: Aaron Mart MD;  Location: [facility] OR;  Service: Urology;  Laterality: Right;    NEPHRECTOMY RADICAL Right     ROTATOR CUFF REPAIR Right     TOTAL ABDOMINAL HYSTERECTOMY      TUBAL ABDOMINAL LIGATION      URETEROSCOPY LASER LITHOTRIPSY WITH STENT INSERTION Right 03/02/2023    Procedure: URETEROSCOPY LASER LITHOTRIPSY WITH STENT INSERTION;  Surgeon: Aaron Mart MD;  Location: [facility] OR;  Service: Urology;  Laterality: Right;       Medications    Current Facility-Administered Medications:     acetaminophen (TYLENOL) tablet 650 mg, 650 mg, Oral, Q4H PRN **OR** acetaminophen (TYLENOL) 160 MG/5ML oral solution 650 mg, 650 mg, Oral, Q4H PRN **OR** acetaminophen (TYLENOL) suppository 650 mg, 650 mg, Rectal, Q4H PRN, Kerley, Brian Joseph, DO    sennosides-docusate (PERICOLACE) 8.6-50 MG per tablet 2 tablet, 2 tablet, Oral, BID **AND** polyethylene glycol (MIRALAX) packet 17 g, 17 g, Oral, Daily PRN **AND** bisacodyl (DULCOLAX) EC tablet 5 mg, 5 mg, Oral, Daily PRN **AND** bisacodyl (DULCOLAX) suppository 10 mg, 10 mg, Rectal, Daily PRN, Kerley, Brian Joseph, DO    budesonide-formoterol (SYMBICORT) 160-4.5 MCG/ACT inhaler 2 puff, 2 puff, Inhalation, BID - RT, 2 puff at 01/04/24 0654 **AND** tiotropium (SPIRIVA RESPIMAT) 2.5 mcg/act aerosol solution inhaler, 2 puff, Inhalation, Daily - RT, Kerley, Brian Joseph, DO, 2 puff at 01/04/24 0653    Calcium Replacement - Follow Nurse / BPA Driven Protocol, , Does not apply, PRN, Kerley, Brian Joseph, DO    ketorolac (TORADOL) injection 30 mg, 30 mg, Intravenous, Q6H PRN, Kerley, Brian Joseph, DO, 30 mg at 01/04/24 0531    Magnesium Standard Dose Replacement - Follow  Nurse / BPA Driven Protocol, , Does not apply, PRN, Kerley, Brian Joseph,     nitroglycerin (NITROSTAT) SL tablet 0.4 mg, 0.4 mg, Sublingual, Q5 Min PRN, Kerley, Brian Joseph, DO    ondansetron (ZOFRAN) injection 4 mg, 4 mg, Intravenous, Q6H PRN, Kerley, Brian Joseph, DO    Pharmacy to Dose Zosyn, , Does not apply, Continuous PRN, Kerley, Brian Joseph, DO    Phosphorus Replacement - Follow Nurse / BPA Driven Protocol, , Does not apply, PRN, Kerley, Brian Joseph,     piperacillin-tazobactam (ZOSYN) 3.375 g in iso-osmotic dextrose 50 ml (premix), 3.375 g, Intravenous, Q8H, Kerley, Brian Joseph, DO, 3.375 g at 01/04/24 0518    Potassium Replacement - Follow Nurse / BPA Driven Protocol, , Does not apply, PRN, Kerley, Brian Joseph,     sodium chloride 0.9 % flush 10 mL, 10 mL, Intravenous, PRN, Kerley, Brian Joseph,     sodium chloride 0.9 % flush 10 mL, 10 mL, Intravenous, Q12H, Kerley, Brian Joseph,     sodium chloride 0.9 % flush 10 mL, 10 mL, Intravenous, PRN, Kerley, Brian Joseph,     sodium chloride 0.9 % infusion 40 mL, 40 mL, Intravenous, PRN, Kerley, Brian Joseph,     sodium chloride 0.9 % infusion, 150 mL/hr, Intravenous, Continuous, Demetrius Ortiz MD, Last Rate: 75 mL/hr at 01/04/24 0518, 75 mL/hr at 01/04/24 0518    Current Outpatient Medications:     albuterol sulfate  (90 Base) MCG/ACT inhaler, INHALE 3 PUFFS BY MOUTH EVERY 6 HOURS AS NEEDED, Disp: 8.5 g, Rfl: 12    aspirin 81 MG EC tablet, Take 1 tablet by mouth Daily., Disp: , Rfl:     cetirizine (zyrTEC) 10 MG tablet, TAKE ONE TABLET BY MOUTH EVERY DAY, Disp: 30 tablet, Rfl: 12    docusate sodium (Colace) 100 MG capsule, Take 1 capsule by mouth 2 (Two) Times a Day. If taking pain pill, Disp: 15 capsule, Rfl: 1    esomeprazole (nexIUM) 40 MG capsule, TAKE ONE CAPSULE BY MOUTH TWO TIMES A DAY, Disp: 180 capsule, Rfl: 1    fluconazole (Diflucan) 100 MG tablet, Take 1 tablet by mouth Daily., Disp: 14 tablet, Rfl: 0     "Fluticasone-Umeclidin-Vilant (Trelegy Ellipta) 200-62.5-25 MCG/ACT aerosol powder , Inhale 1 puff Daily., Disp: 28 each, Rfl: 1    losartan (COZAAR) 25 MG tablet, TAKE 1 TABLET BY MOUTH DAILY, Disp: 90 tablet, Rfl: 3    oxyCODONE (ROXICODONE) 10 MG tablet, Take 1 tablet by mouth Every 6 (Six) Hours As Needed for Moderate Pain or Severe Pain., Disp: 10 tablet, Rfl: 0    rosuvastatin (CRESTOR) 40 MG tablet, TAKE 1 TABLET BY MOUTH DAILY, Disp: 90 tablet, Rfl: 3    Allergies  Allergies   Allergen Reactions    Sulfa Antibiotics Unknown - High Severity     high fever - pt states 106F    Morphine Other (See Comments)     drop in O2 sats; needed oxygent; pt states she has to be reminded to breathe;        Social History  Social History     Socioeconomic History    Marital status:    Tobacco Use    Smoking status: Every Day     Packs/day: 1.00     Years: 29.00     Additional pack years: 0.00     Total pack years: 29.00     Types: Cigarettes     Start date: 1993     Passive exposure: Current    Smokeless tobacco: Never   Vaping Use    Vaping Use: Never used   Substance and Sexual Activity    Alcohol use: Not Currently    Drug use: Never    Sexual activity: Defer       Review of Systems  Constitutional: No fevers or chills  Skin: Negative for rash  Endocrine: No heat/cold intolerance   Cardiovascular: Negative for chest pain or dyspnea on exertion  Respiratory: Negative for shortness of breath or wheezing  Gastrointestinal: No constipation, nausea or vomiting  Genitourinary: Negative for new lower urinary tract symptoms, current gross hematuria or dysuria.  Musculoskeletal: No flank pain  Neurological:  Negative for frequent headaches or dizziness  Lymph/Heme: Negative for leg swelling or calf pain.    Physical Exam  Visit Vitals  BP (!) 89/67   Pulse 72   Temp 100 °F (37.8 °C) (Oral)   Resp 18   Ht 165.1 cm (65\")   Wt 80.4 kg (177 lb 3.2 oz)   SpO2 95%   BMI 29.49 kg/m²     Constitutional: NAD, WDWN.   HEENT: NCAT. " Conjunctivae normal.  MMM.    Cardiovascular: Regular rate.  Pulmonary/Chest: Respirations are even and unlabored bilaterally.  Abdominal: Soft. No distension, tenderness, masses or guarding. No CVA tenderness.  Neurological: A + O x 3.  Cranial Nerves II-XII grossly intact. Normal gait.  Extremities: MELISSA x 4, Warm. No clubbing.  No cyanosis.    Skin: Pink, warm and dry.  No rashes noted.  Psychiatric:  Normal mood and affect    Labs & Imaging  Lab Results   Component Value Date    GLUCOSE 124 (H) 01/04/2024    CALCIUM 8.4 (L) 01/04/2024     01/04/2024    K 4.0 01/04/2024    CO2 23.2 01/04/2024     01/04/2024    BUN 9 01/04/2024    CREATININE 1.10 (H) 01/04/2024    BCR 8.2 01/04/2024    ANIONGAP 10.8 01/04/2024     Lab Results   Component Value Date    WBC 11.82 (H) 01/04/2024    HGB 11.3 (L) 01/04/2024    HCT 33.6 (L) 01/04/2024    MCV 91.3 01/04/2024     01/04/2024     Brief Urine Lab Results  (Last result in the past 365 days)        Color   Clarity   Blood   Leuk Est   Nitrite   Protein   CREAT   Urine HCG        01/03/24 2322 Yellow   Cloudy   Negative   Negative   Negative   30 mg/dL (1+)                 Urine Culture          12/27/2023    12:04   Urine Culture   Urine Culture No growth         CT Abdomen Pelvis With Contrast    Result Date: 1/3/2024  FINAL REPORT TECHNIQUE: null CLINICAL HISTORY: recent right nephrectomy, pain, fever COMPARISON: null FINDINGS: CT abdomen and pelvis with contrast Comparison: None Findings: There is a small right pleural effusion. There is mild atelectasis in the right lower lobe. Patient is status post right nephrectomy. There is a rim enhancing fluid collection containing a small amount of gas within the previous location of the right kidney measuring 10.6 x 7.8 cm axially and 18 cm vertically. There is a small amount of gas in the right retroperitoneum just inferior to the fluid collection as well. There is linear fat stranding and a small amount of gas  in the right abdominal wall consistent with the recent surgical incision. There is no free air. The fluid collection in the right nephrectomy bed produces mass effect on the right lobe of the liver. Liver is otherwise unremarkable. Patient is status post cholecystectomy. The pancreas, spleen, left adrenal gland, and left kidney are unremarkable. The right adrenal gland appears to be compressed by the fluid collection and is not well-visualized. The gastrointestinal tract is unremarkable. Patient is status post hysterectomy. Aorta is normal diameter. There are no enlarged lymph nodes. There is no fracture or suspicious lytic or sclerotic lesion.     Impression: 1. Large fluid collection containing small amount of gas in the previous location of the right kidney consistent with an abscess. 2. Small right pleural effusion. Authenticated and Electronically Signed by Shayne Stoner MD on 01/03/2024 09:50:45 PM    XR Chest PA & Lateral    Result Date: 12/27/2023  PROCEDURE: XR CHEST PA AND LATERAL-   HISTORY: Cough/fever.; R50.9-Fever, unspecified  COMPARISON: None.  FINDINGS:  The lungs are clear.   There is no evidence of effusion or other pleural disease.  The mediastinum has a normal appearance.  The cardiac silhouette is unremarkable.      Unremarkable chest exam.    This report was signed and finalized on 12/27/2023 12:18 PM by Ivan Melo MD.      Transversus Abdominis Plane (TAP) Block - Bilateral    Result Date: 12/20/2023  Dominic Cervantes CRNA     12/20/2023  7:53 AM Transversus Abdominis Plane (TAP) Block - Bilateral Patient reassessed immediately prior to procedure Start time: 12/20/2023 7:35 AM Stop time: 12/20/2023 7:40 AM Reason for block: at surgeon's request and post-op pain management Performed by CRNA/CAA: Dominic Cervantes CRNA Preanesthetic Checklist Completed: patient identified, IV checked, site marked, risks and benefits discussed, surgical consent, monitors and equipment checked,  pre-op evaluation and timeout performed Prep: Pt Position: supine Sterile barriers:gloves, cap, sterile barriers and mask Prep: ChloraPrep Patient monitoring: blood pressure monitoring, continuous pulse oximetry and EKG Procedure Performed under: general Guidance:ultrasound guided ULTRASOUND INTERPRETATION.  Using ultrasound guidance a 20 G gauge needle was placed in close proximity to the nerve, at which point, under ultrasound guidance anesthetic was injected in the area of the nerve and spread of the anesthesia was seen on ultrasound in close proximity thereto.  There were no abnormalities seen on ultrasound; a digital image was taken; and the patient tolerated the procedure with no complications. Images:still images obtained Laterality:Bilateral Block Type:TAP Injection Technique:single-shot Needle Type:echogenic Resistance on Injection: none Post Assessment Injection Assessment: negative aspiration for heme, no paresthesia on injection and incremental injection Patient Tolerance:comfortable throughout block Complications:no Additional Notes Procedure:      BILATERAL TAP BLOCKS                          Patient analgesia was achieved with General Anesthesia The pt was placed in the Supine Position and under Ultrasound guidance, an echogenic or touhy needle was advanced with Normal Saline hydro dissection of tissue.  The Internal Oblique and Transversus Abdominus muscles were visualized.  At or before the aponeurosis of Internal Oblique, the local anesthetic spread was visualized in the Transversus Abdominus Plane. Injection was made incrementally with aspiration every 5 mls.  There was no intravascular injection;  injection pressure was normal; there was no neural injection; and the procedure was completed without difficulty.           Assessment  Veronica Goldsmith is a 48 y.o. female who presents with the following diagnosis:  1. Fluid collection at surgical site, initial encounter         Plan  1. To OR for  "PERCUTANEOUS US GUIDED DRAIN PLACEMENT     Aaron Mart MD          Electronically signed by Aaron Mart MD at 24 0830       Kerley, Brian Joseph, DO at 24 2203            [facility]   HISTORY AND PHYSICAL      Name:  Veronica Goldsmith   Age:  48 y.o.  Sex:  female  :  1975  MRN:  [id number]   Visit Number:  [id number]  Admission Date:  1/3/2024  Date Of Service:  24  Primary Care Physician:  Greg Levine MD    Chief Complaint:     Right flank pain    History Of Presenting Illness:      Veronica Goldsmith is a 48-year-old woman past medical history of type 2 diabetes, hypertension, mixed hyperlipidemia, nonalcoholic steatohepatitis, tobacco use, renal insufficiency, mild persistent asthma, anxiety and depression, GERD, on Ozempic, migraines, recent nephrectomy for renal cell carcinoma.  Presented to [facility] ED on 1/3/2024 directed primary care visit earlier in the day with concern for fatigue, decreased p.o. intake, abdominal pain mainly on the right side where she had a nephrectomy, a \"firm mass\" on her right flank persistent since surgery tender to the touch.  Has had associated nausea, diarrhea.  Denies fever or chills, chest pain, shortness of air, dysuria.    ED summary: Tmax 100, tachycardic, otherwise vital signs stable on room air.  Sodium 133, creatinine 1.2, blood glucose 116, lactate not elevated, procalcitonin not elevated, lipase not elevated, leukocytosis, hemoglobin 12.6.  Blood cultures collected.  CT abdomen/pelvis large fluid collection containing small amount of gas in the previous location of the right kidney consistent with abscess, small right pleural effusion. She was provided Zosyn, 1 L normal saline bolus, Valium, Tylenol.  ED spoke with urology-Dr. Mart, completed antibiotics and admission with plan for drain placement following day.    Review Of Systems:    All systems were reviewed and negative except as mentioned in " history of presenting illness, assessment and plan.    Past Medical History: Patient  has a past medical history of Acne rosacea, erythematous telangiectatic type, Acute bronchitis, Acute sinusitis, Acute upper respiratory infection, Allergic contact dermatitis, Allergic rhinitis, Anxiety and depression, Asthma, Back pain, chronic, Benign paroxysmal positional vertigo, Blood clots in stool, Candidal dermatitis, Candidiasis of vulva and vagina, Cellulitis, Cervical pain, Conjunctivitis, Contusion of ear, Corneal abrasion, Depression, Diabetes mellitus, Dyspnea, unspecified, Fibromyalgia, Flu vaccine need, Foot pain, right, GERD (gastroesophageal reflux disease), Hand pain, left, Hearing loss, right, Heart palpitations, Hemorrhoids, external, Herpes simplex without complication, Herpes zoster lesion, Hypertension, Impetigo herpetiformis, Injury to tibial blood vessels, unspecified laterality, initial encounter, Kidney cyst, Kidney stones, Left humeral fracture, Low back pain, Lymphadenopathy, Migraine headache, Mixed hyperlipidemia, TAM (nonalcoholic steatohepatitis), Neuropathy, Oral candidiasis, Plantar fasciitis, Recent weight loss, Salpingitis and oophoritis, unspecified, Scabies, Seasonal allergies, Stress, Swelling of limb, Symptoms involving abdomen and pelvis, Tattoo, Tendon pain, Tobacco use disorder, 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, Urinary tract infection, Vaginitis and vulvovaginitis, and Vaginitis, atrophic.    Past Surgical History: Patient  has a past surgical history that includes Cholecystectomy; Total abdominal hysterectomy; Bilateral oophorectomy; Colonoscopy (03/30/2018); Humerus fracture surgery (Left); Rotator cuff repair (Right); Tubal ligation; Carpal tunnel release (Bilateral); ureteroscopy laser lithotripsy with stent insertion (Right, 03/02/2023); Lipoma Excision; Nephrectomy (Right, 12/20/2023); and Nephrectomy radical (Right).    Social History: Patient  reports that she  has been smoking cigarettes. She started smoking about 31 years ago. She has a 29.00 pack-year smoking history. She has been exposed to tobacco smoke. She has never used smokeless tobacco. She reports that she does not currently use alcohol. She reports that she does not use drugs.    Family History:  Patient's family history has been reviewed and found to be noncontributory.     Allergies:      Sulfa antibiotics and Morphine    Home Medications:    Prior to Admission Medications       Prescriptions Last Dose Informant Patient Reported? Taking?    albuterol sulfate  (90 Base) MCG/ACT inhaler Past Week  No Yes    INHALE 3 PUFFS BY MOUTH EVERY 6 HOURS AS NEEDED    aspirin 81 MG EC tablet Past Week  Yes Yes    Take 1 tablet by mouth Daily.    cetirizine (zyrTEC) 10 MG tablet Past Week  No Yes    TAKE ONE TABLET BY MOUTH EVERY DAY    docusate sodium (Colace) 100 MG capsule Past Week  No Yes    Take 1 capsule by mouth 2 (Two) Times a Day. If taking pain pill    esomeprazole (nexIUM) 40 MG capsule Past Week  No Yes    TAKE ONE CAPSULE BY MOUTH TWO TIMES A DAY    fluconazole (Diflucan) 100 MG tablet Past Week  No Yes    Take 1 tablet by mouth Daily.    Fluticasone-Umeclidin-Vilant (Trelegy Ellipta) 200-62.5-25 MCG/ACT aerosol powder  Past Week  No Yes    Inhale 1 puff Daily.    losartan (COZAAR) 25 MG tablet Past Week  No Yes    TAKE 1 TABLET BY MOUTH DAILY    oxyCODONE (ROXICODONE) 10 MG tablet Past Week  No Yes    Take 1 tablet by mouth Every 6 (Six) Hours As Needed for Moderate Pain or Severe Pain.    rosuvastatin (CRESTOR) 40 MG tablet Past Week  No Yes    TAKE 1 TABLET BY MOUTH DAILY          ED Medications:    Medications   sodium chloride 0.9 % flush 10 mL (has no administration in time range)   piperacillin-tazobactam (ZOSYN) 4.5 g in iso-osmotic dextrose 100 mL IVPB (premix) (has no administration in time range)   sodium chloride 0.9 % bolus 1,000 mL (1,000 mL Intravenous New Bag 1/3/24 2047)  "  acetaminophen (TYLENOL) tablet 1,000 mg (1,000 mg Oral Given 1/3/24 2047)   diazePAM (VALIUM) injection 5 mg (5 mg Intravenous Given 1/3/24 2048)   iopamidol (ISOVUE-300) 61 % injection 100 mL (100 mL Intravenous Given 1/3/24 2112)     Vital Signs:  Temp:  [98.6 °F (37 °C)-100 °F (37.8 °C)] 100 °F (37.8 °C)  Heart Rate:  [103-112] 104  Resp:  [18] 18  BP: (110-122)/(74-81) 110/81        01/03/24 1903   Weight: 80.4 kg (177 lb 3.2 oz)     Body mass index is 29.49 kg/m².    Physical Exam:     Most recent vital Signs: /81 (BP Location: Right arm, Patient Position: Sitting)   Pulse 104   Temp 100 °F (37.8 °C) (Oral)   Resp 18   Ht 165.1 cm (65\")   Wt 80.4 kg (177 lb 3.2 oz)   SpO2 94%   BMI 29.49 kg/m²     Physical Exam  Constitutional:       General: She is not in acute distress.     Appearance: She is ill-appearing. She is not toxic-appearing.   HENT:      Mouth/Throat:      Mouth: Mucous membranes are moist.   Eyes:      Extraocular Movements: Extraocular movements intact.   Cardiovascular:      Rate and Rhythm: Normal rate and regular rhythm.      Pulses: Normal pulses.      Heart sounds: Normal heart sounds.   Pulmonary:      Effort: Pulmonary effort is normal.      Breath sounds: Normal breath sounds.   Abdominal:      Palpations: Abdomen is soft.      Tenderness: There is abdominal tenderness.      Comments: Surgical scars right lower abdomen dry and nontender, tenderness right flank   Musculoskeletal:      Right lower leg: No edema.      Left lower leg: No edema.   Skin:     General: Skin is warm.      Capillary Refill: Capillary refill takes less than 2 seconds.   Neurological:      General: No focal deficit present.      Mental Status: She is alert and oriented to person, place, and time.   Psychiatric:         Mood and Affect: Mood normal.         Thought Content: Thought content normal.         Laboratory data:    I have reviewed the labs done in the emergency room.    Results from last 7 " "days   Lab Units 01/03/24 1933   SODIUM mmol/L 133*   POTASSIUM mmol/L 4.2   CHLORIDE mmol/L 96*   CO2 mmol/L 26.3   BUN mg/dL 8   CREATININE mg/dL 1.20*   CALCIUM mg/dL 8.8   BILIRUBIN mg/dL 0.4   ALK PHOS U/L 67   ALT (SGPT) U/L 17   AST (SGOT) U/L 25   GLUCOSE mg/dL 116*     Results from last 7 days   Lab Units 01/03/24 1933 01/03/24  1701   WBC 10*3/mm3 14.28*  --    HEMOGLOBIN g/dL 12.6  --    HEMOGLOBIN, POC g/dL  --  12.1   HEMATOCRIT % 37.0  --    PLATELETS 10*3/mm3 352  --                      Results from last 7 days   Lab Units 01/03/24 1933   LIPASE U/L 44               Invalid input(s): \"USDES\", \"NITRITITE\", \"BACT\", \"EP\"    Pain Management Panel           No data to display                EKG:      none    Radiology:    CT Abdomen Pelvis With Contrast    Result Date: 1/3/2024  FINAL REPORT TECHNIQUE: null CLINICAL HISTORY: recent right nephrectomy, pain, fever COMPARISON: null FINDINGS: CT abdomen and pelvis with contrast Comparison: None Findings: There is a small right pleural effusion. There is mild atelectasis in the right lower lobe. Patient is status post right nephrectomy. There is a rim enhancing fluid collection containing a small amount of gas within the previous location of the right kidney measuring 10.6 x 7.8 cm axially and 18 cm vertically. There is a small amount of gas in the right retroperitoneum just inferior to the fluid collection as well. There is linear fat stranding and a small amount of gas in the right abdominal wall consistent with the recent surgical incision. There is no free air. The fluid collection in the right nephrectomy bed produces mass effect on the right lobe of the liver. Liver is otherwise unremarkable. Patient is status post cholecystectomy. The pancreas, spleen, left adrenal gland, and left kidney are unremarkable. The right adrenal gland appears to be compressed by the fluid collection and is not well-visualized. The gastrointestinal tract is unremarkable. " Patient is status post hysterectomy. Aorta is normal diameter. There are no enlarged lymph nodes. There is no fracture or suspicious lytic or sclerotic lesion.     Impression: 1. Large fluid collection containing small amount of gas in the previous location of the right kidney consistent with an abscess. 2. Small right pleural effusion. Authenticated and Electronically Signed by Shayne Stoner MD on 01/03/2024 09:50:45 PM     Assessment/Plan:    Inpatient general floor admission 1/3/2024 with right retroperitoneal abscess status post nephrectomy for renal cell carcinoma.    At time of admission resting in bed, appears comfortably ill, pleasantly conversational.    Status post nephrectomy for renal cell carcinoma  Retroperitoneal abscess right side  Neurology consultation, recommendations appreciated.  Zosyn.  Blood cultures.  Plan for drain placement.    Chronic:  type 2 diabetes, hypertension, mixed hyperlipidemia, nonalcoholic steatohepatitis, tobacco use, renal insufficiency, mild persistent asthma, anxiety and depression, GERD, on Ozempic, migraines, recent nephrectomy for renal cell carcinoma.    Continue home medications.    Risk Assessment: High  DVT Prophylaxis: SCDs  Code Status: Full code  Diet: N.p.o. midnight    Advance Care Planning  ACP discussion was held with the patient during this visit. Patient does not have an advance directive, information provided.           Brian Joseph Kerley, DO  01/03/24  22:03 EST    Dictated utilizing Dragon dictation.    Electronically signed by Kerley, Brian Joseph, DO at 01/04/24 0202          Emergency Department Notes        Martha Hairston PCT at 01/04/24 0752          Dr. Ortiz called at this time requesting to speak to RN. Call transferred to MONICA Campbell.     Electronically signed by Martha Hairston PCT at 01/04/24 0753       Shannan Stephenson RN at 01/04/24 0793          Report given to [facility] at this time    Electronically signed by Shannan Stephenson RN at  "01/04/24 0749       Richard Valdivia MD at 01/03/24 2035            HPI: Veronica Goldsmith is a 48 y.o. female who presents to the emergency department complaining of flank pain.  Patient states that for the last week she has had persistent right-sided flank pain.  Some right abdominal pain as well.  This is a spasm-like sensation.  It is severe.  There are no alleviating or aggravating factors.  She has had subjective fevers.  A few weeks ago patient had a right radical nephrectomy.  Has seen her urologist who felt this was likely musculoskeletal.  Saw her primary doctor today who sent her to the ED for CT scan to \"rule out bleeding\".      REVIEW OF SYSTEMS: All other systems reviewed and are negative     PAST MEDICAL HISTORY:   Past Medical History:   Diagnosis Date    Acne rosacea, erythematous telangiectatic type     Acute bronchitis     history of    Acute sinusitis     Acute upper respiratory infection     hisotry of    Allergic contact dermatitis     Allergic rhinitis     Anxiety and depression     Asthma     Back pain, chronic     Benign paroxysmal positional vertigo     Blood clots in stool     Candidal dermatitis     Candidiasis of vulva and vagina     Cellulitis     AND ABSCESS, right lower abdomen    Cervical pain     Conjunctivitis     Contusion of ear     Corneal abrasion     Depression     Diabetes mellitus     type 2 - A1C better after weight loss with use of Semiglutide    Dyspnea, unspecified     Fibromyalgia     Flu vaccine need     Foot pain, right     GERD (gastroesophageal reflux disease)     Hand pain, left     Hearing loss, right     Heart palpitations     Hemorrhoids, external     Herpes simplex without complication     Herpes zoster lesion     Hypertension     Impetigo herpetiformis     Injury to tibial blood vessels, unspecified laterality, initial encounter     Kidney cyst     Right kidney    Kidney stones     Left humeral fracture     Low back pain     Lymphadenopathy     Migraine " headache     Mixed hyperlipidemia     TAM (nonalcoholic steatohepatitis)     Neuropathy     Oral candidiasis     Plantar fasciitis     Recent weight loss     60lbs with use of Ozempic - per pt 12/6/23    Salpingitis and oophoritis, unspecified     not specified as acute, subacute, or chronic    Scabies     Seasonal allergies     Stress     Swelling of limb     Symptoms involving abdomen and pelvis     OTHER SYMPTOMS INVOLVING ABDOMEN AND PELVIS, ABDOMINAL OR PELVIC SWELLING, MASS, OR LUMP, OTHER SPECIFIED S    Tattoo     Tendon pain     Tobacco use disorder     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     Urinary tract infection     Vaginitis and vulvovaginitis     Vaginitis, atrophic         FAMILY HISTORY:   Family History   Problem Relation Age of Onset    Diabetes Mother     Esophageal cancer Father     Hypertension Father     Cancer Father     Cancer Maternal Grandmother     Diabetes Maternal Grandmother     Cancer Paternal Grandmother     Cancer Paternal Grandfather     Stroke Other     Cervical cancer Other     Diabetes Other     Esophageal cancer Other     Hypertension Other     Lung cancer Other     Pancreatic cancer Other     Skin cancer Other         SOCIAL HISTORY:   Social History     Socioeconomic History    Marital status:    Tobacco Use    Smoking status: Every Day     Packs/day: 1.00     Years: 29.00     Additional pack years: 0.00     Total pack years: 29.00     Types: Cigarettes     Start date: 1993     Passive exposure: Current    Smokeless tobacco: Never   Vaping Use    Vaping Use: Never used   Substance and Sexual Activity    Alcohol use: Not Currently    Drug use: Never    Sexual activity: Defer        SURGICAL HISTORY:   Past Surgical History:   Procedure Laterality Date    BILATERAL OOPHORECTOMY      CARPAL TUNNEL RELEASE Bilateral     CHOLECYSTECTOMY      COLONOSCOPY  03/30/2018    HUMERUS FRACTURE SURGERY Left     has implants - plate, screws    LIPOMA EXCISION      low  back    NEPHRECTOMY Right 12/20/2023    Procedure: NEPHRECTOMY LAPAROSCOPIC HAND ASSISTED;  Surgeon: Aaron Mart MD;  Location: [facility] OR;  Service: Urology;  Laterality: Right;    NEPHRECTOMY RADICAL Right     ROTATOR CUFF REPAIR Right     TOTAL ABDOMINAL HYSTERECTOMY      TUBAL ABDOMINAL LIGATION      URETEROSCOPY LASER LITHOTRIPSY WITH STENT INSERTION Right 03/02/2023    Procedure: URETEROSCOPY LASER LITHOTRIPSY WITH STENT INSERTION;  Surgeon: Aaron Mart MD;  Location: [facility] OR;  Service: Urology;  Laterality: Right;        ALLERGIES: Sulfa antibiotics and Morphine       PHYSICAL EXAM:   VITAL SIGNS:   Vitals:    01/03/24 1937   BP: 110/81   Pulse: 104   Resp: 18   Temp: 100 °F (37.8 °C)   SpO2: 94%      CONSTITUTIONAL: Awake, well appearing, nontoxic   HENT: Atraumatic, normocephalic, oral mucosa moist, airway patent. Nares patent without drainage. External ears normal.   EYES: Conjunctivae clear, EOMI, PERRL   NECK: Trachea midline, nontender, supple   CARDIOVASCULAR: Mild tachycardia, Normal rhythm.  PULMONARY/CHEST: Normal work of breathing. Clear to auscultation, no rhonchi, wheezes, or rales.  ABDOMINAL: Nondistended, soft, nontender, no rebound or guarding.  NEUROLOGIC: Nonfocal, moves all four extremities, no gross sensory or motor deficits.   EXTREMITIES: No clubbing, cyanosis, or edema   SKIN: Warm, Dry, No erythema, No rash.  Incisions are clean, dry and intact      ED COURSE / MEDICAL DECISION MAKING:     Veronica Goldsmith is a 48 y.o. female who presents to the emergency department for evaluation of flank and abdominal pain in the setting of recent nephrectomy.  Well-developed, well-nourished lady in no distress with exam as above.  Her vital signs are notable for mild tachycardia.  Her oxygen saturation is normal on room air at 90%.  Her exam is relatively benign.  Will obtain labs, CT scan and give symptomatic treatment.  Disposition pending.    Differential diagnosis includes  postoperative complication, musculoskeletal pain among other etiologies.    Lab work notable for leukocytosis.  CT scan reveals a large fluid collection concerning for abscess.  Discussed with Dr. Mart, will plan for drain placement tomorrow.  Discussed with Dr. Kerley for admission.  Antibiotics initiated.  Patient updated and is agreeable.    Final diagnoses:   Fluid collection at surgical site, initial encounter        Richard Valdivia MD  01/03/24 2215      Electronically signed by Richard Valdivia MD at 01/03/24 2215       Vital Signs (last day)       Date/Time Temp Temp src Pulse Resp BP Patient Position SpO2    01/04/24 1200 97 (36.1) Temporal 54 18 86/63 Lying 96    01/04/24 1150 -- -- 56 18 83/57 Lying 95    01/04/24 1140 -- -- 64 15 83/57 Lying 94    01/04/24 1135 -- -- 58 17 92/65 Lying 94    01/04/24 1130 -- -- -- 17 -- -- --    01/04/24 1120 -- -- 57 18 81/55 Lying 95    01/04/24 1110 -- -- 56 19 82/57 Lying 95    01/04/24 1100 97 (36.1) Temporal 62 16 88/59 Lying 95    01/04/24 1055 -- -- 68 16 81/57 Lying 95    01/04/24 1050 -- -- 65 16 84/53 Lying 95    01/04/24 1045 -- -- 60 14 82/56 Lying 96    01/04/24 1040 -- -- 64 14 85/56 Lying 96    01/04/24 1035 -- -- 62 14 80/54 Lying 92    01/04/24 1030 -- -- 63 14 85/65 Lying 94    01/04/24 1025 -- -- 64 18 82/56 Lying 94    01/04/24 1020 -- -- 63 12 85/56 Lying 98    01/04/24 1015 -- -- 62 15 77/57 Lying 96    01/04/24 1010 -- -- 62 12 81/56 Lying 98    01/04/24 1005 -- -- 66 14 87/59 Lying 97    01/04/24 1000 -- -- 73 16 87/59 Lying 95    01/04/24 0956 97 (36.1) Temporal 67 18 89/61 Lying 98    01/04/24 0833 97.1 (36.2) Temporal 85 18 91/51 Sitting 94    01/04/24 0730 -- -- 72 -- 89/67 -- 95    01/04/24 0700 -- -- 84 -- 89/58 -- 94    01/04/24 0654 -- -- -- 18 -- -- --    01/04/24 0630 -- -- 98 -- 91/59 -- 94    01/04/24 0600 -- -- 80 -- 97/68 -- 93    01/04/24 0530 -- -- 93 -- 98/57 -- 94    01/04/24 0500 -- -- 87 -- 99/65 -- 94    01/04/24  0430 -- -- 87 -- 96/62 -- 97    01/04/24 0417 -- -- 95 -- 96/66 -- 97    01/04/24 0400 -- -- 86 -- 101/65 -- 95    01/04/24 0330 -- -- 82 -- 95/66 -- 96    01/04/24 0300 -- -- 80 -- 96/64 -- 96    01/04/24 0230 -- -- 76 -- 92/72 -- 96    01/04/24 0200 -- -- 82 -- 94/72 -- 96    01/04/24 0130 -- -- 84 -- 94/63 -- 95    01/04/24 0100 -- -- 80 -- 91/69 -- 96    01/04/24 0030 -- -- 78 -- 97/73 -- 96    01/04/24 0000 -- -- -- -- 96/71 -- --    01/03/24 2337 -- -- -- -- 97/70 -- --    01/03/24 2300 -- -- -- -- 88/58 -- 96    01/03/24 2245 -- -- -- -- 94/65 -- 95    01/03/24 2215 -- -- -- -- 92/61 -- 95    01/03/24 2200 -- -- -- -- 89/61 -- 94    01/03/24 1937 100 (37.8) Oral 104 18 110/81 Sitting 94    01/03/24 1903 99.1 (37.3) Oral 103 -- 112/79 Sitting 99          Current Facility-Administered Medications   Medication Dose Route Frequency Provider Last Rate Last Admin    acetaminophen (TYLENOL) tablet 650 mg  650 mg Oral Q4H PRN Aaron Mart MD        Or    acetaminophen (TYLENOL) 160 MG/5ML oral solution 650 mg  650 mg Oral Q4H PRN Aaron Mart MD        Or    acetaminophen (TYLENOL) suppository 650 mg  650 mg Rectal Q4H PRN Aaron Mart MD        sennosides-docusate (PERICOLACE) 8.6-50 MG per tablet 2 tablet  2 tablet Oral BID Aaron Mart MD        And    polyethylene glycol (MIRALAX) packet 17 g  17 g Oral Daily PRN Aaron Mart MD        And    bisacodyl (DULCOLAX) EC tablet 5 mg  5 mg Oral Daily PRN Aaron Mart MD        And    bisacodyl (DULCOLAX) suppository 10 mg  10 mg Rectal Daily PRN Aaron Mart MD        budesonide-formoterol (SYMBICORT) 160-4.5 MCG/ACT inhaler 2 puff  2 puff Inhalation BID - RT Aaron Mart MD   2 puff at 01/04/24 0654    And    tiotropium (SPIRIVA RESPIMAT) 2.5 mcg/act aerosol solution inhaler  2 puff Inhalation Daily - RT Aaron Mart MD   2 puff at 01/04/24 0653    Calcium Replacement - Follow Nurse / BPA  Driven Protocol   Does not apply Aaron Nance MD        lactated ringers infusion 1,000 mL  1,000 mL Intravenous Continuous Aaron Mart MD   Stopped at 01/04/24 0949    Magnesium Standard Dose Replacement - Follow Nurse / BPA Driven Protocol   Does not apply Aaron Nance MD        nitroglycerin (NITROSTAT) SL tablet 0.4 mg  0.4 mg Sublingual Q5 Min PRN Aaron Mart MD        ondansetron (ZOFRAN) injection 4 mg  4 mg Intravenous Q6H PRN Aaron Mart MD        Pharmacy to Dose Zosyn   Does not apply Continuous PRN Aaron Mart MD        Phosphorus Replacement - Follow Nurse / BPA Driven Protocol   Does not apply Aaron Nance MD        piperacillin-tazobactam (ZOSYN) 3.375 g in iso-osmotic dextrose 50 ml (premix)  3.375 g Intravenous Q8H Aaron Mart MD   3.375 g at 01/04/24 0518    Potassium Replacement - Follow Nurse / BPA Driven Protocol   Does not apply Aaron Nance MD        sodium chloride 0.9 % flush 10 mL  10 mL Intravenous PRN Aaron Mart MD        sodium chloride 0.9 % flush 10 mL  10 mL Intravenous Q12H Aaron Mart MD        sodium chloride 0.9 % flush 10 mL  10 mL Intravenous PRN Aaron Mart MD        sodium chloride 0.9 % infusion 40 mL  40 mL Intravenous PRN Aaron Mart MD        sodium chloride 0.9 % infusion  150 mL/hr Intravenous Continuous Aaron Mart MD 75 mL/hr at 01/04/24 0518 75 mL/hr at 01/04/24 0518     Lab Results (last 24 hours)       Procedure Component Value Units Date/Time    Blood Culture With CRAIG - Blood, Hand, Left [[id number]]  (Normal) Collected: 01/03/24 2240    Specimen: Blood from Hand, Left Updated: 01/04/24 1101     Blood Culture No growth at less than 24 hours    Blood Culture With CRAIG - Blood, Arm, Left [[id number]]  (Normal) Collected: 01/03/24 2235    Specimen: Blood from Arm, Left Updated: 01/04/24 1101     Blood Culture No growth  at less than 24 hours    Wound Culture - Surgical Site, Kidney, Right [[id number]] Collected: 01/04/24 1028    Specimen: Surgical Site from Kidney, Right Updated: 01/04/24 1058     Gram Stain Greater than 25 WBCs seen      Rare (1+) Gram positive cocci in pairs      Few (2+) Gram negative bacilli    Anaerobic Culture - Fine Needle Aspirate, Kidney, Right [[id number]] Collected: 01/04/24 0923    Specimen: Fine Needle Aspirate from Kidney, Right Updated: 01/04/24 1025    Fungus Culture - Fine Needle Aspirate, Kidney, Right [[id number]] Collected: 01/04/24 0923    Specimen: Fine Needle Aspirate from Kidney, Right Updated: 01/04/24 1025    AFB Culture - Fine Needle Aspirate, Kidney, Right [[id number]] Collected: 01/04/24 0923    Specimen: Fine Needle Aspirate from Kidney, Right Updated: 01/04/24 1025    Basic Metabolic Panel [[id number]]  (Abnormal) Collected: 01/04/24 0624    Specimen: Blood Updated: 01/04/24 0653     Glucose 124 mg/dL      BUN 9 mg/dL      Creatinine 1.10 mg/dL      Sodium 139 mmol/L      Potassium 4.0 mmol/L      Chloride 105 mmol/L      CO2 23.2 mmol/L      Calcium 8.4 mg/dL      BUN/Creatinine Ratio 8.2     Anion Gap 10.8 mmol/L      eGFR 62.9 mL/min/1.73     Narrative:      GFR Normal >60  Chronic Kidney Disease <60  Kidney Failure <15      CBC Auto Differential [[id number]]  (Abnormal) Collected: 01/04/24 0624    Specimen: Blood Updated: 01/04/24 0637     WBC 11.82 10*3/mm3      RBC 3.68 10*6/mm3      Hemoglobin 11.3 g/dL      Hematocrit 33.6 %      MCV 91.3 fL      MCH 30.7 pg      MCHC 33.6 g/dL      RDW 13.1 %      RDW-SD 43.6 fl      MPV 9.7 fL      Platelets 328 10*3/mm3      Neutrophil % 79.5 %      Lymphocyte % 9.1 %      Monocyte % 8.5 %      Eosinophil % 1.1 %      Basophil % 0.4 %      Immature Grans % 1.4 %      Neutrophils, Absolute 9.40 10*3/mm3      Lymphocytes, Absolute 1.08 10*3/mm3      Monocytes, Absolute 1.00 10*3/mm3      Eosinophils, Absolute 0.13 10*3/mm3      Basophils, Absolute  "0.05 10*3/mm3      Immature Grans, Absolute 0.16 10*3/mm3      nRBC 0.0 /100 WBC     Urinalysis, Microscopic Only - Urine, Clean Catch [[id number]]  (Abnormal) Collected: 01/03/24 2322    Specimen: Urine, Clean Catch Updated: 01/03/24 2339     RBC, UA None Seen /HPF      WBC, UA None Seen /HPF      Bacteria, UA Trace /HPF      Squamous Epithelial Cells, UA 7-12 /HPF      Hyaline Casts, UA None Seen /LPF      Methodology Manual Light Microscopy    Urinalysis With Microscopic If Indicated (No Culture) - Urine, Clean Catch [[id number]]  (Abnormal) Collected: 01/03/24 2322    Specimen: Urine, Clean Catch Updated: 01/03/24 2337     Color, UA Yellow     Appearance, UA Cloudy     pH, UA 5.5     Specific Gravity, UA >=1.030     Glucose, UA Negative     Ketones, UA Negative     Bilirubin, UA Negative     Blood, UA Negative     Protein, UA 30 mg/dL (1+)     Leuk Esterase, UA Negative     Nitrite, UA Negative     Urobilinogen, UA 0.2 E.U./dL    Lactic Acid, Plasma [[id number]]  (Normal) Collected: 01/03/24 2235    Specimen: Blood Updated: 01/03/24 2307     Lactate 1.1 mmol/L     Procalcitonin [[id number]]  (Normal) Collected: 01/03/24 1933    Specimen: Blood Updated: 01/03/24 2223     Procalcitonin 0.22 ng/mL     Narrative:      As a Marker for Sepsis (Non-Neonates):    1. <0.5 ng/mL represents a low risk of severe sepsis and/or septic shock.  2. >2 ng/mL represents a high risk of severe sepsis and/or septic shock.    As a Marker for Lower Respiratory Tract Infections that require antibiotic therapy:    PCT on Admission    Antibiotic Therapy       6-12 Hrs later    >0.5                Strongly Recommended  >0.25 - <0.5        Recommended   0.1 - 0.25          Discouraged              Remeasure/reassess PCT  <0.1                Strongly Discouraged     Remeasure/reassess PCT    As 28 day mortality risk marker: \"Change in Procalcitonin Result\" (>80% or <=80%) if Day 0 (or Day 1) and Day 4 values are available. Refer to " http://www.[facility]-pct-calculator.com    Change in PCT <=80%  A decrease of PCT levels below or equal to 80% defines a positive change in PCT test result representing a higher risk for 28-day all-cause mortality of patients diagnosed with severe sepsis for septic shock.    Change in PCT >80%  A decrease of PCT levels of more than 80% defines a negative change in PCT result representing a lower risk for 28-day all-cause mortality of patients diagnosed with severe sepsis or septic shock.       [facility] Draw [[id number]] Collected: 01/03/24 1933    Specimen: Blood Updated: 01/03/24 2045    Narrative:      The following orders were created for panel order [facility] Draw.  Procedure                               Abnormality         Status                     ---------                               -----------         ------                     Green Top (Gel)[[id number]]                                  Final result               Lavender Top[[id number]]                                     Final result               Gold Top - SST[[id number]]                                   Final result               Light Blue Top[[id number]]                                   Final result                 Please view results for these tests on the individual orders.    Green Top (Gel) [[id number]] Collected: 01/03/24 1933    Specimen: Blood Updated: 01/03/24 2045     Extra Tube Hold for add-ons.     Comment: Auto resulted.       Lavender Top [[id number]] Collected: 01/03/24 1933    Specimen: Blood Updated: 01/03/24 2045     Extra Tube hold for add-on     Comment: Auto resulted       Gold Top - SST [[id number]] Collected: 01/03/24 1933    Specimen: Blood Updated: 01/03/24 2045     Extra Tube Hold for add-ons.     Comment: Auto resulted.       Light Blue Top [[id number]] Collected: 01/03/24 1933    Specimen: Blood Updated: 01/03/24 2045     Extra Tube Hold for add-ons.     Comment: Auto resulted       Comprehensive Metabolic Panel [[id number]]   (Abnormal) Collected: 01/03/24 1933    Specimen: Blood Updated: 01/03/24 2008     Glucose 116 mg/dL      BUN 8 mg/dL      Creatinine 1.20 mg/dL      Sodium 133 mmol/L      Potassium 4.2 mmol/L      Comment: Slight hemolysis detected by analyzer. Result may be falsely elevated.        Chloride 96 mmol/L      CO2 26.3 mmol/L      Calcium 8.8 mg/dL      Total Protein 7.9 g/dL      Albumin 3.3 g/dL      ALT (SGPT) 17 U/L      AST (SGOT) 25 U/L      Alkaline Phosphatase 67 U/L      Total Bilirubin 0.4 mg/dL      Globulin 4.6 gm/dL      A/G Ratio 0.7 g/dL      BUN/Creatinine Ratio 6.7     Anion Gap 10.7 mmol/L      eGFR 56.7 mL/min/1.73     Narrative:      GFR Normal >60  Chronic Kidney Disease <60  Kidney Failure <15      Lipase [[id number]]  (Normal) Collected: 01/03/24 1933    Specimen: Blood Updated: 01/03/24 2008     Lipase 44 U/L     CBC & Differential [[id number]]  (Abnormal) Collected: 01/03/24 1933    Specimen: Blood Updated: 01/03/24 1945    Narrative:      The following orders were created for panel order CBC & Differential.  Procedure                               Abnormality         Status                     ---------                               -----------         ------                     CBC Auto Differential[[id number]]        Abnormal            Final result                 Please view results for these tests on the individual orders.    CBC Auto Differential [[id number]]  (Abnormal) Collected: 01/03/24 1933    Specimen: Blood Updated: 01/03/24 1945     WBC 14.28 10*3/mm3      RBC 4.10 10*6/mm3      Hemoglobin 12.6 g/dL      Hematocrit 37.0 %      MCV 90.2 fL      MCH 30.7 pg      MCHC 34.1 g/dL      RDW 12.9 %      RDW-SD 43.1 fl      MPV 9.9 fL      Platelets 352 10*3/mm3      Neutrophil % 75.5 %      Lymphocyte % 15.2 %      Monocyte % 6.8 %      Eosinophil % 0.8 %      Basophil % 0.6 %      Immature Grans % 1.1 %      Neutrophils, Absolute 10.78 10*3/mm3      Lymphocytes, Absolute 2.17 10*3/mm3       Monocytes, Absolute 0.97 10*3/mm3      Eosinophils, Absolute 0.12 10*3/mm3      Basophils, Absolute 0.08 10*3/mm3      Immature Grans, Absolute 0.16 10*3/mm3      nRBC 0.0 /100 WBC           Imaging Results (Last 24 Hours)       Procedure Component Value Units Date/Time    CT Abdomen Pelvis With Contrast [[id number]] Collected: 01/03/24 2150     Updated: 01/03/24 2152    Narrative:      FINAL REPORT    TECHNIQUE:  null    CLINICAL HISTORY:  recent right nephrectomy, pain, fever    COMPARISON:  null    FINDINGS:  CT abdomen and pelvis with contrast    Comparison: None    Findings:    There is a small right pleural effusion. There is mild atelectasis in the right lower lobe.    Patient is status post right nephrectomy. There is a rim enhancing fluid collection containing a small amount of gas within the previous location of the right kidney measuring 10.6 x 7.8 cm axially and 18 cm vertically. There is a small amount of gas in   the right retroperitoneum just inferior to the fluid collection as well. There is linear fat stranding and a small amount of gas in the right abdominal wall consistent with the recent surgical incision. There is no free air. The fluid collection in the   right nephrectomy bed produces mass effect on the right lobe of the liver. Liver is otherwise unremarkable. Patient is status post cholecystectomy. The pancreas, spleen, left adrenal gland, and left kidney are unremarkable. The right adrenal gland   appears to be compressed by the fluid collection and is not well-visualized. The gastrointestinal tract is unremarkable. Patient is status post hysterectomy. Aorta is normal diameter. There are no enlarged lymph nodes. There is no fracture or suspicious   lytic or sclerotic lesion.      Impression:      Impression:    1. Large fluid collection containing small amount of gas in the previous location of the right kidney consistent with an abscess.    2. Small right pleural effusion.    Authenticated  and Electronically Signed by Shayne Stoner MD on  01/03/2024 09:50:45 PM             Operative/Procedure Notes (last 24 hours)        Aaron Mart MD at 01/04/24 0908          Preoperative diagnosis  Retroperitoneal abscess    Postoperative diagnosis  Same    Procedure performed  1.  Ultrasound guided percutaneous drain placement and aspiration of retroperitoneal abscess    Attending surgeon  Aaron Mart MD    Anesthesia  General    Complications  None    Findings  We removed 500 cc of brown-colored clearly purulent material    Specimen  Abscess fluid for culture    Indications  Patient agreed to undergo the above named procedure after discussion of the alternatives, risks and benefits.  Informed consent was obtained.      Procedure  The patient was taken to the operating room and placed prone on the operating table.  Pre-operative antibiotics were administered.  Bilateral lower extremity SCDs were placed.  After induction of monitored anesthesia care the patient was prepped and draped in a sterile fashion.  A time-out was performed.      We used the BK abdominal ultrasound probe with needle guide to identify the abscess in question.  The liver was also identified much more laterally and our access point was placed at 7 cm from the spine so as to be well clear of the liver.  An echogenic 18-gauge needle was placed into the abscess.  Purulent fluid was extracted and sent for culture.  A Super Stiff wire was placed into the abscess cavity, then dilated up to 12 Honduran.  A 10 Honduran pigtail nephrostomy tube was placed into the abscess cavity, which was irrigated and over 500 cc of purulent fluid was removed.  It was attached to a drainage bag and sutured in place.  Local anesthetic was administered.  Patient tolerated the procedure well and was transferred to PACU in stable condition.    We will see how she does as an inpatient while receiving antibiotics.  We may want to repeat a CT without contrast in  "a couple days depending on drain output.    Electronically signed by Aaron Mart MD at 24 1002          Physician Progress Notes (last 24 hours)        Demetrius Ortiz MD at 24 0954              [facility]    Name:  Veronica Goldsmith   Age:  48 y.o.  Sex:  female  :  1975  MRN:  [id number]   Visit Number:  [id number]  Admission Date:  1/3/2024  Date Of Service:  24  Primary Care Physician:  Greg Levine MD     LOS: 1 day :    Chief Complaint:      Right flank pain.    Subjective:    Ms. Goldsmith was seen and examined this morning in the emergency room.  She was admitted overnight secondary to post nephrectomy abscess and she is currently awaiting to go to the OR for abscess drainage and possible drain placement by Dr. Mart.  She does have low blood pressures but denies any chest pain or shortness of breath.  She states that her blood pressures are usually high and she takes medications.  Previous physician documentation, laboratory and imaging data have been reviewed.    Hospital Course:    Veronica Goldsmith is a 48-year-old woman past medical history of type 2 diabetes, hypertension, mixed hyperlipidemia, nonalcoholic steatohepatitis, tobacco use, renal insufficiency, mild persistent asthma, anxiety and depression, GERD, on Ozempic, migraines, recent nephrectomy for renal cell carcinoma.  Presented to [facility] ED on 1/3/2024 directed by primary care visit earlier in the day with concern for fatigue, decreased p.o. intake, abdominal pain mainly on the right side where she had a nephrectomy, a \"firm mass\" on her right flank persistent since surgery tender to the touch.  Patient apparently recently had some upper respiratory symptoms and was treated by primary care provider with a course of Augmentin for 7 days.     ED summary: Tmax 100, tachycardic, otherwise vital signs stable on room air.  Sodium 133, creatinine 1.2, blood glucose 116, " lactate not elevated, procalcitonin not elevated, lipase not elevated, leukocytosis, hemoglobin 12.6.  Blood cultures collected.  CT abdomen/pelvis large fluid collection containing small amount of gas in the previous location of the right kidney consistent with abscess, small right pleural effusion. She was provided Zosyn, 2 L normal saline bolus, Valium, Tylenol.  ED spoke with urology-Dr. Mart who recommended admission for abscess drainage and antibiotics therapy.    Review of Systems:     All systems were reviewed and negative except as mentioned in subjective, assessment and plan.    Vital Signs:    Temp:  [97.1 °F (36.2 °C)-100 °F (37.8 °C)] 97.1 °F (36.2 °C)  Heart Rate:  [] 85  Resp:  [18] 18  BP: ()/(51-81) 91/51    Intake and output:    I/O last 3 completed shifts:  In: 2100 [IV Piggyback:2100]  Out: -   I/O this shift:  In: 650 [I.V.:600; IV Piggyback:50]  Out: -     Physical Examination:    General Appearance:  Alert and cooperative.    Head:  Atraumatic and normocephalic.   Eyes: Conjunctivae and sclerae normal, no icterus. No pallor.   Throat: No oral lesions, no thrush, oral mucosa moist.   Neck: Supple, trachea midline, no thyromegaly.   Lungs:   Breath sounds heard bilaterally equally.  No wheezing or crackles. No Pleural rub or bronchial breathing.   Heart:  Normal S1 and S2, no murmur, no gallop, no rub. No JVD.   Abdomen:   Normal bowel sounds, no masses, no organomegaly. Soft, minimal right-sided tenderness, nondistended, no rebound tenderness.  Laparoscopic surgical puncture wounds as well as horizontal surgical scar noted in the right lower abdomen.  No abdominal wall erythema noted.   Extremities: Supple, no edema, no cyanosis, no clubbing.   Skin: No bleeding or rash.   Neurologic: Alert and oriented x 3. No facial asymmetry. Moves all four limbs. No tremors.      Laboratory results:    Results from last 7 days   Lab Units 01/04/24  0624 01/03/24  1933   SODIUM mmol/L 139 133*    POTASSIUM mmol/L 4.0 4.2   CHLORIDE mmol/L 105 96*   CO2 mmol/L 23.2 26.3   BUN mg/dL 9 8   CREATININE mg/dL 1.10* 1.20*   CALCIUM mg/dL 8.4* 8.8   BILIRUBIN mg/dL  --  0.4   ALK PHOS U/L  --  67   ALT (SGPT) U/L  --  17   AST (SGOT) U/L  --  25   GLUCOSE mg/dL 124* 116*     Results from last 7 days   Lab Units 01/04/24  0624 01/03/24  1933 01/03/24  1701   WBC 10*3/mm3 11.82* 14.28*  --    HEMOGLOBIN g/dL 11.3* 12.6  --    HEMOGLOBIN, POC g/dL  --   --  12.1   HEMATOCRIT % 33.6* 37.0  --    PLATELETS 10*3/mm3 328 352  --      I have reviewed the patient's laboratory results.    Radiology results:    CT Abdomen Pelvis With Contrast    Result Date: 1/3/2024  FINAL REPORT TECHNIQUE: null CLINICAL HISTORY: recent right nephrectomy, pain, fever COMPARISON: null FINDINGS: CT abdomen and pelvis with contrast Comparison: None Findings: There is a small right pleural effusion. There is mild atelectasis in the right lower lobe. Patient is status post right nephrectomy. There is a rim enhancing fluid collection containing a small amount of gas within the previous location of the right kidney measuring 10.6 x 7.8 cm axially and 18 cm vertically. There is a small amount of gas in the right retroperitoneum just inferior to the fluid collection as well. There is linear fat stranding and a small amount of gas in the right abdominal wall consistent with the recent surgical incision. There is no free air. The fluid collection in the right nephrectomy bed produces mass effect on the right lobe of the liver. Liver is otherwise unremarkable. Patient is status post cholecystectomy. The pancreas, spleen, left adrenal gland, and left kidney are unremarkable. The right adrenal gland appears to be compressed by the fluid collection and is not well-visualized. The gastrointestinal tract is unremarkable. Patient is status post hysterectomy. Aorta is normal diameter. There are no enlarged lymph nodes. There is no fracture or suspicious lytic  or sclerotic lesion.     Impression: Impression: 1. Large fluid collection containing small amount of gas in the previous location of the right kidney consistent with an abscess. 2. Small right pleural effusion. Authenticated and Electronically Signed by Shayne Stoner MD on 01/03/2024 09:50:45 PM   I have reviewed the patient's radiology reports.    Medication Review:     I have reviewed the patient's active and prn medications.     Problem List:      Postoperative abscess    Essential hypertension, benign    Gastroesophageal reflux disease without esophagitis    TAM (nonalcoholic steatohepatitis)    Type 2 diabetes mellitus, without long-term current use of insulin    Fluid collection at surgical site    Assessment:    Postoperative surgical site abscess, POA.  Sepsis with hypotension secondary to #1, POA.  Recent history of right nephrectomy for renal cell carcinoma on 12/20/2023.  Nonalcoholic fatty liver disease.  Chronic kidney disease stage II.  Diabetes mellitus type 2.  Mild persistent asthma.  Gastroesophageal reflux disease.  Essential hypertension.    Plan:    Sepsis/postoperative abscess.  - Continue IV antibiotics therapy with Zosyn.  - Blood cultures have been done.  - Dr. Mart from urology has been consulted.  - Patient scheduled for OR today for abscess drainage.  - We will give her another normal saline bolus of 500 mL prior to surgery and continue on normal saline at 150 mL/h.    Diabetes mellitus type 2/hypertension/CKD.  - Blood sugars are fairly stable with a recent hemoglobin A1c at 5.3 on 12/21/2023.  - Hold off on blood pressure medications at this time due to hypotension.    Discussed with nursing staff at the bedside.    DVT Prophylaxis: SCDs  Code Status: Full  Diet: N.p.o.  Discharge Plan: Pending-hopefully home in 1 to 2 days.    Demetrius Ortiz MD  01/04/24  10:05 EST    Dictated utilizing Dragon dictation.      Electronically signed by Demetrius Ortiz MD at 01/04/24 1005          Consult  Notes (last 24 hours)        Aaron Mart MD at 01/04/24 0851        Consult Orders    1. Inpatient Urology Consult [[id number]] ordered by Kerley, Brian Joseph, DO at 01/03/24 8950                 Reason for Consult  Retroperitoneal abscess    HPI  Ms. Goldsmith is a 48 y.o. female with past medical history of renal cell carcinoma, status post right laparoscopic nephrectomy on 12/20/2023, who presents with postoperative abscess in nephrectomy bed.    She has been having worsening abdominal pain and subjective fevers at home and presented to the ER last night.  CT scan shows a large abscess in the retroperitoneum beneath the liver.    Past Medical History  Past Medical History:   Diagnosis Date    Acne rosacea, erythematous telangiectatic type     Acute bronchitis     history of    Acute sinusitis     Acute upper respiratory infection     hisotry of    Allergic contact dermatitis     Allergic rhinitis     Anxiety and depression     Asthma     Back pain, chronic     Benign paroxysmal positional vertigo     Blood clots in stool     Candidal dermatitis     Candidiasis of vulva and vagina     Cellulitis     AND ABSCESS, right lower abdomen    Cervical pain     Conjunctivitis     Contusion of ear     Corneal abrasion     Depression     Diabetes mellitus     type 2 - A1C better after weight loss with use of Semiglutide    Dyspnea, unspecified     Fibromyalgia     Flu vaccine need     Foot pain, right     GERD (gastroesophageal reflux disease)     Hand pain, left     Hearing loss, right     Heart palpitations     Hemorrhoids, external     Herpes simplex without complication     Herpes zoster lesion     Hypertension     Impetigo herpetiformis     Injury to tibial blood vessels, unspecified laterality, initial encounter     Kidney cyst     Right kidney    Kidney stones     Left humeral fracture     Low back pain     Lymphadenopathy     Migraine headache     Mixed hyperlipidemia     TAM (nonalcoholic steatohepatitis)      Neuropathy     Oral candidiasis     Plantar fasciitis     Recent weight loss     60lbs with use of Ozempic - per pt 12/6/23    Salpingitis and oophoritis, unspecified     not specified as acute, subacute, or chronic    Scabies     Seasonal allergies     Stress     Swelling of limb     Symptoms involving abdomen and pelvis     OTHER SYMPTOMS INVOLVING ABDOMEN AND PELVIS, ABDOMINAL OR PELVIC SWELLING, MASS, OR LUMP, OTHER SPECIFIED S    Tattoo     Tendon pain     Tobacco use disorder     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     Urinary tract infection     Vaginitis and vulvovaginitis     Vaginitis, atrophic        Past Surgical History  Past Surgical History:   Procedure Laterality Date    BILATERAL OOPHORECTOMY      CARPAL TUNNEL RELEASE Bilateral     CHOLECYSTECTOMY      COLONOSCOPY  03/30/2018    HUMERUS FRACTURE SURGERY Left     has implants - plate, screws    LIPOMA EXCISION      low back    NEPHRECTOMY Right 12/20/2023    Procedure: NEPHRECTOMY LAPAROSCOPIC HAND ASSISTED;  Surgeon: Aaron Mart MD;  Location: [facility];  Service: Urology;  Laterality: Right;    NEPHRECTOMY RADICAL Right     ROTATOR CUFF REPAIR Right     TOTAL ABDOMINAL HYSTERECTOMY      TUBAL ABDOMINAL LIGATION      URETEROSCOPY LASER LITHOTRIPSY WITH STENT INSERTION Right 03/02/2023    Procedure: URETEROSCOPY LASER LITHOTRIPSY WITH STENT INSERTION;  Surgeon: Aaron Mart MD;  Location: [facility] OR;  Service: Urology;  Laterality: Right;       Medications    Current Facility-Administered Medications:     acetaminophen (TYLENOL) tablet 650 mg, 650 mg, Oral, Q4H PRN **OR** acetaminophen (TYLENOL) 160 MG/5ML oral solution 650 mg, 650 mg, Oral, Q4H PRN **OR** acetaminophen (TYLENOL) suppository 650 mg, 650 mg, Rectal, Q4H PRN, Kerley, Brian Joseph, DO    sennosides-docusate (PERICOLACE) 8.6-50 MG per tablet 2 tablet, 2 tablet, Oral, BID **AND** polyethylene glycol (MIRALAX) packet 17 g, 17 g, Oral, Daily PRN **AND**  bisacodyl (DULCOLAX) EC tablet 5 mg, 5 mg, Oral, Daily PRN **AND** bisacodyl (DULCOLAX) suppository 10 mg, 10 mg, Rectal, Daily PRN, Kerley, Brian Joseph, DO    budesonide-formoterol (SYMBICORT) 160-4.5 MCG/ACT inhaler 2 puff, 2 puff, Inhalation, BID - RT, 2 puff at 01/04/24 0654 **AND** tiotropium (SPIRIVA RESPIMAT) 2.5 mcg/act aerosol solution inhaler, 2 puff, Inhalation, Daily - RT, Kerley, Brian Joseph, , 2 puff at 01/04/24 0653    Calcium Replacement - Follow Nurse / BPA Driven Protocol, , Does not apply, PRN, Kerley, Brian Joseph, DO    ceFAZolin Sodium-Dextrose (ANCEF) IVPB (duplex) 2,000 mg, 2,000 mg, Intravenous, Once, Aaron Mart MD    lactated ringers infusion 1,000 mL, 1,000 mL, Intravenous, Continuous, Aaron Mart MD, Last Rate: 25 mL/hr at 01/04/24 0846, 1,000 mL at 01/04/24 0846    Magnesium Standard Dose Replacement - Follow Nurse / BPA Driven Protocol, , Does not apply, PRN, Kerley, Brian Joseph, DO    nitroglycerin (NITROSTAT) SL tablet 0.4 mg, 0.4 mg, Sublingual, Q5 Min PRN, Kerley, Brian Joseph, DO    ondansetron (ZOFRAN) injection 4 mg, 4 mg, Intravenous, Q6H PRN, Kerley, Brian Joseph, DO    Pharmacy to Dose Zosyn, , Does not apply, Continuous PRN, Kerley, Brian Joseph, DO    Phosphorus Replacement - Follow Nurse / BPA Driven Protocol, , Does not apply, PRN, Kerley, Brian Joseph, DO    piperacillin-tazobactam (ZOSYN) 3.375 g in iso-osmotic dextrose 50 ml (premix), 3.375 g, Intravenous, Q8H, Kerley, Brian Joseph, DO, 3.375 g at 01/04/24 0518    Potassium Replacement - Follow Nurse / BPA Driven Protocol, , Does not apply, PRN, Kerley, Brian Joseph, DO    sodium chloride 0.9 % flush 10 mL, 10 mL, Intravenous, PRN, Kerley, Brian Joseph, DO    sodium chloride 0.9 % flush 10 mL, 10 mL, Intravenous, Q12H, Kerley, Brian Joseph, DO    sodium chloride 0.9 % flush 10 mL, 10 mL, Intravenous, PRN, Kerley, Brian Joseph, DO    sodium chloride 0.9 % infusion 40 mL, 40 mL, Intravenous, PRN,  Kerley, Brian Joseph,     sodium chloride 0.9 % infusion, 150 mL/hr, Intravenous, Continuous, Demetrius Ortiz MD, Last Rate: 75 mL/hr at 01/04/24 0518, 75 mL/hr at 01/04/24 0518    Allergies  Allergies   Allergen Reactions    Sulfa Antibiotics Unknown - High Severity     high fever - pt states 106F    Morphine Other (See Comments)     drop in O2 sats; needed oxygent; pt states she has to be reminded to breathe;        Social History  Social History     Social History Narrative    Not on file       Family History  Family History   Problem Relation Age of Onset    Diabetes Mother     Esophageal cancer Father     Hypertension Father     Cancer Father     Cancer Maternal Grandmother     Diabetes Maternal Grandmother     Cancer Paternal Grandmother     Cancer Paternal Grandfather     Stroke Other     Cervical cancer Other     Diabetes Other     Esophageal cancer Other     Hypertension Other     Lung cancer Other     Pancreatic cancer Other     Skin cancer Other          Physical Exam  Vitals:    01/04/24 0654 01/04/24 0700 01/04/24 0730 01/04/24 0833   BP:  (!) 89/58 (!) 89/67 91/51   BP Location:    Right arm   Patient Position:    Sitting   Pulse:  84 72 85   Resp: 18   18   Temp:    97.1 °F (36.2 °C)   TempSrc:    Temporal   SpO2:  94% 95% 94%   Weight:       Height:         Constitutional: NAD, WDWN  HEENT: NCAT. Conjunctivae normal  MMM  Cardiovascular: Regular rate  Pulmonary/Chest: Respirations are even and nonlabored bilaterally  Abdominal: Soft with no distension, moderate right upper quadrant tenderness, no masses, moderate right CVA tenderness  Neurological: A + O, cranial nerves II-XII grossly intact,   Extremities: MELISSA x 4, warm, no clubbing, no cyanosis  Skin: Pink, warm, dry with no rash  Psychiatric:  Normal mood and affect      I/O last 3 completed shifts:  In: 2100 [IV Piggyback:2100]  Out: -     Labs  Lab Results   Component Value Date    GLUCOSE 124 (H) 01/04/2024    CALCIUM 8.4 (L) 01/04/2024    NA  "139 01/04/2024    K 4.0 01/04/2024    CO2 23.2 01/04/2024     01/04/2024    BUN 9 01/04/2024    CREATININE 1.10 (H) 01/04/2024    BCR 8.2 01/04/2024    ANIONGAP 10.8 01/04/2024       Lab Results   Component Value Date    WBC 11.82 (H) 01/04/2024    HGB 11.3 (L) 01/04/2024    HCT 33.6 (L) 01/04/2024    MCV 91.3 01/04/2024     01/04/2024       Brief Urine Lab Results  (Last result in the past 365 days)        Color   Clarity   Blood   Leuk Est   Nitrite   Protein   CREAT   Urine HCG        01/03/24 2322 Yellow   Cloudy   Negative   Negative   Negative   30 mg/dL (1+)                   No results found for: \"URINECX\"    Radiographic Studies  CT Abdomen Pelvis With Contrast    Result Date: 1/3/2024  FINAL REPORT TECHNIQUE: null CLINICAL HISTORY: recent right nephrectomy, pain, fever COMPARISON: null FINDINGS: CT abdomen and pelvis with contrast Comparison: None Findings: There is a small right pleural effusion. There is mild atelectasis in the right lower lobe. Patient is status post right nephrectomy. There is a rim enhancing fluid collection containing a small amount of gas within the previous location of the right kidney measuring 10.6 x 7.8 cm axially and 18 cm vertically. There is a small amount of gas in the right retroperitoneum just inferior to the fluid collection as well. There is linear fat stranding and a small amount of gas in the right abdominal wall consistent with the recent surgical incision. There is no free air. The fluid collection in the right nephrectomy bed produces mass effect on the right lobe of the liver. Liver is otherwise unremarkable. Patient is status post cholecystectomy. The pancreas, spleen, left adrenal gland, and left kidney are unremarkable. The right adrenal gland appears to be compressed by the fluid collection and is not well-visualized. The gastrointestinal tract is unremarkable. Patient is status post hysterectomy. Aorta is normal diameter. There are no enlarged " lymph nodes. There is no fracture or suspicious lytic or sclerotic lesion.     Impression: 1. Large fluid collection containing small amount of gas in the previous location of the right kidney consistent with an abscess. 2. Small right pleural effusion. Authenticated and Electronically Signed by Shayne Stoner MD on 01/03/2024 09:50:45 PM    XR Chest PA & Lateral    Result Date: 12/27/2023  PROCEDURE: XR CHEST PA AND LATERAL-   HISTORY: Cough/fever.; R50.9-Fever, unspecified  COMPARISON: None.  FINDINGS:  The lungs are clear.   There is no evidence of effusion or other pleural disease.  The mediastinum has a normal appearance.  The cardiac silhouette is unremarkable.      Unremarkable chest exam.    This report was signed and finalized on 12/27/2023 12:18 PM by Ivan Melo MD.      Transversus Abdominis Plane (TAP) Block - Bilateral    Result Date: 12/20/2023  Dominic Cervantes CRNA     12/20/2023  7:53 AM Transversus Abdominis Plane (TAP) Block - Bilateral Patient reassessed immediately prior to procedure Start time: 12/20/2023 7:35 AM Stop time: 12/20/2023 7:40 AM Reason for block: at surgeon's request and post-op pain management Performed by CRNA/CAA: Dominic Cervantes CRNA Preanesthetic Checklist Completed: patient identified, IV checked, site marked, risks and benefits discussed, surgical consent, monitors and equipment checked, pre-op evaluation and timeout performed Prep: Pt Position: supine Sterile barriers:gloves, cap, sterile barriers and mask Prep: ChloraPrep Patient monitoring: blood pressure monitoring, continuous pulse oximetry and EKG Procedure Performed under: general Guidance:ultrasound guided ULTRASOUND INTERPRETATION.  Using ultrasound guidance a 20 G gauge needle was placed in close proximity to the nerve, at which point, under ultrasound guidance anesthetic was injected in the area of the nerve and spread of the anesthesia was seen on ultrasound in close proximity thereto.  There  were no abnormalities seen on ultrasound; a digital image was taken; and the patient tolerated the procedure with no complications. Images:still images obtained Laterality:Bilateral Block Type:TAP Injection Technique:single-shot Needle Type:echogenic Resistance on Injection: none Post Assessment Injection Assessment: negative aspiration for heme, no paresthesia on injection and incremental injection Patient Tolerance:comfortable throughout block Complications:no Additional Notes Procedure:      BILATERAL TAP BLOCKS                          Patient analgesia was achieved with General Anesthesia The pt was placed in the Supine Position and under Ultrasound guidance, an echogenic or touhy needle was advanced with Normal Saline hydro dissection of tissue.  The Internal Oblique and Transversus Abdominus muscles were visualized.  At or before the aponeurosis of Internal Oblique, the local anesthetic spread was visualized in the Transversus Abdominus Plane. Injection was made incrementally with aspiration every 5 mls.  There was no intravascular injection;  injection pressure was normal; there was no neural injection; and the procedure was completed without difficulty.       I have personally reviewed these labs and imaging.     Assessment  Ms. Goldsmith is a 48 y.o. female with right retroperitoneal abscess after right laparoscopic nephrectomy several weeks ago.  She has had fevers, leukocytosis, hypotension.    Plan  1.  Admission with resuscitation with fluids and antibiotics per hospital medicine; agree with Zosyn for antibiosis  2.  OR today with me for ultrasound-guided retroperitoneal drain placement.  3.  If she clinically improves I think she would likely be fit for discharge in the next couple days, pending cultures, which I will send from the drain.  4.  I recommend stopping Toradol, which I have canceled, given her solitary kidney and mild azotemia with hypotension.      Aaron Mart,  MD        Electronically signed by Aaron Mart MD at 01/04/24 0893

## 2024-01-04 NOTE — PLAN OF CARE
Problem: Infection  Goal: Absence of Infection Signs and Symptoms  Outcome: Ongoing, Progressing     Problem: Adjustment to Illness (Sepsis/Septic Shock)  Goal: Optimal Coping  Outcome: Ongoing, Progressing     Problem: Bleeding (Sepsis/Septic Shock)  Goal: Absence of Bleeding  Outcome: Ongoing, Progressing     Problem: Glycemic Control Impaired (Sepsis/Septic Shock)  Goal: Blood Glucose Level Within Desired Range  Outcome: Ongoing, Progressing     Problem: Infection Progression (Sepsis/Septic Shock)  Goal: Absence of Infection Signs and Symptoms  Outcome: Ongoing, Progressing  Intervention: Initiate Sepsis Management  Recent Flowsheet Documentation  Taken 1/4/2024 1227 by Zandra Chacon RN  Infection Prevention: hand hygiene promoted  Intervention: Promote Recovery  Recent Flowsheet Documentation  Taken 1/4/2024 1227 by Zandra Chacon RN  Activity Management: bedrest     Problem: Nutrition Impaired (Sepsis/Septic Shock)  Goal: Optimal Nutrition Intake  Outcome: Ongoing, Progressing   Goal Outcome Evaluation:

## 2024-01-04 NOTE — OP NOTE
Preoperative diagnosis  Retroperitoneal abscess    Postoperative diagnosis  Same    Procedure performed  1.  Ultrasound guided percutaneous drain placement and aspiration of retroperitoneal abscess    Attending surgeon  Aaron Mart MD    Anesthesia  General    Complications  None    Findings  We removed 500 cc of brown-colored clearly purulent material    Specimen  Abscess fluid for culture    Indications  Patient agreed to undergo the above named procedure after discussion of the alternatives, risks and benefits.  Informed consent was obtained.      Procedure  The patient was taken to the operating room and placed prone on the operating table.  Pre-operative antibiotics were administered.  Bilateral lower extremity SCDs were placed.  After induction of monitored anesthesia care the patient was prepped and draped in a sterile fashion.  A time-out was performed.      We used the BK abdominal ultrasound probe with needle guide to identify the abscess in question.  The liver was also identified much more laterally and our access point was placed at 7 cm from the spine so as to be well clear of the liver.  An echogenic 18-gauge needle was placed into the abscess.  Purulent fluid was extracted and sent for culture.  A Super Stiff wire was placed into the abscess cavity, then dilated up to 12 Fijian.  A 10 Fijian pigtail nephrostomy tube was placed into the abscess cavity, which was irrigated and over 500 cc of purulent fluid was removed.  It was attached to a drainage bag and sutured in place.  Local anesthetic was administered.  Patient tolerated the procedure well and was transferred to PACU in stable condition.    We will see how she does as an inpatient while receiving antibiotics.  We may want to repeat a CT without contrast in a couple days depending on drain output.

## 2024-01-04 NOTE — PROGRESS NOTES
Pharmacokinetic Consult - Piperacillin-tazobacatam Dosing    Veronica Goldsmith is a 48 y.o. female who has been consulted to dose piperacillin-tazobactam for  intra-abdominal infection .    Current Antimicrobial Therapy    Anti-Infectives (From admission, onward)      Ordered     Dose/Rate Route Frequency Start Stop    01/04/24 0211  piperacillin-tazobactam (ZOSYN) 3.375 g in iso-osmotic dextrose 50 ml (premix)        Ordering Provider: Kerley, Brian Joseph, DO    3.375 g  over 4 Hours Intravenous Every 8 Hours 01/04/24 0500 01/11/24 0459    01/04/24 0021  Pharmacy to Dose Zosyn        Ordering Provider: Kerley, Brian Joseph, DO     Does not apply Continuous PRN 01/04/24 0021 01/11/24 0020    01/03/24 2159  piperacillin-tazobactam (ZOSYN) 4.5 g in iso-osmotic dextrose 100 mL IVPB (premix)        Ordering Provider: Richard Valdivia MD    4.5 g  over 30 Minutes Intravenous Once 01/03/24 2215 01/03/24 2319            Microbiology Results (last 10 days)       Procedure Component Value - Date/Time    Urine Culture - Urine, Urine, Clean Catch [[id number]]  (Normal) Collected: 12/27/23 1204    Lab Status: Final result Specimen: Urine, Clean Catch Updated: 12/29/23 1229     Urine Culture No growth             Allergies    Sulfa antibiotics and Morphine    Relevant clinical data and objective history reviewed:    Creatinine   Date Value Ref Range Status   01/03/2024 1.20 (H) 0.57 - 1.00 mg/dL Final     Estimated Creatinine Clearance: 61.4 mL/min (A) (by C-G formula based on SCr of 1.2 mg/dL (H)).    No intake/output data recorded.    Patient weight: 80.4 kg (177 lb 3.2 oz)    Asessment/Plan    Initiate Piperacillin-tazobactam 3.375 g IV every 8 hours  Pharmacy will monitor Ms. Goldsmith's renal function and clinical status and adjust the piperacillin-tazobactam dose and/or frequency as needed.    Thank you,  Brie Campbell RP,PharmD  1/4/2024  02:13 EST

## 2024-01-04 NOTE — CASE MANAGEMENT/SOCIAL WORK
Discharge Planning Assessment  [facility]     Patient Name: Veronica Goldsmith  MRN: [id number]  Today's Date: 1/4/2024    Admit Date: 1/3/2024    Plan: DCP Home with  to transport. Denies any needs/services.   Discharge Needs Assessment       Row Name 01/04/24 1529       Living Environment    People in Home spouse    Name(s) of People in Home Jeovanny/    Current Living Arrangements home    Duration at Residence 8 years.    Potentially Unsafe Housing Conditions unable to assess    In the past 12 months has the electric, gas, oil, or water company threatened to shut off services in your home? No    Primary Care Provided by self    Provides Primary Care For no one    Family Caregiver if Needed spouse    Family Caregiver Names Jeovanny/    Quality of Family Relationships unable to assess    Able to Return to Prior Arrangements yes       Resource/Environmental Concerns    Resource/Environmental Concerns none    Transportation Concerns none       Food Insecurity    Within the past 12 months, you worried that your food would run out before you got the money to buy more. Never true    Within the past 12 months, the food you bought just didn't last and you didn't have money to get more. Never true       Transition Planning    Patient/Family Anticipates Transition to home with family    Patient/Family Anticipated Services at Transition none    Transportation Anticipated family or friend will provide       Discharge Needs Assessment    Readmission Within the Last 30 Days previous discharge plan unsuccessful    Equipment Currently Used at Home none    Concerns to be Addressed denies needs/concerns at this time    Anticipated Changes Related to Illness none    Equipment Needed After Discharge other (see comments)  To be determined.    Provided Post Acute Provider List? N/A    Provided Post Acute Provider Quality & Resource List? N/A                   Discharge Plan       Row Name 01/04/24 1532       Plan    Plan DCP  Home with  to transport. Denies any needs/services.    Plan Comments CM spoke with pt at bedside. Permission given to discuss DCP. Pt confirmed name,,address,and insurance. Pt states No LW, No POA and no  service. PCP confirmed as ANDREA Levine, last seen 1/3/24. . Pharmacy used is  [facility]. Agreed to meds to bed. DMS listed above. Lives with her  and states is (I) with her ADL's and (I) with her mobility. Works full time as Dialysis tech. States has no issues with obtaining food, medicines or transportation. Plans to return home and denies any needs/services.    Final Discharge Disposition Code 01 - home or self-care                  Continued Care and Services - Admitted Since 1/3/2024    Coordination has not been started for this encounter.          Demographic Summary       Row Name 24 1524       General Information    Admission Type inpatient    Arrived From emergency department    Referral Source admission list    Reason for Consult discharge planning    Preferred Language English       Contact Information    Permission Granted to Share Info With     Contact Information Obtained for                    Functional Status       Row Name 24 1524       Functional Status    Usual Activity Tolerance excellent    Current Activity Tolerance good       Physical Activity    On average, how many days per week do you engage in moderate to strenuous exercise (like a brisk walk)? 4 days    On average, how many minutes do you engage in exercise at this level? 60 min    Number of minutes of exercise per week 240       Assessment of Health Literacy    How often do you have someone help you read hospital materials? Never    How often do you have problems learning about your medical condition because of difficulty understanding written information? Never    How often do you have a problem understanding what is told to you about your medical condition? Never     How confident are you filling out medical forms by yourself? Extremely    Health Literacy Excellent       Functional Status, IADL    Medications independent    Meal Preparation independent    Housekeeping independent    Laundry independent    Shopping independent    IADL Comments Works full time in Dialysis Clinic 4 days/week.       Mental Status    General Appearance WDL WDL       Mental Status Summary    Recent Changes in Mental Status/Cognitive Functioning no changes       Employment/    Employment Status employed full-time    Current or Previous Occupation healthcare    Employment/ Comments Dialysis Tech.                   Psychosocial       Row Name 01/04/24 1525       Developmental Stage (Eriksson's)    Developmental Stage Stage 7 (35-65 years/Middle Adulthood) Generativity vs. Stagnation                   Abuse/Neglect    No documentation.                  Legal    No documentation.                  Substance Abuse    No documentation.                  Patient Forms    No documentation.                     Adry Stock RN

## 2024-01-04 NOTE — ED PROVIDER NOTES
"  HPI: Veronica Goldsmith is a 48 y.o. female who presents to the emergency department complaining of flank pain.  Patient states that for the last week she has had persistent right-sided flank pain.  Some right abdominal pain as well.  This is a spasm-like sensation.  It is severe.  There are no alleviating or aggravating factors.  She has had subjective fevers.  A few weeks ago patient had a right radical nephrectomy.  Has seen her urologist who felt this was likely musculoskeletal.  Saw her primary doctor today who sent her to the ED for CT scan to \"rule out bleeding\".      REVIEW OF SYSTEMS: All other systems reviewed and are negative     PAST MEDICAL HISTORY:   Past Medical History:   Diagnosis Date    Acne rosacea, erythematous telangiectatic type     Acute bronchitis     history of    Acute sinusitis     Acute upper respiratory infection     hisotry of    Allergic contact dermatitis     Allergic rhinitis     Anxiety and depression     Asthma     Back pain, chronic     Benign paroxysmal positional vertigo     Blood clots in stool     Candidal dermatitis     Candidiasis of vulva and vagina     Cellulitis     AND ABSCESS, right lower abdomen    Cervical pain     Conjunctivitis     Contusion of ear     Corneal abrasion     Depression     Diabetes mellitus     type 2 - A1C better after weight loss with use of Semiglutide    Dyspnea, unspecified     Fibromyalgia     Flu vaccine need     Foot pain, right     GERD (gastroesophageal reflux disease)     Hand pain, left     Hearing loss, right     Heart palpitations     Hemorrhoids, external     Herpes simplex without complication     Herpes zoster lesion     Hypertension     Impetigo herpetiformis     Injury to tibial blood vessels, unspecified laterality, initial encounter     Kidney cyst     Right kidney    Kidney stones     Left humeral fracture     Low back pain     Lymphadenopathy     Migraine headache     Mixed hyperlipidemia     TAM (nonalcoholic steatohepatitis)  "    Neuropathy     Oral candidiasis     Plantar fasciitis     Recent weight loss     60lbs with use of Ozempic - per pt 12/6/23    Salpingitis and oophoritis, unspecified     not specified as acute, subacute, or chronic    Scabies     Seasonal allergies     Stress     Swelling of limb     Symptoms involving abdomen and pelvis     OTHER SYMPTOMS INVOLVING ABDOMEN AND PELVIS, ABDOMINAL OR PELVIC SWELLING, MASS, OR LUMP, OTHER SPECIFIED S    Tattoo     Tendon pain     Tobacco use disorder     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     Urinary tract infection     Vaginitis and vulvovaginitis     Vaginitis, atrophic         FAMILY HISTORY:   Family History   Problem Relation Age of Onset    Diabetes Mother     Esophageal cancer Father     Hypertension Father     Cancer Father     Cancer Maternal Grandmother     Diabetes Maternal Grandmother     Cancer Paternal Grandmother     Cancer Paternal Grandfather     Stroke Other     Cervical cancer Other     Diabetes Other     Esophageal cancer Other     Hypertension Other     Lung cancer Other     Pancreatic cancer Other     Skin cancer Other         SOCIAL HISTORY:   Social History     Socioeconomic History    Marital status:    Tobacco Use    Smoking status: Every Day     Packs/day: 1.00     Years: 29.00     Additional pack years: 0.00     Total pack years: 29.00     Types: Cigarettes     Start date: 1993     Passive exposure: Current    Smokeless tobacco: Never   Vaping Use    Vaping Use: Never used   Substance and Sexual Activity    Alcohol use: Not Currently    Drug use: Never    Sexual activity: Defer        SURGICAL HISTORY:   Past Surgical History:   Procedure Laterality Date    BILATERAL OOPHORECTOMY      CARPAL TUNNEL RELEASE Bilateral     CHOLECYSTECTOMY      COLONOSCOPY  03/30/2018    HUMERUS FRACTURE SURGERY Left     has implants - plate, screws    LIPOMA EXCISION      low back    NEPHRECTOMY Right 12/20/2023    Procedure: NEPHRECTOMY LAPAROSCOPIC  HAND ASSISTED;  Surgeon: Aaron Mart MD;  Location: [facility];  Service: Urology;  Laterality: Right;    NEPHRECTOMY RADICAL Right     ROTATOR CUFF REPAIR Right     TOTAL ABDOMINAL HYSTERECTOMY      TUBAL ABDOMINAL LIGATION      URETEROSCOPY LASER LITHOTRIPSY WITH STENT INSERTION Right 03/02/2023    Procedure: URETEROSCOPY LASER LITHOTRIPSY WITH STENT INSERTION;  Surgeon: Aaron Mart MD;  Location: [facility] OR;  Service: Urology;  Laterality: Right;        ALLERGIES: Sulfa antibiotics and Morphine       PHYSICAL EXAM:   VITAL SIGNS:   Vitals:    01/03/24 1937   BP: 110/81   Pulse: 104   Resp: 18   Temp: 100 °F (37.8 °C)   SpO2: 94%      CONSTITUTIONAL: Awake, well appearing, nontoxic   HENT: Atraumatic, normocephalic, oral mucosa moist, airway patent. Nares patent without drainage. External ears normal.   EYES: Conjunctivae clear, EOMI, PERRL   NECK: Trachea midline, nontender, supple   CARDIOVASCULAR: Mild tachycardia, Normal rhythm.  PULMONARY/CHEST: Normal work of breathing. Clear to auscultation, no rhonchi, wheezes, or rales.  ABDOMINAL: Nondistended, soft, nontender, no rebound or guarding.  NEUROLOGIC: Nonfocal, moves all four extremities, no gross sensory or motor deficits.   EXTREMITIES: No clubbing, cyanosis, or edema   SKIN: Warm, Dry, No erythema, No rash.  Incisions are clean, dry and intact      ED COURSE / MEDICAL DECISION MAKING:     Veronica Goldsmith is a 48 y.o. female who presents to the emergency department for evaluation of flank and abdominal pain in the setting of recent nephrectomy.  Well-developed, well-nourished lady in no distress with exam as above.  Her vital signs are notable for mild tachycardia.  Her oxygen saturation is normal on room air at 90%.  Her exam is relatively benign.  Will obtain labs, CT scan and give symptomatic treatment.  Disposition pending.    Differential diagnosis includes postoperative complication, musculoskeletal pain among other etiologies.    Lab  work notable for leukocytosis.  CT scan reveals a large fluid collection concerning for abscess.  Discussed with Dr. Mart, will plan for drain placement tomorrow.  Discussed with Dr. Kerley for admission.  Antibiotics initiated.  Patient updated and is agreeable.    Final diagnoses:   Fluid collection at surgical site, initial encounter        Richard Valdivia MD  01/03/24 8744

## 2024-01-04 NOTE — H&P
CC  Chief Complaint   Patient presents with    Flank Pain        HPI  Veronica Goldsmith is a 48 y.o. with history of   1. Fluid collection at surgical site, initial encounter         + recent fevers   Does not take any blood thinners    Past Medical History  Past Medical History:   Diagnosis Date    Acne rosacea, erythematous telangiectatic type     Acute bronchitis     history of    Acute sinusitis     Acute upper respiratory infection     hisotry of    Allergic contact dermatitis     Allergic rhinitis     Anxiety and depression     Asthma     Back pain, chronic     Benign paroxysmal positional vertigo     Blood clots in stool     Candidal dermatitis     Candidiasis of vulva and vagina     Cellulitis     AND ABSCESS, right lower abdomen    Cervical pain     Conjunctivitis     Contusion of ear     Corneal abrasion     Depression     Diabetes mellitus     type 2 - A1C better after weight loss with use of Semiglutide    Dyspnea, unspecified     Fibromyalgia     Flu vaccine need     Foot pain, right     GERD (gastroesophageal reflux disease)     Hand pain, left     Hearing loss, right     Heart palpitations     Hemorrhoids, external     Herpes simplex without complication     Herpes zoster lesion     Hypertension     Impetigo herpetiformis     Injury to tibial blood vessels, unspecified laterality, initial encounter     Kidney cyst     Right kidney    Kidney stones     Left humeral fracture     Low back pain     Lymphadenopathy     Migraine headache     Mixed hyperlipidemia     TAM (nonalcoholic steatohepatitis)     Neuropathy     Oral candidiasis     Plantar fasciitis     Recent weight loss     60lbs with use of Ozempic - per pt 12/6/23    Salpingitis and oophoritis, unspecified     not specified as acute, subacute, or chronic    Scabies     Seasonal allergies     Stress     Swelling of limb     Symptoms involving abdomen and pelvis     OTHER SYMPTOMS INVOLVING ABDOMEN AND PELVIS, ABDOMINAL OR PELVIC SWELLING, MASS, OR  LUMP, OTHER SPECIFIED S    Tattoo     Tendon pain     Tobacco use disorder     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     Urinary tract infection     Vaginitis and vulvovaginitis     Vaginitis, atrophic        Past Surgical History  Past Surgical History:   Procedure Laterality Date    BILATERAL OOPHORECTOMY      CARPAL TUNNEL RELEASE Bilateral     CHOLECYSTECTOMY      COLONOSCOPY  03/30/2018    HUMERUS FRACTURE SURGERY Left     has implants - plate, screws    LIPOMA EXCISION      low back    NEPHRECTOMY Right 12/20/2023    Procedure: NEPHRECTOMY LAPAROSCOPIC HAND ASSISTED;  Surgeon: Aaron Mart MD;  Location: [facility];  Service: Urology;  Laterality: Right;    NEPHRECTOMY RADICAL Right     ROTATOR CUFF REPAIR Right     TOTAL ABDOMINAL HYSTERECTOMY      TUBAL ABDOMINAL LIGATION      URETEROSCOPY LASER LITHOTRIPSY WITH STENT INSERTION Right 03/02/2023    Procedure: URETEROSCOPY LASER LITHOTRIPSY WITH STENT INSERTION;  Surgeon: Aaron Mart MD;  Location: [facility] OR;  Service: Urology;  Laterality: Right;       Medications    Current Facility-Administered Medications:     acetaminophen (TYLENOL) tablet 650 mg, 650 mg, Oral, Q4H PRN **OR** acetaminophen (TYLENOL) 160 MG/5ML oral solution 650 mg, 650 mg, Oral, Q4H PRN **OR** acetaminophen (TYLENOL) suppository 650 mg, 650 mg, Rectal, Q4H PRN, Kerley, Brian Joseph, DO    sennosides-docusate (PERICOLACE) 8.6-50 MG per tablet 2 tablet, 2 tablet, Oral, BID **AND** polyethylene glycol (MIRALAX) packet 17 g, 17 g, Oral, Daily PRN **AND** bisacodyl (DULCOLAX) EC tablet 5 mg, 5 mg, Oral, Daily PRN **AND** bisacodyl (DULCOLAX) suppository 10 mg, 10 mg, Rectal, Daily PRN, Kerley, Brian Joseph, DO    budesonide-formoterol (SYMBICORT) 160-4.5 MCG/ACT inhaler 2 puff, 2 puff, Inhalation, BID - RT, 2 puff at 01/04/24 0654 **AND** tiotropium (SPIRIVA RESPIMAT) 2.5 mcg/act aerosol solution inhaler, 2 puff, Inhalation, Daily - RT, Kerley, Brian Joseph, DO, 2  puff at 01/04/24 0653    Calcium Replacement - Follow Nurse / BPA Driven Protocol, , Does not apply, PRN, Kerley, Brian Joseph, DO    ketorolac (TORADOL) injection 30 mg, 30 mg, Intravenous, Q6H PRN, Kerley, Brian Joseph, DO, 30 mg at 01/04/24 0531    Magnesium Standard Dose Replacement - Follow Nurse / BPA Driven Protocol, , Does not apply, PRN, Kerley, Brian Joseph,     nitroglycerin (NITROSTAT) SL tablet 0.4 mg, 0.4 mg, Sublingual, Q5 Min PRN, Kerley, Brian Joseph, DO    ondansetron (ZOFRAN) injection 4 mg, 4 mg, Intravenous, Q6H PRN, Kerley, Brian Joseph,     Pharmacy to Dose Zosyn, , Does not apply, Continuous PRN, Kerley, Brian Joseph, DO    Phosphorus Replacement - Follow Nurse / BPA Driven Protocol, , Does not apply, PRN, Kerley, Brian Joseph, DO    piperacillin-tazobactam (ZOSYN) 3.375 g in iso-osmotic dextrose 50 ml (premix), 3.375 g, Intravenous, Q8H, Kerley, Brian Joseph, , 3.375 g at 01/04/24 0518    Potassium Replacement - Follow Nurse / BPA Driven Protocol, , Does not apply, PRN, Kerley, Brian Joseph,     sodium chloride 0.9 % flush 10 mL, 10 mL, Intravenous, PRN, Kerley, Brian Joseph,     sodium chloride 0.9 % flush 10 mL, 10 mL, Intravenous, Q12H, Kerley, Brian Joseph,     sodium chloride 0.9 % flush 10 mL, 10 mL, Intravenous, PRN, Kerley, Brian Joseph,     sodium chloride 0.9 % infusion 40 mL, 40 mL, Intravenous, PRN, Kerley, Brian Joseph,     sodium chloride 0.9 % infusion, 150 mL/hr, Intravenous, Continuous, Demetrius Ortiz MD, Last Rate: 75 mL/hr at 01/04/24 0518, 75 mL/hr at 01/04/24 0518    Current Outpatient Medications:     albuterol sulfate  (90 Base) MCG/ACT inhaler, INHALE 3 PUFFS BY MOUTH EVERY 6 HOURS AS NEEDED, Disp: 8.5 g, Rfl: 12    aspirin 81 MG EC tablet, Take 1 tablet by mouth Daily., Disp: , Rfl:     cetirizine (zyrTEC) 10 MG tablet, TAKE ONE TABLET BY MOUTH EVERY DAY, Disp: 30 tablet, Rfl: 12    docusate sodium (Colace) 100 MG capsule, Take 1 capsule by  mouth 2 (Two) Times a Day. If taking pain pill, Disp: 15 capsule, Rfl: 1    esomeprazole (nexIUM) 40 MG capsule, TAKE ONE CAPSULE BY MOUTH TWO TIMES A DAY, Disp: 180 capsule, Rfl: 1    fluconazole (Diflucan) 100 MG tablet, Take 1 tablet by mouth Daily., Disp: 14 tablet, Rfl: 0    Fluticasone-Umeclidin-Vilant (Trelegy Ellipta) 200-62.5-25 MCG/ACT aerosol powder , Inhale 1 puff Daily., Disp: 28 each, Rfl: 1    losartan (COZAAR) 25 MG tablet, TAKE 1 TABLET BY MOUTH DAILY, Disp: 90 tablet, Rfl: 3    oxyCODONE (ROXICODONE) 10 MG tablet, Take 1 tablet by mouth Every 6 (Six) Hours As Needed for Moderate Pain or Severe Pain., Disp: 10 tablet, Rfl: 0    rosuvastatin (CRESTOR) 40 MG tablet, TAKE 1 TABLET BY MOUTH DAILY, Disp: 90 tablet, Rfl: 3    Allergies  Allergies   Allergen Reactions    Sulfa Antibiotics Unknown - High Severity     high fever - pt states 106F    Morphine Other (See Comments)     drop in O2 sats; needed oxygent; pt states she has to be reminded to breathe;        Social History  Social History     Socioeconomic History    Marital status:    Tobacco Use    Smoking status: Every Day     Packs/day: 1.00     Years: 29.00     Additional pack years: 0.00     Total pack years: 29.00     Types: Cigarettes     Start date: 1993     Passive exposure: Current    Smokeless tobacco: Never   Vaping Use    Vaping Use: Never used   Substance and Sexual Activity    Alcohol use: Not Currently    Drug use: Never    Sexual activity: Defer       Review of Systems  Constitutional: No fevers or chills  Skin: Negative for rash  Endocrine: No heat/cold intolerance   Cardiovascular: Negative for chest pain or dyspnea on exertion  Respiratory: Negative for shortness of breath or wheezing  Gastrointestinal: No constipation, nausea or vomiting  Genitourinary: Negative for new lower urinary tract symptoms, current gross hematuria or dysuria.  Musculoskeletal: No flank pain  Neurological:  Negative for frequent headaches or  "dizziness  Lymph/Heme: Negative for leg swelling or calf pain.    Physical Exam  Visit Vitals  BP (!) 89/67   Pulse 72   Temp 100 °F (37.8 °C) (Oral)   Resp 18   Ht 165.1 cm (65\")   Wt 80.4 kg (177 lb 3.2 oz)   SpO2 95%   BMI 29.49 kg/m²     Constitutional: NAD, WDWN.   HEENT: NCAT. Conjunctivae normal.  MMM.    Cardiovascular: Regular rate.  Pulmonary/Chest: Respirations are even and unlabored bilaterally.  Abdominal: Soft. No distension, tenderness, masses or guarding. No CVA tenderness.  Neurological: A + O x 3.  Cranial Nerves II-XII grossly intact. Normal gait.  Extremities: MELISSA x 4, Warm. No clubbing.  No cyanosis.    Skin: Pink, warm and dry.  No rashes noted.  Psychiatric:  Normal mood and affect    Labs & Imaging  Lab Results   Component Value Date    GLUCOSE 124 (H) 01/04/2024    CALCIUM 8.4 (L) 01/04/2024     01/04/2024    K 4.0 01/04/2024    CO2 23.2 01/04/2024     01/04/2024    BUN 9 01/04/2024    CREATININE 1.10 (H) 01/04/2024    BCR 8.2 01/04/2024    ANIONGAP 10.8 01/04/2024     Lab Results   Component Value Date    WBC 11.82 (H) 01/04/2024    HGB 11.3 (L) 01/04/2024    HCT 33.6 (L) 01/04/2024    MCV 91.3 01/04/2024     01/04/2024     Brief Urine Lab Results  (Last result in the past 365 days)        Color   Clarity   Blood   Leuk Est   Nitrite   Protein   CREAT   Urine HCG        01/03/24 2322 Yellow   Cloudy   Negative   Negative   Negative   30 mg/dL (1+)                 Urine Culture          12/27/2023    12:04   Urine Culture   Urine Culture No growth         CT Abdomen Pelvis With Contrast    Result Date: 1/3/2024  FINAL REPORT TECHNIQUE: null CLINICAL HISTORY: recent right nephrectomy, pain, fever COMPARISON: null FINDINGS: CT abdomen and pelvis with contrast Comparison: None Findings: There is a small right pleural effusion. There is mild atelectasis in the right lower lobe. Patient is status post right nephrectomy. There is a rim enhancing fluid collection containing a " small amount of gas within the previous location of the right kidney measuring 10.6 x 7.8 cm axially and 18 cm vertically. There is a small amount of gas in the right retroperitoneum just inferior to the fluid collection as well. There is linear fat stranding and a small amount of gas in the right abdominal wall consistent with the recent surgical incision. There is no free air. The fluid collection in the right nephrectomy bed produces mass effect on the right lobe of the liver. Liver is otherwise unremarkable. Patient is status post cholecystectomy. The pancreas, spleen, left adrenal gland, and left kidney are unremarkable. The right adrenal gland appears to be compressed by the fluid collection and is not well-visualized. The gastrointestinal tract is unremarkable. Patient is status post hysterectomy. Aorta is normal diameter. There are no enlarged lymph nodes. There is no fracture or suspicious lytic or sclerotic lesion.     Impression: 1. Large fluid collection containing small amount of gas in the previous location of the right kidney consistent with an abscess. 2. Small right pleural effusion. Authenticated and Electronically Signed by Shayne Stoner MD on 01/03/2024 09:50:45 PM    XR Chest PA & Lateral    Result Date: 12/27/2023  PROCEDURE: XR CHEST PA AND LATERAL-   HISTORY: Cough/fever.; R50.9-Fever, unspecified  COMPARISON: None.  FINDINGS:  The lungs are clear.   There is no evidence of effusion or other pleural disease.  The mediastinum has a normal appearance.  The cardiac silhouette is unremarkable.      Unremarkable chest exam.    This report was signed and finalized on 12/27/2023 12:18 PM by Ivan Melo MD.      Transversus Abdominis Plane (TAP) Block - Bilateral    Result Date: 12/20/2023  Dominic Cervantes CRNA     12/20/2023  7:53 AM Transversus Abdominis Plane (TAP) Block - Bilateral Patient reassessed immediately prior to procedure Start time: 12/20/2023 7:35 AM Stop time: 12/20/2023  7:40 AM Reason for block: at surgeon's request and post-op pain management Performed by CRNA/CAA: Dominic Cervantes CRNA Preanesthetic Checklist Completed: patient identified, IV checked, site marked, risks and benefits discussed, surgical consent, monitors and equipment checked, pre-op evaluation and timeout performed Prep: Pt Position: supine Sterile barriers:gloves, cap, sterile barriers and mask Prep: ChloraPrep Patient monitoring: blood pressure monitoring, continuous pulse oximetry and EKG Procedure Performed under: general Guidance:ultrasound guided ULTRASOUND INTERPRETATION.  Using ultrasound guidance a 20 G gauge needle was placed in close proximity to the nerve, at which point, under ultrasound guidance anesthetic was injected in the area of the nerve and spread of the anesthesia was seen on ultrasound in close proximity thereto.  There were no abnormalities seen on ultrasound; a digital image was taken; and the patient tolerated the procedure with no complications. Images:still images obtained Laterality:Bilateral Block Type:TAP Injection Technique:single-shot Needle Type:echogenic Resistance on Injection: none Post Assessment Injection Assessment: negative aspiration for heme, no paresthesia on injection and incremental injection Patient Tolerance:comfortable throughout block Complications:no Additional Notes Procedure:      BILATERAL TAP BLOCKS                          Patient analgesia was achieved with General Anesthesia The pt was placed in the Supine Position and under Ultrasound guidance, an echogenic or touhy needle was advanced with Normal Saline hydro dissection of tissue.  The Internal Oblique and Transversus Abdominus muscles were visualized.  At or before the aponeurosis of Internal Oblique, the local anesthetic spread was visualized in the Transversus Abdominus Plane. Injection was made incrementally with aspiration every 5 mls.  There was no intravascular injection;  injection pressure  was normal; there was no neural injection; and the procedure was completed without difficulty.           Assessment  Veronica Goldsmith is a 48 y.o. female who presents with the following diagnosis:  1. Fluid collection at surgical site, initial encounter         Plan  1. To OR for PERCUTANEOUS US GUIDED DRAIN PLACEMENT     Aaron Mart MD

## 2024-01-04 NOTE — CONSULTS
Reason for Consult  Retroperitoneal abscess    HPI  Ms. Goldsmith is a 48 y.o. female with past medical history of renal cell carcinoma, status post right laparoscopic nephrectomy on 12/20/2023, who presents with postoperative abscess in nephrectomy bed.    She has been having worsening abdominal pain and subjective fevers at home and presented to the ER last night.  CT scan shows a large abscess in the retroperitoneum beneath the liver.    Past Medical History  Past Medical History:   Diagnosis Date    Acne rosacea, erythematous telangiectatic type     Acute bronchitis     history of    Acute sinusitis     Acute upper respiratory infection     hisotry of    Allergic contact dermatitis     Allergic rhinitis     Anxiety and depression     Asthma     Back pain, chronic     Benign paroxysmal positional vertigo     Blood clots in stool     Candidal dermatitis     Candidiasis of vulva and vagina     Cellulitis     AND ABSCESS, right lower abdomen    Cervical pain     Conjunctivitis     Contusion of ear     Corneal abrasion     Depression     Diabetes mellitus     type 2 - A1C better after weight loss with use of Semiglutide    Dyspnea, unspecified     Fibromyalgia     Flu vaccine need     Foot pain, right     GERD (gastroesophageal reflux disease)     Hand pain, left     Hearing loss, right     Heart palpitations     Hemorrhoids, external     Herpes simplex without complication     Herpes zoster lesion     Hypertension     Impetigo herpetiformis     Injury to tibial blood vessels, unspecified laterality, initial encounter     Kidney cyst     Right kidney    Kidney stones     Left humeral fracture     Low back pain     Lymphadenopathy     Migraine headache     Mixed hyperlipidemia     TAM (nonalcoholic steatohepatitis)     Neuropathy     Oral candidiasis     Plantar fasciitis     Recent weight loss     60lbs with use of Ozempic - per pt 12/6/23    Salpingitis and oophoritis, unspecified     not specified as acute, subacute,  or chronic    Scabies     Seasonal allergies     Stress     Swelling of limb     Symptoms involving abdomen and pelvis     OTHER SYMPTOMS INVOLVING ABDOMEN AND PELVIS, ABDOMINAL OR PELVIC SWELLING, MASS, OR LUMP, OTHER SPECIFIED S    Tattoo     Tendon pain     Tobacco use disorder     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     Urinary tract infection     Vaginitis and vulvovaginitis     Vaginitis, atrophic        Past Surgical History  Past Surgical History:   Procedure Laterality Date    BILATERAL OOPHORECTOMY      CARPAL TUNNEL RELEASE Bilateral     CHOLECYSTECTOMY      COLONOSCOPY  03/30/2018    HUMERUS FRACTURE SURGERY Left     has implants - plate, screws    LIPOMA EXCISION      low back    NEPHRECTOMY Right 12/20/2023    Procedure: NEPHRECTOMY LAPAROSCOPIC HAND ASSISTED;  Surgeon: Aaron Mart MD;  Location: [facility] OR;  Service: Urology;  Laterality: Right;    NEPHRECTOMY RADICAL Right     ROTATOR CUFF REPAIR Right     TOTAL ABDOMINAL HYSTERECTOMY      TUBAL ABDOMINAL LIGATION      URETEROSCOPY LASER LITHOTRIPSY WITH STENT INSERTION Right 03/02/2023    Procedure: URETEROSCOPY LASER LITHOTRIPSY WITH STENT INSERTION;  Surgeon: Aaron Mart MD;  Location: [facility] OR;  Service: Urology;  Laterality: Right;       Medications    Current Facility-Administered Medications:     acetaminophen (TYLENOL) tablet 650 mg, 650 mg, Oral, Q4H PRN **OR** acetaminophen (TYLENOL) 160 MG/5ML oral solution 650 mg, 650 mg, Oral, Q4H PRN **OR** acetaminophen (TYLENOL) suppository 650 mg, 650 mg, Rectal, Q4H PRN, Kerley, Brian Joseph, DO    sennosides-docusate (PERICOLACE) 8.6-50 MG per tablet 2 tablet, 2 tablet, Oral, BID **AND** polyethylene glycol (MIRALAX) packet 17 g, 17 g, Oral, Daily PRN **AND** bisacodyl (DULCOLAX) EC tablet 5 mg, 5 mg, Oral, Daily PRN **AND** bisacodyl (DULCOLAX) suppository 10 mg, 10 mg, Rectal, Daily PRN, Kerley, Brian Joseph, DO    budesonide-formoterol (SYMBICORT) 160-4.5 MCG/ACT  inhaler 2 puff, 2 puff, Inhalation, BID - RT, 2 puff at 01/04/24 0654 **AND** tiotropium (SPIRIVA RESPIMAT) 2.5 mcg/act aerosol solution inhaler, 2 puff, Inhalation, Daily - RT, Kerley, Brian Joseph, DO, 2 puff at 01/04/24 0653    Calcium Replacement - Follow Nurse / BPA Driven Protocol, , Does not apply, PRN, Kerley, Brian Joseph,     ceFAZolin Sodium-Dextrose (ANCEF) IVPB (duplex) 2,000 mg, 2,000 mg, Intravenous, Once, Aaron Mart MD    lactated ringers infusion 1,000 mL, 1,000 mL, Intravenous, Continuous, Aaron Mart MD, Last Rate: 25 mL/hr at 01/04/24 0846, 1,000 mL at 01/04/24 0846    Magnesium Standard Dose Replacement - Follow Nurse / BPA Driven Protocol, , Does not apply, PRN, Kerley, Brian Joseph, DO    nitroglycerin (NITROSTAT) SL tablet 0.4 mg, 0.4 mg, Sublingual, Q5 Min PRN, Kerley, Brian Joseph, DO    ondansetron (ZOFRAN) injection 4 mg, 4 mg, Intravenous, Q6H PRN, Kerley, Brian Joseph,     Pharmacy to Dose Zosyn, , Does not apply, Continuous PRN, Kerley, Brian Joseph, DO    Phosphorus Replacement - Follow Nurse / BPA Driven Protocol, , Does not apply, PRN, Kerley, Brian Joseph, DO    piperacillin-tazobactam (ZOSYN) 3.375 g in iso-osmotic dextrose 50 ml (premix), 3.375 g, Intravenous, Q8H, Kerley, Brian Joseph, DO, 3.375 g at 01/04/24 0518    Potassium Replacement - Follow Nurse / BPA Driven Protocol, , Does not apply, PRN, Kerley, Brian Joseph,     sodium chloride 0.9 % flush 10 mL, 10 mL, Intravenous, PRN, Kerley, Brian Joseph,     sodium chloride 0.9 % flush 10 mL, 10 mL, Intravenous, Q12H, Kerley, Brian Joseph,     sodium chloride 0.9 % flush 10 mL, 10 mL, Intravenous, PRN, Kerley, Brian Joseph, DO    sodium chloride 0.9 % infusion 40 mL, 40 mL, Intravenous, PRN, Kerley, Brian Joseph, DO    sodium chloride 0.9 % infusion, 150 mL/hr, Intravenous, Continuous, Demetrius Ortiz MD, Last Rate: 75 mL/hr at 01/04/24 0518, 75 mL/hr at 01/04/24 0518    Allergies  Allergies    Allergen Reactions    Sulfa Antibiotics Unknown - High Severity     high fever - pt states 106F    Morphine Other (See Comments)     drop in O2 sats; needed oxygent; pt states she has to be reminded to breathe;        Social History  Social History     Social History Narrative    Not on file       Family History  Family History   Problem Relation Age of Onset    Diabetes Mother     Esophageal cancer Father     Hypertension Father     Cancer Father     Cancer Maternal Grandmother     Diabetes Maternal Grandmother     Cancer Paternal Grandmother     Cancer Paternal Grandfather     Stroke Other     Cervical cancer Other     Diabetes Other     Esophageal cancer Other     Hypertension Other     Lung cancer Other     Pancreatic cancer Other     Skin cancer Other          Physical Exam  Vitals:    01/04/24 0654 01/04/24 0700 01/04/24 0730 01/04/24 0833   BP:  (!) 89/58 (!) 89/67 91/51   BP Location:    Right arm   Patient Position:    Sitting   Pulse:  84 72 85   Resp: 18   18   Temp:    97.1 °F (36.2 °C)   TempSrc:    Temporal   SpO2:  94% 95% 94%   Weight:       Height:         Constitutional: NAD, WDWN  HEENT: NCAT. Conjunctivae normal  MMM  Cardiovascular: Regular rate  Pulmonary/Chest: Respirations are even and nonlabored bilaterally  Abdominal: Soft with no distension, moderate right upper quadrant tenderness, no masses, moderate right CVA tenderness  Neurological: A + O, cranial nerves II-XII grossly intact,   Extremities: MELISSA x 4, warm, no clubbing, no cyanosis  Skin: Pink, warm, dry with no rash  Psychiatric:  Normal mood and affect      I/O last 3 completed shifts:  In: 2100 [IV Piggyback:2100]  Out: -     Labs  Lab Results   Component Value Date    GLUCOSE 124 (H) 01/04/2024    CALCIUM 8.4 (L) 01/04/2024     01/04/2024    K 4.0 01/04/2024    CO2 23.2 01/04/2024     01/04/2024    BUN 9 01/04/2024    CREATININE 1.10 (H) 01/04/2024    BCR 8.2 01/04/2024    ANIONGAP 10.8 01/04/2024       Lab Results  "  Component Value Date    WBC 11.82 (H) 01/04/2024    HGB 11.3 (L) 01/04/2024    HCT 33.6 (L) 01/04/2024    MCV 91.3 01/04/2024     01/04/2024       Brief Urine Lab Results  (Last result in the past 365 days)        Color   Clarity   Blood   Leuk Est   Nitrite   Protein   CREAT   Urine HCG        01/03/24 2322 Yellow   Cloudy   Negative   Negative   Negative   30 mg/dL (1+)                   No results found for: \"URINECX\"    Radiographic Studies  CT Abdomen Pelvis With Contrast    Result Date: 1/3/2024  FINAL REPORT TECHNIQUE: null CLINICAL HISTORY: recent right nephrectomy, pain, fever COMPARISON: null FINDINGS: CT abdomen and pelvis with contrast Comparison: None Findings: There is a small right pleural effusion. There is mild atelectasis in the right lower lobe. Patient is status post right nephrectomy. There is a rim enhancing fluid collection containing a small amount of gas within the previous location of the right kidney measuring 10.6 x 7.8 cm axially and 18 cm vertically. There is a small amount of gas in the right retroperitoneum just inferior to the fluid collection as well. There is linear fat stranding and a small amount of gas in the right abdominal wall consistent with the recent surgical incision. There is no free air. The fluid collection in the right nephrectomy bed produces mass effect on the right lobe of the liver. Liver is otherwise unremarkable. Patient is status post cholecystectomy. The pancreas, spleen, left adrenal gland, and left kidney are unremarkable. The right adrenal gland appears to be compressed by the fluid collection and is not well-visualized. The gastrointestinal tract is unremarkable. Patient is status post hysterectomy. Aorta is normal diameter. There are no enlarged lymph nodes. There is no fracture or suspicious lytic or sclerotic lesion.     Impression: 1. Large fluid collection containing small amount of gas in the previous location of the right kidney consistent " with an abscess. 2. Small right pleural effusion. Authenticated and Electronically Signed by Shayne Stoner MD on 01/03/2024 09:50:45 PM    XR Chest PA & Lateral    Result Date: 12/27/2023  PROCEDURE: XR CHEST PA AND LATERAL-   HISTORY: Cough/fever.; R50.9-Fever, unspecified  COMPARISON: None.  FINDINGS:  The lungs are clear.   There is no evidence of effusion or other pleural disease.  The mediastinum has a normal appearance.  The cardiac silhouette is unremarkable.      Unremarkable chest exam.    This report was signed and finalized on 12/27/2023 12:18 PM by Ivan Melo MD.      Transversus Abdominis Plane (TAP) Block - Bilateral    Result Date: 12/20/2023  Dominic Cervantes CRNA     12/20/2023  7:53 AM Transversus Abdominis Plane (TAP) Block - Bilateral Patient reassessed immediately prior to procedure Start time: 12/20/2023 7:35 AM Stop time: 12/20/2023 7:40 AM Reason for block: at surgeon's request and post-op pain management Performed by CRNA/CAA: Dominic Cervantes CRNA Preanesthetic Checklist Completed: patient identified, IV checked, site marked, risks and benefits discussed, surgical consent, monitors and equipment checked, pre-op evaluation and timeout performed Prep: Pt Position: supine Sterile barriers:gloves, cap, sterile barriers and mask Prep: ChloraPrep Patient monitoring: blood pressure monitoring, continuous pulse oximetry and EKG Procedure Performed under: general Guidance:ultrasound guided ULTRASOUND INTERPRETATION.  Using ultrasound guidance a 20 G gauge needle was placed in close proximity to the nerve, at which point, under ultrasound guidance anesthetic was injected in the area of the nerve and spread of the anesthesia was seen on ultrasound in close proximity thereto.  There were no abnormalities seen on ultrasound; a digital image was taken; and the patient tolerated the procedure with no complications. Images:still images obtained Laterality:Bilateral Block Type:TAP Injection  Technique:single-shot Needle Type:echogenic Resistance on Injection: none Post Assessment Injection Assessment: negative aspiration for heme, no paresthesia on injection and incremental injection Patient Tolerance:comfortable throughout block Complications:no Additional Notes Procedure:      BILATERAL TAP BLOCKS                          Patient analgesia was achieved with General Anesthesia The pt was placed in the Supine Position and under Ultrasound guidance, an echogenic or touhy needle was advanced with Normal Saline hydro dissection of tissue.  The Internal Oblique and Transversus Abdominus muscles were visualized.  At or before the aponeurosis of Internal Oblique, the local anesthetic spread was visualized in the Transversus Abdominus Plane. Injection was made incrementally with aspiration every 5 mls.  There was no intravascular injection;  injection pressure was normal; there was no neural injection; and the procedure was completed without difficulty.       I have personally reviewed these labs and imaging.     Assessment  Ms. Goldsmith is a 48 y.o. female with right retroperitoneal abscess after right laparoscopic nephrectomy several weeks ago.  She has had fevers, leukocytosis, hypotension.    Plan  1.  Admission with resuscitation with fluids and antibiotics per hospital medicine; agree with Zosyn for antibiosis  2.  OR today with me for ultrasound-guided retroperitoneal drain placement.  3.  If she clinically improves I think she would likely be fit for discharge in the next couple days, pending cultures, which I will send from the drain.  4.  I recommend stopping Toradol, which I have canceled, given her solitary kidney and mild azotemia with hypotension.      Aaron Mart MD

## 2024-01-05 ENCOUNTER — READMISSION MANAGEMENT (OUTPATIENT)
Dept: CALL CENTER | Facility: HOSPITAL | Age: 49
End: 2024-01-05
Payer: COMMERCIAL

## 2024-01-05 VITALS
WEIGHT: 178.35 LBS | SYSTOLIC BLOOD PRESSURE: 117 MMHG | HEIGHT: 65 IN | TEMPERATURE: 97.6 F | RESPIRATION RATE: 18 BRPM | BODY MASS INDEX: 29.72 KG/M2 | HEART RATE: 64 BPM | OXYGEN SATURATION: 94 % | DIASTOLIC BLOOD PRESSURE: 70 MMHG

## 2024-01-05 LAB
ACID FAST STN SPEC: NEGATIVE
ANION GAP SERPL CALCULATED.3IONS-SCNC: 12.6 MMOL/L (ref 5–15)
BUN SERPL-MCNC: 9 MG/DL (ref 6–20)
BUN/CREAT SERPL: 9 (ref 7–25)
CALCIUM SPEC-SCNC: 8.4 MG/DL (ref 8.6–10.5)
CHLORIDE SERPL-SCNC: 109 MMOL/L (ref 98–107)
CO2 SERPL-SCNC: 20.4 MMOL/L (ref 22–29)
CREAT SERPL-MCNC: 1 MG/DL (ref 0.57–1)
DEPRECATED RDW RBC AUTO: 45.3 FL (ref 37–54)
EGFRCR SERPLBLD CKD-EPI 2021: 70.5 ML/MIN/1.73
ERYTHROCYTE [DISTWIDTH] IN BLOOD BY AUTOMATED COUNT: 13.3 % (ref 12.3–15.4)
GLUCOSE SERPL-MCNC: 153 MG/DL (ref 65–99)
HCT VFR BLD AUTO: 31.5 % (ref 34–46.6)
HGB BLD-MCNC: 10.3 G/DL (ref 12–15.9)
MCH RBC QN AUTO: 30.5 PG (ref 26.6–33)
MCHC RBC AUTO-ENTMCNC: 32.7 G/DL (ref 31.5–35.7)
MCV RBC AUTO: 93.2 FL (ref 79–97)
PLATELET # BLD AUTO: 311 10*3/MM3 (ref 140–450)
PMV BLD AUTO: 10.3 FL (ref 6–12)
POTASSIUM SERPL-SCNC: 4.9 MMOL/L (ref 3.5–5.2)
RBC # BLD AUTO: 3.38 10*6/MM3 (ref 3.77–5.28)
SODIUM SERPL-SCNC: 142 MMOL/L (ref 136–145)
SPECIMEN PREPARATION: NORMAL
WBC NRBC COR # BLD AUTO: 11.82 10*3/MM3 (ref 3.4–10.8)

## 2024-01-05 PROCEDURE — 99238 HOSP IP/OBS DSCHRG MGMT 30/<: CPT | Performed by: INTERNAL MEDICINE

## 2024-01-05 PROCEDURE — 80048 BASIC METABOLIC PNL TOTAL CA: CPT | Performed by: UROLOGY

## 2024-01-05 PROCEDURE — 99024 POSTOP FOLLOW-UP VISIT: CPT | Performed by: NURSE PRACTITIONER

## 2024-01-05 PROCEDURE — 94799 UNLISTED PULMONARY SVC/PX: CPT

## 2024-01-05 PROCEDURE — 25010000002 PIPERACILLIN SOD-TAZOBACTAM PER 1 G: Performed by: UROLOGY

## 2024-01-05 PROCEDURE — 85027 COMPLETE CBC AUTOMATED: CPT | Performed by: UROLOGY

## 2024-01-05 RX ORDER — AMOXICILLIN AND CLAVULANATE POTASSIUM 875; 125 MG/1; MG/1
1 TABLET, FILM COATED ORAL 2 TIMES DAILY
Qty: 14 TABLET | Refills: 0 | Status: SHIPPED | OUTPATIENT
Start: 2024-01-05 | End: 2024-01-05 | Stop reason: SDUPTHER

## 2024-01-05 RX ORDER — AMOXICILLIN AND CLAVULANATE POTASSIUM 875; 125 MG/1; MG/1
1 TABLET, FILM COATED ORAL 2 TIMES DAILY
Qty: 28 TABLET | Refills: 0 | Status: SHIPPED | OUTPATIENT
Start: 2024-01-05 | End: 2024-01-19

## 2024-01-05 RX ADMIN — TIOTROPIUM BROMIDE INHALATION SPRAY 2 PUFF: 3.12 SPRAY, METERED RESPIRATORY (INHALATION) at 07:19

## 2024-01-05 RX ADMIN — Medication 10 ML: at 08:42

## 2024-01-05 RX ADMIN — BUDESONIDE AND FORMOTEROL FUMARATE DIHYDRATE 2 PUFF: 160; 4.5 AEROSOL RESPIRATORY (INHALATION) at 07:18

## 2024-01-05 RX ADMIN — PIPERACILLIN SODIUM AND TAZOBACTAM SODIUM 3.38 G: 3; .375 INJECTION, SOLUTION INTRAVENOUS at 05:33

## 2024-01-05 NOTE — DISCHARGE SUMMARY
[facility]   DISCHARGE SUMMARY      Name:  Veronica Goldsmith   Age:  48 y.o.  Sex:  female  :  1975  MRN:  [id number]   Visit Number:  [id number]    Admission Date:  1/3/2024  Date of Discharge:  2024  Primary Care Physician:  Greg Levine MD    Important issues to note:    1.  Patient was admitted with low-grade fever, hypotension and right flank pain secondary to postoperative abscess and was treated with IV Zosyn and ultrasound-guided drain placement by Dr. Mart on 2024.  2.  She is being discharged home with oral antibiotics therapy with Augmentin for 2 weeks.  3.  Follow-up with Dr. Mart in 7 to 10 days.  4.  Follow-up with primary care provider with abscess fluid culture results.    Discharge Diagnoses:     Postoperative surgical site abscess, POA, status post ultrasound-guided drainage tube placement on 2024.  Sepsis with hypotension secondary to #1, POA, resolved.  Recent history of right nephrectomy for renal cell carcinoma on 2023.  Nonalcoholic fatty liver disease.  Chronic kidney disease stage II.  Diabetes mellitus type 2.  Mild persistent asthma.  Gastroesophageal reflux disease.  Essential hypertension.    Problem List:     Active Hospital Problems    Diagnosis  POA    **Postoperative abscess [T81.49XA]  Yes    Fluid collection at surgical site [T88.8XXA]  Yes    Essential hypertension, benign [I10]  Yes    Gastroesophageal reflux disease without esophagitis [K21.9]  Yes    TAM (nonalcoholic steatohepatitis) [K75.81]  Yes    Type 2 diabetes mellitus, without long-term current use of insulin [E11.9]  Yes      Resolved Hospital Problems   No resolved problems to display.     Presenting Problem:    Chief Complaint   Patient presents with    Flank Pain      Consults:     Consulting Physician(s)         Provider   Role Specialty     Aaron Mart MD  Consulting Physician Urology          Procedures Performed:    Ultrasound-guided  "percutaneous abscess drain placement right back on 1/4/2024.    History of presenting illness/Hospital Course:    Veronica Goldsmith is a 48-year-old woman past medical history of type 2 diabetes, hypertension, mixed hyperlipidemia, nonalcoholic steatohepatitis, tobacco use, renal insufficiency, mild persistent asthma, anxiety and depression, GERD, on Ozempic, migraines, recent nephrectomy for renal cell carcinoma.  Presented to [facility] ED on 1/3/2024 directed by primary care visit earlier in the day with concern for fatigue, decreased p.o. intake, abdominal pain mainly on the right side where she had a nephrectomy, a \"firm mass\" on her right flank persistent since surgery tender to the touch.  Patient apparently recently had some upper respiratory symptoms and was treated by primary care provider with a course of Augmentin for 7 days.     ED summary: Tmax 100, tachycardic, otherwise vital signs stable on room air.  Sodium 133, creatinine 1.2, blood glucose 116, lactate not elevated, procalcitonin not elevated, lipase not elevated, leukocytosis, hemoglobin 12.6.  Blood cultures collected.  CT abdomen/pelvis large fluid collection containing small amount of gas in the previous location of the right kidney consistent with abscess, small right pleural effusion. She was provided Zosyn, 2 L normal saline bolus, Valium, Tylenol.  ED spoke with urology-Dr. Mart who recommended admission for abscess drainage and antibiotics therapy.    Patient was admitted to the medical floor and was continued on IV antibiotics therapy with Zosyn.  She did have intermittent hypotension which was responsive to fluids.  She subsequently underwent ultrasound-guided right back abscess drainage with drain catheter placement by Dr. Mart on 1/4/2024 and tolerated the procedure well.  Over 500 mL of purulent fluid was drained.  Patient is currently feeling better and is eager to go home.  Abscess Gram stain is showing gram-negative rods as well as " gram-positive cocci in pairs.  Culture report is currently pending.  We will discharge her home on Augmentin for 2 weeks.  She is advised to follow-up with urology in 7 to 10 days.  Follow-up with primary care provider in 1 week.  Discussed with urology services and nursing staff.  Discussed with the patient's  Jeovanny who is at the bedside.    Vital Signs:    Temp:  [94.3 °F (34.6 °C)-97.6 °F (36.4 °C)] 97.6 °F (36.4 °C)  Heart Rate:  [46-68] 64  Resp:  [14-19] 18  BP: ()/(53-75) 117/70    Physical Exam:    General Appearance:  Alert and cooperative.    Head:  Atraumatic and normocephalic.   Eyes: Conjunctivae and sclerae normal, no icterus. No pallor.   Ears:  Ears with no abnormalities noted.   Throat: No oral lesions, no thrush, oral mucosa moist.   Neck: Supple, trachea midline, no thyromegaly.   Back:   No kyphoscoliosis present. No tenderness to palpation.   Lungs:   Breath sounds heard bilaterally equally.  No crackles or wheezing. No Pleural rub or bronchial breathing.   Heart:  Normal S1 and S2, no murmur, no gallop, no rub. No JVD.   Abdomen:   Normal bowel sounds, no masses, no organomegaly. Soft, nontender, nondistended, no rebound tenderness.  Recent laparoscopic surgical scars noted on the abdominal wall well-healing.  Right posterior lower back surgical drain noted in place with purulent drainage noted in the bag.   Extremities: Supple, no edema, no cyanosis, no clubbing.   Pulses: Pulses palpable bilaterally.   Skin: No bleeding or rash.   Neurologic: Alert and oriented x 3. No facial asymmetry. Moves all four limbs. No tremors.     Pertinent Lab Results:     Results from last 7 days   Lab Units 01/05/24  0709 01/04/24  0624 01/03/24  1933   SODIUM mmol/L 142 139 133*   POTASSIUM mmol/L 4.9 4.0 4.2   CHLORIDE mmol/L 109* 105 96*   CO2 mmol/L 20.4* 23.2 26.3   BUN mg/dL 9 9 8   CREATININE mg/dL 1.00 1.10* 1.20*   CALCIUM mg/dL 8.4* 8.4* 8.8   BILIRUBIN mg/dL  --   --  0.4   ALK PHOS U/L   --   --  67   ALT (SGPT) U/L  --   --  17   AST (SGOT) U/L  --   --  25   GLUCOSE mg/dL 153* 124* 116*     Results from last 7 days   Lab Units 01/05/24  0709 01/04/24  0624 01/03/24  1933   WBC 10*3/mm3 11.82* 11.82* 14.28*   HEMOGLOBIN g/dL 10.3* 11.3* 12.6   HEMATOCRIT % 31.5* 33.6* 37.0   PLATELETS 10*3/mm3 311 328 352       Results from last 7 days   Lab Units 01/03/24  1933   LIPASE U/L 44         Results from last 7 days   Lab Units 01/04/24  1028 01/03/24  2240 01/03/24 2235   BLOODCX   --  No growth at 24 hours No growth at 24 hours   WOUNDCX  Heavy growth (4+) Escherichia coli*  --   --      Pertinent Radiology Results:    Imaging Results (All)       Procedure Component Value Units Date/Time    CT Abdomen Pelvis With Contrast [[id number]] Collected: 01/03/24 2150     Updated: 01/03/24 2152    Narrative:      FINAL REPORT    TECHNIQUE:  null    CLINICAL HISTORY:  recent right nephrectomy, pain, fever    COMPARISON:  null    FINDINGS:  CT abdomen and pelvis with contrast    Comparison: None    Findings:    There is a small right pleural effusion. There is mild atelectasis in the right lower lobe.    Patient is status post right nephrectomy. There is a rim enhancing fluid collection containing a small amount of gas within the previous location of the right kidney measuring 10.6 x 7.8 cm axially and 18 cm vertically. There is a small amount of gas in   the right retroperitoneum just inferior to the fluid collection as well. There is linear fat stranding and a small amount of gas in the right abdominal wall consistent with the recent surgical incision. There is no free air. The fluid collection in the   right nephrectomy bed produces mass effect on the right lobe of the liver. Liver is otherwise unremarkable. Patient is status post cholecystectomy. The pancreas, spleen, left adrenal gland, and left kidney are unremarkable. The right adrenal gland   appears to be compressed by the fluid collection and is not  well-visualized. The gastrointestinal tract is unremarkable. Patient is status post hysterectomy. Aorta is normal diameter. There are no enlarged lymph nodes. There is no fracture or suspicious   lytic or sclerotic lesion.      Impression:      Impression:    1. Large fluid collection containing small amount of gas in the previous location of the right kidney consistent with an abscess.    2. Small right pleural effusion.    Authenticated and Electronically Signed by Shayne Stoner MD on  01/03/2024 09:50:45 PM     Condition on Discharge:      Stable.    Code status during the hospital stay:    Code Status and Medical Interventions:   Ordered at: 01/03/24 6007     Code Status (Patient has no pulse and is not breathing):    CPR (Attempt to Resuscitate)     Medical Interventions (Patient has pulse or is breathing):    Full Support     Discharge Disposition:    Home or Self Care    Discharge Medications:       Discharge Medications        New Medications        Instructions Start Date   amoxicillin-clavulanate 875-125 MG per tablet  Commonly known as: AUGMENTIN   1 tablet, Oral, 2 Times Daily             Continue These Medications        Instructions Start Date   albuterol sulfate  (90 Base) MCG/ACT inhaler  Commonly known as: PROVENTIL HFA;VENTOLIN HFA;PROAIR HFA   INHALE 3 PUFFS BY MOUTH EVERY 6 HOURS AS NEEDED      aspirin 81 MG EC tablet   81 mg, Oral, Daily      cetirizine 10 MG tablet  Commonly known as: zyrTEC   TAKE ONE TABLET BY MOUTH EVERY DAY      docusate sodium 100 MG capsule  Commonly known as: Colace   100 mg, Oral, 2 Times Daily, If taking pain pill      esomeprazole 40 MG capsule  Commonly known as: nexIUM   TAKE ONE CAPSULE BY MOUTH TWO TIMES A DAY      fluconazole 100 MG tablet  Commonly known as: Diflucan   100 mg, Oral, Daily      losartan 25 MG tablet  Commonly known as: COZAAR   TAKE 1 TABLET BY MOUTH DAILY      oxyCODONE 10 MG tablet  Commonly known as: ROXICODONE   10 mg, Oral, Every 6  Hours PRN      rosuvastatin 40 MG tablet  Commonly known as: CRESTOR   TAKE 1 TABLET BY MOUTH DAILY      Trelegy Ellipta 200-62.5-25 MCG/ACT aerosol powder   Generic drug: Fluticasone-Umeclidin-Vilant   1 puff, Inhalation, Daily             Discharge Diet:     Diet Instructions       Diet: Cardiac Diets; Healthy Heart (2-3 Na+); Regular Texture (IDDSI 7); Thin (IDDSI 0)      Discharge Diet: Cardiac Diets    Cardiac Diet: Healthy Heart (2-3 Na+)    Texture: Regular Texture (IDDSI 7)    Fluid Consistency: Thin (IDDSI 0)          Activity at Discharge:     Activity Instructions       Activity as Tolerated            Follow-up Appointments:      Future Appointments   Date Time Provider Department Center   1/10/2024 10:30 AM Devonte Zavala MD [facility]   1/11/2024 11:00 AM Aaron Mart MD [facility]   1/12/2024 10:30 AM Greg Levine MD [facility]   2/16/2024  8:45 AM Greg Levine MD [facility]   6/27/2024  8:30 AM Aaron Mart MD [facility]     Test Results Pending at Discharge:    Pending Labs       Order Current Status    AFB Culture - Fine Needle Aspirate, Kidney, Right In process    Anaerobic Culture - Fine Needle Aspirate, Kidney, Right In process    Fungus Culture - Fine Needle Aspirate, Kidney, Right In process    Blood Culture With CRAIG - Blood, Arm, Left Preliminary result    Blood Culture With CRAIG - Blood, Hand, Left Preliminary result    Wound Culture - Surgical Site, Kidney, Right Preliminary result             Demetrius Ortiz MD  01/05/24  10:25 EST    Time: I spent 25 minutes on this discharge activity which included: face-to-face encounter with the patient, reviewing the data in the system, coordination of the care with the nursing staff as well as consultants, documentation, and entering orders.     Dictated utilizing Dragon dictation.

## 2024-01-05 NOTE — PAYOR COMM NOTE
"To:  [place]  From: Sara Conroy RN  Phone: [phone]  Fax: [phone]  NPI: [id number]  TIN: [id number]  Member ID: [id number]   MRN: [id number]    Veronica Goldsmith (48 y.o. Female)       Date of Birth   1975    Social Security Number       Address   [address] [place] [address]    Home Phone   [phone]    MRN   [id number]       [employer]    Marital Status                               Admission Date   1/3/24    Admission Type   Emergency    Admitting Provider   Demetrius Ortiz MD    Attending Provider   Demetrius Ortiz MD    Department, Room/Bed   [facility] TELEMETRY 3, 304/1       Discharge Date       Discharge Disposition       Discharge Destination                                 Attending Provider: Demetrius Ortiz MD    Allergies: Sulfa Antibiotics, Morphine    Isolation: None   Infection: None   Code Status: CPR    Ht: 165.1 cm (65\")   Wt: 80.9 kg (178 lb 5.6 oz)    Admission Cmt: None   Principal Problem: Postoperative abscess [T81.49XA]                   Active Insurance as of 1/3/2024       Primary Coverage       Payor Plan Insurance Group Employer/Plan Group    [employer] CROSS BLUE SHIELD PPO [id number]       Payor Plan Address Payor Plan Phone Number Payor Plan Fax Number Effective Dates    [address] [phone]  1/1/2017 - None Entered    [place] [address]         Subscriber Name Subscriber Birth Date Member ID       VERONICA GOLDSMITH 1975 [id number]                     Emergency Contacts        (Rel.) Home Phone Work Phone Mobile Phone    TY GOLDSMITH (Spouse) -- -- [phone]              Vital Signs (last day)       Date/Time Temp Temp src Pulse Resp BP Patient Position SpO2    01/05/24 0718 -- -- -- 18 -- -- 91    01/05/24 0700 97.6 (36.4) Oral -- 18 -- Lying --    01/05/24 0300 97.2 (36.2) Oral -- 18 -- Lying --    01/05/24 0240 -- -- 59 -- 99/63 -- --    01/05/24 0000 -- -- 46 -- 105/73 -- 95    " 01/04/24 2330 -- -- 52 -- 99/75 -- 92    01/04/24 2300 96.4 (35.8) Oral -- 18 -- Lying --    01/04/24 2230 -- -- 55 -- 89/62 -- 95    01/04/24 2200 -- -- 60 -- 99/66 -- --    01/04/24 2000 -- -- -- -- 92/61 -- --    01/04/24 1930 96.5 (35.8) Oral -- 18 -- Lying --    01/04/24 1855 97.5 (36.4) Oral -- -- -- -- --    01/04/24 1830 -- -- 52 -- 91/65 -- 93    01/04/24 1800 94.3 (34.6) Oral -- -- -- -- --    01/04/24 1557 97.6 (36.4) Oral -- 18 82/61 Lying --    01/04/24 1300 -- -- -- -- 99/64 -- --    01/04/24 1246 -- -- 65 -- 94/62 -- 94    01/04/24 1200 97 (36.1) Temporal 54 18 86/63 Lying 96    01/04/24 1150 -- -- 56 18 83/57 Lying 95    01/04/24 1140 -- -- 64 15 83/57 Lying 94    01/04/24 1135 -- -- 58 17 92/65 Lying 94    01/04/24 1130 -- -- -- 17 -- -- --    01/04/24 1120 -- -- 57 18 81/55 Lying 95    01/04/24 1110 -- -- 56 19 82/57 Lying 95    01/04/24 1100 97 (36.1) Temporal 62 16 88/59 Lying 95    01/04/24 1055 -- -- 68 16 81/57 Lying 95    01/04/24 1050 -- -- 65 16 84/53 Lying 95    01/04/24 1045 -- -- 60 14 82/56 Lying 96    01/04/24 1040 -- -- 64 14 85/56 Lying 96    01/04/24 1035 -- -- 62 14 80/54 Lying 92    01/04/24 1030 -- -- 63 14 85/65 Lying 94    01/04/24 1025 -- -- 64 18 82/56 Lying 94    01/04/24 1020 -- -- 63 12 85/56 Lying 98    01/04/24 1015 -- -- 62 15 77/57 Lying 96    01/04/24 1010 -- -- 62 12 81/56 Lying 98    01/04/24 1005 -- -- 66 14 87/59 Lying 97    01/04/24 1000 -- -- 73 16 87/59 Lying 95    01/04/24 0956 97 (36.1) Temporal 67 18 89/61 Lying 98    01/04/24 0833 97.1 (36.2) Temporal 85 18 91/51 Sitting 94    01/04/24 0730 -- -- 72 -- 89/67 -- 95    01/04/24 0700 -- -- 84 -- 89/58 -- 94    01/04/24 0654 -- -- -- 18 -- -- --    01/04/24 0630 -- -- 98 -- 91/59 -- 94    01/04/24 0600 -- -- 80 -- 97/68 -- 93    01/04/24 0530 -- -- 93 -- 98/57 -- 94    01/04/24 0500 -- -- 87 -- 99/65 -- 94    01/04/24 0430 -- -- 87 -- 96/62 -- 97    01/04/24 0417 -- -- 95 -- 96/66 -- 97    01/04/24 0400 -- --  86 -- 101/65 -- 95    01/04/24 0330 -- -- 82 -- 95/66 -- 96    01/04/24 0300 -- -- 80 -- 96/64 -- 96    01/04/24 0230 -- -- 76 -- 92/72 -- 96    01/04/24 0200 -- -- 82 -- 94/72 -- 96    01/04/24 0130 -- -- 84 -- 94/63 -- 95    01/04/24 0100 -- -- 80 -- 91/69 -- 96    01/04/24 0030 -- -- 78 -- 97/73 -- 96    01/04/24 0000 -- -- -- -- 96/71 -- --          Current Facility-Administered Medications   Medication Dose Route Frequency Provider Last Rate Last Admin    acetaminophen (TYLENOL) tablet 650 mg  650 mg Oral Q4H PRN Aaron Mart MD        Or    acetaminophen (TYLENOL) 160 MG/5ML oral solution 650 mg  650 mg Oral Q4H PRN Aaron Mart MD        Or    acetaminophen (TYLENOL) suppository 650 mg  650 mg Rectal Q4H PRN Aaron Mart MD        sennosides-docusate (PERICOLACE) 8.6-50 MG per tablet 2 tablet  2 tablet Oral BID Aaron Mart MD        And    polyethylene glycol (MIRALAX) packet 17 g  17 g Oral Daily PRN Aaron Mart MD        And    bisacodyl (DULCOLAX) EC tablet 5 mg  5 mg Oral Daily PRN Aaron Mart MD        And    bisacodyl (DULCOLAX) suppository 10 mg  10 mg Rectal Daily PRN Aaron Mart MD        budesonide-formoterol (SYMBICORT) 160-4.5 MCG/ACT inhaler 2 puff  2 puff Inhalation BID - RT Aaron Mart MD   2 puff at 01/05/24 0718    And    tiotropium (SPIRIVA RESPIMAT) 2.5 mcg/act aerosol solution inhaler  2 puff Inhalation Daily - RT Aaron Mart MD   2 puff at 01/05/24 0719    Calcium Replacement - Follow Nurse / BPA Driven Protocol   Does not apply PRN Aaron Mart MD        lactated ringers infusion 1,000 mL  1,000 mL Intravenous Continuous Aaron Mart MD   Stopped at 01/04/24 0949    Magnesium Standard Dose Replacement - Follow Nurse / BPA Driven Protocol   Does not apply PRN Aaron Mart MD        nitroglycerin (NITROSTAT) SL tablet 0.4 mg  0.4 mg Sublingual Q5 Min PRN Aaron Mart,  MD        ondansetron (ZOFRAN) injection 4 mg  4 mg Intravenous Q6H PRN Aaron Mart MD        Pharmacy to Dose Zosyn   Does not apply Continuous PRN Aaron Mart MD        Phosphorus Replacement - Follow Nurse / BPA Driven Protocol   Does not apply Aaron Nance MD        piperacillin-tazobactam (ZOSYN) 3.375 g in iso-osmotic dextrose 50 ml (premix)  3.375 g Intravenous Q8H Aaron Mart MD   3.375 g at 01/05/24 0533    Potassium Replacement - Follow Nurse / BPA Driven Protocol   Does not apply PRN Aaron Mart MD        sodium chloride 0.9 % flush 10 mL  10 mL Intravenous PRN Aaron Mart MD        sodium chloride 0.9 % flush 10 mL  10 mL Intravenous Q12H Aaron Mart MD   10 mL at 01/04/24 2235    sodium chloride 0.9 % flush 10 mL  10 mL Intravenous PRN Aaron Mart MD        sodium chloride 0.9 % infusion 40 mL  40 mL Intravenous PRN Aaron Mart MD        sodium chloride 0.9 % infusion  150 mL/hr Intravenous Continuous Aaron Mart MD 75 mL/hr at 01/04/24 0518 75 mL/hr at 01/04/24 0518     Lab Results (last 24 hours)       Procedure Component Value Units Date/Time    Basic Metabolic Panel [[id number]] Collected: 01/05/24 0709    Specimen: Blood Updated: 01/05/24 0721    CBC (No Diff) [[id number]] Collected: 01/05/24 0709    Specimen: Blood Updated: 01/05/24 0721    Blood Culture With CRAIG - Blood, Hand, Left [[id number]]  (Normal) Collected: 01/03/24 2240    Specimen: Blood from Hand, Left Updated: 01/04/24 2300     Blood Culture No growth at 24 hours    Blood Culture With CRAIG - Blood, Arm, Left [[id number]]  (Normal) Collected: 01/03/24 2235    Specimen: Blood from Arm, Left Updated: 01/04/24 2300     Blood Culture No growth at 24 hours    Wound Culture - Surgical Site, Kidney, Right [[id number]] Collected: 01/04/24 1028    Specimen: Surgical Site from Kidney, Right Updated: 01/04/24 1058     Gram Stain Greater than 25 WBCs  seen      Rare (1+) Gram positive cocci in pairs      Few (2+) Gram negative bacilli    Anaerobic Culture - Fine Needle Aspirate, Kidney, Right [[id number]] Collected: 01/04/24 0923    Specimen: Fine Needle Aspirate from Kidney, Right Updated: 01/04/24 1025    Fungus Culture - Fine Needle Aspirate, Kidney, Right [[id number]] Collected: 01/04/24 0923    Specimen: Fine Needle Aspirate from Kidney, Right Updated: 01/04/24 1025    AFB Culture - Fine Needle Aspirate, Kidney, Right [[id number]] Collected: 01/04/24 0923    Specimen: Fine Needle Aspirate from Kidney, Right Updated: 01/04/24 1025          Imaging Results (Last 24 Hours)       ** No results found for the last 24 hours. **          Orders (last 24 hrs)        Start     Ordered    01/05/24 0600  Basic Metabolic Panel  Morning Draw         01/04/24 0751    01/05/24 0600  CBC (No Diff)  Morning Draw         01/04/24 0751    01/04/24 1915  sodium chloride 0.9 % bolus 1,000 mL  Once         01/04/24 1822    01/04/24 1737  Diet: Liquid Diets; Clear Liquid; Fluid Consistency: Thin (IDDSI 0)  Diet Effective Now         01/04/24 1736    01/04/24 1028  Wound Culture - Surgical Site, Kidney, Right  Once        Comments: FINE NEEDLE ASPIRATE      01/04/24 1034    01/04/24 1006  Vital signs every 5 minutes for 15 minutes, every 15 minutes thereafter.  Once,   Status:  Canceled         01/04/24 1005    01/04/24 1006  Notify Anesthesia of Any Acute Changes in Patient Condition  Until Discontinued,   Status:  Canceled         01/04/24 1005    01/04/24 1006  Notify Anesthesia for Unrelieved Pain  Until Discontinued,   Status:  Canceled         01/04/24 1005    01/04/24 1006  Once DC criteria to floor met, follow surgeon's orders.  Continuous,   Status:  Canceled         01/04/24 1005    01/04/24 1006  Discharge patient from PACU when discharge criteria is met.  Continuous,   Status:  Canceled         01/04/24 1005    01/04/24 1005  HYDROmorphone (DILAUDID) injection 0.5 mg  Every  15 Minutes PRN,   Status:  Discontinued         01/04/24 1005    01/04/24 1005  ondansetron (ZOFRAN) injection 4 mg  Once As Needed,   Status:  Discontinued         01/04/24 1005    01/04/24 0945  Fungus Culture - Fine Needle Aspirate, Kidney, Right  RELEASE UPON ORDERING         01/04/24 0945    01/04/24 0945  Gram Stain - No Culture  RELEASE UPON ORDERING,   Status:  Canceled         01/04/24 0945    01/04/24 0945  AFB Culture - Fine Needle Aspirate, Kidney, Right  RELEASE UPON ORDERING         01/04/24 0945    01/04/24 0945  Anaerobic Culture 10 Day Incubation - Fine Needle Aspirate, Kidney, Right  RELEASE UPON ORDERING,   Status:  Canceled         01/04/24 0945    01/04/24 0945  Anaerobic Culture - Fine Needle Aspirate, Kidney, Right  RELEASE UPON ORDERING         01/04/24 0945    01/04/24 0930  ceFAZolin Sodium-Dextrose (ANCEF) IVPB (duplex) 2,000 mg  Once         01/04/24 0840    01/04/24 0930  lactated ringers infusion 1,000 mL  Continuous         01/04/24 0840    01/04/24 0918  bupivacaine-EPINEPHrine PF (MARCAINE w/EPI) 0.5% -1:007043 injection  As Needed,   Status:  Discontinued         01/04/24 0918    01/04/24 0841  Follow Anesthesia Guidelines / Protocol  Once,   Status:  Canceled         01/04/24 0840    01/04/24 0841  Obtain Informed Consent  Once,   Status:  Canceled         01/04/24 0840    01/04/24 0841  Verify / Perform Chlorhexidine Skin Prep  Once,   Status:  Canceled        Comments: Chlorhexidine Skin Wipes and Instructions For All Patients Having A Procedure Requiring an Outward Incision if Not Allergic.  If Allergic, Give Antibacterial Skin Wipes and Instructions.  Do Not Use For Facial Cases or on Any Mucus Membranes.    01/04/24 0840    01/04/24 0841  Follow Anesthesia Guidelines / Protocol  Once,   Status:  Canceled         01/04/24 0840    01/04/24 0841  Insert Peripheral IV  Once,   Status:  Canceled         01/04/24 0840    01/04/24 0841  Maintain IV Access  Continuous,   Status:   Canceled         01/04/24 0840    01/04/24 0807  sodium chloride 0.9 % bolus 500 mL  Once         01/04/24 0751    01/04/24 0800  Oral Care  2 Times Daily       01/04/24 0021    01/04/24 0726  Case Request  Once         01/04/24 0727    01/04/24 0726  Follow Anesthesia Guidelines / Protocol  Continuous         01/04/24 0727    01/04/24 0726  Perform Chlorhexidine Skin Prep Night Before and Morning of Procedure  Until Discontinued        Comments: Chlorhexidine Skin Prep and Instructions For All Patients Having A Procedure Requiring an Outward Incision if Not Allergic. If Allergic, Give Antibacterial Skin Wipes and Instructions. Do Not Use For Facial Cases or on Any Mucus Membranes.    01/04/24 0727    01/04/24 0700  tiotropium (SPIRIVA RESPIMAT) 2.5 mcg/act aerosol solution inhaler  Daily - RT        See Hyperspace for full Linked Orders Report.    01/04/24 0021    01/04/24 0500  piperacillin-tazobactam (ZOSYN) 3.375 g in iso-osmotic dextrose 50 ml (premix)  Every 8 Hours         01/04/24 0211    01/04/24 0400  Vital Signs  Every 4 Hours       01/04/24 0021    01/04/24 0100  Strict Intake & Output  Every Hour       01/04/24 0021    01/04/24 0037  budesonide-formoterol (SYMBICORT) 160-4.5 MCG/ACT inhaler 2 puff  2 Times Daily - RT        See Hyperspace for full Linked Orders Report.    01/04/24 0021    01/04/24 0037  sodium chloride 0.9 % flush 10 mL  Every 12 Hours Scheduled         01/04/24 0021    01/04/24 0037  sennosides-docusate (PERICOLACE) 8.6-50 MG per tablet 2 tablet  2 Times Daily        See Hyperspace for full Linked Orders Report.    01/04/24 0021    01/04/24 0037  sodium chloride 0.9 % infusion  Continuous         01/04/24 0021    01/04/24 0021  Intake & Output  Every Shift       01/04/24 0021    01/04/24 0021  sodium chloride 0.9 % flush 10 mL  As Needed         01/04/24 0021    01/04/24 0021  sodium chloride 0.9 % infusion 40 mL  As Needed         01/04/24 0021    01/04/24 0021  acetaminophen  (TYLENOL) tablet 650 mg  Every 4 Hours PRN        See Hyperspace for full Linked Orders Report.    01/04/24 0021    01/04/24 0021  acetaminophen (TYLENOL) 160 MG/5ML oral solution 650 mg  Every 4 Hours PRN        See Hyperspace for full Linked Orders Report.    01/04/24 0021    01/04/24 0021  acetaminophen (TYLENOL) suppository 650 mg  Every 4 Hours PRN        See Hyperspace for full Linked Orders Report.    01/04/24 0021    01/04/24 0021  polyethylene glycol (MIRALAX) packet 17 g  Daily PRN        See Hyperspace for full Linked Orders Report.    01/04/24 0021    01/04/24 0021  bisacodyl (DULCOLAX) EC tablet 5 mg  Daily PRN        See Hyperspace for full Linked Orders Report.    01/04/24 0021    01/04/24 0021  bisacodyl (DULCOLAX) suppository 10 mg  Daily PRN        See Hyperspace for full Linked Orders Report.    01/04/24 0021    01/04/24 0021  ondansetron (ZOFRAN) injection 4 mg  Every 6 Hours PRN         01/04/24 0021    01/04/24 0021  nitroglycerin (NITROSTAT) SL tablet 0.4 mg  Every 5 Minutes PRN         01/04/24 0021    01/04/24 0021  Potassium Replacement - Follow Nurse / BPA Driven Protocol  As Needed         01/04/24 0021    01/04/24 0021  Magnesium Standard Dose Replacement - Follow Nurse / BPA Driven Protocol  As Needed         01/04/24 0021    01/04/24 0021  Phosphorus Replacement - Follow Nurse / BPA Driven Protocol  As Needed         01/04/24 0021    01/04/24 0021  Calcium Replacement - Follow Nurse / BPA Driven Protocol  As Needed         01/04/24 0021    01/04/24 0021  Pharmacy to Dose Zosyn  Continuous PRN         01/04/24 0021    01/04/24 0021  ketorolac (TORADOL) injection 30 mg  Every 6 Hours PRN,   Status:  Discontinued         01/04/24 0021    01/03/24 1931  sodium chloride 0.9 % flush 10 mL  As Needed         01/03/24 1932    Unscheduled  Up With Assistance  As Needed       01/04/24 0021    Signed and Held  Oxygen Therapy- Nasal Cannula; 2 LPM  Continuous PRN         Signed and Held    Signed  "and Held  Pulse Oximetry, Continuous  Continuous PRN         Signed and Held    Signed and Held  Anesthesia Follow-Up  Once        Provider:  (Not yet assigned)    Signed and Held    --  SCANNED - TELEMETRY           24 0000                     Physician Progress Notes (last 24 hours)        Demetrius Ortiz MD at 24 0954              [facility]    Name:  Veronica Goldsmith   Age:  48 y.o.  Sex:  female  :  1975  MRN:  [id number]   Visit Number:  [id number]  Admission Date:  1/3/2024  Date Of Service:  24  Primary Care Physician:  Greg Levine MD     LOS: 1 day :    Chief Complaint:      Right flank pain.    Subjective:    Ms. Goldsmith was seen and examined this morning in the emergency room.  She was admitted overnight secondary to post nephrectomy abscess and she is currently awaiting to go to the OR for abscess drainage and possible drain placement by Dr. Mart.  She does have low blood pressures but denies any chest pain or shortness of breath.  She states that her blood pressures are usually high and she takes medications.  Previous physician documentation, laboratory and imaging data have been reviewed.    Hospital Course:    Veronica Goldsmith is a 48-year-old woman past medical history of type 2 diabetes, hypertension, mixed hyperlipidemia, nonalcoholic steatohepatitis, tobacco use, renal insufficiency, mild persistent asthma, anxiety and depression, GERD, on Ozempic, migraines, recent nephrectomy for renal cell carcinoma.  [facility]ed [facility] ED on 1/3/2024 directed by primary care visit earlier in the day with concern for fatigue, decreased p.o. intake, abdominal pain mainly on the right side where she had a nephrectomy, a \"firm mass\" on her right flank persistent since surgery tender to the touch.  Patient apparently recently had some upper respiratory symptoms and was treated by primary care provider with a course of Augmentin for 7 days.   "   ED summary: Tmax 100, tachycardic, otherwise vital signs stable on room air.  Sodium 133, creatinine 1.2, blood glucose 116, lactate not elevated, procalcitonin not elevated, lipase not elevated, leukocytosis, hemoglobin 12.6.  Blood cultures collected.  CT abdomen/pelvis large fluid collection containing small amount of gas in the previous location of the right kidney consistent with abscess, small right pleural effusion. She was provided Zosyn, 2 L normal saline bolus, Valium, Tylenol.  ED spoke with urology-Dr. Mart who recommended admission for abscess drainage and antibiotics therapy.    Review of Systems:     All systems were reviewed and negative except as mentioned in subjective, assessment and plan.    Vital Signs:    Temp:  [97.1 °F (36.2 °C)-100 °F (37.8 °C)] 97.1 °F (36.2 °C)  Heart Rate:  [] 85  Resp:  [18] 18  BP: ()/(51-81) 91/51    Intake and output:    I/O last 3 completed shifts:  In: 2100 [IV Piggyback:2100]  Out: -   I/O this shift:  In: 650 [I.V.:600; IV Piggyback:50]  Out: -     Physical Examination:    General Appearance:  Alert and cooperative.    Head:  Atraumatic and normocephalic.   Eyes: Conjunctivae and sclerae normal, no icterus. No pallor.   Throat: No oral lesions, no thrush, oral mucosa moist.   Neck: Supple, trachea midline, no thyromegaly.   Lungs:   Breath sounds heard bilaterally equally.  No wheezing or crackles. No Pleural rub or bronchial breathing.   Heart:  Normal S1 and S2, no murmur, no gallop, no rub. No JVD.   Abdomen:   Normal bowel sounds, no masses, no organomegaly. Soft, minimal right-sided tenderness, nondistended, no rebound tenderness.  Laparoscopic surgical puncture wounds as well as horizontal surgical scar noted in the right lower abdomen.  No abdominal wall erythema noted.   Extremities: Supple, no edema, no cyanosis, no clubbing.   Skin: No bleeding or rash.   Neurologic: Alert and oriented x 3. No facial asymmetry. Moves all four limbs. No  tremors.      Laboratory results:    Results from last 7 days   Lab Units 01/04/24  0624 01/03/24  1933   SODIUM mmol/L 139 133*   POTASSIUM mmol/L 4.0 4.2   CHLORIDE mmol/L 105 96*   CO2 mmol/L 23.2 26.3   BUN mg/dL 9 8   CREATININE mg/dL 1.10* 1.20*   CALCIUM mg/dL 8.4* 8.8   BILIRUBIN mg/dL  --  0.4   ALK PHOS U/L  --  67   ALT (SGPT) U/L  --  17   AST (SGOT) U/L  --  25   GLUCOSE mg/dL 124* 116*     Results from last 7 days   Lab Units 01/04/24  0624 01/03/24  1933 01/03/24  1701   WBC 10*3/mm3 11.82* 14.28*  --    HEMOGLOBIN g/dL 11.3* 12.6  --    HEMOGLOBIN, POC g/dL  --   --  12.1   HEMATOCRIT % 33.6* 37.0  --    PLATELETS 10*3/mm3 328 352  --      I have reviewed the patient's laboratory results.    Radiology results:    CT Abdomen Pelvis With Contrast    Result Date: 1/3/2024  FINAL REPORT TECHNIQUE: null CLINICAL HISTORY: recent right nephrectomy, pain, fever COMPARISON: null FINDINGS: CT abdomen and pelvis with contrast Comparison: None Findings: There is a small right pleural effusion. There is mild atelectasis in the right lower lobe. Patient is status post right nephrectomy. There is a rim enhancing fluid collection containing a small amount of gas within the previous location of the right kidney measuring 10.6 x 7.8 cm axially and 18 cm vertically. There is a small amount of gas in the right retroperitoneum just inferior to the fluid collection as well. There is linear fat stranding and a small amount of gas in the right abdominal wall consistent with the recent surgical incision. There is no free air. The fluid collection in the right nephrectomy bed produces mass effect on the right lobe of the liver. Liver is otherwise unremarkable. Patient is status post cholecystectomy. The pancreas, spleen, left adrenal gland, and left kidney are unremarkable. The right adrenal gland appears to be compressed by the fluid collection and is not well-visualized. The gastrointestinal tract is unremarkable. Patient  is status post hysterectomy. Aorta is normal diameter. There are no enlarged lymph nodes. There is no fracture or suspicious lytic or sclerotic lesion.     Impression: Impression: 1. Large fluid collection containing small amount of gas in the previous location of the right kidney consistent with an abscess. 2. Small right pleural effusion. Authenticated and Electronically Signed by Shayne Stoner MD on 01/03/2024 09:50:45 PM   I have reviewed the patient's radiology reports.    Medication Review:     I have reviewed the patient's active and prn medications.     Problem List:      Postoperative abscess    Essential hypertension, benign    Gastroesophageal reflux disease without esophagitis    TAM (nonalcoholic steatohepatitis)    Type 2 diabetes mellitus, without long-term current use of insulin    Fluid collection at surgical site    Assessment:    Postoperative surgical site abscess, POA.  Sepsis with hypotension secondary to #1, POA.  Recent history of right nephrectomy for renal cell carcinoma on 12/20/2023.  Nonalcoholic fatty liver disease.  Chronic kidney disease stage II.  Diabetes mellitus type 2.  Mild persistent asthma.  Gastroesophageal reflux disease.  Essential hypertension.    Plan:    Sepsis/postoperative abscess.  - Continue IV antibiotics therapy with Zosyn.  - Blood cultures have been done.  - Dr. Mart from urology has been consulted.  - Patient scheduled for OR today for abscess drainage.  - We will give her another normal saline bolus of 500 mL prior to surgery and continue on normal saline at 150 mL/h.    Diabetes mellitus type 2/hypertension/CKD.  - Blood sugars are fairly stable with a recent hemoglobin A1c at 5.3 on 12/21/2023.  - Hold off on blood pressure medications at this time due to hypotension.    Discussed with nursing staff at the bedside.    DVT Prophylaxis: SCDs  Code Status: Full  Diet: N.p.o.  Discharge Plan: Pending-hopefully home in 1 to 2 days.    Demetrius Ortiz,  MD  01/04/24  10:05 EST    Dictated utilizing Dragon dictation.      Electronically signed by Demetrius Ortiz MD at 01/04/24 1005          Consult Notes (last 24 hours)        Aaron Mart MD at 01/04/24 0851        Consult Orders    1. Inpatient Urology Consult [[id number]] ordered by Kerley, Brian Joseph, DO at 01/03/24 2313                 Reason for Consult  Retroperitoneal abscess    HPI  Ms. Goldsmith is a 48 y.o. female with past medical history of renal cell carcinoma, status post right laparoscopic nephrectomy on 12/20/2023, who presents with postoperative abscess in nephrectomy bed.    She has been having worsening abdominal pain and subjective fevers at home and presented to the ER last night.  CT scan shows a large abscess in the retroperitoneum beneath the liver.    Past Medical History  Past Medical History:   Diagnosis Date    Acne rosacea, erythematous telangiectatic type     Acute bronchitis     history of    Acute sinusitis     Acute upper respiratory infection     hisotry of    Allergic contact dermatitis     Allergic rhinitis     Anxiety and depression     Asthma     Back pain, chronic     Benign paroxysmal positional vertigo     Blood clots in stool     Candidal dermatitis     Candidiasis of vulva and vagina     Cellulitis     AND ABSCESS, right lower abdomen    Cervical pain     Conjunctivitis     Contusion of ear     Corneal abrasion     Depression     Diabetes mellitus     type 2 - A1C better after weight loss with use of Semiglutide    Dyspnea, unspecified     Fibromyalgia     Flu vaccine need     Foot pain, right     GERD (gastroesophageal reflux disease)     Hand pain, left     Hearing loss, right     Heart palpitations     Hemorrhoids, external     Herpes simplex without complication     Herpes zoster lesion     Hypertension     Impetigo herpetiformis     Injury to tibial blood vessels, unspecified laterality, initial encounter     Kidney cyst     Right kidney    Kidney stones     Left  humeral fracture     Low back pain     Lymphadenopathy     Migraine headache     Mixed hyperlipidemia     TAM (nonalcoholic steatohepatitis)     Neuropathy     Oral candidiasis     Plantar fasciitis     Recent weight loss     60lbs with use of Ozempic - per pt 12/6/23    Salpingitis and oophoritis, unspecified     not specified as acute, subacute, or chronic    Scabies     Seasonal allergies     Stress     Swelling of limb     Symptoms involving abdomen and pelvis     OTHER SYMPTOMS INVOLVING ABDOMEN AND PELVIS, ABDOMINAL OR PELVIC SWELLING, MASS, OR LUMP, OTHER SPECIFIED S    Tattoo     Tendon pain     Tobacco use disorder     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     Urinary tract infection     Vaginitis and vulvovaginitis     Vaginitis, atrophic        Past Surgical History  Past Surgical History:   Procedure Laterality Date    BILATERAL OOPHORECTOMY      CARPAL TUNNEL RELEASE Bilateral     CHOLECYSTECTOMY      COLONOSCOPY  03/30/2018    HUMERUS FRACTURE SURGERY Left     has implants - plate, screws    LIPOMA EXCISION      low back    NEPHRECTOMY Right 12/20/2023    Procedure: NEPHRECTOMY LAPAROSCOPIC HAND ASSISTED;  Surgeon: Aaron Mart MD;  Location: [facility];  Service: Urology;  Laterality: Right;    NEPHRECTOMY RADICAL Right     ROTATOR CUFF REPAIR Right     TOTAL ABDOMINAL HYSTERECTOMY      TUBAL ABDOMINAL LIGATION      URETEROSCOPY LASER LITHOTRIPSY WITH STENT INSERTION Right 03/02/2023    Procedure: URETEROSCOPY LASER LITHOTRIPSY WITH STENT INSERTION;  Surgeon: Aaron Mart MD;  Location: [facility] OR;  Service: Urology;  Laterality: Right;       Medications    Current Facility-Administered Medications:     acetaminophen (TYLENOL) tablet 650 mg, 650 mg, Oral, Q4H PRN **OR** acetaminophen (TYLENOL) 160 MG/5ML oral solution 650 mg, 650 mg, Oral, Q4H PRN **OR** acetaminophen (TYLENOL) suppository 650 mg, 650 mg, Rectal, Q4H PRN, Kerley, Brian Joseph, DO sennosides-docusate  (PERICOLACE) 8.6-50 MG per tablet 2 tablet, 2 tablet, Oral, BID **AND** polyethylene glycol (MIRALAX) packet 17 g, 17 g, Oral, Daily PRN **AND** bisacodyl (DULCOLAX) EC tablet 5 mg, 5 mg, Oral, Daily PRN **AND** bisacodyl (DULCOLAX) suppository 10 mg, 10 mg, Rectal, Daily PRN, Kerley, Brian Joseph,     budesonide-formoterol (SYMBICORT) 160-4.5 MCG/ACT inhaler 2 puff, 2 puff, Inhalation, BID - RT, 2 puff at 01/04/24 0654 **AND** tiotropium (SPIRIVA RESPIMAT) 2.5 mcg/act aerosol solution inhaler, 2 puff, Inhalation, Daily - RT, Kerley, Brian Joseph, , 2 puff at 01/04/24 0653    Calcium Replacement - Follow Nurse / BPA Driven Protocol, , Does not apply, PRN, Kerley, Brian Joseph, DO    ceFAZolin Sodium-Dextrose (ANCEF) IVPB (duplex) 2,000 mg, 2,000 mg, Intravenous, Once, Aaron Mart MD    lactated ringers infusion 1,000 mL, 1,000 mL, Intravenous, Continuous, Aaron Mart MD, Last Rate: 25 mL/hr at 01/04/24 0846, 1,000 mL at 01/04/24 0846    Magnesium Standard Dose Replacement - Follow Nurse / BPA Driven Protocol, , Does not apply, PRN, Kerley, Brian Joseph, DO    nitroglycerin (NITROSTAT) SL tablet 0.4 mg, 0.4 mg, Sublingual, Q5 Min PRN, Kerley, Brian Joseph, DO    ondansetron (ZOFRAN) injection 4 mg, 4 mg, Intravenous, Q6H PRN, Kerley, Brian Joseph,     Pharmacy to Dose Zosyn, , Does not apply, Continuous PRN, Kerley, Brian Joseph, DO    Phosphorus Replacement - Follow Nurse / BPA Driven Protocol, , Does not apply, PRN, Kerley, Brian Joseph, DO    piperacillin-tazobactam (ZOSYN) 3.375 g in iso-osmotic dextrose 50 ml (premix), 3.375 g, Intravenous, Q8H, Kerley, Brian Joseph, , 3.375 g at 01/04/24 0518    Potassium Replacement - Follow Nurse / BPA Driven Protocol, , Does not apply, PRN, Kerley, Brian Joseph,     sodium chloride 0.9 % flush 10 mL, 10 mL, Intravenous, PRN, Kerley, Brian Joseph,     sodium chloride 0.9 % flush 10 mL, 10 mL, Intravenous, Q12H, Kerley, Brian Joseph, DO     sodium chloride 0.9 % flush 10 mL, 10 mL, Intravenous, PRN, Kerley, Brian Joseph, DO    sodium chloride 0.9 % infusion 40 mL, 40 mL, Intravenous, PRN, Kerley, Brian Joseph,     sodium chloride 0.9 % infusion, 150 mL/hr, Intravenous, Continuous, Demetrius Ortiz MD, Last Rate: 75 mL/hr at 01/04/24 0518, 75 mL/hr at 01/04/24 0518    Allergies  Allergies   Allergen Reactions    Sulfa Antibiotics Unknown - High Severity     high fever - pt states 106F    Morphine Other (See Comments)     drop in O2 sats; needed oxygent; pt states she has to be reminded to breathe;        Social History  Social History     Social History Narrative    Not on file       Family History  Family History   Problem Relation Age of Onset    Diabetes Mother     Esophageal cancer Father     Hypertension Father     Cancer Father     Cancer Maternal Grandmother     Diabetes Maternal Grandmother     Cancer Paternal Grandmother     Cancer Paternal Grandfather     Stroke Other     Cervical cancer Other     Diabetes Other     Esophageal cancer Other     Hypertension Other     Lung cancer Other     Pancreatic cancer Other     Skin cancer Other          Physical Exam  Vitals:    01/04/24 0654 01/04/24 0700 01/04/24 0730 01/04/24 0833   BP:  (!) 89/58 (!) 89/67 91/51   BP Location:    Right arm   Patient Position:    Sitting   Pulse:  84 72 85   Resp: 18   18   Temp:    97.1 °F (36.2 °C)   TempSrc:    Temporal   SpO2:  94% 95% 94%   Weight:       Height:         Constitutional: NAD, WDWN  HEENT: NCAT. Conjunctivae normal  MMM  Cardiovascular: Regular rate  Pulmonary/Chest: Respirations are even and nonlabored bilaterally  Abdominal: Soft with no distension, moderate right upper quadrant tenderness, no masses, moderate right CVA tenderness  Neurological: A + O, cranial nerves II-XII grossly intact,   Extremities: MELISSA x 4, warm, no clubbing, no cyanosis  Skin: Pink, warm, dry with no rash  Psychiatric:  Normal mood and affect      I/O last 3 completed  "shifts:  In: 2100 [IV Piggyback:2100]  Out: -     Labs  Lab Results   Component Value Date    GLUCOSE 124 (H) 01/04/2024    CALCIUM 8.4 (L) 01/04/2024     01/04/2024    K 4.0 01/04/2024    CO2 23.2 01/04/2024     01/04/2024    BUN 9 01/04/2024    CREATININE 1.10 (H) 01/04/2024    BCR 8.2 01/04/2024    ANIONGAP 10.8 01/04/2024       Lab Results   Component Value Date    WBC 11.82 (H) 01/04/2024    HGB 11.3 (L) 01/04/2024    HCT 33.6 (L) 01/04/2024    MCV 91.3 01/04/2024     01/04/2024       Brief Urine Lab Results  (Last result in the past 365 days)        Color   Clarity   Blood   Leuk Est   Nitrite   Protein   CREAT   Urine HCG        01/03/24 2322 Yellow   Cloudy   Negative   Negative   Negative   30 mg/dL (1+)                   No results found for: \"URINECX\"    Radiographic Studies  CT Abdomen Pelvis With Contrast    Result Date: 1/3/2024  FINAL REPORT TECHNIQUE: null CLINICAL HISTORY: recent right nephrectomy, pain, fever COMPARISON: null FINDINGS: CT abdomen and pelvis with contrast Comparison: None Findings: There is a small right pleural effusion. There is mild atelectasis in the right lower lobe. Patient is status post right nephrectomy. There is a rim enhancing fluid collection containing a small amount of gas within the previous location of the right kidney measuring 10.6 x 7.8 cm axially and 18 cm vertically. There is a small amount of gas in the right retroperitoneum just inferior to the fluid collection as well. There is linear fat stranding and a small amount of gas in the right abdominal wall consistent with the recent surgical incision. There is no free air. The fluid collection in the right nephrectomy bed produces mass effect on the right lobe of the liver. Liver is otherwise unremarkable. Patient is status post cholecystectomy. The pancreas, spleen, left adrenal gland, and left kidney are unremarkable. The right adrenal gland appears to be compressed by the fluid collection and " is not well-visualized. The gastrointestinal tract is unremarkable. Patient is status post hysterectomy. Aorta is normal diameter. There are no enlarged lymph nodes. There is no fracture or suspicious lytic or sclerotic lesion.     Impression: 1. Large fluid collection containing small amount of gas in the previous location of the right kidney consistent with an abscess. 2. Small right pleural effusion. Authenticated and Electronically Signed by Shayne Stoner MD on 01/03/2024 09:50:45 PM    XR Chest PA & Lateral    Result Date: 12/27/2023  PROCEDURE: XR CHEST PA AND LATERAL-   HISTORY: Cough/fever.; R50.9-Fever, unspecified  COMPARISON: None.  FINDINGS:  The lungs are clear.   There is no evidence of effusion or other pleural disease.  The mediastinum has a normal appearance.  The cardiac silhouette is unremarkable.      Unremarkable chest exam.    This report was signed and finalized on 12/27/2023 12:18 PM by Ivan Melo MD.      Transversus Abdominis Plane (TAP) Block - Bilateral    Result Date: 12/20/2023  Dominic Cervantes CRNA     12/20/2023  7:53 AM Transversus Abdominis Plane (TAP) Block - Bilateral Patient reassessed immediately prior to procedure Start time: 12/20/2023 7:35 AM Stop time: 12/20/2023 7:40 AM Reason for block: at surgeon's request and post-op pain management Performed by CRNA/CAA: Dominic Cervantes CRNA Preanesthetic Checklist Completed: patient identified, IV checked, site marked, risks and benefits discussed, surgical consent, monitors and equipment checked, pre-op evaluation and timeout performed Prep: Pt Position: supine Sterile barriers:gloves, cap, sterile barriers and mask Prep: ChloraPrep Patient monitoring: blood pressure monitoring, continuous pulse oximetry and EKG Procedure Performed under: general Guidance:ultrasound guided ULTRASOUND INTERPRETATION.  Using ultrasound guidance a 20 G gauge needle was placed in close proximity to the nerve, at which point, under  ultrasound guidance anesthetic was injected in the area of the nerve and spread of the anesthesia was seen on ultrasound in close proximity thereto.  There were no abnormalities seen on ultrasound; a digital image was taken; and the patient tolerated the procedure with no complications. Images:still images obtained Laterality:Bilateral Block Type:TAP Injection Technique:single-shot Needle Type:echogenic Resistance on Injection: none Post Assessment Injection Assessment: negative aspiration for heme, no paresthesia on injection and incremental injection Patient Tolerance:comfortable throughout block Complications:no Additional Notes Procedure:      BILATERAL TAP BLOCKS                          Patient analgesia was achieved with General Anesthesia The pt was placed in the Supine Position and under Ultrasound guidance, an echogenic or touhy needle was advanced with Normal Saline hydro dissection of tissue.  The Internal Oblique and Transversus Abdominus muscles were visualized.  At or before the aponeurosis of Internal Oblique, the local anesthetic spread was visualized in the Transversus Abdominus Plane. Injection was made incrementally with aspiration every 5 mls.  There was no intravascular injection;  injection pressure was normal; there was no neural injection; and the procedure was completed without difficulty.       I have personally reviewed these labs and imaging.     Assessment  Ms. Goldsmith is a 48 y.o. female with right retroperitoneal abscess after right laparoscopic nephrectomy several weeks ago.  She has had fevers, leukocytosis, hypotension.    Plan  1.  Admission with resuscitation with fluids and antibiotics per hospital medicine; agree with Zosyn for antibiosis  2.  OR today with me for ultrasound-guided retroperitoneal drain placement.  3.  If she clinically improves I think she would likely be fit for discharge in the next couple days, pending cultures, which I will send from the drain.  4.  I  recommend stopping Toradol, which I have canceled, given her solitary kidney and mild azotemia with hypotension.      Aaron Mart MD        Electronically signed by Aaron Mart MD at 01/04/24 5678

## 2024-01-05 NOTE — CASE MANAGEMENT/SOCIAL WORK
Case Management Discharge Note           Provided Post Acute Provider List?: N/A  Provided Post Acute Provider Quality & Resource List?: N/A    Selected Continued Care - Admitted Since 1/3/2024       Destination    No services have been selected for the patient.                Durable Medical Equipment    No services have been selected for the patient.                Dialysis/Infusion    No services have been selected for the patient.                Home Medical Care    No services have been selected for the patient.                Therapy    No services have been selected for the patient.                Community Resources    No services have been selected for the patient.                Community & DME    No services have been selected for the patient.                    Transportation Services  Private: Car    Final Discharge Disposition Code: 01 - home or self-care

## 2024-01-05 NOTE — PROGRESS NOTES
[facility]  PROGRESS NOTE    Name:  Veronica Goldsmith   Age:  48 y.o.  Sex:  female  :  1975  MRN:  [id number]   Visit Number:  [id number]  Admission Date:  1/3/2024  Date Of Service:  24  Primary Care Physician:  Greg Levine MD     LOS: 2 days :  Patient Care Team:  Greg Levine MD as PCP - General (Family Medicine):    Chief Complaint:    Retroperitoneal abscess    Subjective / Interval History:   48-year-old female with past medical history of renal cell carcinoma status post right laparoscopic nephrectomy on 2023 and developed retroperitoneal abscess and returned to operating room with Dr. Mart on 2023.  This a.m. patient is up doing self-care in the bathroom.  She states that she is feeling much better and is ready to go home.    Review of Systems:   Constitutional: No fevers or chills  Skin: Negative for rash  Respiratory: Negative for shortness of breath or wheezing  Gastrointestinal: No constipation, nausea or vomiting  Genitourinary: Negative for new lower urinary tract symptoms, current gross hematuria or dysuria.  Musculoskeletal: No flank pain         Vital Signs:  Temp:  [94.3 °F (34.6 °C)-97.6 °F (36.4 °C)] 97.6 °F (36.4 °C)  Heart Rate:  [46-73] 59  Resp:  [12-19] 18  BP: ()/(53-75) 99/63    Constitutional: No fevers or chills  Genitourinary:      Intake and output:  I/O last 3 completed shifts:  In: 3730 [P.O.:980; I.V.:600; IV Piggyback:2150]  Out: 1245 [Urine:1150; Drains:95]  No intake/output data recorded.    Physical Examination:    Constitutional: NAD, WDWN.   Cardiovascular: Regular rate.  : patent drain right flank 200mL brown and blood tinged drainage  Neurological: A + O x 3.  Cranial Nerves II-XII grossly intact. Normal gait.  Extremities: MELISSA x 4, Warm. No clubbing.  No cyanosis.    Skin: Pink, warm and dry.  No rashes noted.  Psychiatric:  Normal mood and affect      Laboratory results:  Lab Results (last 24  hours)       Procedure Component Value Units Date/Time    Basic Metabolic Panel [[id number]]  (Abnormal) Collected: 01/05/24 0709    Specimen: Blood Updated: 01/05/24 0855     Glucose 153 mg/dL      BUN 9 mg/dL      Creatinine 1.00 mg/dL      Sodium 142 mmol/L      Potassium 4.9 mmol/L      Chloride 109 mmol/L      CO2 20.4 mmol/L      Calcium 8.4 mg/dL      BUN/Creatinine Ratio 9.0     Comment: Unable to calculate Bun/Crea Ratio.        Anion Gap 12.6 mmol/L      Comment: Unable to calculate Anion Gap.        eGFR 70.5 mL/min/1.73     Narrative:      GFR Normal >60  Chronic Kidney Disease <60  Kidney Failure <15      Wound Culture - Surgical Site, Kidney, Right [[id number]]  (Abnormal) Collected: 01/04/24 1028    Specimen: Surgical Site from Kidney, Right Updated: 01/05/24 0804     Wound Culture Heavy growth (4+) Escherichia coli     Gram Stain Greater than 25 WBCs seen      Rare (1+) Gram positive cocci in pairs      Few (2+) Gram negative bacilli    CBC (No Diff) [[id number]]  (Abnormal) Collected: 01/05/24 0709    Specimen: Blood Updated: 01/05/24 0727     WBC 11.82 10*3/mm3      RBC 3.38 10*6/mm3      Hemoglobin 10.3 g/dL      Hematocrit 31.5 %      MCV 93.2 fL      MCH 30.5 pg      MCHC 32.7 g/dL      RDW 13.3 %      RDW-SD 45.3 fl      MPV 10.3 fL      Platelets 311 10*3/mm3     Blood Culture With CRAIG - Blood, Hand, Left [[id number]]  (Normal) Collected: 01/03/24 2240    Specimen: Blood from Hand, Left Updated: 01/04/24 2300     Blood Culture No growth at 24 hours    Blood Culture With CRAIG - Blood, Arm, Left [[id number]]  (Normal) Collected: 01/03/24 2235    Specimen: Blood from Arm, Left Updated: 01/04/24 2300     Blood Culture No growth at 24 hours    Anaerobic Culture - Fine Needle Aspirate, Kidney, Right [[id number]] Collected: 01/04/24 0923    Specimen: Fine Needle Aspirate from Kidney, Right Updated: 01/04/24 1025    Fungus Culture - Fine Needle Aspirate, Kidney, Right [[id number]] Collected: 01/04/24 0923     Specimen: Fine Needle Aspirate from Kidney, Right Updated: 01/04/24 1025    AFB Culture - Fine Needle Aspirate, Kidney, Right [[id number]] Collected: 01/04/24 0923    Specimen: Fine Needle Aspirate from Kidney, Right Updated: 01/04/24 1025            I have reviewed the patient's laboratory results.    Radiology results:  Imaging Results (Last 24 Hours)       ** No results found for the last 24 hours. **            I have reviewed the patient's radiology reports.       Assessment/Plan    Postoperative abscess    Essential hypertension, benign    Gastroesophageal reflux disease without esophagitis    TAM (nonalcoholic steatohepatitis)    Type 2 diabetes mellitus, without long-term current use of insulin    Fluid collection at surgical site    47 yo female 1 day s/p  Ultrasound guided percutaneous drain placement and aspiration of retroperitoneal abscess. Drain is patent with brown blood tinged fluid and 200mL in bag. Patient is doing well no pain, had hypotension over night that has improved this am.  Consulted with Dr. Mart he recommends discharge with oral abx x 14 days will discuss with hospital medicine, We discussed keeping drain diary till she follows up with Dr. Mart, light activity, and return to office if symptoms worsen or she develops fever.    Keep drain in till follow up with Dr. Mart  Patient will keep daily drain diary  Oral ppx to cover gram negative and gram positive bacteria x 14 days per hospital medicine. Consider clindamycin and Levaquin.    Plan:  Follow up in Urology clinic with Dr. Mart 7-10 days    SAULO George, NP-C  [facility] SAULO Keen  01/05/24  09:01 EST

## 2024-01-05 NOTE — PLAN OF CARE
Problem: Infection  Goal: Absence of Infection Signs and Symptoms  Outcome: Ongoing, Progressing     Problem: Adjustment to Illness (Sepsis/Septic Shock)  Goal: Optimal Coping  Outcome: Ongoing, Progressing     Problem: Bleeding (Sepsis/Septic Shock)  Goal: Absence of Bleeding  Outcome: Ongoing, Progressing     Problem: Glycemic Control Impaired (Sepsis/Septic Shock)  Goal: Blood Glucose Level Within Desired Range  Outcome: Ongoing, Progressing     Problem: Infection Progression (Sepsis/Septic Shock)  Goal: Absence of Infection Signs and Symptoms  Outcome: Ongoing, Progressing  Intervention: Promote Recovery  Recent Flowsheet Documentation  Taken 1/4/2024 2200 by Adia Hayes, RN  Activity Management: activity encouraged  Taken 1/4/2024 2000 by Adia Hayes, RN  Activity Management: activity encouraged     Problem: Nutrition Impaired (Sepsis/Septic Shock)  Goal: Optimal Nutrition Intake  Outcome: Ongoing, Progressing   Goal Outcome Evaluation:

## 2024-01-05 NOTE — NURSING NOTE
Patient education on how to empty drain bag at this time. Patient and family verbalized and returned demonstration. Stressed the importance of writing down amount and characteristics for Next MD visit.Patient verbalized understanding.

## 2024-01-06 NOTE — OUTREACH NOTE
Prep Survey      Flowsheet Row Responses   [facility] patient discharged from? [place]   Is LACE score < 7 ? No   Eligibility [facility]   Date of Admission 01/03/24   Date of Discharge 01/05/24   Discharge Disposition Home or Self Care   Discharge diagnosis Postoperative surgical site abscess, POA, status post ultrasound-guided drainage tube placement on 1/4/2024.   Does the patient have one of the following disease processes/diagnoses(primary or secondary)? General Surgery   Does the patient have Home health ordered? No   Is there a DME ordered? No   Prep survey completed? Yes            CHANA HODGSON - Registered Nurse

## 2024-01-07 LAB
BACTERIA SPEC AEROBE CULT: ABNORMAL
BACTERIA SPEC ANAEROBE CULT: NORMAL
GRAM STN SPEC: ABNORMAL

## 2024-01-08 ENCOUNTER — TRANSITIONAL CARE MANAGEMENT TELEPHONE ENCOUNTER (OUTPATIENT)
Dept: CALL CENTER | Facility: HOSPITAL | Age: 49
End: 2024-01-08
Payer: COMMERCIAL

## 2024-01-08 ENCOUNTER — TELEPHONE (OUTPATIENT)
Dept: FAMILY MEDICINE CLINIC | Facility: CLINIC | Age: 49
End: 2024-01-08
Payer: COMMERCIAL

## 2024-01-08 LAB
BACTERIA SPEC AEROBE CULT: NORMAL
BACTERIA SPEC AEROBE CULT: NORMAL
FUNGUS SPEC CULT: NORMAL
FUNGUS SPEC FUNGUS STN: NORMAL

## 2024-01-08 RX ORDER — ONDANSETRON 8 MG/1
8 TABLET, ORALLY DISINTEGRATING ORAL EVERY 8 HOURS PRN
Qty: 20 TABLET | Refills: 0 | Status: SHIPPED | OUTPATIENT
Start: 2024-01-08

## 2024-01-08 NOTE — PAYOR COMM NOTE
"To:  [place]  From: Sara Conroy RN  Phone: [phone]  Fax: [phone]  NPI: [id number]  TIN: [id number]  Member ID: [id number]   MRN: [id number]    Veronica Goldsmith (48 y.o. Female)       Date of Birth   1975    Social Security Number       Address   [address] [place] [address]    Home Phone   [phone]    MRN   [id number]       Episcopal   Holiness    Marital Status                               Admission Date   1/3/24    Admission Type   Emergency    Admitting Provider   Demetrius Ortiz MD    Attending Provider       Department, Room/Bed   [facility] TELEMETRY 3, 304/1       Discharge Date   2024    Discharge Disposition   Home or Self Care    Discharge Destination                                 Attending Provider: (none)   Allergies: Sulfa Antibiotics, Morphine    Isolation: None   Infection: None   Code Status: Prior    Ht: 165.1 cm (65\")   Wt: 80.9 kg (178 lb 5.6 oz)    Admission Cmt: None   Principal Problem: Postoperative abscess [T81.49XA]                   Active Insurance as of 1/3/2024       Primary Coverage       Payor Plan Insurance Group Employer/Plan Group    [employer]UE [employer] PPO [id number]       Payor Plan Address Payor Plan Phone Number Payor Plan Fax Number Effective Dates    [address] [phone]  2017 - None Entered    [place] [address]         Subscriber Name Subscriber Birth Date Member ID       VERONICA GOLDSMITH 1975 [id number]                     Emergency Contacts        (Rel.) Home Phone Work Phone Mobile Phone    MARTÍNTY (Spouse) -- -- [phone]                 Discharge Summary        Demetrius Ortiz MD at [address] [place] [address]   DISCHARGE SUMMARY      Name:  Veronica Goldsmith   Age:  48 y.o.  Sex:  female  :  1975  MRN:  [id number]   Visit Number:  [id number]    Admission Date:  1/3/2024  Date of Discharge:  2024  Primary Care " Physician:  Greg Levine MD    Important issues to note:    1.  Patient was admitted with low-grade fever, hypotension and right flank pain secondary to postoperative abscess and was treated with IV Zosyn and ultrasound-guided drain placement by Dr. Mart on 1/4/2024.  2.  She is being discharged home with oral antibiotics therapy with Augmentin for 2 weeks.  3.  Follow-up with Dr. Mart in 7 to 10 days.  4.  Follow-up with primary care provider with abscess fluid culture results.    Discharge Diagnoses:     Postoperative surgical site abscess, POA, status post ultrasound-guided drainage tube placement on 1/4/2024.  Sepsis with hypotension secondary to #1, POA, resolved.  Recent history of right nephrectomy for renal cell carcinoma on 12/20/2023.  Nonalcoholic fatty liver disease.  Chronic kidney disease stage II.  Diabetes mellitus type 2.  Mild persistent asthma.  Gastroesophageal reflux disease.  Essential hypertension.    Problem List:     Active Hospital Problems    Diagnosis  POA    **Postoperative abscess [T81.49XA]  Yes    Fluid collection at surgical site [T88.8XXA]  Yes    Essential hypertension, benign [I10]  Yes    Gastroesophageal reflux disease without esophagitis [K21.9]  Yes    TAM (nonalcoholic steatohepatitis) [K75.81]  Yes    Type 2 diabetes mellitus, without long-term current use of insulin [E11.9]  Yes      Resolved Hospital Problems   No resolved problems to display.     Presenting Problem:    Chief Complaint   Patient presents with    Flank Pain      Consults:     Consulting Physician(s)         Provider   Role Specialty     Aaron Mart MD  Consulting Physician Urology          Procedures Performed:    Ultrasound-guided percutaneous abscess drain placement right back on 1/4/2024.    History of presenting illness/Hospital Course:    Veronica Goldsmith is a 48-year-old woman past medical history of type 2 diabetes, hypertension, mixed hyperlipidemia, nonalcoholic steatohepatitis,  "tobacco use, renal insufficiency, mild persistent asthma, anxiety and depression, GERD, on Ozempic, migraines, recent nephrectomy for renal cell carcinoma.  Presented to [facility] ED on 1/3/2024 directed by primary care visit earlier in the day with concern for fatigue, decreased p.o. intake, abdominal pain mainly on the right side where she had a nephrectomy, a \"firm mass\" on her right flank persistent since surgery tender to the touch.  Patient apparently recently had some upper respiratory symptoms and was treated by primary care provider with a course of Augmentin for 7 days.     ED summary: Tmax 100, tachycardic, otherwise vital signs stable on room air.  Sodium 133, creatinine 1.2, blood glucose 116, lactate not elevated, procalcitonin not elevated, lipase not elevated, leukocytosis, hemoglobin 12.6.  Blood cultures collected.  CT abdomen/pelvis large fluid collection containing small amount of gas in the previous location of the right kidney consistent with abscess, small right pleural effusion. She was provided Zosyn, 2 L normal saline bolus, Valium, Tylenol.  ED spoke with urology-Dr. Mart who recommended admission for abscess drainage and antibiotics therapy.    Patient was admitted to the medical floor and was continued on IV antibiotics therapy with Zosyn.  She did have intermittent hypotension which was responsive to fluids.  She subsequently underwent ultrasound-guided right back abscess drainage with drain catheter placement by Dr. Mart on 1/4/2024 and tolerated the procedure well.  Over 500 mL of purulent fluid was drained.  Patient is currently feeling better and is eager to go home.  Abscess Gram stain is showing gram-negative rods as well as gram-positive cocci in pairs.  Culture report is currently pending.  We will discharge her home on Augmentin for 2 weeks.  She is advised to follow-up with urology in 7 to 10 days.  Follow-up with primary care provider in 1 week.  Discussed with urology services and " nursing staff.  Discussed with the patient's  Jeovanny who is at the bedside.    Vital Signs:    Temp:  [94.3 °F (34.6 °C)-97.6 °F (36.4 °C)] 97.6 °F (36.4 °C)  Heart Rate:  [46-68] 64  Resp:  [14-19] 18  BP: ()/(53-75) 117/70    Physical Exam:    General Appearance:  Alert and cooperative.    Head:  Atraumatic and normocephalic.   Eyes: Conjunctivae and sclerae normal, no icterus. No pallor.   Ears:  Ears with no abnormalities noted.   Throat: No oral lesions, no thrush, oral mucosa moist.   Neck: Supple, trachea midline, no thyromegaly.   Back:   No kyphoscoliosis present. No tenderness to palpation.   Lungs:   Breath sounds heard bilaterally equally.  No crackles or wheezing. No Pleural rub or bronchial breathing.   Heart:  Normal S1 and S2, no murmur, no gallop, no rub. No JVD.   Abdomen:   Normal bowel sounds, no masses, no organomegaly. Soft, nontender, nondistended, no rebound tenderness.  Recent laparoscopic surgical scars noted on the abdominal wall well-healing.  Right posterior lower back surgical drain noted in place with purulent drainage noted in the bag.   Extremities: Supple, no edema, no cyanosis, no clubbing.   Pulses: Pulses palpable bilaterally.   Skin: No bleeding or rash.   Neurologic: Alert and oriented x 3. No facial asymmetry. Moves all four limbs. No tremors.     Pertinent Lab Results:     Results from last 7 days   Lab Units 01/05/24  0709 01/04/24  0624 01/03/24  1933   SODIUM mmol/L 142 139 133*   POTASSIUM mmol/L 4.9 4.0 4.2   CHLORIDE mmol/L 109* 105 96*   CO2 mmol/L 20.4* 23.2 26.3   BUN mg/dL 9 9 8   CREATININE mg/dL 1.00 1.10* 1.20*   CALCIUM mg/dL 8.4* 8.4* 8.8   BILIRUBIN mg/dL  --   --  0.4   ALK PHOS U/L  --   --  67   ALT (SGPT) U/L  --   --  17   AST (SGOT) U/L  --   --  25   GLUCOSE mg/dL 153* 124* 116*     Results from last 7 days   Lab Units 01/05/24  0709 01/04/24  0624 01/03/24  1933   WBC 10*3/mm3 11.82* 11.82* 14.28*   HEMOGLOBIN g/dL 10.3* 11.3* 12.6    HEMATOCRIT % 31.5* 33.6* 37.0   PLATELETS 10*3/mm3 311 328 352       Results from last 7 days   Lab Units 01/03/24  1933   LIPASE U/L 44         Results from last 7 days   Lab Units 01/04/24  1028 01/03/24  2240 01/03/24  2235   BLOODCX   --  No growth at 24 hours No growth at 24 hours   WOUNDCX  Heavy growth (4+) Escherichia coli*  --   --      Pertinent Radiology Results:    Imaging Results (All)       Procedure Component Value Units Date/Time    CT Abdomen Pelvis With Contrast [[id number]] Collected: 01/03/24 2150     Updated: 01/03/24 2152    Narrative:      FINAL REPORT    TECHNIQUE:  null    CLINICAL HISTORY:  recent right nephrectomy, pain, fever    COMPARISON:  null    FINDINGS:  CT abdomen and pelvis with contrast    Comparison: None    Findings:    There is a small right pleural effusion. There is mild atelectasis in the right lower lobe.    Patient is status post right nephrectomy. There is a rim enhancing fluid collection containing a small amount of gas within the previous location of the right kidney measuring 10.6 x 7.8 cm axially and 18 cm vertically. There is a small amount of gas in   the right retroperitoneum just inferior to the fluid collection as well. There is linear fat stranding and a small amount of gas in the right abdominal wall consistent with the recent surgical incision. There is no free air. The fluid collection in the   right nephrectomy bed produces mass effect on the right lobe of the liver. Liver is otherwise unremarkable. Patient is status post cholecystectomy. The pancreas, spleen, left adrenal gland, and left kidney are unremarkable. The right adrenal gland   appears to be compressed by the fluid collection and is not well-visualized. The gastrointestinal tract is unremarkable. Patient is status post hysterectomy. Aorta is normal diameter. There are no enlarged lymph nodes. There is no fracture or suspicious   lytic or sclerotic lesion.      Impression:      Impression:    1.  Large fluid collection containing small amount of gas in the previous location of the right kidney consistent with an abscess.    2. Small right pleural effusion.    Authenticated and Electronically Signed by Shayne Stoner MD on  01/03/2024 09:50:45 PM     Condition on Discharge:      Stable.    Code status during the hospital stay:    Code Status and Medical Interventions:   Ordered at: 01/03/24 6445     Code Status (Patient has no pulse and is not breathing):    CPR (Attempt to Resuscitate)     Medical Interventions (Patient has pulse or is breathing):    Full Support     Discharge Disposition:    Home or Self Care    Discharge Medications:       Discharge Medications        New Medications        Instructions Start Date   amoxicillin-clavulanate 875-125 MG per tablet  Commonly known as: AUGMENTIN   1 tablet, Oral, 2 Times Daily             Continue These Medications        Instructions Start Date   albuterol sulfate  (90 Base) MCG/ACT inhaler  Commonly known as: PROVENTIL HFA;VENTOLIN HFA;PROAIR HFA   INHALE 3 PUFFS BY MOUTH EVERY 6 HOURS AS NEEDED      aspirin 81 MG EC tablet   81 mg, Oral, Daily      cetirizine 10 MG tablet  Commonly known as: zyrTEC   TAKE ONE TABLET BY MOUTH EVERY DAY      docusate sodium 100 MG capsule  Commonly known as: Colace   100 mg, Oral, 2 Times Daily, If taking pain pill      esomeprazole 40 MG capsule  Commonly known as: nexIUM   TAKE ONE CAPSULE BY MOUTH TWO TIMES A DAY      fluconazole 100 MG tablet  Commonly known as: Diflucan   100 mg, Oral, Daily      losartan 25 MG tablet  Commonly known as: COZAAR   TAKE 1 TABLET BY MOUTH DAILY      oxyCODONE 10 MG tablet  Commonly known as: ROXICODONE   10 mg, Oral, Every 6 Hours PRN      rosuvastatin 40 MG tablet  Commonly known as: CRESTOR   TAKE 1 TABLET BY MOUTH DAILY      Trelegy Ellipta 200-62.5-25 MCG/ACT aerosol powder   Generic drug: Fluticasone-Umeclidin-Vilant   1 puff, Inhalation, Daily             Discharge Diet:      Diet Instructions       Diet: Cardiac Diets; Healthy Heart (2-3 Na+); Regular Texture (IDDSI 7); Thin (IDDSI 0)      Discharge Diet: Cardiac Diets    Cardiac Diet: Healthy Heart (2-3 Na+)    Texture: Regular Texture (IDDSI 7)    Fluid Consistency: Thin (IDDSI 0)          Activity at Discharge:     Activity Instructions       Activity as Tolerated            Follow-up Appointments:      Future Appointments   Date Time Provider Department Center   1/10/2024 10:30 AM Devonte Zavala MD [facility]   1/11/2024 11:00 AM Aaron Mart MD [facility]   1/12/2024 10:30 AM Greg Levine MD [facility]   2/16/2024  8:45 AM Greg Levine MD [facility]   6/27/2024  8:30 AM Aaron Mart MD [facility]     Test Results Pending at Discharge:    Pending Labs       Order Current Status    AFB Culture - Fine Needle Aspirate, Kidney, Right In process    Anaerobic Culture - Fine Needle Aspirate, Kidney, Right In process    Fungus Culture - Fine Needle Aspirate, Kidney, Right In process    Blood Culture With CRAIG - Blood, Arm, Left Preliminary result    Blood Culture With CRAIG - Blood, Hand, Left Preliminary result    Wound Culture - Surgical Site, Kidney, Right Preliminary result             Demetrius Ortiz MD  01/05/24  10:25 EST    Time: I spent 25 minutes on this discharge activity which included: face-to-face encounter with the patient, reviewing the data in the system, coordination of the care with the nursing staff as well as consultants, documentation, and entering orders.     Dictated utilizing Dragon dictation.      Electronically signed by Demetrius Ortiz MD at 01/05/24 5245

## 2024-01-08 NOTE — OUTREACH NOTE
Call Center TCM Note      Flowsheet Row Responses   [facility] [place] operated by [facility] facility patient discharged from? [facility]   Does the patient have one of the following disease processes/diagnoses(primary or secondary)? General Surgery   TCM attempt successful? No   Unsuccessful attempts Attempt 1            Melany Mayes RN    1/8/2024, 11:27 EST

## 2024-01-08 NOTE — OUTREACH NOTE
Call Center TCM Note      Flowsheet Row Responses   [facility] facility patient discharged from? [facility]   Does the patient have one of the following disease processes/diagnoses(primary or secondary)? General Surgery   TCM attempt successful? No   Unsuccessful attempts Attempt 2            Melany Mayes RN    1/8/2024, 12:41 EST

## 2024-01-09 ENCOUNTER — TRANSITIONAL CARE MANAGEMENT TELEPHONE ENCOUNTER (OUTPATIENT)
Dept: CALL CENTER | Facility: HOSPITAL | Age: 49
End: 2024-01-09
Payer: COMMERCIAL

## 2024-01-09 LAB — BACTERIA SPEC ANAEROBE CULT: NORMAL

## 2024-01-09 NOTE — OUTREACH NOTE
Call Center TCM Note      Flowsheet Row Responses   [facility] patient discharged from? [facility]   Does the patient have one of the following disease processes/diagnoses(primary or secondary)? General Surgery   TCM attempt successful? No  [No VR for PCP]   Unsuccessful attempts Attempt 3            Anju Kelly RN    1/9/2024, 15:23 EST

## 2024-01-11 ENCOUNTER — OFFICE VISIT (OUTPATIENT)
Dept: UROLOGY | Facility: CLINIC | Age: 49
End: 2024-01-11
Payer: COMMERCIAL

## 2024-01-11 VITALS — TEMPERATURE: 96.9 F | DIASTOLIC BLOOD PRESSURE: 78 MMHG | SYSTOLIC BLOOD PRESSURE: 132 MMHG

## 2024-01-11 DIAGNOSIS — T81.49XA POSTOPERATIVE ABSCESS: Primary | ICD-10-CM

## 2024-01-11 PROCEDURE — 99024 POSTOP FOLLOW-UP VISIT: CPT | Performed by: UROLOGY

## 2024-01-11 RX ORDER — ONDANSETRON HYDROCHLORIDE 8 MG/1
TABLET, FILM COATED ORAL
COMMUNITY
Start: 2024-01-08

## 2024-01-11 NOTE — PROGRESS NOTES
CC  Retroperitoneal abscess    HPI  Ms. Goldsmith is a 48 y.o. female with history below in assessment, who presents for follow up.     At this visit she still has 30 cc in her drainage bag and purulence in the tubing    Past Medical History:   Diagnosis Date    Acne rosacea, erythematous telangiectatic type     Acute bronchitis     history of    Acute sinusitis     Acute upper respiratory infection     hisotry of    Allergic contact dermatitis     Allergic rhinitis     Anxiety and depression     Asthma     Back pain, chronic     Benign paroxysmal positional vertigo     Blood clots in stool     Candidal dermatitis     Candidiasis of vulva and vagina     Cellulitis     AND ABSCESS, right lower abdomen    Cervical pain     Conjunctivitis     Contusion of ear     Corneal abrasion     Depression     Diabetes mellitus     type 2 - A1C better after weight loss with use of Semiglutide    Dyspnea, unspecified     Fibromyalgia     Flu vaccine need     Foot pain, right     GERD (gastroesophageal reflux disease)     Hand pain, left     Hearing loss, right     Heart palpitations     Hemorrhoids, external     Herpes simplex without complication     Herpes zoster lesion     Hypertension     Impetigo herpetiformis     Injury to tibial blood vessels, unspecified laterality, initial encounter     Kidney cyst     Right kidney    Kidney stones     Left humeral fracture     Low back pain     Lymphadenopathy     Migraine headache     Mixed hyperlipidemia     TAM (nonalcoholic steatohepatitis)     Neuropathy     Oral candidiasis     Plantar fasciitis     Recent weight loss     60lbs with use of Ozempic - per pt 12/6/23    Salpingitis and oophoritis, unspecified     not specified as acute, subacute, or chronic    Scabies     Seasonal allergies     Stress     Swelling of limb     Symptoms involving abdomen and pelvis     OTHER SYMPTOMS INVOLVING ABDOMEN AND PELVIS, ABDOMINAL OR PELVIC SWELLING, MASS, OR LUMP, OTHER SPECIFIED S    Tattoo      Tendon pain     Tobacco use disorder     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     Urinary tract infection     Vaginitis and vulvovaginitis     Vaginitis, atrophic        Past Surgical History:   Procedure Laterality Date    BILATERAL OOPHORECTOMY      CARPAL TUNNEL RELEASE Bilateral     CHOLECYSTECTOMY      COLONOSCOPY  03/30/2018    HUMERUS FRACTURE SURGERY Left     has implants - plate, screws    LIPOMA EXCISION      low back    NEPHRECTOMY Right 12/20/2023    Procedure: NEPHRECTOMY LAPAROSCOPIC HAND ASSISTED;  Surgeon: Aaron Mart MD;  Location: [facility];  Service: Urology;  Laterality: Right;    NEPHRECTOMY RADICAL Right     ROTATOR CUFF REPAIR Right     TOTAL ABDOMINAL HYSTERECTOMY      TUBAL ABDOMINAL LIGATION      URETEROSCOPY LASER LITHOTRIPSY WITH STENT INSERTION Right 03/02/2023    Procedure: URETEROSCOPY LASER LITHOTRIPSY WITH STENT INSERTION;  Surgeon: Aaron Mart MD;  Location: [facility] OR;  Service: Urology;  Laterality: Right;         Current Outpatient Medications:     albuterol sulfate  (90 Base) MCG/ACT inhaler, INHALE 3 PUFFS BY MOUTH EVERY 6 HOURS AS NEEDED, Disp: 8.5 g, Rfl: 12    amoxicillin-clavulanate (AUGMENTIN) 875-125 MG per tablet, Take 1 tablet by mouth 2 (Two) Times a Day for 14 days., Disp: 28 tablet, Rfl: 0    aspirin 81 MG EC tablet, Take 1 tablet by mouth Daily., Disp: , Rfl:     cetirizine (zyrTEC) 10 MG tablet, TAKE ONE TABLET BY MOUTH EVERY DAY, Disp: 30 tablet, Rfl: 12    docusate sodium (Colace) 100 MG capsule, Take 1 capsule by mouth 2 (Two) Times a Day. If taking pain pill, Disp: 15 capsule, Rfl: 1    esomeprazole (nexIUM) 40 MG capsule, TAKE ONE CAPSULE BY MOUTH TWO TIMES A DAY, Disp: 180 capsule, Rfl: 1    fluconazole (Diflucan) 100 MG tablet, Take 1 tablet by mouth Daily., Disp: 14 tablet, Rfl: 0    Fluticasone-Umeclidin-Vilant (Trelegy Ellipta) 200-62.5-25 MCG/ACT aerosol powder , Inhale 1 puff Daily., Disp: 28 each, Rfl: 1     losartan (COZAAR) 25 MG tablet, TAKE 1 TABLET BY MOUTH DAILY, Disp: 90 tablet, Rfl: 3    ondansetron (ZOFRAN) 8 MG tablet, DISSOLVE 1 TABLET ON OR UNDER TONGUE EVERY 8 HOURS AS NEEDED FOR NAUSEA., Disp: , Rfl:     ondansetron ODT (ZOFRAN-ODT) 8 MG disintegrating tablet, Place 1 tablet on the tongue Every 8 (Eight) Hours As Needed for Nausea., Disp: 20 tablet, Rfl: 0    oxyCODONE (ROXICODONE) 10 MG tablet, Take 1 tablet by mouth Every 6 (Six) Hours As Needed for Moderate Pain or Severe Pain., Disp: 10 tablet, Rfl: 0    rosuvastatin (CRESTOR) 40 MG tablet, TAKE 1 TABLET BY MOUTH DAILY, Disp: 90 tablet, Rfl: 3     Physical Exam  Visit Vitals  /78 (BP Location: Left arm, Patient Position: Sitting, Cuff Size: Adult)   Temp 96.9 °F (36.1 °C) (Temporal)       Labs  Brief Urine Lab Results  (Last result in the past 365 days)        Color   Clarity   Blood   Leuk Est   Nitrite   Protein   CREAT   Urine HCG        01/03/24 2322 Yellow   Cloudy   Negative   Negative   Negative   30 mg/dL (1+)                   Lab Results   Component Value Date    GLUCOSE 153 (H) 01/05/2024    CALCIUM 8.4 (L) 01/05/2024     01/05/2024    K 4.9 01/05/2024    CO2 20.4 (L) 01/05/2024     (H) 01/05/2024    BUN 9 01/05/2024    CREATININE 1.00 01/05/2024    BCR 9.0 01/05/2024    ANIONGAP 12.6 01/05/2024       Lab Results   Component Value Date    WBC 11.82 (H) 01/05/2024    HGB 10.3 (L) 01/05/2024    HCT 31.5 (L) 01/05/2024    MCV 93.2 01/05/2024     01/05/2024       Urine Culture          12/27/2023    12:04   Urine Culture   Urine Culture No growth         Radiographic Studies  CT Abdomen Pelvis With Contrast    Result Date: 1/3/2024  Impression: 1. Large fluid collection containing small amount of gas in the previous location of the right kidney consistent with an abscess. 2. Small right pleural effusion. Authenticated and Electronically Signed by Shayne Stoner MD on 01/03/2024 09:50:45 PM    XR Chest PA &  Lateral    Result Date: 12/27/2023  Unremarkable chest exam.    This report was signed and finalized on 12/27/2023 12:18 PM by Ivan Melo MD.      CT Abdomen With & Without Contrast    Result Date: 10/30/2023  Multiloculated cystic mass in the upper pole of the right kidney is minimally larger than on the prior exam. There is a small component associated with enhancing septa measuring up to 4 mm in thickness. Continued follow-up is recommended in 6 months using renal mass protocol.      CTDI: 9.07 mGy DLP: 1598.61 mGy.cm   This study was performed with techniques to keep radiation doses as low as reasonably achievable (ALARA). Individualized dose reduction techniques using automated exposure control or adjustment of mA and/or kV according to the patient size were employed.     Images were reviewed, interpreted, and dictated by Dr. Ivan Melo MD Transcribed by Concepcion Jurado PA-C.  This report was signed and finalized on 10/30/2023 2:28 PM by Ivan Melo MD.        I have reviewed the above labs and imaging.     Assessment  48 y.o. female with right renal mass, status post right laparoscopic nephrectomy, who presented to the hospital with a large abscess in the nephrectomy bed, status post ultrasound-guided drain placement.  Her drain output has been slowly decreasing over the past few days.  It has gone from 130/day, to 50 cc yesterday.    Plan  1.  Keep drain in until it stops putting out anything.  Follow-up in office next week.

## 2024-01-12 ENCOUNTER — PATIENT ROUNDING (BHMG ONLY) (OUTPATIENT)
Dept: FAMILY MEDICINE CLINIC | Facility: CLINIC | Age: 49
End: 2024-01-12
Payer: COMMERCIAL

## 2024-01-12 ENCOUNTER — OFFICE VISIT (OUTPATIENT)
Dept: FAMILY MEDICINE CLINIC | Facility: CLINIC | Age: 49
End: 2024-01-12
Payer: COMMERCIAL

## 2024-01-12 ENCOUNTER — LAB (OUTPATIENT)
Dept: FAMILY MEDICINE CLINIC | Facility: CLINIC | Age: 49
End: 2024-01-12
Payer: COMMERCIAL

## 2024-01-12 VITALS
HEIGHT: 65 IN | DIASTOLIC BLOOD PRESSURE: 80 MMHG | TEMPERATURE: 98 F | OXYGEN SATURATION: 95 % | RESPIRATION RATE: 16 BRPM | HEART RATE: 94 BPM | SYSTOLIC BLOOD PRESSURE: 134 MMHG | WEIGHT: 178.2 LBS | BODY MASS INDEX: 29.69 KG/M2

## 2024-01-12 DIAGNOSIS — T81.49XA POSTOPERATIVE ABSCESS: Primary | ICD-10-CM

## 2024-01-12 DIAGNOSIS — D62 ANEMIA DUE TO ACUTE BLOOD LOSS: ICD-10-CM

## 2024-01-12 DIAGNOSIS — K75.81 NASH (NONALCOHOLIC STEATOHEPATITIS): ICD-10-CM

## 2024-01-12 DIAGNOSIS — E83.51 HYPOCALCEMIA: ICD-10-CM

## 2024-01-12 LAB
DEPRECATED RDW RBC AUTO: 45.5 FL (ref 37–54)
ERYTHROCYTE [DISTWIDTH] IN BLOOD BY AUTOMATED COUNT: 13.6 % (ref 12.3–15.4)
HCT VFR BLD AUTO: 40.2 % (ref 34–46.6)
HGB BLD-MCNC: 12.8 G/DL (ref 12–15.9)
MCH RBC QN AUTO: 29.5 PG (ref 26.6–33)
MCHC RBC AUTO-ENTMCNC: 31.8 G/DL (ref 31.5–35.7)
MCV RBC AUTO: 92.6 FL (ref 79–97)
PLATELET # BLD AUTO: 330 10*3/MM3 (ref 140–450)
PMV BLD AUTO: 10.2 FL (ref 6–12)
RBC # BLD AUTO: 4.34 10*6/MM3 (ref 3.77–5.28)
WBC NRBC COR # BLD AUTO: 10.67 10*3/MM3 (ref 3.4–10.8)

## 2024-01-12 PROCEDURE — 80053 COMPREHEN METABOLIC PANEL: CPT | Performed by: FAMILY MEDICINE

## 2024-01-12 PROCEDURE — 85027 COMPLETE CBC AUTOMATED: CPT | Performed by: FAMILY MEDICINE

## 2024-01-12 PROCEDURE — 85007 BL SMEAR W/DIFF WBC COUNT: CPT | Performed by: FAMILY MEDICINE

## 2024-01-12 NOTE — PROGRESS NOTES
A [employer] message has been sent to the patient for PATIENT  ROUNDING with [facility]

## 2024-01-12 NOTE — PROGRESS NOTES
Follow Up Office Visit      Date: 2024   Patient Name: Veronica Goldsmith  : 1975   MRN: [id number]     Chief Complaint:    Chief Complaint   Patient presents with    Hospital Follow Up Visit       History of Present Illness: Veronica Goldsmith is a 48 y.o. female who is here today for follow-up of hospitalization from January 3 through 2024.  Patient was admitted with abdominal pain with further evaluation revealing a intra-abdominal abscess positive for E. coli at the site of her kidney resection.  Patient had it drained percutaneously with the drain placed.  She was started on IV antibiotics and was discharged on Augmentin.  Patient improved her symptoms while hospitalized.  It was also noted that she had improvement of her kidney function test while hospitalized but did develop some hypocalcemia.    Since discharge patient is feeling much better.  She is no longer having fever and the pain on her right side has greatly improved.  She continues to have the drain in place and she is averaging more than 30 cc of purulent drainage on a daily basis.  Patient has not had any other complaints.  She is continue to take her Augmentin without side effects.  She denies any other complications of other medications.  She does have some lower abdominal pain but this is greatly improved.  Her activity level has improved as well as her appetite and sleep.  She denies any other cardiovascular, respiratory, gastrointestinal, urologic or neurologic complaints.      Subjective      Review of Systems:   Review of Systems   Constitutional:  Negative for activity change, appetite change and fatigue.   Respiratory:  Negative for cough and shortness of breath.    Cardiovascular:  Negative for chest pain, palpitations and leg swelling.   Gastrointestinal:  Positive for abdominal distention and abdominal pain. Negative for constipation, diarrhea, nausea, vomiting, GERD and indigestion.   Genitourinary:  Negative for  dysuria, flank pain, frequency and urgency.   Musculoskeletal:  Positive for myalgias. Negative for arthralgias.   Neurological:  Negative for dizziness, weakness and memory problem.   Psychiatric/Behavioral:  Negative for sleep disturbance.        I have reviewed the patients family history, social history, past medical history, past surgical history and have updated it as appropriate.     Medications:     Current Outpatient Medications:     albuterol sulfate  (90 Base) MCG/ACT inhaler, INHALE 3 PUFFS BY MOUTH EVERY 6 HOURS AS NEEDED, Disp: 8.5 g, Rfl: 12    amoxicillin-clavulanate (AUGMENTIN) 875-125 MG per tablet, Take 1 tablet by mouth 2 (Two) Times a Day for 14 days., Disp: 28 tablet, Rfl: 0    aspirin 81 MG EC tablet, Take 1 tablet by mouth Daily., Disp: , Rfl:     cetirizine (zyrTEC) 10 MG tablet, TAKE ONE TABLET BY MOUTH EVERY DAY, Disp: 30 tablet, Rfl: 12    docusate sodium (Colace) 100 MG capsule, Take 1 capsule by mouth 2 (Two) Times a Day. If taking pain pill, Disp: 15 capsule, Rfl: 1    esomeprazole (nexIUM) 40 MG capsule, TAKE ONE CAPSULE BY MOUTH TWO TIMES A DAY, Disp: 180 capsule, Rfl: 1    fluconazole (Diflucan) 100 MG tablet, Take 1 tablet by mouth Daily., Disp: 14 tablet, Rfl: 0    Fluticasone-Umeclidin-Vilant (Trelegy Ellipta) 200-62.5-25 MCG/ACT aerosol powder , Inhale 1 puff Daily., Disp: 28 each, Rfl: 1    losartan (COZAAR) 25 MG tablet, TAKE 1 TABLET BY MOUTH DAILY, Disp: 90 tablet, Rfl: 3    ondansetron (ZOFRAN) 8 MG tablet, DISSOLVE 1 TABLET ON OR UNDER TONGUE EVERY 8 HOURS AS NEEDED FOR NAUSEA., Disp: , Rfl:     ondansetron ODT (ZOFRAN-ODT) 8 MG disintegrating tablet, Place 1 tablet on the tongue Every 8 (Eight) Hours As Needed for Nausea., Disp: 20 tablet, Rfl: 0    oxyCODONE (ROXICODONE) 10 MG tablet, Take 1 tablet by mouth Every 6 (Six) Hours As Needed for Moderate Pain or Severe Pain., Disp: 10 tablet, Rfl: 0    rosuvastatin (CRESTOR) 40 MG tablet, TAKE 1 TABLET BY MOUTH DAILY,  "Disp: 90 tablet, Rfl: 3    Allergies:   Allergies   Allergen Reactions    Sulfa Antibiotics Unknown - High Severity     high fever - pt states 106F    Morphine Other (See Comments)     drop in O2 sats; needed oxygent; pt states she has to be reminded to breathe;        Immunizations:   Immunization History   Administered Date(s) Administered    Covid-19 (Pfizer) Gray Cap Monovalent 05/24/2022, 06/20/2022    Flu Vaccine Quad PF >36MO 02/08/2019    Flu Vaccine Split Quad 01/09/2019    Flublok 18+yrs 01/09/2019    Fluzone (or Fluarix & Flulaval for VFC) >6mos 10/04/2022    Hepatitis A 01/09/2019    Influenza, Unspecified 02/08/2019, 10/10/2023    MMR 02/08/2019    Pneumococcal Polysaccharide (PPSV23) 10/07/2022, 11/16/2023    Tdap 02/08/2019        Objective     Physical Exam: Please see above  Vital Signs:   Vitals:    01/12/24 1053   BP: 134/80   BP Location: Left arm   Patient Position: Sitting   Cuff Size: Adult   Pulse: 94   Resp: 16   Temp: 98 °F (36.7 °C)   TempSrc: Temporal   SpO2: 95%   Weight: 80.8 kg (178 lb 3.2 oz)   Height: 165.1 cm (65\")     Body mass index is 29.65 kg/m².          Physical Exam  Vitals and nursing note reviewed.   Constitutional:       Appearance: Normal appearance.   HENT:      Head: Normocephalic and atraumatic.      Nose: Nose normal.      Mouth/Throat:      Pharynx: Oropharynx is clear.   Eyes:      Extraocular Movements: Extraocular movements intact.      Pupils: Pupils are equal, round, and reactive to light.   Neck:      Thyroid: No thyroid mass or thyromegaly.      Trachea: Trachea normal.   Cardiovascular:      Rate and Rhythm: Normal rate and regular rhythm.      Pulses: Normal pulses. No decreased pulses.      Heart sounds: Normal heart sounds.   Pulmonary:      Effort: Pulmonary effort is normal.      Breath sounds: Normal breath sounds.   Abdominal:      General: Abdomen is flat. Bowel sounds are normal.      Palpations: Abdomen is soft.      Tenderness: There is no " abdominal tenderness.      Comments: Percutaneous drain in place on her right side.   Musculoskeletal:      Cervical back: Neck supple.      Right lower leg: No edema.      Left lower leg: No edema.   Lymphadenopathy:      Cervical: No cervical adenopathy.   Skin:     General: Skin is warm and dry.   Neurological:      General: No focal deficit present.      Mental Status: She is alert and oriented to person, place, and time.      Sensory: Sensation is intact.      Motor: Motor function is intact.      Coordination: Coordination is intact.   Psychiatric:         Attention and Perception: Attention normal.         Mood and Affect: Mood normal.         Speech: Speech normal.         Behavior: Behavior normal.         Procedures    Results:   Labs:   Hemoglobin A1C   Date Value Ref Range Status   12/21/2023 5.30 4.80 - 5.60 % Final     TSH   Date Value Ref Range Status   11/16/2023 1.100 0.270 - 4.200 uIU/mL Final        POCT Results (if applicable):   Results for orders placed or performed during the hospital encounter of 01/03/24   Blood Culture With CRAIG - Blood, Hand, Left    Specimen: Hand, Left; Blood   Result Value Ref Range    Blood Culture No growth at 5 days    Blood Culture With CRAIG - Blood, Arm, Left    Specimen: Arm, Left; Blood   Result Value Ref Range    Blood Culture No growth at 5 days    Anaerobic Culture - Fine Needle Aspirate, Kidney, Right    Specimen: Kidney, Right; Fine Needle Aspirate   Result Value Ref Range    Anaerobic Culture No anaerobes isolated at 5 days    Fungus Culture - Fine Needle Aspirate, Kidney, Right    Specimen: Kidney, Right; Fine Needle Aspirate   Result Value Ref Range    Fungus Stain Final report     KOH/Calcofluor preparation Comment    AFB Culture - Fine Needle Aspirate, Kidney, Right    Specimen: Kidney, Right; Fine Needle Aspirate   Result Value Ref Range    AFB Specimen Processing Concentration     Acid Fast Smear Negative    Wound Culture - Surgical Site, Kidney, Right     Specimen: Kidney, Right; Surgical Site   Result Value Ref Range    Wound Culture Heavy growth (4+) Escherichia coli (A)     Gram Stain Greater than 25 WBCs seen     Gram Stain Rare (1+) Gram positive cocci in pairs     Gram Stain Few (2+) Gram negative bacilli        Susceptibility    Escherichia coli - ASHKAN*     Amoxicillin + Clavulanate  Susceptible ug/ml     Ampicillin  Susceptible ug/ml     Ampicillin + Sulbactam  Susceptible ug/ml     Cefepime  Susceptible ug/ml     Ceftazidime  Susceptible ug/ml     Ceftriaxone  Susceptible ug/ml     Gentamicin  Susceptible ug/ml     Levofloxacin  Susceptible ug/ml     Piperacillin + Tazobactam  Susceptible ug/ml     Tetracycline  Susceptible ug/ml     Trimethoprim + Sulfamethoxazole  Susceptible ug/ml     * Cefazolin sensitivity will not be reported for Enterobacteriaceae in non-urine isolates. If cefazolin is preferred, please call the microbiology lab to request an E-test.  With the exception of urinary-sourced infections, aminoglycosides should not be used as monotherapy.   Comprehensive Metabolic Panel    Specimen: Blood   Result Value Ref Range    Glucose 116 (H) 65 - 99 mg/dL    BUN 8 6 - 20 mg/dL    Creatinine 1.20 (H) 0.57 - 1.00 mg/dL    Sodium 133 (L) 136 - 145 mmol/L    Potassium 4.2 3.5 - 5.2 mmol/L    Chloride 96 (L) 98 - 107 mmol/L    CO2 26.3 22.0 - 29.0 mmol/L    Calcium 8.8 8.6 - 10.5 mg/dL    Total Protein 7.9 6.0 - 8.5 g/dL    Albumin 3.3 (L) 3.5 - 5.2 g/dL    ALT (SGPT) 17 1 - 33 U/L    AST (SGOT) 25 1 - 32 U/L    Alkaline Phosphatase 67 39 - 117 U/L    Total Bilirubin 0.4 0.0 - 1.2 mg/dL    Globulin 4.6 gm/dL    A/G Ratio 0.7 g/dL    BUN/Creatinine Ratio 6.7 (L) 7.0 - 25.0    Anion Gap 10.7 5.0 - 15.0 mmol/L    eGFR 56.7 (L) >60.0 mL/min/1.73   Lipase    Specimen: Blood   Result Value Ref Range    Lipase 44 13 - 60 U/L   Urinalysis With Microscopic If Indicated (No Culture) - Urine, Clean Catch    Specimen: Urine, Clean Catch   Result Value Ref Range     Color, UA Yellow Yellow, Straw    Appearance, UA Cloudy (A) Clear    pH, UA 5.5 5.0 - 8.0    Specific Gravity, UA >=1.030 1.005 - 1.030    Glucose, UA Negative Negative    Ketones, UA Negative Negative    Bilirubin, UA Negative Negative    Blood, UA Negative Negative    Protein, UA 30 mg/dL (1+) (A) Negative    Leuk Esterase, UA Negative Negative    Nitrite, UA Negative Negative    Urobilinogen, UA 0.2 E.U./dL 0.2 - 1.0 E.U./dL   CBC Auto Differential    Specimen: Blood   Result Value Ref Range    WBC 14.28 (H) 3.40 - 10.80 10*3/mm3    RBC 4.10 3.77 - 5.28 10*6/mm3    Hemoglobin 12.6 12.0 - 15.9 g/dL    Hematocrit 37.0 34.0 - 46.6 %    MCV 90.2 79.0 - 97.0 fL    MCH 30.7 26.6 - 33.0 pg    MCHC 34.1 31.5 - 35.7 g/dL    RDW 12.9 12.3 - 15.4 %    RDW-SD 43.1 37.0 - 54.0 fl    MPV 9.9 6.0 - 12.0 fL    Platelets 352 140 - 450 10*3/mm3    Neutrophil % 75.5 42.7 - 76.0 %    Lymphocyte % 15.2 (L) 19.6 - 45.3 %    Monocyte % 6.8 5.0 - 12.0 %    Eosinophil % 0.8 0.3 - 6.2 %    Basophil % 0.6 0.0 - 1.5 %    Immature Grans % 1.1 (H) 0.0 - 0.5 %    Neutrophils, Absolute 10.78 (H) 1.70 - 7.00 10*3/mm3    Lymphocytes, Absolute 2.17 0.70 - 3.10 10*3/mm3    Monocytes, Absolute 0.97 (H) 0.10 - 0.90 10*3/mm3    Eosinophils, Absolute 0.12 0.00 - 0.40 10*3/mm3    Basophils, Absolute 0.08 0.00 - 0.20 10*3/mm3    Immature Grans, Absolute 0.16 (H) 0.00 - 0.05 10*3/mm3    nRBC 0.0 0.0 - 0.2 /100 WBC   Lactic Acid, Plasma    Specimen: Blood   Result Value Ref Range    Lactate 1.1 0.5 - 2.0 mmol/L   Procalcitonin    Specimen: Blood   Result Value Ref Range    Procalcitonin 0.22 0.00 - 0.25 ng/mL   Urinalysis, Microscopic Only - Urine, Clean Catch    Specimen: Urine, Clean Catch   Result Value Ref Range    RBC, UA None Seen None Seen, 0-2 /HPF    WBC, UA None Seen None Seen, 0-2 /HPF    Bacteria, UA Trace (A) None Seen /HPF    Squamous Epithelial Cells, UA 7-12 (A) None Seen, 0-2 /HPF    Hyaline Casts, UA None Seen None Seen /LPF     Methodology Manual Light Microscopy    Basic Metabolic Panel    Specimen: Blood   Result Value Ref Range    Glucose 124 (H) 65 - 99 mg/dL    BUN 9 6 - 20 mg/dL    Creatinine 1.10 (H) 0.57 - 1.00 mg/dL    Sodium 139 136 - 145 mmol/L    Potassium 4.0 3.5 - 5.2 mmol/L    Chloride 105 98 - 107 mmol/L    CO2 23.2 22.0 - 29.0 mmol/L    Calcium 8.4 (L) 8.6 - 10.5 mg/dL    BUN/Creatinine Ratio 8.2 7.0 - 25.0    Anion Gap 10.8 5.0 - 15.0 mmol/L    eGFR 62.9 >60.0 mL/min/1.73   CBC Auto Differential    Specimen: Blood   Result Value Ref Range    WBC 11.82 (H) 3.40 - 10.80 10*3/mm3    RBC 3.68 (L) 3.77 - 5.28 10*6/mm3    Hemoglobin 11.3 (L) 12.0 - 15.9 g/dL    Hematocrit 33.6 (L) 34.0 - 46.6 %    MCV 91.3 79.0 - 97.0 fL    MCH 30.7 26.6 - 33.0 pg    MCHC 33.6 31.5 - 35.7 g/dL    RDW 13.1 12.3 - 15.4 %    RDW-SD 43.6 37.0 - 54.0 fl    MPV 9.7 6.0 - 12.0 fL    Platelets 328 140 - 450 10*3/mm3    Neutrophil % 79.5 (H) 42.7 - 76.0 %    Lymphocyte % 9.1 (L) 19.6 - 45.3 %    Monocyte % 8.5 5.0 - 12.0 %    Eosinophil % 1.1 0.3 - 6.2 %    Basophil % 0.4 0.0 - 1.5 %    Immature Grans % 1.4 (H) 0.0 - 0.5 %    Neutrophils, Absolute 9.40 (H) 1.70 - 7.00 10*3/mm3    Lymphocytes, Absolute 1.08 0.70 - 3.10 10*3/mm3    Monocytes, Absolute 1.00 (H) 0.10 - 0.90 10*3/mm3    Eosinophils, Absolute 0.13 0.00 - 0.40 10*3/mm3    Basophils, Absolute 0.05 0.00 - 0.20 10*3/mm3    Immature Grans, Absolute 0.16 (H) 0.00 - 0.05 10*3/mm3    nRBC 0.0 0.0 - 0.2 /100 WBC   Basic Metabolic Panel    Specimen: Blood   Result Value Ref Range    Glucose 153 (H) 65 - 99 mg/dL    BUN 9 6 - 20 mg/dL    Creatinine 1.00 0.57 - 1.00 mg/dL    Sodium 142 136 - 145 mmol/L    Potassium 4.9 3.5 - 5.2 mmol/L    Chloride 109 (H) 98 - 107 mmol/L    CO2 20.4 (L) 22.0 - 29.0 mmol/L    Calcium 8.4 (L) 8.6 - 10.5 mg/dL    BUN/Creatinine Ratio 9.0 7.0 - 25.0    Anion Gap 12.6 5.0 - 15.0 mmol/L    eGFR 70.5 >60.0 mL/min/1.73   CBC (No Diff)    Specimen: Blood   Result Value Ref Range     WBC 11.82 (H) 3.40 - 10.80 10*3/mm3    RBC 3.38 (L) 3.77 - 5.28 10*6/mm3    Hemoglobin 10.3 (L) 12.0 - 15.9 g/dL    Hematocrit 31.5 (L) 34.0 - 46.6 %    MCV 93.2 79.0 - 97.0 fL    MCH 30.5 26.6 - 33.0 pg    MCHC 32.7 31.5 - 35.7 g/dL    RDW 13.3 12.3 - 15.4 %    RDW-SD 45.3 37.0 - 54.0 fl    MPV 10.3 6.0 - 12.0 fL    Platelets 311 140 - 450 10*3/mm3   Green Top (Gel)   Result Value Ref Range    Extra Tube Hold for add-ons.    Lavender Top   Result Value Ref Range    Extra Tube hold for add-on    Gold Top - SST   Result Value Ref Range    Extra Tube Hold for add-ons.    Light Blue Top   Result Value Ref Range    Extra Tube Hold for add-ons.        Imaging:   No valid procedures specified.     Measures:   Advanced Care Planning:   Patient has an advance directive in EMR which is still valid.     Smoking Cessation:   Smoker.  Patient has reduced smoking here over the previous several weeks.    Assessment / Plan      Assessment/Plan:   Diagnoses and all orders for this visit:    1. Postoperative abscess (Primary)   Patient is doing much better since last being seen.  The amount of drainage she is having has improved.  She still averages around 30 cc a day of purulent drainage but has not had any fevers excetra.  She does continue to take the Augmentin without any difficulty.  Blood cultures were negative and the E. coli was sensitive to the antibiotic.  She does have a scheduled follow-up with the urologist in the near future who will probably remove the percutaneous drain.  We have encouraged her to try to increase her activity level and resume her oral intake.    2. Hypocalcemia  Patient did have a low calcium level while hospitalized.  Most likely this is due to her acute illness and not due to any parathyroid or renal dystrophy.  We will recheck her kidney function as well as calcium and we will await the results.  If she does have low calcium levels we will pursue with obtain vitamin D levels as well as  parathyroid levels excetra.  Patient states that she has increased her milk intake.  We will continue to monitor and will treat accordingly.  -     Comprehensive Metabolic Panel; Future  -     Comprehensive Metabolic Panel    3. Anemia due to acute blood loss  We will recheck her CBC.  I will suspect that she has had an improvement in her anemia since being discharged.  If she remains anemic we will obtain iron studies to see if we need to supplement.  -     CBC With Manual Differential; Future    4. TAM (nonalcoholic steatohepatitis)   Patient did have increased liver function test immediately after surgeries.  Some of this could have been due to anesthesia but she did have manipulation of her liver during the operation.  We will continue to follow her liver function and will treat accordingly.  I do suspect that her liver will return back to normal.      Follow Up:   Return in about 5 weeks (around 2/16/2024).      At [facility], we believe that sharing information builds trust and better relationships. You are receiving this note because you recently visited [facility]. It is possible you will see health information before a provider has talked with you about it. This kind of information can be easy to misunderstand. To help you fully understand what it means for your health, we urge you to discuss this note with your provider.    Greg Levine MD  [facility]

## 2024-01-13 LAB
ALBUMIN SERPL-MCNC: 3.7 G/DL (ref 3.5–5.2)
ALBUMIN/GLOB SERPL: 1.1 G/DL
ALP SERPL-CCNC: 81 U/L (ref 39–117)
ALT SERPL W P-5'-P-CCNC: 15 U/L (ref 1–33)
ANION GAP SERPL CALCULATED.3IONS-SCNC: 13.4 MMOL/L (ref 5–15)
AST SERPL-CCNC: 25 U/L (ref 1–32)
BILIRUB SERPL-MCNC: <0.2 MG/DL (ref 0–1.2)
BUN SERPL-MCNC: 9 MG/DL (ref 6–20)
BUN/CREAT SERPL: 7.3 (ref 7–25)
CALCIUM SPEC-SCNC: 9.3 MG/DL (ref 8.6–10.5)
CHLORIDE SERPL-SCNC: 105 MMOL/L (ref 98–107)
CO2 SERPL-SCNC: 25.6 MMOL/L (ref 22–29)
CREAT SERPL-MCNC: 1.24 MG/DL (ref 0.57–1)
EGFRCR SERPLBLD CKD-EPI 2021: 54.5 ML/MIN/1.73
EOSINOPHIL # BLD MANUAL: 0.44 10*3/MM3 (ref 0–0.4)
EOSINOPHIL NFR BLD MANUAL: 4.1 % (ref 0.3–6.2)
GLOBULIN UR ELPH-MCNC: 3.5 GM/DL
GLUCOSE SERPL-MCNC: 134 MG/DL (ref 65–99)
LYMPHOCYTES # BLD MANUAL: 2.42 10*3/MM3 (ref 0.7–3.1)
LYMPHOCYTES NFR BLD MANUAL: 2.1 % (ref 5–12)
MONOCYTES # BLD: 0.22 10*3/MM3 (ref 0.1–0.9)
NEUTROPHILS # BLD AUTO: 7.59 10*3/MM3 (ref 1.7–7)
NEUTROPHILS NFR BLD MANUAL: 71.1 % (ref 42.7–76)
PLAT MORPH BLD: NORMAL
POTASSIUM SERPL-SCNC: 4.5 MMOL/L (ref 3.5–5.2)
PROT SERPL-MCNC: 7.2 G/DL (ref 6–8.5)
RBC MORPH BLD: NORMAL
SODIUM SERPL-SCNC: 144 MMOL/L (ref 136–145)
VARIANT LYMPHS NFR BLD MANUAL: 22.7 % (ref 19.6–45.3)
WBC MORPH BLD: NORMAL

## 2024-01-18 ENCOUNTER — OFFICE VISIT (OUTPATIENT)
Dept: UROLOGY | Facility: CLINIC | Age: 49
End: 2024-01-18
Payer: COMMERCIAL

## 2024-01-18 VITALS — HEIGHT: 65 IN | WEIGHT: 178.2 LBS | BODY MASS INDEX: 29.69 KG/M2

## 2024-01-18 DIAGNOSIS — T81.49XA POSTOPERATIVE ABSCESS: Primary | ICD-10-CM

## 2024-01-18 PROCEDURE — 99024 POSTOP FOLLOW-UP VISIT: CPT | Performed by: UROLOGY

## 2024-01-18 NOTE — PATIENT INSTRUCTIONS
Patient has a drain in from a retroperitoneal abscess.  She has to continue to have this until it completely stops draining.  During that time it is unlikely that she will be able to work.  It is unclear how long she will need to be off work for this medical reason.

## 2024-01-18 NOTE — PROGRESS NOTES
CC  Retroperitoneal abscess    HPI  Ms. Goldsmith is a 48 y.o. female with history below in assessment, who presents for follow up.     At this visit she still has 30 cc in her drainage bag and purulence in the tubing    Past Medical History:   Diagnosis Date    Acne rosacea, erythematous telangiectatic type     Acute bronchitis     history of    Acute sinusitis     Acute upper respiratory infection     hisotry of    Allergic contact dermatitis     Allergic rhinitis     Anxiety and depression     Asthma     Back pain, chronic     Benign paroxysmal positional vertigo     Blood clots in stool     Candidal dermatitis     Candidiasis of vulva and vagina     Cellulitis     AND ABSCESS, right lower abdomen    Cervical pain     Conjunctivitis     Contusion of ear     Corneal abrasion     Depression     Diabetes mellitus     type 2 - A1C better after weight loss with use of Semiglutide    Dyspnea, unspecified     Fibromyalgia     Flu vaccine need     Foot pain, right     GERD (gastroesophageal reflux disease)     Hand pain, left     Hearing loss, right     Heart palpitations     Hemorrhoids, external     Herpes simplex without complication     Herpes zoster lesion     Hypertension     Impetigo herpetiformis     Injury to tibial blood vessels, unspecified laterality, initial encounter     Kidney cyst     Right kidney    Kidney stones     Left humeral fracture     Low back pain     Lymphadenopathy     Migraine headache     Mixed hyperlipidemia     TAM (nonalcoholic steatohepatitis)     Neuropathy     Oral candidiasis     Plantar fasciitis     Recent weight loss     60lbs with use of Ozempic - per pt 12/6/23    Salpingitis and oophoritis, unspecified     not specified as acute, subacute, or chronic    Scabies     Seasonal allergies     Stress     Swelling of limb     Symptoms involving abdomen and pelvis     OTHER SYMPTOMS INVOLVING ABDOMEN AND PELVIS, ABDOMINAL OR PELVIC SWELLING, MASS, OR LUMP, OTHER SPECIFIED S    Tattoo      Tendon pain     Tobacco use disorder     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     Urinary tract infection     Vaginitis and vulvovaginitis     Vaginitis, atrophic        Past Surgical History:   Procedure Laterality Date    BILATERAL OOPHORECTOMY      CARPAL TUNNEL RELEASE Bilateral     CHOLECYSTECTOMY      COLONOSCOPY  03/30/2018    HUMERUS FRACTURE SURGERY Left     has implants - plate, screws    LIPOMA EXCISION      low back    NEPHRECTOMY Right 12/20/2023    Procedure: NEPHRECTOMY LAPAROSCOPIC HAND ASSISTED;  Surgeon: Aaron Mart MD;  Location: [facility];  Service: Urology;  Laterality: Right;    NEPHRECTOMY RADICAL Right     ROTATOR CUFF REPAIR Right     TOTAL ABDOMINAL HYSTERECTOMY      TUBAL ABDOMINAL LIGATION      URETEROSCOPY LASER LITHOTRIPSY WITH STENT INSERTION Right 03/02/2023    Procedure: URETEROSCOPY LASER LITHOTRIPSY WITH STENT INSERTION;  Surgeon: Aaron Mart MD;  Location: [facility] OR;  Service: Urology;  Laterality: Right;         Current Outpatient Medications:     albuterol sulfate  (90 Base) MCG/ACT inhaler, INHALE 3 PUFFS BY MOUTH EVERY 6 HOURS AS NEEDED, Disp: 8.5 g, Rfl: 12    amoxicillin-clavulanate (AUGMENTIN) 875-125 MG per tablet, Take 1 tablet by mouth 2 (Two) Times a Day for 14 days., Disp: 28 tablet, Rfl: 0    aspirin 81 MG EC tablet, Take 1 tablet by mouth Daily., Disp: , Rfl:     cetirizine (zyrTEC) 10 MG tablet, TAKE ONE TABLET BY MOUTH EVERY DAY, Disp: 30 tablet, Rfl: 12    docusate sodium (Colace) 100 MG capsule, Take 1 capsule by mouth 2 (Two) Times a Day. If taking pain pill, Disp: 15 capsule, Rfl: 1    esomeprazole (nexIUM) 40 MG capsule, TAKE ONE CAPSULE BY MOUTH TWO TIMES A DAY, Disp: 180 capsule, Rfl: 1    fluconazole (Diflucan) 100 MG tablet, Take 1 tablet by mouth Daily., Disp: 14 tablet, Rfl: 0    Fluticasone-Umeclidin-Vilant (Trelegy Ellipta) 200-62.5-25 MCG/ACT aerosol powder , Inhale 1 puff Daily., Disp: 28 each, Rfl: 1     "losartan (COZAAR) 25 MG tablet, TAKE 1 TABLET BY MOUTH DAILY, Disp: 90 tablet, Rfl: 3    ondansetron (ZOFRAN) 8 MG tablet, DISSOLVE 1 TABLET ON OR UNDER TONGUE EVERY 8 HOURS AS NEEDED FOR NAUSEA., Disp: , Rfl:     ondansetron ODT (ZOFRAN-ODT) 8 MG disintegrating tablet, Place 1 tablet on the tongue Every 8 (Eight) Hours As Needed for Nausea., Disp: 20 tablet, Rfl: 0    oxyCODONE (ROXICODONE) 10 MG tablet, Take 1 tablet by mouth Every 6 (Six) Hours As Needed for Moderate Pain or Severe Pain., Disp: 10 tablet, Rfl: 0    rosuvastatin (CRESTOR) 40 MG tablet, TAKE 1 TABLET BY MOUTH DAILY, Disp: 90 tablet, Rfl: 3     Physical Exam  Visit Vitals  Ht 165.1 cm (65\")   Wt 80.8 kg (178 lb 3.2 oz)   BMI 29.65 kg/m²       Labs  Brief Urine Lab Results  (Last result in the past 365 days)        Color   Clarity   Blood   Leuk Est   Nitrite   Protein   CREAT   Urine HCG        01/03/24 2322 Yellow   Cloudy   Negative   Negative   Negative   30 mg/dL (1+)                   Lab Results   Component Value Date    GLUCOSE 134 (H) 01/12/2024    CALCIUM 9.3 01/12/2024     01/12/2024    K 4.5 01/12/2024    CO2 25.6 01/12/2024     01/12/2024    BUN 9 01/12/2024    CREATININE 1.24 (H) 01/12/2024    BCR 7.3 01/12/2024    ANIONGAP 13.4 01/12/2024       Lab Results   Component Value Date    WBC 10.67 01/12/2024    HGB 12.8 01/12/2024    HCT 40.2 01/12/2024    MCV 92.6 01/12/2024     01/12/2024       Urine Culture          12/27/2023    12:04   Urine Culture   Urine Culture No growth         Radiographic Studies  CT Abdomen Pelvis With Contrast  Result Date: 1/3/2024  Impression: 1. Large fluid collection containing small amount of gas in the previous location of the right kidney consistent with an abscess. 2. Small right pleural effusion. Authenticated and Electronically Signed by Shayne Stoner MD on 01/03/2024 09:50:45 PM    XR Chest PA & Lateral  Result Date: 12/27/2023  Unremarkable chest exam.    This report was signed " and finalized on 12/27/2023 12:18 PM by Ivan Melo MD.      CT Abdomen With & Without Contrast  Result Date: 10/30/2023  Multiloculated cystic mass in the upper pole of the right kidney is minimally larger than on the prior exam. There is a small component associated with enhancing septa measuring up to 4 mm in thickness. Continued follow-up is recommended in 6 months using renal mass protocol.      CTDI: 9.07 mGy DLP: 1598.61 mGy.cm   This study was performed with techniques to keep radiation doses as low as reasonably achievable (ALARA). Individualized dose reduction techniques using automated exposure control or adjustment of mA and/or kV according to the patient size were employed.     Images were reviewed, interpreted, and dictated by Dr. Ivan Melo MD Transcribed by Concepcion Jurado PA-C.  This report was signed and finalized on 10/30/2023 2:28 PM by Ivan Melo MD.        I have reviewed the above labs and imaging.     Assessment  48 y.o. female with right renal mass, status post right laparoscopic nephrectomy, who presented to the hospital with a large abscess in the nephrectomy bed, status post ultrasound-guided drain placement.  Her drain output has been lower but still present.  It has gone from 130/day, to 25 cc yesterday.  Although several days ago she said it put out 100.    Plan  1.  She will call us when the drain stops draining completely and then she will come in and I will remove it.

## 2024-01-22 NOTE — H&P
"  [facility]      Name:  Veronica Goldsmith   Age:  48 y.o.  Sex:  female  :  1975  MRN:  [id number]   Visit Number:  [id number]  Admission Date:  1/3/2024  Date Of Service:  24  Primary Care Physician:  Greg Levine MD    Chief Complaint:     Right flank pain    History Of Presenting Illness:      Veronica Goldsmith is a 48-year-old woman past medical history of type 2 diabetes, hypertension, mixed hyperlipidemia, nonalcoholic steatohepatitis, tobacco use, renal insufficiency, mild persistent asthma, anxiety and depression, GERD, on Ozempic, migraines, recent nephrectomy for renal cell carcinoma.  Presented to [facility] ED on 1/3/2024 directed primary care visit earlier in the day with concern for fatigue, decreased p.o. intake, abdominal pain mainly on the right side where she had a nephrectomy, a \"firm mass\" on her right flank persistent since surgery tender to the touch.  Has had associated nausea, diarrhea.  Denies fever or chills, chest pain, shortness of air, dysuria.    ED summary: Tmax 100, tachycardic, otherwise vital signs stable on room air.  Sodium 133, creatinine 1.2, blood glucose 116, lactate not elevated, procalcitonin not elevated, lipase not elevated, leukocytosis, hemoglobin 12.6.  Blood cultures collected.  CT abdomen/pelvis large fluid collection containing small amount of gas in the previous location of the right kidney consistent with abscess, small right pleural effusion. She was provided Zosyn, 1 L normal saline bolus, Valium, Tylenol.  ED spoke with urology-Dr. Mart, completed antibiotics and admission with plan for drain placement following day.    Review Of Systems:    All systems were reviewed and negative except as mentioned in history of presenting illness, assessment and plan.    Past Medical History: Patient  has a past medical history of Acne rosacea, erythematous telangiectatic type, Acute bronchitis, Acute sinusitis, " Mau has had a fever since Friday sometimes up to 104 I advised to go to Urgent care   Acute upper respiratory infection, Allergic contact dermatitis, Allergic rhinitis, Anxiety and depression, Asthma, Back pain, chronic, Benign paroxysmal positional vertigo, Blood clots in stool, Candidal dermatitis, Candidiasis of vulva and vagina, Cellulitis, Cervical pain, Conjunctivitis, Contusion of ear, Corneal abrasion, Depression, Diabetes mellitus, Dyspnea, unspecified, Fibromyalgia, Flu vaccine need, Foot pain, right, GERD (gastroesophageal reflux disease), Hand pain, left, Hearing loss, right, Heart palpitations, Hemorrhoids, external, Herpes simplex without complication, Herpes zoster lesion, Hypertension, Impetigo herpetiformis, Injury to tibial blood vessels, unspecified laterality, initial encounter, Kidney cyst, Kidney stones, Left humeral fracture, Low back pain, Lymphadenopathy, Migraine headache, Mixed hyperlipidemia, TAM (nonalcoholic steatohepatitis), Neuropathy, Oral candidiasis, Plantar fasciitis, Recent weight loss, Salpingitis and oophoritis, unspecified, Scabies, Seasonal allergies, Stress, Swelling of limb, Symptoms involving abdomen and pelvis, Tattoo, Tendon pain, Tobacco use disorder, 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, Urinary tract infection, Vaginitis and vulvovaginitis, and Vaginitis, atrophic.    Past Surgical History: Patient  has a past surgical history that includes Cholecystectomy; Total abdominal hysterectomy; Bilateral oophorectomy; Colonoscopy (03/30/2018); Humerus fracture surgery (Left); Rotator cuff repair (Right); Tubal ligation; Carpal tunnel release (Bilateral); ureteroscopy laser lithotripsy with stent insertion (Right, 03/02/2023); Lipoma Excision; Nephrectomy (Right, 12/20/2023); and Nephrectomy radical (Right).    Social History: Patient  reports that she has been smoking cigarettes. She started smoking about 31 years ago. She has a 29.00 pack-year smoking history. She has been exposed to tobacco smoke. She has never used smokeless tobacco. She  reports that she does not currently use alcohol. She reports that she does not use drugs.    Family History:  Patient's family history has been reviewed and found to be noncontributory.     Allergies:      Sulfa antibiotics and Morphine    Home Medications:    Prior to Admission Medications       Prescriptions Last Dose Informant Patient Reported? Taking?    albuterol sulfate  (90 Base) MCG/ACT inhaler Past Week  No Yes    INHALE 3 PUFFS BY MOUTH EVERY 6 HOURS AS NEEDED    aspirin 81 MG EC tablet Past Week  Yes Yes    Take 1 tablet by mouth Daily.    cetirizine (zyrTEC) 10 MG tablet Past Week  No Yes    TAKE ONE TABLET BY MOUTH EVERY DAY    docusate sodium (Colace) 100 MG capsule Past Week  No Yes    Take 1 capsule by mouth 2 (Two) Times a Day. If taking pain pill    esomeprazole (nexIUM) 40 MG capsule Past Week  No Yes    TAKE ONE CAPSULE BY MOUTH TWO TIMES A DAY    fluconazole (Diflucan) 100 MG tablet Past Week  No Yes    Take 1 tablet by mouth Daily.    Fluticasone-Umeclidin-Vilant (Trelegy Ellipta) 200-62.5-25 MCG/ACT aerosol powder  Past Week  No Yes    Inhale 1 puff Daily.    losartan (COZAAR) 25 MG tablet Past Week  No Yes    TAKE 1 TABLET BY MOUTH DAILY    oxyCODONE (ROXICODONE) 10 MG tablet Past Week  No Yes    Take 1 tablet by mouth Every 6 (Six) Hours As Needed for Moderate Pain or Severe Pain.    rosuvastatin (CRESTOR) 40 MG tablet Past Week  No Yes    TAKE 1 TABLET BY MOUTH DAILY          ED Medications:    Medications   sodium chloride 0.9 % flush 10 mL (has no administration in time range)   piperacillin-tazobactam (ZOSYN) 4.5 g in iso-osmotic dextrose 100 mL IVPB (premix) (has no administration in time range)   sodium chloride 0.9 % bolus 1,000 mL (1,000 mL Intravenous New Bag 1/3/24 2047)   acetaminophen (TYLENOL) tablet 1,000 mg (1,000 mg Oral Given 1/3/24 2047)   diazePAM (VALIUM) injection 5 mg (5 mg Intravenous Given 1/3/24 2048)   iopamidol (ISOVUE-300) 61 % injection 100 mL (100 mL  "Intravenous Given 1/3/24 2112)     Vital Signs:  Temp:  [98.6 °F (37 °C)-100 °F (37.8 °C)] 100 °F (37.8 °C)  Heart Rate:  [103-112] 104  Resp:  [18] 18  BP: (110-122)/(74-81) 110/81        01/03/24 1903   Weight: 80.4 kg (177 lb 3.2 oz)     Body mass index is 29.49 kg/m².    Physical Exam:     Most recent vital Signs: /81 (BP Location: Right arm, Patient Position: Sitting)   Pulse 104   Temp 100 °F (37.8 °C) (Oral)   Resp 18   Ht 165.1 cm (65\")   Wt 80.4 kg (177 lb 3.2 oz)   SpO2 94%   BMI 29.49 kg/m²     Physical Exam  Constitutional:       General: She is not in acute distress.     Appearance: She is ill-appearing. She is not toxic-appearing.   HENT:      Mouth/Throat:      Mouth: Mucous membranes are moist.   Eyes:      Extraocular Movements: Extraocular movements intact.   Cardiovascular:      Rate and Rhythm: Normal rate and regular rhythm.      Pulses: Normal pulses.      Heart sounds: Normal heart sounds.   Pulmonary:      Effort: Pulmonary effort is normal.      Breath sounds: Normal breath sounds.   Abdominal:      Palpations: Abdomen is soft.      Tenderness: There is abdominal tenderness.      Comments: Surgical scars right lower abdomen dry and nontender, tenderness right flank   Musculoskeletal:      Right lower leg: No edema.      Left lower leg: No edema.   Skin:     General: Skin is warm.      Capillary Refill: Capillary refill takes less than 2 seconds.   Neurological:      General: No focal deficit present.      Mental Status: She is alert and oriented to person, place, and time.   Psychiatric:         Mood and Affect: Mood normal.         Thought Content: Thought content normal.         Laboratory data:    I have reviewed the labs done in the emergency room.    Results from last 7 days   Lab Units 01/03/24  1933   SODIUM mmol/L 133*   POTASSIUM mmol/L 4.2   CHLORIDE mmol/L 96*   CO2 mmol/L 26.3   BUN mg/dL 8   CREATININE mg/dL 1.20*   CALCIUM mg/dL 8.8   BILIRUBIN mg/dL 0.4   ALK " "PHOS U/L 67   ALT (SGPT) U/L 17   AST (SGOT) U/L 25   GLUCOSE mg/dL 116*     Results from last 7 days   Lab Units 01/03/24 1933 01/03/24  1701   WBC 10*3/mm3 14.28*  --    HEMOGLOBIN g/dL 12.6  --    HEMOGLOBIN, POC g/dL  --  12.1   HEMATOCRIT % 37.0  --    PLATELETS 10*3/mm3 352  --                      Results from last 7 days   Lab Units 01/03/24  1933   LIPASE U/L 44               Invalid input(s): \"USDES\", \"NITRITITE\", \"BACT\", \"EP\"    Pain Management Panel           No data to display                EKG:      none    Radiology:    CT Abdomen Pelvis With Contrast    Result Date: 1/3/2024  FINAL REPORT TECHNIQUE: null CLINICAL HISTORY: recent right nephrectomy, pain, fever COMPARISON: null FINDINGS: CT abdomen and pelvis with contrast Comparison: None Findings: There is a small right pleural effusion. There is mild atelectasis in the right lower lobe. Patient is status post right nephrectomy. There is a rim enhancing fluid collection containing a small amount of gas within the previous location of the right kidney measuring 10.6 x 7.8 cm axially and 18 cm vertically. There is a small amount of gas in the right retroperitoneum just inferior to the fluid collection as well. There is linear fat stranding and a small amount of gas in the right abdominal wall consistent with the recent surgical incision. There is no free air. The fluid collection in the right nephrectomy bed produces mass effect on the right lobe of the liver. Liver is otherwise unremarkable. Patient is status post cholecystectomy. The pancreas, spleen, left adrenal gland, and left kidney are unremarkable. The right adrenal gland appears to be compressed by the fluid collection and is not well-visualized. The gastrointestinal tract is unremarkable. Patient is status post hysterectomy. Aorta is normal diameter. There are no enlarged lymph nodes. There is no fracture or suspicious lytic or sclerotic lesion.     Impression: 1. Large fluid collection " containing small amount of gas in the previous location of the right kidney consistent with an abscess. 2. Small right pleural effusion. Authenticated and Electronically Signed by Shayne Stoner MD on 01/03/2024 09:50:45 PM     Assessment/Plan:    Inpatient general floor admission 1/3/2024 with right retroperitoneal abscess status post nephrectomy for renal cell carcinoma.    At time of admission resting in bed, appears comfortably ill, pleasantly conversational.    Status post nephrectomy for renal cell carcinoma  Retroperitoneal abscess right side  Neurology consultation, recommendations appreciated.  Zosyn.  Blood cultures.  Plan for drain placement.    Chronic:  type 2 diabetes, hypertension, mixed hyperlipidemia, nonalcoholic steatohepatitis, tobacco use, renal insufficiency, mild persistent asthma, anxiety and depression, GERD, on Ozempic, migraines, recent nephrectomy for renal cell carcinoma.    Continue home medications.    Risk Assessment: High  DVT Prophylaxis: SCDs  Code Status: Full code  Diet: N.p.o. midnight    Advance Care Planning   ACP discussion was held with the patient during this visit. Patient does not have an advance directive, information provided.           Brian Joseph Kerley, DO  01/03/24  22:03 EST    Dictated utilizing Dragon dictation.

## 2024-01-29 RX ORDER — CLOTRIMAZOLE AND BETAMETHASONE DIPROPIONATE 10; .64 MG/G; MG/G
1 CREAM TOPICAL 2 TIMES DAILY
Qty: 45 G | Refills: 1 | Status: SHIPPED | OUTPATIENT
Start: 2024-01-29

## 2024-02-01 ENCOUNTER — TELEPHONE (OUTPATIENT)
Dept: UROLOGY | Facility: CLINIC | Age: 49
End: 2024-02-01
Payer: COMMERCIAL

## 2024-02-01 NOTE — TELEPHONE ENCOUNTER
Hub staff attempted to follow warm transfer process and was unsuccessful     Caller: MEGAN RESENDIZ    Best call back number: [phone]    Patient is needing: PATIENT CALLED IN ASKING FOR GISELE. SHE HAS A DRAIN TUBE IN AND SHE WAS TOLD BY THE OFFICE THAT WHEN IT STOPPED DRAINING TO CALL AND GET IT REMOVED. PLEASE CALL BACK FOR ASSISTANCE.

## 2024-02-01 NOTE — TELEPHONE ENCOUNTER
I let patient know Fior is waiting to hear from  and we will call her back soon to advise.  Arash,LIZZ

## 2024-02-02 LAB
FUNGUS SPEC CULT: NORMAL
FUNGUS SPEC FUNGUS STN: NORMAL

## 2024-02-05 ENCOUNTER — PROCEDURE VISIT (OUTPATIENT)
Dept: UROLOGY | Facility: CLINIC | Age: 49
End: 2024-02-05
Payer: COMMERCIAL

## 2024-02-05 VITALS
SYSTOLIC BLOOD PRESSURE: 122 MMHG | WEIGHT: 178.2 LBS | BODY MASS INDEX: 29.69 KG/M2 | DIASTOLIC BLOOD PRESSURE: 86 MMHG | HEIGHT: 65 IN

## 2024-02-05 DIAGNOSIS — T81.49XA POSTOPERATIVE ABSCESS: Primary | ICD-10-CM

## 2024-02-05 PROCEDURE — 99024 POSTOP FOLLOW-UP VISIT: CPT | Performed by: UROLOGY

## 2024-02-05 NOTE — PROGRESS NOTES
The patient's drain has stopped putting out any material for several days now.  It was removed without complication.  She will follow-up in 3 months with a CT scan beforehand.

## 2024-02-06 ENCOUNTER — OFFICE VISIT (OUTPATIENT)
Dept: FAMILY MEDICINE CLINIC | Facility: CLINIC | Age: 49
End: 2024-02-06
Payer: COMMERCIAL

## 2024-02-06 ENCOUNTER — PATIENT ROUNDING (BHMG ONLY) (OUTPATIENT)
Dept: FAMILY MEDICINE CLINIC | Facility: CLINIC | Age: 49
End: 2024-02-06
Payer: COMMERCIAL

## 2024-02-06 VITALS
HEIGHT: 65 IN | TEMPERATURE: 98 F | RESPIRATION RATE: 18 BRPM | OXYGEN SATURATION: 97 % | SYSTOLIC BLOOD PRESSURE: 118 MMHG | WEIGHT: 186 LBS | DIASTOLIC BLOOD PRESSURE: 78 MMHG | BODY MASS INDEX: 30.99 KG/M2 | HEART RATE: 83 BPM

## 2024-02-06 DIAGNOSIS — T81.49XA POSTOPERATIVE ABSCESS: Primary | ICD-10-CM

## 2024-02-06 DIAGNOSIS — C64.1 RENAL CELL CARCINOMA OF RIGHT KIDNEY: ICD-10-CM

## 2024-02-06 DIAGNOSIS — N28.9 RENAL INSUFFICIENCY: ICD-10-CM

## 2024-02-06 DIAGNOSIS — R53.83 OTHER FATIGUE: ICD-10-CM

## 2024-02-06 PROCEDURE — 99213 OFFICE O/P EST LOW 20 MIN: CPT | Performed by: FAMILY MEDICINE

## 2024-02-06 RX ORDER — TIRZEPATIDE 10 MG/.5ML
INJECTION, SOLUTION SUBCUTANEOUS
COMMUNITY
Start: 2024-01-29

## 2024-02-06 NOTE — PROGRESS NOTES
Follow Up Office Visit      Date: 2024   Patient Name: Veronica Goldsmith  : 1975   MRN: [id number]     Chief Complaint:    Chief Complaint   Patient presents with    Follow-up    Fatigue     States that she has been having weakness and wants to discuss that        History of Present Illness: Veronica Goldsmith is a 48 y.o. female who is here today for follow-up.  Patient was seen by the urologist yesterday with removal of the drain for the surgical site abscess.  Patient states that she has not any fever or chills and that her pain is improved but does continue to have significant amount of fatigue and weakness.  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  Patient states that she has not had any fever or chills as mentioned.  She denies any nausea or vomiting.  She has not had any issues with respect to chest pain, shortness of breath, PND, orthopnea excetra.  She has slowly improved her activity, appetite and sleep.  Patient denies any other cardiovascular, respiratory, gastrointestinal, urologic or neurologic complaints.    Subjective      Review of Systems:   Review of Systems   Constitutional:  Positive for fatigue. Negative for activity change, appetite change and fever.   Respiratory:  Negative for cough and shortness of breath.    Cardiovascular:  Negative for chest pain, palpitations and leg swelling.   Gastrointestinal:  Negative for abdominal pain, blood in stool, constipation, diarrhea, nausea and vomiting.   Genitourinary:  Negative for dysuria, flank pain, frequency, hematuria and urgency.   Musculoskeletal:  Negative for arthralgias and myalgias.   Neurological:  Positive for weakness. Negative for dizziness, tremors, syncope, light-headedness, numbness, headache and memory problem.   Psychiatric/Behavioral:  Negative for sleep disturbance.        I have reviewed the patients family history, social  history, past medical history, past surgical history and have updated it as appropriate.     Medications:     Current Outpatient Medications:     albuterol sulfate  (90 Base) MCG/ACT inhaler, INHALE 3 PUFFS BY MOUTH EVERY 6 HOURS AS NEEDED, Disp: 8.5 g, Rfl: 12    aspirin 81 MG EC tablet, Take 1 tablet by mouth Daily., Disp: , Rfl:     cetirizine (zyrTEC) 10 MG tablet, TAKE ONE TABLET BY MOUTH EVERY DAY, Disp: 30 tablet, Rfl: 12    clotrimazole-betamethasone (Lotrisone) 1-0.05 % cream, Apply 1 Application topically to the appropriate area as directed 2 (Two) Times a Day., Disp: 45 g, Rfl: 1    docusate sodium (Colace) 100 MG capsule, Take 1 capsule by mouth 2 (Two) Times a Day. If taking pain pill, Disp: 15 capsule, Rfl: 1    esomeprazole (nexIUM) 40 MG capsule, TAKE ONE CAPSULE BY MOUTH TWO TIMES A DAY, Disp: 180 capsule, Rfl: 1    fluconazole (Diflucan) 100 MG tablet, Take 1 tablet by mouth Daily., Disp: 14 tablet, Rfl: 0    Fluticasone-Umeclidin-Vilant (Trelegy Ellipta) 200-62.5-25 MCG/ACT aerosol powder , Inhale 1 puff Daily., Disp: 28 each, Rfl: 1    losartan (COZAAR) 25 MG tablet, TAKE 1 TABLET BY MOUTH DAILY, Disp: 90 tablet, Rfl: 3    Mounjaro 10 MG/0.5ML solution pen-injector pen, INJECT 0.5 ML UNDER THE SKIN INTO THE APPROPRIATE AREA AS DIRECTED 1 (ONE) TIME PER WEEK., Disp: , Rfl:     ondansetron (ZOFRAN) 8 MG tablet, DISSOLVE 1 TABLET ON OR UNDER TONGUE EVERY 8 HOURS AS NEEDED FOR NAUSEA., Disp: , Rfl:     ondansetron ODT (ZOFRAN-ODT) 8 MG disintegrating tablet, Place 1 tablet on the tongue Every 8 (Eight) Hours As Needed for Nausea., Disp: 20 tablet, Rfl: 0    oxyCODONE (ROXICODONE) 10 MG tablet, Take 1 tablet by mouth Every 6 (Six) Hours As Needed for Moderate Pain or Severe Pain., Disp: 10 tablet, Rfl: 0    rosuvastatin (CRESTOR) 40 MG tablet, TAKE 1 TABLET BY MOUTH DAILY, Disp: 90 tablet, Rfl: 3    Allergies:   Allergies   Allergen Reactions    Sulfa Antibiotics Unknown - High Severity      "high fever - pt states 106F    Morphine Other (See Comments)     drop in O2 sats; needed oxygent; pt states she has to be reminded to breathe;        Immunizations:   Immunization History   Administered Date(s) Administered    Covid-19 (Pfizer) Gray Cap Monovalent 05/24/2022, 06/20/2022    Flu Vaccine Quad PF >36MO 02/08/2019    Flu Vaccine Split Quad 01/09/2019    Flublok 18+yrs 01/09/2019    Fluzone (or Fluarix & Flulaval for VFC) >6mos 10/04/2022    Hepatitis A 01/09/2019    Influenza, Unspecified 02/08/2019, 10/10/2023    MMR 02/08/2019    Pneumococcal Polysaccharide (PPSV23) 10/07/2022, 11/16/2023    Tdap 02/08/2019        Objective     Physical Exam: Please see above  Vital Signs:   Vitals:    02/06/24 1419   BP: 118/78   BP Location: Right arm   Patient Position: Sitting   Cuff Size: Adult   Pulse: 83   Resp: 18   Temp: 98 °F (36.7 °C)   TempSrc: Temporal   SpO2: 97%   Weight: 84.4 kg (186 lb)   Height: 165.1 cm (65\")     Body mass index is 30.95 kg/m².          Physical Exam  Vitals and nursing note reviewed.   Constitutional:       Appearance: Normal appearance.   HENT:      Head: Normocephalic and atraumatic.      Nose: Nose normal.      Mouth/Throat:      Pharynx: Oropharynx is clear.   Eyes:      Extraocular Movements: Extraocular movements intact.      Pupils: Pupils are equal, round, and reactive to light.   Neck:      Thyroid: No thyroid mass or thyromegaly.      Trachea: Trachea normal.   Cardiovascular:      Rate and Rhythm: Normal rate and regular rhythm.      Pulses: Normal pulses. No decreased pulses.      Heart sounds: Normal heart sounds.   Pulmonary:      Effort: Pulmonary effort is normal.      Breath sounds: Normal breath sounds.   Abdominal:      General: Abdomen is flat. Bowel sounds are normal.      Palpations: Abdomen is soft.      Tenderness: There is no abdominal tenderness.   Musculoskeletal:      Cervical back: Neck supple.      Right lower leg: No edema.      Left lower leg: No " edema.   Lymphadenopathy:      Cervical: No cervical adenopathy.   Skin:     General: Skin is warm and dry.   Neurological:      General: No focal deficit present.      Mental Status: She is alert and oriented to person, place, and time.      Sensory: Sensation is intact.      Motor: Motor function is intact.      Coordination: Coordination is intact.   Psychiatric:         Attention and Perception: Attention normal.         Mood and Affect: Mood normal.         Speech: Speech normal.         Behavior: Behavior normal.         Procedures    Results:   Labs:   Hemoglobin A1C   Date Value Ref Range Status   12/21/2023 5.30 4.80 - 5.60 % Final     TSH   Date Value Ref Range Status   11/16/2023 1.100 0.270 - 4.200 uIU/mL Final        POCT Results (if applicable):   Results for orders placed or performed in visit on 01/12/24   CBC Auto Differential    Specimen: Arm, Left; Blood   Result Value Ref Range    WBC 10.67 3.40 - 10.80 10*3/mm3    RBC 4.34 3.77 - 5.28 10*6/mm3    Hemoglobin 12.8 12.0 - 15.9 g/dL    Hematocrit 40.2 34.0 - 46.6 %    MCV 92.6 79.0 - 97.0 fL    MCH 29.5 26.6 - 33.0 pg    MCHC 31.8 31.5 - 35.7 g/dL    RDW 13.6 12.3 - 15.4 %    RDW-SD 45.5 37.0 - 54.0 fl    MPV 10.2 6.0 - 12.0 fL    Platelets 330 140 - 450 10*3/mm3   Manual Differential    Specimen: Arm, Left; Blood   Result Value Ref Range    Neutrophil % 71.1 42.7 - 76.0 %    Lymphocyte % 22.7 19.6 - 45.3 %    Monocyte % 2.1 (L) 5.0 - 12.0 %    Eosinophil % 4.1 0.3 - 6.2 %    Neutrophils Absolute 7.59 (H) 1.70 - 7.00 10*3/mm3    Lymphocytes Absolute 2.42 0.70 - 3.10 10*3/mm3    Monocytes Absolute 0.22 0.10 - 0.90 10*3/mm3    Eosinophils Absolute 0.44 (H) 0.00 - 0.40 10*3/mm3    RBC Morphology Normal Normal    WBC Morphology Normal Normal    Platelet Morphology Normal Normal       Imaging:   No valid procedures specified.     Measures:   Advanced Care Planning:   Patient has an advance directive in EMR which is still valid.     Smoking Cessation:    less than 3 minutes spent counseling Will try to cut down    Assessment / Plan      Assessment/Plan:   Diagnoses and all orders for this visit:    1. Postoperative abscess (Primary)   Patient does appear to be improved with respect to her perioperative abscess.  The mall and drainage was diminished and she did have the drain pulled yesterday.  We will continue to observe her symptoms and if she does develop pain, fever, chills excetra further evaluation with a CT scan will be obtained at that time.  We will continue to monitor closely and will treat accordingly.    2. Renal cell carcinoma of right kidney   Patient does appear to be doing well with respect to her renal cell carcinoma.  She is scheduled to follow-up with Dr. Mart in June with a CT scan.  If she has any complaints prior to that appointment we will assess at that time.    3. Other fatigue   Patient has continued has significant amount of fatigue.  This is understandable since she has had a nephrectomy, draining of a perioperative abscess, renal insufficiency as well as anemia.  She will continue to slowly increase her activity level to help improve her stamina, but I think is reasonable to keep her off work for a little bit longer so she can fully recover especially since she just had the drain pulled yesterday.  We will reassess in the next couple weeks and will pursue and treat accordingly.    4. Renal insufficiency   Patient has had some decrease in renal function.  We have encouraged her to continue push fluids and we will reassess when she returns to clinic with laboratory data in the next several weeks.        Follow Up:   No follow-ups on file.      At [facility], we believe that sharing information builds trust and better relationships. You are receiving this note because you recently visited [facility]. It is possible you will see health information before a provider has talked with you about it. This kind of information can be easy  to misunderstand. To help you fully understand what it means for your health, we urge you to discuss this note with your provider.    Greg Levine MD  [facility]

## 2024-02-06 NOTE — PROGRESS NOTES
A [employer] message has been sent to the patient for PATIENT  [place] [facility]

## 2024-02-07 ENCOUNTER — HOSPITAL ENCOUNTER (OUTPATIENT)
Dept: MRI IMAGING | Facility: HOSPITAL | Age: 49
Discharge: HOME | End: 2024-02-07
Admitting: FAMILY MEDICINE
Payer: COMMERCIAL

## 2024-02-07 DIAGNOSIS — M54.16 LUMBAR BACK PAIN WITH RADICULOPATHY AFFECTING LOWER EXTREMITY: ICD-10-CM

## 2024-02-07 PROCEDURE — 72148 MRI LUMBAR SPINE W/O DYE: CPT

## 2024-02-16 ENCOUNTER — PATIENT ROUNDING (BHMG ONLY) (OUTPATIENT)
Dept: FAMILY MEDICINE CLINIC | Facility: CLINIC | Age: 49
End: 2024-02-16
Payer: COMMERCIAL

## 2024-02-16 ENCOUNTER — OFFICE VISIT (OUTPATIENT)
Dept: FAMILY MEDICINE CLINIC | Facility: CLINIC | Age: 49
End: 2024-02-16
Payer: COMMERCIAL

## 2024-02-16 VITALS
HEIGHT: 65 IN | HEART RATE: 92 BPM | DIASTOLIC BLOOD PRESSURE: 80 MMHG | WEIGHT: 185.4 LBS | OXYGEN SATURATION: 94 % | RESPIRATION RATE: 18 BRPM | BODY MASS INDEX: 30.89 KG/M2 | SYSTOLIC BLOOD PRESSURE: 118 MMHG | TEMPERATURE: 98.4 F

## 2024-02-16 DIAGNOSIS — N28.9 RENAL INSUFFICIENCY: Primary | ICD-10-CM

## 2024-02-16 DIAGNOSIS — I10 PRIMARY HYPERTENSION: ICD-10-CM

## 2024-02-16 DIAGNOSIS — F17.210 CIGARETTE NICOTINE DEPENDENCE WITHOUT COMPLICATION: ICD-10-CM

## 2024-02-16 DIAGNOSIS — K75.81 NASH (NONALCOHOLIC STEATOHEPATITIS): ICD-10-CM

## 2024-02-16 DIAGNOSIS — E11.9 TYPE 2 DIABETES MELLITUS WITHOUT COMPLICATION, WITHOUT LONG-TERM CURRENT USE OF INSULIN: ICD-10-CM

## 2024-02-16 DIAGNOSIS — K21.9 GASTROESOPHAGEAL REFLUX DISEASE WITHOUT ESOPHAGITIS: ICD-10-CM

## 2024-02-16 DIAGNOSIS — J45.30 MILD PERSISTENT ASTHMA, UNSPECIFIED WHETHER COMPLICATED: ICD-10-CM

## 2024-02-16 DIAGNOSIS — E78.2 MIXED HYPERLIPIDEMIA: ICD-10-CM

## 2024-02-17 LAB
ACID FAST STN SPEC: NEGATIVE
MYCOBACTERIUM SPEC QL CULT: NEGATIVE
SPECIMEN PREPARATION: NORMAL

## 2024-02-19 ENCOUNTER — PATIENT ROUNDING (BHMG ONLY) (OUTPATIENT)
Dept: FAMILY MEDICINE CLINIC | Facility: CLINIC | Age: 49
End: 2024-02-19
Payer: COMMERCIAL

## 2024-02-19 NOTE — PROGRESS NOTES
February 19, 2024    Hello, may I speak with Veronica Goldsmith?    My name is Joselito    I am  with [facility]  [address] [place] [address] [phone].    Before we get started may I verify your date of birth? 1975    I am calling to see how your recent visit went. Is this a good time to talk? yes    Tell me about your visit with us. What things went well? Good       We're always looking for ways to make our patients' experiences even better. Do you have recommendations on ways we may improve?  no    Overall were you satisfied with your visit? Yes       I appreciate you taking the time to speak with me today. Is there anything else I can do for you? no      Thank you, and have a great day.

## 2024-03-08 ENCOUNTER — TELEPHONE (OUTPATIENT)
Dept: UROLOGY | Facility: CLINIC | Age: 49
End: 2024-03-08

## 2024-03-20 ENCOUNTER — OFFICE VISIT (OUTPATIENT)
Dept: FAMILY MEDICINE CLINIC | Facility: CLINIC | Age: 49
End: 2024-03-20
Payer: COMMERCIAL

## 2024-03-20 ENCOUNTER — LAB (OUTPATIENT)
Dept: FAMILY MEDICINE CLINIC | Facility: CLINIC | Age: 49
End: 2024-03-20
Payer: COMMERCIAL

## 2024-03-20 VITALS
WEIGHT: 187 LBS | RESPIRATION RATE: 18 BRPM | BODY MASS INDEX: 31.16 KG/M2 | SYSTOLIC BLOOD PRESSURE: 118 MMHG | HEIGHT: 65 IN | HEART RATE: 71 BPM | OXYGEN SATURATION: 97 % | TEMPERATURE: 98 F | DIASTOLIC BLOOD PRESSURE: 70 MMHG

## 2024-03-20 DIAGNOSIS — K75.81 NASH (NONALCOHOLIC STEATOHEPATITIS): ICD-10-CM

## 2024-03-20 DIAGNOSIS — N28.9 RENAL INSUFFICIENCY: ICD-10-CM

## 2024-03-20 DIAGNOSIS — E83.51 HYPOCALCEMIA: ICD-10-CM

## 2024-03-20 DIAGNOSIS — M25.551 RIGHT HIP PAIN: ICD-10-CM

## 2024-03-20 DIAGNOSIS — E11.9 TYPE 2 DIABETES MELLITUS WITHOUT COMPLICATION, WITHOUT LONG-TERM CURRENT USE OF INSULIN: ICD-10-CM

## 2024-03-20 DIAGNOSIS — E78.2 MIXED HYPERLIPIDEMIA: ICD-10-CM

## 2024-03-20 DIAGNOSIS — N28.9 RENAL INSUFFICIENCY: Primary | ICD-10-CM

## 2024-03-20 DIAGNOSIS — F17.210 CIGARETTE NICOTINE DEPENDENCE WITHOUT COMPLICATION: ICD-10-CM

## 2024-03-20 DIAGNOSIS — I10 PRIMARY HYPERTENSION: ICD-10-CM

## 2024-03-20 LAB
ALBUMIN SERPL-MCNC: 4.5 G/DL (ref 3.5–5.2)
ALBUMIN/GLOB SERPL: 1.5 G/DL
ALP SERPL-CCNC: 96 U/L (ref 39–117)
ALT SERPL W P-5'-P-CCNC: 30 U/L (ref 1–33)
ANION GAP SERPL CALCULATED.3IONS-SCNC: 13.1 MMOL/L (ref 5–15)
AST SERPL-CCNC: 27 U/L (ref 1–32)
BILIRUB SERPL-MCNC: 0.3 MG/DL (ref 0–1.2)
BUN SERPL-MCNC: 14 MG/DL (ref 6–20)
BUN/CREAT SERPL: 10.9 (ref 7–25)
CALCIUM SPEC-SCNC: 9.8 MG/DL (ref 8.6–10.5)
CHLORIDE SERPL-SCNC: 105 MMOL/L (ref 98–107)
CHOLEST SERPL-MCNC: 126 MG/DL (ref 0–200)
CO2 SERPL-SCNC: 24.9 MMOL/L (ref 22–29)
CREAT SERPL-MCNC: 1.29 MG/DL (ref 0.57–1)
EGFRCR SERPLBLD CKD-EPI 2021: 51.9 ML/MIN/1.73
GLOBULIN UR ELPH-MCNC: 3.1 GM/DL
GLUCOSE SERPL-MCNC: 80 MG/DL (ref 65–99)
HBA1C MFR BLD: 5.6 % (ref 4.8–5.6)
HDLC SERPL-MCNC: 32 MG/DL (ref 40–60)
LDLC SERPL CALC-MCNC: 74 MG/DL (ref 0–100)
LDLC/HDLC SERPL: 2.29 {RATIO}
POTASSIUM SERPL-SCNC: 4.3 MMOL/L (ref 3.5–5.2)
PROT SERPL-MCNC: 7.6 G/DL (ref 6–8.5)
SODIUM SERPL-SCNC: 143 MMOL/L (ref 136–145)
TRIGL SERPL-MCNC: 104 MG/DL (ref 0–150)
VLDLC SERPL-MCNC: 20 MG/DL (ref 5–40)

## 2024-03-20 PROCEDURE — 80053 COMPREHEN METABOLIC PANEL: CPT | Performed by: FAMILY MEDICINE

## 2024-03-20 PROCEDURE — 83036 HEMOGLOBIN GLYCOSYLATED A1C: CPT | Performed by: FAMILY MEDICINE

## 2024-03-20 PROCEDURE — 80061 LIPID PANEL: CPT | Performed by: FAMILY MEDICINE

## 2024-03-20 NOTE — PROGRESS NOTES
Follow Up Office Visit      Date: 2024   Patient Name: Veronica Goldsmith  : 1975   MRN: [id number]     Chief Complaint:    Chief Complaint   Patient presents with    Follow-up     Kidney function fu     Diabetes       History of Present Illness: Veronica Goldsmith is a 48 y.o. female who is here today for follow-up.  Patient feels that she is doing better at present time.  She has returned back to work and is fatigue and have elevated weakness but she does appear to be able to manage to the work at present time.  She does continue to have a persistent cough but has not had any problems with shortness of breath.  She does have intermittent pain in her right hip region.  Her MRI did not reveal any abnormality in the past.  Patient has not had any other mass effect noted on her right side.  She denies any fevers or chills.  She does continue to take her medication as prescribed without any complaints.  Her activity, appetite and sleep are improving.  Patient denies any other cardiovascular, respiratory, gastrointestinal, urologic or neurologic complaints.    Subjective      Review of Systems:   Review of Systems   Constitutional:  Positive for fatigue. Negative for activity change, appetite change and fever.   Respiratory:  Positive for cough. Negative for shortness of breath and wheezing.    Cardiovascular:  Negative for chest pain, palpitations and leg swelling.   Gastrointestinal:  Negative for abdominal distention, abdominal pain, blood in stool, constipation, diarrhea, nausea, vomiting, GERD and indigestion.   Genitourinary:  Negative for dysuria, flank pain, frequency and urgency.   Musculoskeletal:  Negative for arthralgias and myalgias.   Neurological:  Positive for weakness. Negative for dizziness, light-headedness, numbness, headache and memory problem.   Psychiatric/Behavioral:  Negative for sleep disturbance. The patient is not nervous/anxious.        I have reviewed the patients family history,  social history, past medical history, past surgical history and have updated it as appropriate.     Medications:     Current Outpatient Medications:     albuterol sulfate  (90 Base) MCG/ACT inhaler, INHALE 3 PUFFS BY MOUTH EVERY 6 HOURS AS NEEDED, Disp: 8.5 g, Rfl: 12    aspirin 81 MG EC tablet, Take 1 tablet by mouth Daily., Disp: , Rfl:     cetirizine (zyrTEC) 10 MG tablet, TAKE ONE TABLET BY MOUTH EVERY DAY, Disp: 30 tablet, Rfl: 12    clotrimazole-betamethasone (Lotrisone) 1-0.05 % cream, Apply 1 Application topically to the appropriate area as directed 2 (Two) Times a Day., Disp: 45 g, Rfl: 1    docusate sodium (Colace) 100 MG capsule, Take 1 capsule by mouth 2 (Two) Times a Day. If taking pain pill, Disp: 15 capsule, Rfl: 1    esomeprazole (nexIUM) 40 MG capsule, TAKE ONE CAPSULE BY MOUTH TWO TIMES A DAY, Disp: 180 capsule, Rfl: 1    Fluticasone-Umeclidin-Vilant (Trelegy Ellipta) 200-62.5-25 MCG/ACT aerosol powder , Inhale 1 puff Daily., Disp: 28 each, Rfl: 1    losartan (COZAAR) 25 MG tablet, TAKE 1 TABLET BY MOUTH DAILY, Disp: 90 tablet, Rfl: 3    Mounjaro 10 MG/0.5ML solution pen-injector pen, INJECT 0.5 ML UNDER THE SKIN INTO THE APPROPRIATE AREA AS DIRECTED 1 (ONE) TIME PER WEEK., Disp: , Rfl:     ondansetron ODT (ZOFRAN-ODT) 8 MG disintegrating tablet, Place 1 tablet on the tongue Every 8 (Eight) Hours As Needed for Nausea., Disp: 20 tablet, Rfl: 0    rosuvastatin (CRESTOR) 40 MG tablet, TAKE 1 TABLET BY MOUTH DAILY, Disp: 90 tablet, Rfl: 3    Allergies:   Allergies   Allergen Reactions    Sulfa Antibiotics Unknown - High Severity     high fever - pt states 106F    Morphine Other (See Comments)     drop in O2 sats; needed oxygent; pt states she has to be reminded to breathe;        Immunizations:   Immunization History   Administered Date(s) Administered    Covid-19 (Pfizer) Gray Cap Monovalent 05/24/2022, 06/20/2022    Flu Vaccine Quad PF >36MO 02/08/2019    Flu Vaccine Split Quad 01/09/2019     "Flublok 18+yrs 01/09/2019    Fluzone (or Fluarix & Flulaval for VFC) >6mos 10/04/2022    Hepatitis A 01/09/2019    Influenza, Unspecified 02/08/2019, 10/10/2023    MMR 02/08/2019    Pneumococcal Polysaccharide (PPSV23) 10/07/2022, 11/16/2023    Tdap 02/08/2019        Objective     Physical Exam: Please see above  Vital Signs:   Vitals:    03/20/24 1500   BP: 118/70   BP Location: Left arm   Patient Position: Sitting   Cuff Size: Adult   Pulse: 71   Resp: 18   Temp: 98 °F (36.7 °C)   TempSrc: Temporal   SpO2: 97%   Weight: 84.8 kg (187 lb)   Height: 165.1 cm (65\")     Body mass index is 31.12 kg/m².          Physical Exam  Vitals and nursing note reviewed.   Constitutional:       Appearance: Normal appearance.   HENT:      Head: Normocephalic and atraumatic.      Nose: Nose normal.      Mouth/Throat:      Pharynx: Oropharynx is clear.   Eyes:      Extraocular Movements: Extraocular movements intact.      Pupils: Pupils are equal, round, and reactive to light.   Neck:      Thyroid: No thyroid mass or thyromegaly.      Trachea: Trachea normal.   Cardiovascular:      Rate and Rhythm: Normal rate and regular rhythm.      Pulses: Normal pulses. No decreased pulses.      Heart sounds: Normal heart sounds.   Pulmonary:      Effort: Pulmonary effort is normal.      Breath sounds: Normal breath sounds.   Abdominal:      General: Abdomen is flat. Bowel sounds are normal.      Palpations: Abdomen is soft.      Tenderness: There is no abdominal tenderness.   Musculoskeletal:      Cervical back: Neck supple.      Right lower leg: No edema.      Left lower leg: No edema.   Lymphadenopathy:      Cervical: No cervical adenopathy.   Skin:     General: Skin is warm and dry.   Neurological:      General: No focal deficit present.      Mental Status: She is alert and oriented to person, place, and time.      Sensory: Sensation is intact.      Motor: Motor function is intact.      Coordination: Coordination is intact.   Psychiatric:    "      Attention and Perception: Attention normal.         Mood and Affect: Mood normal.         Speech: Speech normal.         Behavior: Behavior normal.         Procedures    Results:   Labs:   Hemoglobin A1C   Date Value Ref Range Status   12/21/2023 5.30 4.80 - 5.60 % Final     TSH   Date Value Ref Range Status   11/16/2023 1.100 0.270 - 4.200 uIU/mL Final        POCT Results (if applicable):   Results for orders placed or performed in visit on 01/12/24   CBC Auto Differential    Specimen: Arm, Left; Blood   Result Value Ref Range    WBC 10.67 3.40 - 10.80 10*3/mm3    RBC 4.34 3.77 - 5.28 10*6/mm3    Hemoglobin 12.8 12.0 - 15.9 g/dL    Hematocrit 40.2 34.0 - 46.6 %    MCV 92.6 79.0 - 97.0 fL    MCH 29.5 26.6 - 33.0 pg    MCHC 31.8 31.5 - 35.7 g/dL    RDW 13.6 12.3 - 15.4 %    RDW-SD 45.5 37.0 - 54.0 fl    MPV 10.2 6.0 - 12.0 fL    Platelets 330 140 - 450 10*3/mm3   Manual Differential    Specimen: Arm, Left; Blood   Result Value Ref Range    Neutrophil % 71.1 42.7 - 76.0 %    Lymphocyte % 22.7 19.6 - 45.3 %    Monocyte % 2.1 (L) 5.0 - 12.0 %    Eosinophil % 4.1 0.3 - 6.2 %    Neutrophils Absolute 7.59 (H) 1.70 - 7.00 10*3/mm3    Lymphocytes Absolute 2.42 0.70 - 3.10 10*3/mm3    Monocytes Absolute 0.22 0.10 - 0.90 10*3/mm3    Eosinophils Absolute 0.44 (H) 0.00 - 0.40 10*3/mm3    RBC Morphology Normal Normal    WBC Morphology Normal Normal    Platelet Morphology Normal Normal       Imaging:   No valid procedures specified.     Measures:   Advanced Care Planning:   Patient has an advance directive in EMR which is still valid.     Smoking Cessation:   less than 3 minutes spent counseling Will try to cut down    Assessment / Plan      Assessment/Plan:   Diagnoses and all orders for this visit:    1. Renal insufficiency (Primary)  Patient has had difficulty with renal insufficiency.  We will repeat her renal functions and will make adjustments based on these findings.  Hopefully her kidney function test has remained  stable.  We will encourage her to avoid sodium and to continue to push fluids.  If she has any other abnormality, possible renal ultrasound will be pursued.  -     Comprehensive Metabolic Panel; Future    2. TAM (nonalcoholic steatohepatitis)   Patient has been trying to modify her diet.  She is taking Mounjaro without too much difficulty.  Hopefully this she will be able to maintain.  The GLP-1 agonist will hopefully continue to show improvement in her liver function test and decrease her visceral fat.    3. Type 2 diabetes mellitus without complication, without long-term current use of insulin  We will obtain hemoglobin A1c.  It does appear that she is tolerating the GLP-1 agonist.  We will make adjustments to keep her hemoglobin A1c less than 7%.  -     Hemoglobin A1c; Future    4. Primary hypertension   Blood pressure is doing well at present time.  She is tolerating her medications without complaints.  We will obtain renal function and hopefully we will not need any further modification.    5. Mixed hyperlipidemia  Patient is tolerating her lipid-lowering agent.  We will obtain lipid profile as well as liver function test and will adjust to keep her LDL less than 70.  -     Lipid Panel; Future    6. Cigarette nicotine dependence without complication   Patient does continue to smoke.  We have encouraged her to discontinue her smoking.  We will provide medical assistance if necessary.    7. Hypocalcemia   Patient has had low calcium in the past.  I do not suspect that she has had any abnormality at present time.  We will repeat her calcium level and if it does remain diminished we will obtain PTH and will make adjustments based on these findings.    8. Right hip pain  Patient does have some pain in her right hip region.  She did have MRIs of her low back that did not reveal any abnormality.  We will obtain x-ray and if she does have underlying abnormality further assessment treatment may be necessary as well as  possible referral to an orthopedist.  -     XR Hip With or Without Pelvis 2 - 3 View Right; Future        Follow Up:   Return in about 3 months (around 6/20/2024).      At [facility], we believe that sharing information builds trust and better relationships. You are receiving this note because you recently visited [facility]. It is possible you will see health information before a provider has talked with you about it. This kind of information can be easy to misunderstand. To help you fully understand what it means for your health, we urge you to discuss this note with your provider.    Greg Levine MD  [facility]

## 2024-04-18 ENCOUNTER — CLINICAL SUPPORT (OUTPATIENT)
Dept: FAMILY MEDICINE CLINIC | Facility: CLINIC | Age: 49
End: 2024-04-18
Payer: COMMERCIAL

## 2024-04-18 DIAGNOSIS — R10.9 ABDOMINAL PAIN, UNSPECIFIED ABDOMINAL LOCATION: Primary | ICD-10-CM

## 2024-04-18 LAB
BILIRUB BLD-MCNC: NEGATIVE MG/DL
CLARITY, POC: ABNORMAL
COLOR UR: YELLOW
EXPIRATION DATE: ABNORMAL
GLUCOSE UR STRIP-MCNC: NEGATIVE MG/DL
KETONES UR QL: NEGATIVE
LEUKOCYTE EST, POC: NEGATIVE
Lab: ABNORMAL
NITRITE UR-MCNC: NEGATIVE MG/ML
PH UR: 6 [PH] (ref 5–8)
PROT UR STRIP-MCNC: NEGATIVE MG/DL
RBC # UR STRIP: NEGATIVE /UL
SP GR UR: 1.01 (ref 1–1.03)
UROBILINOGEN UR QL: ABNORMAL

## 2024-04-18 PROCEDURE — 81003 URINALYSIS AUTO W/O SCOPE: CPT | Performed by: FAMILY MEDICINE

## 2024-04-25 ENCOUNTER — LAB (OUTPATIENT)
Dept: FAMILY MEDICINE CLINIC | Facility: CLINIC | Age: 49
End: 2024-04-25
Payer: COMMERCIAL

## 2024-04-25 ENCOUNTER — OFFICE VISIT (OUTPATIENT)
Dept: FAMILY MEDICINE CLINIC | Facility: CLINIC | Age: 49
End: 2024-04-25
Payer: COMMERCIAL

## 2024-04-25 VITALS
BODY MASS INDEX: 31.46 KG/M2 | HEART RATE: 81 BPM | DIASTOLIC BLOOD PRESSURE: 78 MMHG | TEMPERATURE: 98 F | SYSTOLIC BLOOD PRESSURE: 130 MMHG | OXYGEN SATURATION: 96 % | WEIGHT: 188.8 LBS | HEIGHT: 65 IN

## 2024-04-25 DIAGNOSIS — K75.81 METABOLIC DYSFUNCTION-ASSOCIATED STEATOHEPATITIS (MASH): ICD-10-CM

## 2024-04-25 DIAGNOSIS — E11.9 TYPE 2 DIABETES MELLITUS WITHOUT COMPLICATION, WITHOUT LONG-TERM CURRENT USE OF INSULIN: ICD-10-CM

## 2024-04-25 DIAGNOSIS — N28.9 RENAL INSUFFICIENCY: Primary | ICD-10-CM

## 2024-04-25 DIAGNOSIS — I10 PRIMARY HYPERTENSION: ICD-10-CM

## 2024-04-25 DIAGNOSIS — J01.90 ACUTE NON-RECURRENT SINUSITIS, UNSPECIFIED LOCATION: ICD-10-CM

## 2024-04-25 DIAGNOSIS — K75.81 NASH (NONALCOHOLIC STEATOHEPATITIS): ICD-10-CM

## 2024-04-25 PROCEDURE — 82977 ASSAY OF GGT: CPT | Performed by: FAMILY MEDICINE

## 2024-04-25 PROCEDURE — 82465 ASSAY BLD/SERUM CHOLESTEROL: CPT | Performed by: FAMILY MEDICINE

## 2024-04-25 PROCEDURE — 80053 COMPREHEN METABOLIC PANEL: CPT | Performed by: FAMILY MEDICINE

## 2024-04-25 PROCEDURE — 82172 ASSAY OF APOLIPOPROTEIN: CPT | Performed by: FAMILY MEDICINE

## 2024-04-25 PROCEDURE — 36415 COLL VENOUS BLD VENIPUNCTURE: CPT | Performed by: FAMILY MEDICINE

## 2024-04-25 PROCEDURE — 83883 ASSAY NEPHELOMETRY NOT SPEC: CPT | Performed by: FAMILY MEDICINE

## 2024-04-25 PROCEDURE — 83010 ASSAY OF HAPTOGLOBIN QUANT: CPT | Performed by: FAMILY MEDICINE

## 2024-04-25 PROCEDURE — 84478 ASSAY OF TRIGLYCERIDES: CPT | Performed by: FAMILY MEDICINE

## 2024-04-25 RX ORDER — LOSARTAN POTASSIUM 25 MG/1
TABLET ORAL
Qty: 90 TABLET | Refills: 12 | Status: SHIPPED | OUTPATIENT
Start: 2024-04-25

## 2024-04-25 RX ORDER — AMOXICILLIN AND CLAVULANATE POTASSIUM 875; 125 MG/1; MG/1
1 TABLET, FILM COATED ORAL 2 TIMES DAILY
Qty: 20 TABLET | Refills: 0 | Status: SHIPPED | OUTPATIENT
Start: 2024-04-25

## 2024-04-25 NOTE — PROGRESS NOTES
Follow Up Office Visit      Date: 2024   Patient Name: Veronica Goldsmith  : 1975   MRN: [id number]     Chief Complaint:    Chief Complaint   Patient presents with    Follow-up     Possible sinu infection         History of Present Illness: Veronica Goldsmith is a 48 y.o. female who is here today for follow-up.  Patient had difficulty with weight loss secondary to her GLP-1 agonist and has reduced the dose and has been them to me.  She was concerned that she may have been losing weight a little bit too fast.  She recently had a right nephrectomy secondary to renal cell carcinoma and has been admitted to the hospital for a preoperative abscess that required surgical drainage.  Currently she is doing better with respect to her symptoms but does continue to have some fatigue issues excetra.  She is slowly improving her symptomatology at present time.  Patient states that she has had some problems with increase in cough and drainage over the previous several weeks.  She has had some sinus bleeding that she has an underlying sinus infection.  She does continue to smoke but has tried to reduce.  No other wheezing or other symptoms been noted.  Her appetite is improving.  Activity is slowly improving.  Sleep is about the same.  Patient denies any other cardiovascular,, gastrointestinal, urologic or neurologic complaints.    Subjective      Review of Systems:   Review of Systems   Constitutional:  Negative for activity change, appetite change and fatigue.   HENT:  Positive for congestion, postnasal drip and sinus pressure.    Respiratory:  Negative for cough and shortness of breath.    Cardiovascular:  Negative for chest pain, palpitations and leg swelling.   Gastrointestinal:  Negative for abdominal distention, abdominal pain, blood in stool, constipation, diarrhea, nausea, vomiting, GERD and indigestion.   Genitourinary:  Negative for dysuria, flank pain, frequency and urgency.   Musculoskeletal:  Negative for  arthralgias and myalgias.   Neurological:  Negative for dizziness, tremors, seizures, weakness, light-headedness, numbness, headache and memory problem.   Psychiatric/Behavioral:  Negative for sleep disturbance and depressed mood. The patient is not nervous/anxious.        I have reviewed the patients family history, social history, past medical history, past surgical history and have updated it as appropriate.     Medications:     Current Outpatient Medications:     albuterol sulfate  (90 Base) MCG/ACT inhaler, INHALE 3 PUFFS BY MOUTH EVERY 6 HOURS AS NEEDED, Disp: 8.5 g, Rfl: 12    aspirin 81 MG EC tablet, Take 1 tablet by mouth Daily., Disp: , Rfl:     cetirizine (zyrTEC) 10 MG tablet, TAKE ONE TABLET BY MOUTH EVERY DAY, Disp: 30 tablet, Rfl: 12    clotrimazole-betamethasone (Lotrisone) 1-0.05 % cream, Apply 1 Application topically to the appropriate area as directed 2 (Two) Times a Day., Disp: 45 g, Rfl: 1    esomeprazole (nexIUM) 40 MG capsule, TAKE ONE CAPSULE BY MOUTH TWO TIMES A DAY, Disp: 180 capsule, Rfl: 1    Fluticasone-Umeclidin-Vilant (Trelegy Ellipta) 200-62.5-25 MCG/ACT aerosol powder , Inhale 1 puff Daily., Disp: 28 each, Rfl: 1    rosuvastatin (CRESTOR) 40 MG tablet, TAKE 1 TABLET BY MOUTH DAILY, Disp: 90 tablet, Rfl: 3    amoxicillin-clavulanate (AUGMENTIN) 875-125 MG per tablet, Take 1 tablet by mouth 2 (Two) Times a Day., Disp: 20 tablet, Rfl: 0    losartan (COZAAR) 25 MG tablet, TAKE 1 TABLET BY MOUTH DAILY, Disp: 90 tablet, Rfl: 12    Allergies:   Allergies   Allergen Reactions    Sulfa Antibiotics Unknown - High Severity     high fever - pt states 106F    Morphine Other (See Comments)     drop in O2 sats; needed oxygent; pt states she has to be reminded to breathe;        Immunizations:   Immunization History   Administered Date(s) Administered    Covid-19 (Pfizer) Gray Cap Monovalent 05/24/2022, 06/20/2022    Flu Vaccine Quad PF >36MO 02/08/2019    Flu Vaccine Split Quad 01/09/2019     "Flublok 18+yrs 01/09/2019    Fluzone (or Fluarix & Flulaval for VFC) >6mos 10/04/2022    Hepatitis A 01/09/2019    Influenza, Unspecified 02/08/2019, 10/10/2023    MMR 02/08/2019    Pneumococcal Polysaccharide (PPSV23) 10/07/2022, 11/16/2023    Tdap 02/08/2019        Objective     Physical Exam: Please see above  Vital Signs:   Vitals:    04/25/24 1048   BP: 130/78   Pulse: 81   Temp: 98 °F (36.7 °C)   SpO2: 96%   Weight: 85.6 kg (188 lb 12.8 oz)   Height: 165.1 cm (65\")     Body mass index is 31.42 kg/m².          Physical Exam  Vitals and nursing note reviewed.   Constitutional:       Appearance: Normal appearance.   HENT:      Head: Normocephalic and atraumatic.      Nose: Nose normal.      Mouth/Throat:      Pharynx: Oropharynx is clear.   Eyes:      Extraocular Movements: Extraocular movements intact.      Pupils: Pupils are equal, round, and reactive to light.   Neck:      Thyroid: No thyroid mass or thyromegaly.      Trachea: Trachea normal.   Cardiovascular:      Rate and Rhythm: Normal rate and regular rhythm.      Pulses: Normal pulses. No decreased pulses.      Heart sounds: Normal heart sounds.   Pulmonary:      Effort: Pulmonary effort is normal.      Breath sounds: Normal breath sounds.   Abdominal:      General: Abdomen is flat. Bowel sounds are normal.      Palpations: Abdomen is soft.      Tenderness: There is no abdominal tenderness.   Musculoskeletal:      Cervical back: Neck supple.      Right lower leg: No edema.      Left lower leg: No edema.   Lymphadenopathy:      Cervical: No cervical adenopathy.   Skin:     General: Skin is warm and dry.   Neurological:      General: No focal deficit present.      Mental Status: She is alert and oriented to person, place, and time.      Sensory: Sensation is intact.      Motor: Motor function is intact.      Coordination: Coordination is intact.   Psychiatric:         Attention and Perception: Attention normal.         Mood and Affect: Mood normal.         " Speech: Speech normal.         Behavior: Behavior normal.         Procedures    Results:   Labs:   Hemoglobin A1C   Date Value Ref Range Status   03/20/2024 5.60 4.80 - 5.60 % Final     TSH   Date Value Ref Range Status   11/16/2023 1.100 0.270 - 4.200 uIU/mL Final        POCT Results (if applicable):   Results for orders placed or performed in visit on 04/18/24   POCT urinalysis dipstick, automated    Specimen: Urine   Result Value Ref Range    Color Yellow Yellow, Straw, Dark Yellow, Erika    Clarity, UA Slightly Cloudy (A) Clear    Specific Gravity  1.015 1.005 - 1.030    pH, Urine 6.0 5.0 - 8.0    Leukocytes Negative Negative    Nitrite, UA Negative Negative    Protein, POC Negative Negative mg/dL    Glucose, UA Negative Negative mg/dL    Ketones, UA Negative Negative    Urobilinogen, UA 0.2 E.U./dL Normal, 0.2 E.U./dL    Bilirubin Negative Negative    Blood, UA Negative Negative    Lot Number [id number]     Expiration Date 06/29/2025        Imaging:   No valid procedures specified.     Measures:   Advanced Care Planning:   Patient has an advance directive in EMR which is still valid.     Smoking Cessation:   less than 3 minutes spent counseling Will try to cut down    Assessment / Plan      Assessment/Plan:   Diagnoses and all orders for this visit:    1. Renal insufficiency (Primary)   Patient has been pushing fluids and avoiding sodium.  We will obtain a CMP to assess her renal function.  I do suspect that they will be improved from previous.  We will make modifications as necessary.    2. Type 2 diabetes mellitus without complication, without long-term current use of insulin   Patient has been doing relatively well with respect to her diabetes.  She has discontinued the GLP-1 agonist at present time due to her shift and weakness.  We have discussed the has been making her weight over the previous 3 months and could resume the GLP-1 agonist if her blood sugars become elevated.  We will monitor her  hemoglobin A1c's and will make adjustments based on these findings.  She did have a previous hemoglobin A1c greater than 7% prior to starting her GLP-1 agonist after she is having weight loss.    3. TAM (nonalcoholic steatohepatitis)  Patient does have orders to be metabolic associated fatty liver disease.  We will obtain a FibroSure test to assess the extent of the fatty liver as well as rule out possible fibrosis.  If he does have additional fibrosis we will obtain elastography and will pursue and treat accordingly.  We have encouraged her that she may need the GLP-1 agonist to help reduce her fatty liver disease.  We have encouraged her to avoid carbohydrates and fatty foods.  -     TAM Fibrosure; Future  -     Comprehensive Metabolic Panel; Future    4. Acute non-recurrent sinusitis, unspecified location  Patient may have initially had symptoms of an upper respiratory infection or maybe even some allergies that has not evolved into acute sinusitis.  We have encouraged her to use nasal saline, discontinue smoking and continue to push fluids.  We will provide a course of Augmentin and will monitor and if she has worsening complaints she will contact us.  -     amoxicillin-clavulanate (AUGMENTIN) 875-125 MG per tablet; Take 1 tablet by mouth 2 (Two) Times a Day.  Dispense: 20 tablet; Refill: 0        Follow Up:   No follow-ups on file.      At [facility], we believe that sharing information builds trust and better relationships. You are receiving this note because you recently visited [facility]. It is possible you will see health information before a provider has talked with you about it. This kind of information can be easy to misunderstand. To help you fully understand what it means for your health, we urge you to discuss this note with your provider.    Greg Levine MD  [facility]

## 2024-04-26 LAB
ALBUMIN SERPL-MCNC: 4.6 G/DL (ref 3.5–5.2)
ALBUMIN/GLOB SERPL: 1.7 G/DL
ALP SERPL-CCNC: 94 U/L (ref 39–117)
ALT SERPL W P-5'-P-CCNC: 31 U/L (ref 1–33)
ANION GAP SERPL CALCULATED.3IONS-SCNC: 9 MMOL/L (ref 5–15)
AST SERPL-CCNC: 21 U/L (ref 1–32)
BILIRUB SERPL-MCNC: 0.2 MG/DL (ref 0–1.2)
BUN SERPL-MCNC: 14 MG/DL (ref 6–20)
BUN/CREAT SERPL: 13.9 (ref 7–25)
CALCIUM SPEC-SCNC: 9.5 MG/DL (ref 8.6–10.5)
CHLORIDE SERPL-SCNC: 109 MMOL/L (ref 98–107)
CO2 SERPL-SCNC: 24 MMOL/L (ref 22–29)
CREAT SERPL-MCNC: 1.01 MG/DL (ref 0.57–1)
EGFRCR SERPLBLD CKD-EPI 2021: 69.7 ML/MIN/1.73
GLOBULIN UR ELPH-MCNC: 2.7 GM/DL
GLUCOSE SERPL-MCNC: 107 MG/DL (ref 65–99)
POTASSIUM SERPL-SCNC: 4.2 MMOL/L (ref 3.5–5.2)
PROT SERPL-MCNC: 7.3 G/DL (ref 6–8.5)
SODIUM SERPL-SCNC: 142 MMOL/L (ref 136–145)

## 2024-04-26 NOTE — PROGRESS NOTES
Called patient to speak about results. No answer, detailed voicemail left. Advised patient to contact our office with any questions or conserns.

## 2024-04-27 LAB
A2 MACROGLOB SERPL-MCNC: 344 MG/DL (ref 110–276)
ALT SERPL W P-5'-P-CCNC: 33 IU/L (ref 0–40)
APO A-I SERPL-MCNC: 110 MG/DL (ref 116–209)
AST SERPL W P-5'-P-CCNC: 25 IU/L (ref 0–40)
BILIRUB SERPL-MCNC: 0.2 MG/DL (ref 0–1.2)
CHOLEST SERPL-MCNC: 163 MG/DL (ref 100–199)
FIBROSIS SCORING:: ABNORMAL
FIBROSIS STAGE SERPL QL: ABNORMAL
GGT SERPL-CCNC: 17 IU/L (ref 0–60)
GLUCOSE SERPL-MCNC: 107 MG/DL (ref 70–99)
HAPTOGLOB SERPL-MCNC: 120 MG/DL (ref 42–296)
LABORATORY COMMENT REPORT: ABNORMAL
LIVER FIBR SCORE SERPL CALC.FIBROSURE: 0.26 (ref 0–0.21)
LIVER STEATOSIS GRADE SERPL QL: ABNORMAL
LIVER STEATOSIS SCORE SERPL: 0.48 (ref 0–0.4)
NASH GRADE SERPL QL: ABNORMAL
NASH INTERPRETATION SERPL-IMP: ABNORMAL
NASH SCORE SERPL: 0.39 (ref 0–0.25)
NASH SCORING: ABNORMAL
STEATOSIS SCORING: ABNORMAL
TEST PERFORMANCE INFO SPEC: ABNORMAL
TEST PERFORMANCE INFO SPEC: ABNORMAL
TRIGL SERPL-MCNC: 207 MG/DL (ref 0–149)

## 2024-04-29 NOTE — PROGRESS NOTES
Patient was informed of results and verbalized good understanding and appreciation. She is willing to do an u/s

## 2024-05-03 RX ORDER — ONDANSETRON HYDROCHLORIDE 8 MG/1
TABLET, FILM COATED ORAL
Qty: 2 TABLET | Refills: 0 | Status: SHIPPED | OUTPATIENT
Start: 2024-05-03

## 2024-06-04 ENCOUNTER — OFFICE VISIT (OUTPATIENT)
Dept: FAMILY MEDICINE CLINIC | Facility: CLINIC | Age: 49
End: 2024-06-04
Payer: COMMERCIAL

## 2024-06-04 VITALS
BODY MASS INDEX: 31.09 KG/M2 | HEART RATE: 83 BPM | WEIGHT: 186.6 LBS | SYSTOLIC BLOOD PRESSURE: 130 MMHG | HEIGHT: 65 IN | OXYGEN SATURATION: 96 % | TEMPERATURE: 97.8 F | DIASTOLIC BLOOD PRESSURE: 88 MMHG

## 2024-06-04 DIAGNOSIS — J45.30 MILD PERSISTENT ASTHMA, UNSPECIFIED WHETHER COMPLICATED: ICD-10-CM

## 2024-06-04 DIAGNOSIS — F17.210 CIGARETTE NICOTINE DEPENDENCE WITHOUT COMPLICATION: ICD-10-CM

## 2024-06-04 DIAGNOSIS — Z12.31 ENCOUNTER FOR SCREENING MAMMOGRAM FOR MALIGNANT NEOPLASM OF BREAST: ICD-10-CM

## 2024-06-04 DIAGNOSIS — E11.9 TYPE 2 DIABETES MELLITUS WITHOUT COMPLICATION, WITHOUT LONG-TERM CURRENT USE OF INSULIN: ICD-10-CM

## 2024-06-04 DIAGNOSIS — E78.2 MIXED HYPERLIPIDEMIA: ICD-10-CM

## 2024-06-04 DIAGNOSIS — N28.9 RENAL INSUFFICIENCY: ICD-10-CM

## 2024-06-04 DIAGNOSIS — K75.81 METABOLIC DYSFUNCTION-ASSOCIATED STEATOHEPATITIS (MASH): Primary | ICD-10-CM

## 2024-06-04 DIAGNOSIS — I10 PRIMARY HYPERTENSION: ICD-10-CM

## 2024-06-04 PROCEDURE — 99214 OFFICE O/P EST MOD 30 MIN: CPT | Performed by: FAMILY MEDICINE

## 2024-06-04 RX ORDER — SEMAGLUTIDE 2.68 MG/ML
2 INJECTION, SOLUTION SUBCUTANEOUS WEEKLY
Qty: 3 ML | Refills: 11 | Status: SHIPPED | OUTPATIENT
Start: 2024-06-04

## 2024-06-04 NOTE — PROGRESS NOTES
Follow Up Office Visit      Date: 2024   Patient Name: Veronica Goldsmith  : 1975   MRN: [id number]     Chief Complaint:    Chief Complaint   Patient presents with    Follow-up     Routine follow up for renal insufficiency.       History of Present Illness: Veronica Goldsmith is a 49 y.o. female who is here today for follow-up.  Patient has been doing relatively well since last being seen.  She does continue to drink plenty of fluids.  Her laboratory data does show that things have improved.  She has not had any other complaints and is doing better with respect to her work stamina.  She tolerates her medications without difficulty.  She has not had any change in her usual activity, appetite and sleep.  Patient denies any other cardiovascular, respiratory, gastrointestinal, urologic or neurologic complaints.  Patient does have a repeat CT scan scheduled later on this month with follow-up with the urologist.    Subjective      Review of Systems:   Review of Systems   Constitutional:  Negative for activity change, appetite change and fatigue.   Respiratory:  Negative for cough, chest tightness, shortness of breath and wheezing.    Cardiovascular:  Negative for chest pain, palpitations and leg swelling.   Gastrointestinal:  Negative for abdominal distention, abdominal pain, blood in stool, constipation, diarrhea, nausea, vomiting, GERD and indigestion.   Genitourinary:  Negative for dysuria, flank pain, frequency and urgency.   Musculoskeletal:  Negative for arthralgias and myalgias.   Neurological:  Negative for dizziness, tremors, seizures, syncope, weakness, light-headedness, numbness, headache and memory problem.   Psychiatric/Behavioral:  Negative for sleep disturbance and depressed mood. The patient is not nervous/anxious.        I have reviewed the patients family history, social history, past medical history, past surgical history and have updated it as appropriate.     Medications:     Current  "Outpatient Medications:     albuterol sulfate  (90 Base) MCG/ACT inhaler, INHALE 3 PUFFS BY MOUTH EVERY 6 HOURS AS NEEDED, Disp: 8.5 g, Rfl: 12    aspirin 81 MG EC tablet, Take 1 tablet by mouth Daily., Disp: , Rfl:     clotrimazole-betamethasone (Lotrisone) 1-0.05 % cream, Apply 1 Application topically to the appropriate area as directed 2 (Two) Times a Day., Disp: 45 g, Rfl: 1    esomeprazole (nexIUM) 40 MG capsule, TAKE ONE CAPSULE BY MOUTH TWO TIMES A DAY, Disp: 180 capsule, Rfl: 1    losartan (COZAAR) 25 MG tablet, TAKE 1 TABLET BY MOUTH DAILY, Disp: 90 tablet, Rfl: 12    ondansetron (ZOFRAN) 8 MG tablet, DISSOLVE 1 TABLET ON OR UNDER TONGUE EVERY 8 HOURS AS NEEDED FOR NAUSEA., Disp: 2 tablet, Rfl: 0    rosuvastatin (CRESTOR) 40 MG tablet, TAKE 1 TABLET BY MOUTH DAILY, Disp: 90 tablet, Rfl: 3    Semaglutide, 2 MG/DOSE, (Ozempic, 2 MG/DOSE,) 8 MG/3ML solution pen-injector, Inject 2 mg under the skin into the appropriate area as directed 1 (One) Time Per Week., Disp: 3 mL, Rfl: 11    Allergies:   Allergies   Allergen Reactions    Sulfa Antibiotics Unknown - High Severity     high fever - pt states 106F    Morphine Other (See Comments)     drop in O2 sats; needed oxygent; pt states she has to be reminded to breathe;        Immunizations:   Immunization History   Administered Date(s) Administered    Covid-19 (Pfizer) Gray Cap Monovalent 05/24/2022, 06/20/2022    Flu Vaccine Quad PF >36MO 02/08/2019    Flu Vaccine Split Quad 01/09/2019    Flublok 18+yrs 01/09/2019    Fluzone (or Fluarix & Flulaval for VFC) >6mos 10/04/2022    Hepatitis A 01/09/2019    Influenza, Unspecified 02/08/2019, 10/10/2023    MMR 02/08/2019    Pneumococcal Polysaccharide (PPSV23) 10/07/2022, 11/16/2023    Tdap 02/08/2019        Objective     Physical Exam: Please see above  Vital Signs:   Vitals:    06/04/24 1039   BP: 130/88   Pulse: 83   Temp: 97.8 °F (36.6 °C)   SpO2: 96%   Weight: 84.6 kg (186 lb 9.6 oz)   Height: 165.1 cm (65\") "     Body mass index is 31.05 kg/m².  BMI is >= 30 and <35. (Class 1 Obesity). The following options were offered after discussion;: exercise counseling/recommendations and nutrition counseling/recommendations       Physical Exam  Vitals and nursing note reviewed.   Constitutional:       Appearance: Normal appearance.   HENT:      Head: Normocephalic and atraumatic.      Nose: Nose normal.      Mouth/Throat:      Pharynx: Oropharynx is clear.   Eyes:      Extraocular Movements: Extraocular movements intact.      Pupils: Pupils are equal, round, and reactive to light.   Neck:      Thyroid: No thyroid mass or thyromegaly.      Trachea: Trachea normal.   Cardiovascular:      Rate and Rhythm: Normal rate and regular rhythm.      Pulses: Normal pulses. No decreased pulses.      Heart sounds: Normal heart sounds.   Pulmonary:      Effort: Pulmonary effort is normal.      Breath sounds: Normal breath sounds.   Abdominal:      General: Abdomen is flat. Bowel sounds are normal.      Palpations: Abdomen is soft.      Tenderness: There is no abdominal tenderness.   Musculoskeletal:      Cervical back: Neck supple.      Right lower leg: No edema.      Left lower leg: No edema.   Lymphadenopathy:      Cervical: No cervical adenopathy.   Skin:     General: Skin is warm and dry.   Neurological:      General: No focal deficit present.      Mental Status: She is alert and oriented to person, place, and time.      Sensory: Sensation is intact.      Motor: Motor function is intact.      Coordination: Coordination is intact.   Psychiatric:         Attention and Perception: Attention normal.         Mood and Affect: Mood normal.         Speech: Speech normal.         Behavior: Behavior normal.         Procedures    Results:   Labs:   Hemoglobin A1C   Date Value Ref Range Status   03/20/2024 5.60 4.80 - 5.60 % Final     TSH   Date Value Ref Range Status   11/16/2023 1.100 0.270 - 4.200 uIU/mL Final        POCT Results (if applicable):    Results for orders placed or performed in visit on 04/25/24   TAM Fibrosure    Specimen: Arm, Left; Blood   Result Value Ref Range    Fibrosis Score 0.26 (H) 0.00 - 0.21    Fibrosis Stage F0-F1     Steatosis Score (Reference) 0.48 (H) 0.00 - 0.40    Steatosis Grade (Reference) Comment     TAM Score (Reference) 0.39 (H) 0.00 - 0.25    Tam Grade (Reference) Comment     Methodology: Comment     Alpha 2-Macroglobulins, Qn 344 (H) 110 - 276 mg/dL    Haptoglobin 120 42 - 296 mg/dL    Apolipoprotein A-1 110 (L) 116 - 209 mg/dL    Total Bilirubin 0.2 0.0 - 1.2 mg/dL    GGT 17 0 - 60 IU/L    ALT (SGPT) 33 0 - 40 IU/L    AST (SGOT) P5P (Reference) 25 0 - 40 IU/L    Cholesterol, Total (Reference) 163 100 - 199 mg/dL    Glucose, Serum (Reference) 107 (H) 70 - 99 mg/dL    Triglycerides 207 (H) 0 - 149 mg/dL    Interpretations: (Reference) Comment     Fibrosis Scoring: Comment     Steatosis Scoring Comment     TAM Scoring Comment     Limitations: Comment     Comment Comment    Comprehensive Metabolic Panel    Specimen: Arm, Left; Blood   Result Value Ref Range    Glucose 107 (H) 65 - 99 mg/dL    BUN 14 6 - 20 mg/dL    Creatinine 1.01 (H) 0.57 - 1.00 mg/dL    Sodium 142 136 - 145 mmol/L    Potassium 4.2 3.5 - 5.2 mmol/L    Chloride 109 (H) 98 - 107 mmol/L    CO2 24.0 22.0 - 29.0 mmol/L    Calcium 9.5 8.6 - 10.5 mg/dL    Total Protein 7.3 6.0 - 8.5 g/dL    Albumin 4.6 3.5 - 5.2 g/dL    ALT (SGPT) 31 1 - 33 U/L    AST (SGOT) 21 1 - 32 U/L    Alkaline Phosphatase 94 39 - 117 U/L    Total Bilirubin 0.2 0.0 - 1.2 mg/dL    Globulin 2.7 gm/dL    A/G Ratio 1.7 g/dL    BUN/Creatinine Ratio 13.9 7.0 - 25.0    Anion Gap 9.0 5.0 - 15.0 mmol/L    eGFR 69.7 >60.0 mL/min/1.73       Imaging:   No valid procedures specified.     Measures:   Advanced Care Planning:   Patient has an advance directive in EMR which is still valid.     Smoking Cessation:   less than 3 minutes spent counseling Will try to cut down    Assessment / Plan       Assessment/Plan:   Diagnoses and all orders for this visit:    1. Metabolic dysfunction-associated steatohepatitis (MASH) (Primary)  Patient did have a recent FibroSure test that does suggest that she has underlying pathology.  We have discussed options and have recommended that she continue with her low carbohydrate/low-fat diet as well as 300 minutes of aerobic exercise weekly.  She is also tolerating the GLP-1 agonist.  We will obtain a ultrasound with elastography to assure his there is no underlying fibrosis.  If she does have possible fibrosis we will refer her to a gastroenterologist.  -     US Liver; Future    2. Primary hypertension  Blood pressure is doing well at present time.  We will repeat a CMP will make adjustments based on these findings.  She will be due for laboratory data around June 20.  -     Comprehensive Metabolic Panel; Future    3. Renal insufficiency   She has been doing better with respect to her renal function.  She is drinking fluids much better at present time.  We will repeat her CMP later on this month and will make adjustments based on these findings.    4. Type 2 diabetes mellitus without complication, without long-term current use of insulin  Patient is tolerating her GLP-1 agonist.  We will repeat her hemoglobin A1c will make adjustments based on these findings.  Our goal was for her hemoglobin A1c to be less than 7%.  -     Semaglutide, 2 MG/DOSE, (Ozempic, 2 MG/DOSE,) 8 MG/3ML solution pen-injector; Inject 2 mg under the skin into the appropriate area as directed 1 (One) Time Per Week.  Dispense: 3 mL; Refill: 11  -     Hemoglobin A1c; Future    5. Mixed hyperlipidemia  She is tolerating her lipid-lowering agent.  We will make arrangements for her to have her lipid profile obtained.  We will reassess her liver function test at that time as well.  Our goal is for her LDL be less than 70.  -     Lipid Panel; Future    6. Cigarette nicotine dependence without  complication   Patient does continue to smoke.  We have encouraged that she discontinue her smoking.  Patient understands the risk of smoking.  We will provide medical assistance if required.    7. Mild persistent asthma, unspecified whether complicated   Patient has asthma is doing relatively well at present time.  We will continue to monitor and will make adjustments based on these findings.    8. Encounter for screening mammogram for malignant neoplasm of breast  Patient's mammogram has been ordered.  -     Mammo Screening Digital Tomosynthesis Bilateral With CAD; Future      -     Semaglutide, 2 MG/DOSE, (Ozempic, 2 MG/DOSE,) 8 MG/3ML solution pen-injector; Inject 2 mg under the skin into the appropriate area as directed 1 (One) Time Per Week.  Dispense: 3 mL; Refill: 11        Follow Up:   No follow-ups on file.      At [facility], we believe that sharing information builds trust and better relationships. You are receiving this note because you recently visited [facility]. It is possible you will see health information before a provider has talked with you about it. This kind of information can be easy to misunderstand. To help you fully understand what it means for your health, we urge you to discuss this note with your provider.    Greg Levine MD  [facility]

## 2024-06-19 ENCOUNTER — HOSPITAL ENCOUNTER (OUTPATIENT)
Dept: CT IMAGING | Facility: HOSPITAL | Age: 49
Discharge: HOME | End: 2024-06-19
Payer: COMMERCIAL

## 2024-06-19 ENCOUNTER — LAB (OUTPATIENT)
Dept: LAB | Facility: HOSPITAL | Age: 49
End: 2024-06-19
Payer: COMMERCIAL

## 2024-06-19 DIAGNOSIS — C64.1 RENAL CELL CARCINOMA OF RIGHT KIDNEY: ICD-10-CM

## 2024-06-19 DIAGNOSIS — E78.2 MIXED HYPERLIPIDEMIA: ICD-10-CM

## 2024-06-19 DIAGNOSIS — I10 PRIMARY HYPERTENSION: ICD-10-CM

## 2024-06-19 DIAGNOSIS — E11.9 TYPE 2 DIABETES MELLITUS WITHOUT COMPLICATION, WITHOUT LONG-TERM CURRENT USE OF INSULIN: ICD-10-CM

## 2024-06-19 LAB
ALBUMIN SERPL-MCNC: 4.6 G/DL (ref 3.5–5.2)
ALBUMIN/GLOB SERPL: 1.5 G/DL
ALP SERPL-CCNC: 114 U/L (ref 39–117)
ALT SERPL W P-5'-P-CCNC: 31 U/L (ref 1–33)
ANION GAP SERPL CALCULATED.3IONS-SCNC: 11.2 MMOL/L (ref 5–15)
AST SERPL-CCNC: 26 U/L (ref 1–32)
BILIRUB SERPL-MCNC: 0.2 MG/DL (ref 0–1.2)
BUN SERPL-MCNC: 10 MG/DL (ref 6–20)
BUN/CREAT SERPL: 8.3 (ref 7–25)
CALCIUM SPEC-SCNC: 9.8 MG/DL (ref 8.6–10.5)
CHLORIDE SERPL-SCNC: 105 MMOL/L (ref 98–107)
CHOLEST SERPL-MCNC: 142 MG/DL (ref 0–200)
CO2 SERPL-SCNC: 27.8 MMOL/L (ref 22–29)
CREAT SERPL-MCNC: 1.2 MG/DL (ref 0.57–1)
EGFRCR SERPLBLD CKD-EPI 2021: 56.3 ML/MIN/1.73
GLOBULIN UR ELPH-MCNC: 3.1 GM/DL
GLUCOSE SERPL-MCNC: 110 MG/DL (ref 65–99)
HDLC SERPL-MCNC: 37 MG/DL (ref 40–60)
LDLC SERPL CALC-MCNC: 81 MG/DL (ref 0–100)
LDLC/HDLC SERPL: 2.12 {RATIO}
POTASSIUM SERPL-SCNC: 4 MMOL/L (ref 3.5–5.2)
PROT SERPL-MCNC: 7.7 G/DL (ref 6–8.5)
SODIUM SERPL-SCNC: 144 MMOL/L (ref 136–145)
TRIGL SERPL-MCNC: 133 MG/DL (ref 0–150)
VLDLC SERPL-MCNC: 24 MG/DL (ref 5–40)

## 2024-06-19 PROCEDURE — 25810000003 SODIUM CHLORIDE 0.9 % SOLUTION: Performed by: UROLOGY

## 2024-06-19 PROCEDURE — 25510000001 IOPAMIDOL 61 % SOLUTION: Performed by: UROLOGY

## 2024-06-19 PROCEDURE — 80053 COMPREHEN METABOLIC PANEL: CPT

## 2024-06-19 PROCEDURE — 71260 CT THORAX DX C+: CPT

## 2024-06-19 PROCEDURE — 80061 LIPID PANEL: CPT

## 2024-06-19 PROCEDURE — 74177 CT ABD & PELVIS W/CONTRAST: CPT

## 2024-06-19 RX ADMIN — SODIUM CHLORIDE 1000 ML: 9 INJECTION, SOLUTION INTRAVENOUS at 11:20

## 2024-06-19 RX ADMIN — IOPAMIDOL 100 ML: 612 INJECTION, SOLUTION INTRAVENOUS at 11:16

## 2024-06-19 RX ADMIN — SODIUM CHLORIDE 1000 ML: 9 INJECTION, SOLUTION INTRAVENOUS at 10:36

## 2024-06-21 ENCOUNTER — TELEPHONE (OUTPATIENT)
Dept: FAMILY MEDICINE CLINIC | Facility: CLINIC | Age: 49
End: 2024-06-21
Payer: COMMERCIAL

## 2024-06-21 NOTE — TELEPHONE ENCOUNTER
----- Message from Greg Levine sent at 6/19/2024 12:17 PM EDT -----  Lipid profile is appropriate.  Renal function has worsened slightly.  She needs to continue to push fluids, avoid sodium and anti-inflammatories.  We are still awaiting hemoglobin A1c.

## 2024-06-21 NOTE — TELEPHONE ENCOUNTER
Attempted to call patient with results. Left a detailed voicemail with patients results. Instructed patient to call us if they have any questions or concerns

## 2024-06-27 ENCOUNTER — OFFICE VISIT (OUTPATIENT)
Dept: UROLOGY | Facility: CLINIC | Age: 49
End: 2024-06-27
Payer: COMMERCIAL

## 2024-06-27 VITALS
DIASTOLIC BLOOD PRESSURE: 82 MMHG | OXYGEN SATURATION: 98 % | HEIGHT: 65 IN | BODY MASS INDEX: 31.09 KG/M2 | HEART RATE: 76 BPM | SYSTOLIC BLOOD PRESSURE: 124 MMHG | WEIGHT: 186.6 LBS

## 2024-06-27 DIAGNOSIS — N18.31 STAGE 3A CHRONIC KIDNEY DISEASE: ICD-10-CM

## 2024-06-27 DIAGNOSIS — C64.1 RENAL CELL CARCINOMA OF RIGHT KIDNEY: Primary | ICD-10-CM

## 2024-06-27 NOTE — PROGRESS NOTES
CC  RCC  CKD3     HPI  Ms. Goldsmith is a 49 y.o. female with history below in assessment, who presents for follow up.     At this visit doing well    Past Medical History:   Diagnosis Date    Acne rosacea, erythematous telangiectatic type     Acute bronchitis     history of    Acute sinusitis     Acute upper respiratory infection     hisotry of    Allergic contact dermatitis     Allergic rhinitis     Anxiety and depression     Asthma     Back pain, chronic     Benign paroxysmal positional vertigo     Blood clots in stool     Candidal dermatitis     Candidiasis of vulva and vagina     Cellulitis     AND ABSCESS, right lower abdomen    Cervical pain     Conjunctivitis     Contusion of ear     Corneal abrasion     Depression     Diabetes mellitus     type 2 - A1C better after weight loss with use of Semiglutide    Dyspnea, unspecified     Fibromyalgia     Flu vaccine need     Foot pain, right     GERD (gastroesophageal reflux disease)     Hand pain, left     Hearing loss, right     Heart palpitations     Hemorrhoids, external     Herpes simplex without complication     Herpes zoster lesion     Hypertension     Impetigo herpetiformis     Injury to tibial blood vessels, unspecified laterality, initial encounter     Kidney cyst     Right kidney    Kidney stones     Left humeral fracture     Low back pain     Lymphadenopathy     Migraine headache     Mixed hyperlipidemia     TAM (nonalcoholic steatohepatitis)     Neuropathy     Oral candidiasis     Plantar fasciitis     Recent weight loss     60lbs with use of Ozempic - per pt 12/6/23    Salpingitis and oophoritis, unspecified     not specified as acute, subacute, or chronic    Scabies     Seasonal allergies     Stress     Swelling of limb     Symptoms involving abdomen and pelvis     OTHER SYMPTOMS INVOLVING ABDOMEN AND PELVIS, ABDOMINAL OR PELVIC SWELLING, MASS, OR LUMP, OTHER SPECIFIED S    Tattoo     Tendon pain     Tobacco use disorder     Unspecified viral hepatitis  without hepatic coma     Urinary tract infection     Vaginitis and vulvovaginitis     Vaginitis, atrophic        Past Surgical History:   Procedure Laterality Date    BILATERAL OOPHORECTOMY      CARPAL TUNNEL RELEASE Bilateral     CHOLECYSTECTOMY      COLONOSCOPY  03/30/2018    HUMERUS FRACTURE SURGERY Left     has implants - plate, screws    LIPOMA EXCISION      low back    NEPHRECTOMY Right 12/20/2023    Procedure: NEPHRECTOMY LAPAROSCOPIC HAND ASSISTED;  Surgeon: Aaron Mart MD;  Location: [facility];  Service: Urology;  Laterality: Right;    NEPHRECTOMY RADICAL Right     ROTATOR CUFF REPAIR Right     TOTAL ABDOMINAL HYSTERECTOMY      TUBAL ABDOMINAL LIGATION      URETEROSCOPY LASER LITHOTRIPSY WITH STENT INSERTION Right 03/02/2023    Procedure: URETEROSCOPY LASER LITHOTRIPSY WITH STENT INSERTION;  Surgeon: Aaron Mart MD;  Location: [facility];  Service: Urology;  Laterality: Right;         Current Outpatient Medications:     albuterol sulfate  (90 Base) MCG/ACT inhaler, INHALE 3 PUFFS BY MOUTH EVERY 6 HOURS AS NEEDED, Disp: 8.5 g, Rfl: 12    aspirin 81 MG EC tablet, Take 1 tablet by mouth Daily., Disp: , Rfl:     clotrimazole-betamethasone (Lotrisone) 1-0.05 % cream, Apply 1 Application topically to the appropriate area as directed 2 (Two) Times a Day., Disp: 45 g, Rfl: 1    esomeprazole (nexIUM) 40 MG capsule, TAKE ONE CAPSULE BY MOUTH TWO TIMES A DAY, Disp: 180 capsule, Rfl: 1    losartan (COZAAR) 25 MG tablet, TAKE 1 TABLET BY MOUTH DAILY, Disp: 90 tablet, Rfl: 12    ondansetron (ZOFRAN) 8 MG tablet, DISSOLVE 1 TABLET ON OR UNDER TONGUE EVERY 8 HOURS AS NEEDED FOR NAUSEA., Disp: 2 tablet, Rfl: 0    rosuvastatin (CRESTOR) 40 MG tablet, TAKE 1 TABLET BY MOUTH DAILY, Disp: 90 tablet, Rfl: 3    Semaglutide, 2 MG/DOSE, (Ozempic, 2 MG/DOSE,) 8 MG/3ML solution pen-injector, Inject 2 mg under the skin into the appropriate area as directed 1 (One) Time Per Week., Disp: 3 mL, Rfl: 11      Physical Exam  There were no vitals taken for this visit.    Labs  Brief Urine Lab Results  (Last result in the past 365 days)        Color   Clarity   Blood   Leuk Est   Nitrite   Protein   CREAT   Urine HCG        04/18/24 1700 Yellow   Slightly Cloudy   Negative   Negative   Negative   Negative                   Lab Results   Component Value Date    GLUCOSE 110 (H) 06/19/2024    CALCIUM 9.8 06/19/2024     06/19/2024    K 4.0 06/19/2024    CO2 27.8 06/19/2024     06/19/2024    BUN 10 06/19/2024    CREATININE 1.20 (H) 06/19/2024    BCR 8.3 06/19/2024    ANIONGAP 11.2 06/19/2024       Lab Results   Component Value Date    WBC 10.67 01/12/2024    HGB 12.8 01/12/2024    HCT 40.2 01/12/2024    MCV 92.6 01/12/2024     01/12/2024       Urine Culture          12/27/2023    12:04   Urine Culture   Urine Culture No growth       DATE COLLECTED      DATE RECEIVED      DATE REPORTED  12/20/2023 12/20/2023 12/26/2023    DIAGNOSIS:  RIGHT KIDNEY MASS, RESECTION:  Multilocular cystic clear cell renal cell neoplasm of low malignant potential,  margins clear  Benign adrenal gland  Benign lymph node (0/1)    Procedure: Right Radical nephrectomy  Tumor Focality: Unifocal  Tumor Size: 3.5 x 3.5 x 3 cm  Histologic Type: Multilocular cystic clear cell renal cell neoplasm of low  malignant potential  Histologic Grade (WHO / ISUP): G1  Tumor Extent: Limited to kidney  Sarcomatoid Features: Not identified  Rhabdoid Features: Not identified  Tumor Necrosis: Not identified  Margin Status: All margins negative for invasive carcinoma  Regional Lymph Node Status: all regional lymph nodes negative for tumor  Number of Lymph Nodes Examined: 1  PATHOLOGIC STAGE (pTNM, AJCC 8th Edition): pT1a, pN0     Radiographic Studies  CT Chest With Contrast Diagnostic  Result Date: 6/21/2024  No evidence of metastatic disease.   This study was performed with techniques to keep radiation doses as low as reasonably  achievable (ALARA). Individualized dose reduction techniques using automated exposure control or adjustment of mA and/or kV according to the patient size were employed.    CTDI: 6.62 mGy DLP: 1676.33 mGy.cm      Images were reviewed, interpreted, and dictated by Dr. Yumiko Austin MD Transcribed by Concepcion Jurado PA-C.  This report was signed and finalized on 6/21/2024 3:14 PM by Yumiko Austin MD.      CT Abdomen Pelvis With Contrast  Result Date: 6/20/2024    No evidence of metastatic disease.   CTDI: 6.62 mGy DLP:1676.33 mGy.cm  This report was signed and finalized on 6/20/2024 11:00 AM by Yumiko Austin MD.          I have reviewed the above labs and imaging.     Assessment  49 y.o. female with history of pT1a grade 1 clear-cell renal cell carcinoma.  Status post laparoscopic right nephrectomy December 2023.  No evidence of disease recurrence on follow-up imaging.  She does have type 2 diabetes mellitus, obesity, and CKD 3A.  Both disease processes are stable.    Plan  1.  Follow-up in 6 months with repeat scans CT chest abdomen pelvis with contrast, with prehydration given her CKD, as well as BMP.  2.  I offered her a referral to nephrology.  3.  Recommended smoking cessation

## 2024-07-17 DIAGNOSIS — I10 PRIMARY HYPERTENSION: ICD-10-CM

## 2024-07-17 RX ORDER — ROSUVASTATIN CALCIUM 40 MG/1
40 TABLET, COATED ORAL DAILY
Qty: 90 TABLET | Refills: 3 | Status: SHIPPED | OUTPATIENT
Start: 2024-07-17

## 2024-07-17 RX ORDER — CLOTRIMAZOLE AND BETAMETHASONE DIPROPIONATE 10; .64 MG/G; MG/G
1 CREAM TOPICAL 2 TIMES DAILY
Qty: 45 G | Refills: 1 | Status: SHIPPED | OUTPATIENT
Start: 2024-07-17

## 2024-07-17 RX ORDER — LOSARTAN POTASSIUM 25 MG/1
25 TABLET ORAL DAILY
Qty: 90 TABLET | Refills: 12 | Status: SHIPPED | OUTPATIENT
Start: 2024-07-17

## 2024-07-17 RX ORDER — SEMAGLUTIDE 2.68 MG/ML
2 INJECTION, SOLUTION SUBCUTANEOUS WEEKLY
Qty: 3 ML | Refills: 11 | Status: SHIPPED | OUTPATIENT
Start: 2024-07-17

## 2024-07-17 RX ORDER — ESOMEPRAZOLE MAGNESIUM 40 MG/1
40 CAPSULE, DELAYED RELEASE ORAL 2 TIMES DAILY
Qty: 180 CAPSULE | Refills: 1 | Status: SHIPPED | OUTPATIENT
Start: 2024-07-17

## 2024-07-17 NOTE — TELEPHONE ENCOUNTER
Caller: Veronica Goldsmith    Relationship: Self    Best call back number: [phone]     Requested Prescriptions:   Requested Prescriptions     Pending Prescriptions Disp Refills    losartan (COZAAR) 25 MG tablet 90 tablet 12     Sig: Take 1 tablet by mouth Daily.    esomeprazole (nexIUM) 40 MG capsule 180 capsule 1     Sig: Take 1 capsule by mouth 2 (Two) Times a Day.    rosuvastatin (CRESTOR) 40 MG tablet 90 tablet 3     Sig: Take 1 tablet by mouth Daily.    clotrimazole-betamethasone (Lotrisone) 1-0.05 % cream 45 g 1     Sig: Apply 1 Application topically to the appropriate area as directed 2 (Two) Times a Day.    Semaglutide, 2 MG/DOSE, (Ozempic, 2 MG/DOSE,) 8 MG/3ML solution pen-injector 3 mL 11     Sig: Inject 2 mg under the skin into the appropriate area as directed 1 (One) Time Per Week.        Pharmacy where request should be sent: [facility] [place] - [address] [facility] [phone] [facility] [phone] FX     Last office visit with prescribing clinician: 6/4/2024   Last telemedicine visit with prescribing clinician: Visit date not found   Next office visit with prescribing clinician: 9/23/2024     Additional details provided by patient: SEND ANY PRIOR AUTHORIZATIONS AS WELL.       Does the patient have less than a 3 day supply:  [] Yes  [] No    Would you like a call back once the refill request has been completed: [] Yes [] No    If the office needs to give you a call back, can they leave a voicemail: [] Yes [] No    Larry Rdz Rep   07/17/24 11:32 EDT         DELETE AFTER READING TO PATIENT: “Thank you for sharing this information with me. I will send a message to the clinical team. Please allow 48 hours for the clinical staff to follow up on this request.”

## 2024-07-23 ENCOUNTER — TELEPHONE (OUTPATIENT)
Dept: FAMILY MEDICINE CLINIC | Facility: CLINIC | Age: 49
End: 2024-07-23
Payer: COMMERCIAL

## 2024-07-23 NOTE — TELEPHONE ENCOUNTER
Detailed message left for patient, following up on current referral status. Does patient still want to continue with current referral for US Liver to be completed in [place]?  Request for patient to call pcp with update on referral status.    Relay

## 2024-07-26 NOTE — TELEPHONE ENCOUNTER
Detailed message left for patient following up on current referral to have US of Liver to be completed. Requesting to have patient call pcp office with update if patient is interested in completing referral.

## 2024-07-30 NOTE — TELEPHONE ENCOUNTER
Spoke with patient via phone, notify of upcoming appointment for procedure on 08/15/2024 at 10:30 AM. Patient voiced understanding.

## 2024-08-15 ENCOUNTER — HOSPITAL ENCOUNTER (OUTPATIENT)
Facility: HOSPITAL | Age: 49
Discharge: HOME | End: 2024-08-15
Admitting: FAMILY MEDICINE
Payer: COMMERCIAL

## 2024-08-15 DIAGNOSIS — K75.81 METABOLIC DYSFUNCTION-ASSOCIATED STEATOHEPATITIS (MASH): ICD-10-CM

## 2024-08-15 PROCEDURE — 76705 ECHO EXAM OF ABDOMEN: CPT

## 2024-08-15 PROCEDURE — 76981 USE PARENCHYMA: CPT

## 2024-08-19 ENCOUNTER — TELEPHONE (OUTPATIENT)
Dept: FAMILY MEDICINE CLINIC | Facility: CLINIC | Age: 49
End: 2024-08-19
Payer: COMMERCIAL

## 2024-08-19 RX ORDER — ONDANSETRON HYDROCHLORIDE 8 MG/1
8 TABLET, FILM COATED ORAL EVERY 8 HOURS PRN
Qty: 20 TABLET | Refills: 0 | Status: SHIPPED | OUTPATIENT
Start: 2024-08-19

## 2024-08-19 NOTE — TELEPHONE ENCOUNTER
Caller: Veronica Goldsmith    Relationship: Self    Best call back number: [phone]     Requested Prescriptions:   Requested Prescriptions     Pending Prescriptions Disp Refills    ondansetron (ZOFRAN) 8 MG tablet 2 tablet 0        Pharmacy where request should be sent: [facility] [place] - [address] [facility] [facility] [phone] [facility] [phone] FX     Last office visit with prescribing clinician: 6/4/2024   Last telemedicine visit with prescribing clinician: Visit date not found   Next office visit with prescribing clinician: 9/23/2024     Additional details provided by patient: PATIENT IS OUT    Does the patient have less than a 3 day supply:  [x] Yes  [] No    Would you like a call back once the refill request has been completed: [] Yes [x] No    If the office needs to give you a call back, can they leave a voicemail: [] Yes [x] No    Larry Morin Rep   08/19/24 13:28 EDT

## 2024-08-19 NOTE — TELEPHONE ENCOUNTER
Attempted to call patient, no answer. Left vm to return call.    Relay     Liver ultrasound revealed that she has moderate diffuse fatty liver. Her liver elastography revealed a velocity of 0.99 m/s which is below the level of 1.37 m/s threshold. This would imply that she has a low probability of clinically significant fibrosis. She needs to continue with a low carbohydrate/low-fat diet as well as 300 minutes of aerobic exercise weekly. The key feature of treatment of fatty liver is weight loss.

## 2024-08-19 NOTE — TELEPHONE ENCOUNTER
----- Message from Greg Levine sent at 8/15/2024  6:08 PM EDT -----  Liver ultrasound revealed that she has moderate diffuse fatty liver.  Her liver elastography revealed a velocity of 0.99 m/s which is below the level of 1.37 m/s threshold.  This would imply that she has a low probability of clinically significant fibrosis.  She needs to continue with a low carbohydrate/low-fat diet as well as 300 minutes of aerobic exercise weekly.  The key feature of treatment of fatty liver is weight loss.

## 2024-08-19 NOTE — TELEPHONE ENCOUNTER
Name: RupalVeronica ANIBAL      Relationship: Self      Best Callback Number: [phone]       HUB PROVIDED THE RELAY MESSAGE FROM THE OFFICE      PATIENT: VOICED UNDERSTANDING AND HAS NO FURTHER QUESTIONS AT THIS TIME    ADDITIONAL INFORMATION:

## 2024-09-10 ENCOUNTER — LAB (OUTPATIENT)
Dept: FAMILY MEDICINE CLINIC | Facility: CLINIC | Age: 49
End: 2024-09-10
Payer: COMMERCIAL

## 2024-09-10 ENCOUNTER — OFFICE VISIT (OUTPATIENT)
Dept: FAMILY MEDICINE CLINIC | Facility: CLINIC | Age: 49
End: 2024-09-10
Payer: COMMERCIAL

## 2024-09-10 VITALS
WEIGHT: 187.2 LBS | RESPIRATION RATE: 16 BRPM | BODY MASS INDEX: 31.19 KG/M2 | HEIGHT: 65 IN | DIASTOLIC BLOOD PRESSURE: 88 MMHG | HEART RATE: 80 BPM | OXYGEN SATURATION: 96 % | SYSTOLIC BLOOD PRESSURE: 118 MMHG | TEMPERATURE: 98.2 F

## 2024-09-10 DIAGNOSIS — F17.210 CIGARETTE NICOTINE DEPENDENCE WITHOUT COMPLICATION: ICD-10-CM

## 2024-09-10 DIAGNOSIS — E11.9 TYPE 2 DIABETES MELLITUS WITHOUT COMPLICATION, WITHOUT LONG-TERM CURRENT USE OF INSULIN: ICD-10-CM

## 2024-09-10 DIAGNOSIS — N89.8 VAGINAL DRYNESS: Primary | ICD-10-CM

## 2024-09-10 DIAGNOSIS — I10 PRIMARY HYPERTENSION: ICD-10-CM

## 2024-09-10 DIAGNOSIS — E78.2 MIXED HYPERLIPIDEMIA: ICD-10-CM

## 2024-09-10 PROCEDURE — 83036 HEMOGLOBIN GLYCOSYLATED A1C: CPT

## 2024-09-10 PROCEDURE — 36415 COLL VENOUS BLD VENIPUNCTURE: CPT | Performed by: FAMILY MEDICINE

## 2024-09-10 PROCEDURE — 82043 UR ALBUMIN QUANTITATIVE: CPT

## 2024-09-10 PROCEDURE — 99214 OFFICE O/P EST MOD 30 MIN: CPT

## 2024-09-10 PROCEDURE — 80048 BASIC METABOLIC PNL TOTAL CA: CPT

## 2024-09-10 NOTE — PROGRESS NOTES
Office Note     Name: Veronica Goldsmith    : 1975     MRN: [id number]     Chief Complaint  Follow-up, Diabetes, Hypertension, Hyperlipidemia, and vaginal dryness    History of Present Illness:  Veronica Goldsmith is a 49 y.o. female who presents today for follow-up of chronic conditions and for discussion of vaginal dryness.    She reports that she has not been taking the Ozempic, because her insurance company would not cover the medication.  She reports she was unable to tolerate Mounjaro due to significant GI side effects.  She reports she is tolerating her other chronic medications well.  She reports blood pressure runs well at home.  Denies any side effects from any of her daily medications.  She reports she is doing well with the Nexium.  She denies any difficulty swallowing, painful swallowing, regurgitation of food, or choking.    She does report concerns of vaginal dryness.  She reports that she had total hysterectomy and ovary removal at age 35.  She reports that she was on Premarin cream for several years following hysterectomy and this helped with vaginal dryness.  She reports that she has been off Premarin cream for several years because it seemed like it stopped working. This is the only estrogen cream she has tried, she reports.  She reports that to have sexual intercourse, she and her  need to use a lot of over-the-counter lubricant and it is still very painful for her due to dryness.    Subjective     Review of Systems:   Review of Systems   Constitutional:  Negative for chills and fever.   Respiratory:  Negative for cough and shortness of breath.    Cardiovascular:  Negative for chest pain, palpitations and leg swelling.   Genitourinary:  Negative for vaginal bleeding, vaginal discharge and vaginal pain.        Vaginal dryness       I have reviewed the patients family history, social history, past medical history, past surgical history and have updated it as appropriate.     Past Medical  History:   Past Medical History:   Diagnosis Date    Acne rosacea, erythematous telangiectatic type     Acute bronchitis     history of    Acute sinusitis     Acute upper respiratory infection     hisotry of    Allergic contact dermatitis     Allergic rhinitis     Anxiety and depression     Asthma     Back pain, chronic     Benign paroxysmal positional vertigo     Blood clots in stool     Candidal dermatitis     Candidiasis of vulva and vagina     Cellulitis     AND ABSCESS, right lower abdomen    Cervical pain     Conjunctivitis     Contusion of ear     Corneal abrasion     Depression     Diabetes mellitus     type 2 - A1C better after weight loss with use of Semiglutide    Dyspnea, unspecified     Fibromyalgia     Flu vaccine need     Foot pain, right     GERD (gastroesophageal reflux disease)     Hand pain, left     Hearing loss, right     Heart palpitations     Hemorrhoids, external     Herpes simplex without complication     Herpes zoster lesion     Hypertension     Impetigo herpetiformis     Injury to tibial blood vessels, unspecified laterality, initial encounter     Kidney cyst     Right kidney    Kidney stones     Left humeral fracture     Low back pain     Lymphadenopathy     Migraine headache     Mixed hyperlipidemia     TAM (nonalcoholic steatohepatitis)     Neuropathy     Oral candidiasis     Plantar fasciitis     Recent weight loss     60lbs with use of Ozempic - per pt 12/6/23    Salpingitis and oophoritis, unspecified     not specified as acute, subacute, or chronic    Scabies     Seasonal allergies     Stress     Swelling of limb     Symptoms involving abdomen and pelvis     OTHER SYMPTOMS INVOLVING ABDOMEN AND PELVIS, ABDOMINAL OR PELVIC SWELLING, MASS, OR LUMP, OTHER SPECIFIED S    Tattoo     Tendon pain     Tobacco use disorder     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     Urinary tract infection     Vaginitis and vulvovaginitis     Vaginitis, atrophic        Past Surgical History:    Past Surgical History:   Procedure Laterality Date    BILATERAL OOPHORECTOMY      CARPAL TUNNEL RELEASE Bilateral     CHOLECYSTECTOMY      COLONOSCOPY  03/30/2018    HUMERUS FRACTURE SURGERY Left     has implants - plate, screws    LIPOMA EXCISION      low back    NEPHRECTOMY Right 12/20/2023    Procedure: NEPHRECTOMY LAPAROSCOPIC HAND ASSISTED;  Surgeon: Aaron Mart MD;  Location: [facility];  Service: Urology;  Laterality: Right;    NEPHRECTOMY RADICAL Right     ROTATOR CUFF REPAIR Right     TOTAL ABDOMINAL HYSTERECTOMY      TUBAL ABDOMINAL LIGATION      URETEROSCOPY LASER LITHOTRIPSY WITH STENT INSERTION Right 03/02/2023    Procedure: URETEROSCOPY LASER LITHOTRIPSY WITH STENT INSERTION;  Surgeon: Aaron Mart MD;  Location: [facility];  Service: Urology;  Laterality: Right;       Family History:   Family History   Problem Relation Age of Onset    Diabetes Mother     Esophageal cancer Father     Hypertension Father     Cancer Father     Cancer Maternal Grandmother     Diabetes Maternal Grandmother     Cancer Paternal Grandmother     Cancer Paternal Grandfather     Stroke Other     Cervical cancer Other     Diabetes Other     Esophageal cancer Other     Hypertension Other     Lung cancer Other     Pancreatic cancer Other     Skin cancer Other        Social History:   Social History     Socioeconomic History    Marital status:    Tobacco Use    Smoking status: Every Day     Current packs/day: 1.00     Average packs/day: 1 pack/day for 31.7 years (31.7 ttl pk-yrs)     Types: Cigarettes     Start date: 1993     Passive exposure: Current    Smokeless tobacco: Never   Vaping Use    Vaping status: Never Used   Substance and Sexual Activity    Alcohol use: Not Currently    Drug use: Never    Sexual activity: Yes     Partners: Male       Immunizations:   Immunization History   Administered Date(s) Administered    Covid-19 (Pfizer) Gray Cap Monovalent 05/24/2022, 06/20/2022    Flu Vaccine Quad PF  ">36MO 02/08/2019    Flu Vaccine Split Quad 01/09/2019    Flublok 18+yrs 01/09/2019    Fluzone (or Fluarix & Flulaval for VFC) >6mos 10/04/2022    Hepatitis A 01/09/2019    Influenza, Unspecified 02/08/2019, 10/10/2023    MMR 02/08/2019    Pneumococcal Polysaccharide (PPSV23) 10/07/2022, 11/16/2023    Tdap 02/08/2019        Medications:     Current Outpatient Medications:     albuterol sulfate  (90 Base) MCG/ACT inhaler, INHALE 3 PUFFS BY MOUTH EVERY 6 HOURS AS NEEDED, Disp: 8.5 g, Rfl: 12    aspirin 81 MG EC tablet, Take 1 tablet by mouth Daily., Disp: , Rfl:     clotrimazole-betamethasone (Lotrisone) 1-0.05 % cream, Apply 1 Application topically to the appropriate area as directed 2 (Two) Times a Day., Disp: 45 g, Rfl: 1    esomeprazole (nexIUM) 40 MG capsule, Take 1 capsule by mouth 2 (Two) Times a Day., Disp: 180 capsule, Rfl: 1    losartan (COZAAR) 25 MG tablet, Take 1 tablet by mouth Daily., Disp: 90 tablet, Rfl: 12    ondansetron (ZOFRAN) 8 MG tablet, Take 1 tablet by mouth Every 8 (Eight) Hours As Needed for Nausea or Vomiting., Disp: 20 tablet, Rfl: 0    rosuvastatin (CRESTOR) 40 MG tablet, Take 1 tablet by mouth Daily., Disp: 90 tablet, Rfl: 3    estradiol (ESTRACE VAGINAL) 0.1 MG/GM vaginal cream, Insert 1 g into the vagina Daily. Insert 1 g into the vagina daily for 2 weeks, then decrease to insert 1 g into vagina 2 times per week, Disp: 42.5 g, Rfl: 1    Allergies:   Allergies   Allergen Reactions    Sulfa Antibiotics Unknown - High Severity     high fever - pt states 106F    Morphine Other (See Comments)     drop in O2 sats; needed oxygent; pt states she has to be reminded to breathe;        Objective     Vital Signs  Vitals:    09/10/24 1333   BP: 118/88   BP Location: Left arm   Patient Position: Sitting   Cuff Size: Adult   Pulse: 80   Resp: 16   Temp: 98.2 °F (36.8 °C)   TempSrc: Temporal   SpO2: 96%   Weight: 84.9 kg (187 lb 3.2 oz)   Height: 165.1 cm (65\")     Estimated body mass index is " "31.15 kg/m² as calculated from the following:    Height as of this encounter: 165.1 cm (65\").    Weight as of this encounter: 84.9 kg (187 lb 3.2 oz).            Physical Exam  Vitals and nursing note reviewed.   Constitutional:       General: She is not in acute distress.     Appearance: Normal appearance. She is not ill-appearing, toxic-appearing or diaphoretic.   HENT:      Head: Normocephalic and atraumatic.   Eyes:      Extraocular Movements: Extraocular movements intact.   Cardiovascular:      Rate and Rhythm: Normal rate and regular rhythm.      Heart sounds: No murmur heard.     No friction rub. No gallop.   Pulmonary:      Effort: Pulmonary effort is normal. No respiratory distress.      Breath sounds: No wheezing, rhonchi or rales.   Musculoskeletal:      Cervical back: Normal range of motion.      Right lower leg: No edema.      Left lower leg: No edema.   Skin:     Coloration: Skin is not pale.   Neurological:      Mental Status: She is alert and oriented to person, place, and time. Mental status is at baseline.   Psychiatric:         Mood and Affect: Mood normal.         Thought Content: Thought content normal.          Assessment and Plan     1. Vaginal dryness  -She has symptoms of vulvovaginal atrophy, secondary to hysterectomy and ovarian removal.  She has done well in the past with Premarin cream but reports this seems to have stopped working.  She is interested in trying a different topical regimen.  We discussed that we could try Estrace.  We discussed this may not have a different result than Premarin, as they are both topical estrogens.  I have discussed risks of medication, possible side effects, and benefits.  She has been instructed on proper way to use medication.  -Due to her concurrent tobacco use, I do not believe she would be a good candidate for systemic hormone replacement therapy.  -I have advised the use of over-the-counter lubricants as well.  -If patient is unable to tolerate " Estrace, has side effects, or does not provide symptom relief, she has been advised to return to care and further discussion can be had or referral can be made to gynecology.  - estradiol (ESTRACE VAGINAL) 0.1 MG/GM vaginal cream; Insert 1 g into the vagina Daily. Insert 1 g into the vagina daily for 2 weeks, then decrease to insert 1 g into vagina 2 times per week  Dispense: 42.5 g; Refill: 1    2. Primary hypertension  -Blood pressure is well-controlled in the office.  No changes to medications at present time.    3. Type 2 diabetes mellitus without complication, without long-term current use of insulin  -Most recent hemoglobin A1c was to goal.  She is due for urine microalbumin.  She is up-to-date on diabetic foot exam and eye exam.  Unfortunately, her insurance is not paying for Ozempic.  If her hemoglobin A1c escalates, we may need to revisit this in the future.  - MicroAlbumin, Urine, Random - Urine, Clean Catch; Future    4. Cigarette nicotine dependence without complication  -I have strongly advised tobacco cessation.  Discussed long-term consequences of tobacco use.  Discussed cessation options.  She is not ready to quit at present time.  We will continue to encourage cessation in the future.    5. Mixed hyperlipidemia  -Most recent lipid panel showed LDL cholesterol to goal.  We will continue to monitor.  No medication adjustments at present time.       Follow Up  Return if symptoms worsen or fail to improve, for Next scheduled follow up.    Bambi Cormier PA-C  [facility]

## 2024-09-11 LAB
ALBUMIN UR-MCNC: <1.2 MG/DL
ANION GAP SERPL CALCULATED.3IONS-SCNC: 7.8 MMOL/L (ref 5–15)
BUN SERPL-MCNC: 20 MG/DL (ref 6–20)
BUN/CREAT SERPL: 17.7 (ref 7–25)
CALCIUM SPEC-SCNC: 10.1 MG/DL (ref 8.6–10.5)
CHLORIDE SERPL-SCNC: 108 MMOL/L (ref 98–107)
CO2 SERPL-SCNC: 23.2 MMOL/L (ref 22–29)
CREAT SERPL-MCNC: 1.13 MG/DL (ref 0.57–1)
EGFRCR SERPLBLD CKD-EPI 2021: 60.5 ML/MIN/1.73
GLUCOSE SERPL-MCNC: 74 MG/DL (ref 65–99)
HBA1C MFR BLD: 5.3 % (ref 4.8–5.6)
POTASSIUM SERPL-SCNC: 4.7 MMOL/L (ref 3.5–5.2)
SODIUM SERPL-SCNC: 139 MMOL/L (ref 136–145)

## 2024-09-11 RX ORDER — ESTRADIOL 0.1 MG/G
1 CREAM VAGINAL DAILY
Qty: 42.5 G | Refills: 1 | Status: SHIPPED | OUTPATIENT
Start: 2024-09-11

## 2024-09-11 NOTE — PROGRESS NOTES
Contacted patient to speak about blood work. Patients  verbalized good understanding and appreciation. Patient also seen on mychart. No questions or concerns.

## 2024-09-20 ENCOUNTER — HOSPITAL ENCOUNTER (OUTPATIENT)
Dept: MAMMOGRAPHY | Facility: HOSPITAL | Age: 49
Discharge: HOME | End: 2024-09-20
Admitting: FAMILY MEDICINE
Payer: COMMERCIAL

## 2024-09-20 DIAGNOSIS — Z12.31 ENCOUNTER FOR SCREENING MAMMOGRAM FOR MALIGNANT NEOPLASM OF BREAST: ICD-10-CM

## 2024-09-20 PROCEDURE — 77067 SCR MAMMO BI INCL CAD: CPT

## 2024-09-20 PROCEDURE — 77063 BREAST TOMOSYNTHESIS BI: CPT

## 2024-09-23 ENCOUNTER — OFFICE VISIT (OUTPATIENT)
Dept: FAMILY MEDICINE CLINIC | Facility: CLINIC | Age: 49
End: 2024-09-23
Payer: COMMERCIAL

## 2024-09-23 VITALS
HEIGHT: 65 IN | RESPIRATION RATE: 16 BRPM | SYSTOLIC BLOOD PRESSURE: 128 MMHG | BODY MASS INDEX: 31.65 KG/M2 | HEART RATE: 78 BPM | DIASTOLIC BLOOD PRESSURE: 90 MMHG | TEMPERATURE: 98 F | OXYGEN SATURATION: 97 % | WEIGHT: 190 LBS

## 2024-09-23 DIAGNOSIS — I10 PRIMARY HYPERTENSION: ICD-10-CM

## 2024-09-23 DIAGNOSIS — N28.9 RENAL INSUFFICIENCY: ICD-10-CM

## 2024-09-23 DIAGNOSIS — J45.30 MILD PERSISTENT ASTHMA, UNSPECIFIED WHETHER COMPLICATED: ICD-10-CM

## 2024-09-23 DIAGNOSIS — E11.9 TYPE 2 DIABETES MELLITUS WITHOUT COMPLICATION, WITHOUT LONG-TERM CURRENT USE OF INSULIN: Primary | ICD-10-CM

## 2024-09-23 DIAGNOSIS — K75.81 METABOLIC DYSFUNCTION-ASSOCIATED STEATOHEPATITIS (MASH): ICD-10-CM

## 2024-09-23 DIAGNOSIS — E78.2 MIXED HYPERLIPIDEMIA: ICD-10-CM

## 2024-09-23 DIAGNOSIS — K75.81 NASH (NONALCOHOLIC STEATOHEPATITIS): ICD-10-CM

## 2024-09-23 DIAGNOSIS — F17.210 CIGARETTE NICOTINE DEPENDENCE WITHOUT COMPLICATION: ICD-10-CM

## 2024-09-23 PROCEDURE — 99214 OFFICE O/P EST MOD 30 MIN: CPT | Performed by: FAMILY MEDICINE

## 2024-09-30 DIAGNOSIS — E11.9 TYPE 2 DIABETES MELLITUS WITHOUT COMPLICATION, WITHOUT LONG-TERM CURRENT USE OF INSULIN: ICD-10-CM

## 2024-09-30 NOTE — TELEPHONE ENCOUNTER
Caller: TY RESENDIZ    Relationship: Emergency Contact    Best call back number: [phone]     Requested Prescriptions:   Requested Prescriptions     Pending Prescriptions Disp Refills    Tirzepatide (Mounjaro) 2.5 MG/0.5ML solution pen-injector pen 2 mL 11     Sig: Inject 0.5 mL under the skin into the appropriate area as directed 1 (One) Time Per Week.        Pharmacy where request should be sent: [facility] [address] [place] [address] [phone] [facility] [phone]      Last office visit with prescribing clinician: 9/23/2024   Last telemedicine visit with prescribing clinician: Visit date not found   Next office visit with prescribing clinician: 12/23/2024     Additional details provided by patient: PATIENTS  STATES THE PREVIOUS PHARMACY THIS WAS SENT TO DID NOT HAVE THIS MEDICATION IN STOCK.     Does the patient have less than a 3 day supply:  [x] Yes  [] No    Would you like a call back once the refill request has been completed: [] Yes [x] No    If the office needs to give you a call back, can they leave a voicemail: [] Yes [x] No    Cadance Dunaway, RegSched Rep   09/30/24 09:40 EDT

## 2024-10-01 ENCOUNTER — TELEPHONE (OUTPATIENT)
Dept: FAMILY MEDICINE CLINIC | Facility: CLINIC | Age: 49
End: 2024-10-01

## 2024-10-01 ENCOUNTER — TELEPHONE (OUTPATIENT)
Dept: FAMILY MEDICINE CLINIC | Facility: CLINIC | Age: 49
End: 2024-10-01
Payer: COMMERCIAL

## 2024-10-01 DIAGNOSIS — E11.9 TYPE 2 DIABETES MELLITUS WITHOUT COMPLICATION, WITHOUT LONG-TERM CURRENT USE OF INSULIN: ICD-10-CM

## 2024-10-01 NOTE — TELEPHONE ENCOUNTER
Caller: Veronica Goldsmith    Relationship to patient: Self    Best call back number: [phone]     PATIENT IS CALLING TO STATE THAT THE PHARMACY DID NOT RECEIVE THE MOUNJARO PRESCRIPTION. CAN IT BE SENT AGAIN?

## 2024-10-01 NOTE — TELEPHONE ENCOUNTER
Caller: Veronica Goldsmith    Relationship: Self    Best call back number: [phone]     What is the best time to reach you: ANY    Who are you requesting to speak with (clinical staff, provider,  specific staff member): NURSE    Do you know the name of the person who called: PATIENT    What was the call regarding: MOUNJARO NEEDS PRIOR AUTHORIZATION.     Is it okay if the provider responds through MyChart: PHONE CALL PLEASE

## 2024-10-02 NOTE — TELEPHONE ENCOUNTER
Pharmacy had confirmation on September 30, 2024 at 9:44 AM.  He was sent to [facility] in [place].  This prescription is being sent to [facility].

## 2024-10-08 ENCOUNTER — LAB (OUTPATIENT)
Dept: FAMILY MEDICINE CLINIC | Facility: CLINIC | Age: 49
End: 2024-10-08
Payer: COMMERCIAL

## 2024-10-08 ENCOUNTER — HOSPITAL ENCOUNTER (OUTPATIENT)
Dept: CT IMAGING | Facility: HOSPITAL | Age: 49
Discharge: HOME | End: 2024-10-08
Admitting: PHYSICIAN ASSISTANT
Payer: COMMERCIAL

## 2024-10-08 ENCOUNTER — OFFICE VISIT (OUTPATIENT)
Dept: FAMILY MEDICINE CLINIC | Facility: CLINIC | Age: 49
End: 2024-10-08
Payer: COMMERCIAL

## 2024-10-08 VITALS
WEIGHT: 192.8 LBS | SYSTOLIC BLOOD PRESSURE: 136 MMHG | HEART RATE: 90 BPM | HEIGHT: 65 IN | TEMPERATURE: 98.4 F | BODY MASS INDEX: 32.12 KG/M2 | RESPIRATION RATE: 18 BRPM | OXYGEN SATURATION: 94 % | DIASTOLIC BLOOD PRESSURE: 84 MMHG

## 2024-10-08 DIAGNOSIS — R10.9 ABDOMINAL WALL PAIN IN RIGHT FLANK: ICD-10-CM

## 2024-10-08 DIAGNOSIS — R10.9 ABDOMINAL WALL PAIN IN RIGHT FLANK: Primary | ICD-10-CM

## 2024-10-08 LAB
ALBUMIN SERPL-MCNC: 4.4 G/DL (ref 3.5–5.2)
ALBUMIN/GLOB SERPL: 1.5 G/DL
ALP SERPL-CCNC: 103 U/L (ref 39–117)
ALT SERPL W P-5'-P-CCNC: 26 U/L (ref 1–33)
ANION GAP SERPL CALCULATED.3IONS-SCNC: 9 MMOL/L (ref 5–15)
AST SERPL-CCNC: 25 U/L (ref 1–32)
BILIRUB SERPL-MCNC: 0.2 MG/DL (ref 0–1.2)
BUN SERPL-MCNC: 15 MG/DL (ref 6–20)
BUN/CREAT SERPL: 13.5 (ref 7–25)
CALCIUM SPEC-SCNC: 9.8 MG/DL (ref 8.6–10.5)
CHLORIDE SERPL-SCNC: 103 MMOL/L (ref 98–107)
CO2 SERPL-SCNC: 29 MMOL/L (ref 22–29)
CREAT SERPL-MCNC: 1.11 MG/DL (ref 0.57–1)
DEPRECATED RDW RBC AUTO: 46 FL (ref 37–54)
EGFRCR SERPLBLD CKD-EPI 2021: 61.8 ML/MIN/1.73
ERYTHROCYTE [DISTWIDTH] IN BLOOD BY AUTOMATED COUNT: 12.8 % (ref 12.3–15.4)
GLOBULIN UR ELPH-MCNC: 3 GM/DL
GLUCOSE SERPL-MCNC: 102 MG/DL (ref 65–99)
HCT VFR BLD AUTO: 51.3 % (ref 34–46.6)
HGB BLD-MCNC: 16.3 G/DL (ref 12–15.9)
MCH RBC QN AUTO: 30.9 PG (ref 26.6–33)
MCHC RBC AUTO-ENTMCNC: 31.8 G/DL (ref 31.5–35.7)
MCV RBC AUTO: 97.3 FL (ref 79–97)
PLATELET # BLD AUTO: 219 10*3/MM3 (ref 140–450)
PMV BLD AUTO: 11.7 FL (ref 6–12)
POTASSIUM SERPL-SCNC: 3.7 MMOL/L (ref 3.5–5.2)
PROT SERPL-MCNC: 7.4 G/DL (ref 6–8.5)
RBC # BLD AUTO: 5.27 10*6/MM3 (ref 3.77–5.28)
SODIUM SERPL-SCNC: 141 MMOL/L (ref 136–145)
WBC NRBC COR # BLD AUTO: 10.48 10*3/MM3 (ref 3.4–10.8)

## 2024-10-08 PROCEDURE — 85007 BL SMEAR W/DIFF WBC COUNT: CPT | Performed by: PHYSICIAN ASSISTANT

## 2024-10-08 PROCEDURE — 74176 CT ABD & PELVIS W/O CONTRAST: CPT

## 2024-10-08 PROCEDURE — 85027 COMPLETE CBC AUTOMATED: CPT | Performed by: PHYSICIAN ASSISTANT

## 2024-10-08 PROCEDURE — 36415 COLL VENOUS BLD VENIPUNCTURE: CPT | Performed by: FAMILY MEDICINE

## 2024-10-08 PROCEDURE — 80053 COMPREHEN METABOLIC PANEL: CPT | Performed by: PHYSICIAN ASSISTANT

## 2024-10-08 PROCEDURE — 99214 OFFICE O/P EST MOD 30 MIN: CPT | Performed by: PHYSICIAN ASSISTANT

## 2024-10-08 PROCEDURE — 85652 RBC SED RATE AUTOMATED: CPT | Performed by: PHYSICIAN ASSISTANT

## 2024-10-09 ENCOUNTER — TELEPHONE (OUTPATIENT)
Dept: FAMILY MEDICINE CLINIC | Facility: CLINIC | Age: 49
End: 2024-10-09
Payer: COMMERCIAL

## 2024-10-09 LAB
BASOPHILS # BLD MANUAL: 0.1 10*3/MM3 (ref 0–0.2)
BASOPHILS NFR BLD MANUAL: 1 % (ref 0–1.5)
EOSINOPHIL # BLD MANUAL: 0.52 10*3/MM3 (ref 0–0.4)
EOSINOPHIL NFR BLD MANUAL: 5 % (ref 0.3–6.2)
ERYTHROCYTE [SEDIMENTATION RATE] IN BLOOD: 4 MM/HR (ref 0–20)
LYMPHOCYTES # BLD MANUAL: 1.89 10*3/MM3 (ref 0.7–3.1)
LYMPHOCYTES NFR BLD MANUAL: 7 % (ref 5–12)
MONOCYTES # BLD: 0.73 10*3/MM3 (ref 0.1–0.9)
NEUTROPHILS # BLD AUTO: 7.23 10*3/MM3 (ref 1.7–7)
NEUTROPHILS NFR BLD MANUAL: 69 % (ref 42.7–76)
PLAT MORPH BLD: NORMAL
RBC MORPH BLD: NORMAL
VARIANT LYMPHS NFR BLD MANUAL: 18 % (ref 19.6–45.3)
WBC MORPH BLD: NORMAL

## 2024-10-09 NOTE — TELEPHONE ENCOUNTER
Attempted to call patient, no answer. Left vm to return call.    Relay     CT scan reveals that she has nonobstructing left renal kidney stones. She has no abscess in the right renal fossa. Patient has no other abnormality.  Lab results  Patient liver and kidney function tests are acceptable. She does not have an increase in white blood cell count. Her hematocrit is elevated most likely due to her tobacco usage. Inflammatory markers are negative thus far. This suggests against any infection.

## 2024-10-09 NOTE — TELEPHONE ENCOUNTER
----- Message from Greg Levine sent at 10/9/2024  6:01 AM EDT -----  CT scan reveals that she has nonobstructing left renal kidney stones.  She has no abscess in the right renal fossa.  Patient has no other abnormality.

## 2024-10-09 NOTE — PROGRESS NOTES
Attempted to call patient, no answer. Left vm to return call. Telephone encounter created  Patient informed of results via [employer]

## 2024-10-09 NOTE — PROGRESS NOTES
Attempted to call patient, no answer. Left vm to return call. Telephone encounter created  Patient informed of results via [employer]

## 2024-10-09 NOTE — TELEPHONE ENCOUNTER
----- Message from Greg Levine sent at 10/9/2024  6:07 AM EDT -----  Patient liver and kidney function tests are acceptable.  She does not have an increase in white blood cell count.  Her hematocrit is elevated most likely due to her tobacco usage.  Inflammatory markers are negative thus far.  This suggests against any infection.

## 2024-10-09 NOTE — TELEPHONE ENCOUNTER
Name: Veronica Goldsmith    Relationship: Self    Best Callback Number: [phone]     HUB PROVIDED THE RELAY MESSAGE FROM THE OFFICE   PATIENT VOICED UNDERSTANDING AND HAS NO FURTHER QUESTIONS AT THIS TIME    ADDITIONAL INFORMATION:

## 2024-10-25 ENCOUNTER — OFFICE VISIT (OUTPATIENT)
Dept: FAMILY MEDICINE CLINIC | Facility: CLINIC | Age: 49
End: 2024-10-25
Payer: COMMERCIAL

## 2024-10-25 VITALS
DIASTOLIC BLOOD PRESSURE: 90 MMHG | RESPIRATION RATE: 18 BRPM | TEMPERATURE: 98 F | OXYGEN SATURATION: 98 % | WEIGHT: 192 LBS | SYSTOLIC BLOOD PRESSURE: 130 MMHG | HEIGHT: 65 IN | BODY MASS INDEX: 31.99 KG/M2 | HEART RATE: 62 BPM

## 2024-10-25 DIAGNOSIS — B37.31 VAGINAL YEAST INFECTION: ICD-10-CM

## 2024-10-25 DIAGNOSIS — J01.00 ACUTE MAXILLARY SINUSITIS, RECURRENCE NOT SPECIFIED: Primary | ICD-10-CM

## 2024-10-25 PROCEDURE — 99213 OFFICE O/P EST LOW 20 MIN: CPT | Performed by: NURSE PRACTITIONER

## 2024-10-25 RX ORDER — FLUCONAZOLE 150 MG/1
150 TABLET ORAL ONCE
Qty: 1 TABLET | Refills: 0 | Status: SHIPPED | OUTPATIENT
Start: 2024-10-25 | End: 2024-10-25

## 2024-10-25 NOTE — PROGRESS NOTES
Office Note     Name: Veronica Goldsmith    : 1975     MRN: [id number]     Chief Complaint  Headache and Earache (Pt states that she had oral surgery on Tuesday and ever since she had sinus issues )    Subjective     History of Present Illness:  Veronica Goldsmith is a 49 y.o. female who presents today for right sided sinus pain. The patient was recently seen by an oral surgeon and had a right lower jaw tooth removed. She reports right frontal and maxillary tenderness. She has right sided clear to green nasal drainage, right ear pain, and jaw pain with a localized area of soft tissue swelling in her lower right jaw at the site of the tooth extraction.  Symptoms include head congestion, sinus pressure, right ear pain, postnasal drip, right-sided runny nose, and watery right eye.      Review of Systems:   Review of Systems   Constitutional:  Negative for chills, diaphoresis and fever.   HENT:  Positive for congestion, ear pain, postnasal drip, rhinorrhea, sinus pressure and sore throat. Negative for nosebleeds.    Eyes:  Positive for discharge (watery right eye).   Respiratory:  Negative for cough, shortness of breath and wheezing.    Gastrointestinal:  Negative for diarrhea, nausea and vomiting.   Neurological:  Negative for headache.       Past Medical History:   Past Medical History:   Diagnosis Date    Acne rosacea, erythematous telangiectatic type     Acute bronchitis     history of    Acute sinusitis     Acute upper respiratory infection     hisotry of    Allergic contact dermatitis     Allergic rhinitis     Anxiety and depression     Asthma     Back pain, chronic     Benign paroxysmal positional vertigo     Blood clots in stool     Candidal dermatitis     Candidiasis of vulva and vagina     Cellulitis     AND ABSCESS, right lower abdomen    Cervical pain     Conjunctivitis     Contusion of ear     Corneal abrasion     Depression     Diabetes mellitus     type 2 - A1C better after weight loss with use of  Semiglutide    Dyspnea, unspecified     Fibromyalgia     Flu vaccine need     Foot pain, right     GERD (gastroesophageal reflux disease)     Hand pain, left     Hearing loss, right     Heart palpitations     Hemorrhoids, external     Herpes simplex without complication     Herpes zoster lesion     Hypertension     Impetigo herpetiformis     Injury to tibial blood vessels, unspecified laterality, initial encounter     Kidney cyst     Right kidney    Kidney stones     Left humeral fracture     Low back pain     Lymphadenopathy     Migraine headache     Mixed hyperlipidemia     TAM (nonalcoholic steatohepatitis)     Neuropathy     Oral candidiasis     Plantar fasciitis     Recent weight loss     60lbs with use of Ozempic - per pt 12/6/23    Salpingitis and oophoritis, unspecified     not specified as acute, subacute, or chronic    Scabies     Seasonal allergies     Stress     Swelling of limb     Symptoms involving abdomen and pelvis     OTHER SYMPTOMS INVOLVING ABDOMEN AND PELVIS, ABDOMINAL OR PELVIC SWELLING, MASS, OR LUMP, OTHER SPECIFIED S    Tattoo     Tendon pain     Tobacco use disorder     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     Urinary tract infection     Vaginitis and vulvovaginitis     Vaginitis, atrophic        Past Surgical History:   Past Surgical History:   Procedure Laterality Date    BILATERAL OOPHORECTOMY      CARPAL TUNNEL RELEASE Bilateral     CHOLECYSTECTOMY      COLONOSCOPY  03/30/2018    HUMERUS FRACTURE SURGERY Left     has implants - plate, screws    LIPOMA EXCISION      low back    NEPHRECTOMY Right 12/20/2023    Procedure: NEPHRECTOMY LAPAROSCOPIC HAND ASSISTED;  Surgeon: Aaron Mart MD;  Location: [facility];  Service: Urology;  Laterality: Right;    NEPHRECTOMY RADICAL Right     ROTATOR CUFF REPAIR Right     TOTAL ABDOMINAL HYSTERECTOMY      TUBAL ABDOMINAL LIGATION      URETEROSCOPY LASER LITHOTRIPSY WITH STENT INSERTION Right 03/02/2023    Procedure: URETEROSCOPY  LASER LITHOTRIPSY WITH STENT INSERTION;  Surgeon: Aaron Mart MD;  Location: [facility];  Service: Urology;  Laterality: Right;       Immunizations:   Immunization History   Administered Date(s) Administered    Covid-19 (Pfizer) Gray Cap Monovalent 05/24/2022, 06/20/2022    Flu Vaccine Quad PF >36MO 02/08/2019    Flu Vaccine Split Quad 01/09/2019    Flublok 18+yrs 01/09/2019    Fluzone (or Fluarix & Flulaval for VFC) >6mos 10/04/2022    Hepatitis A 01/09/2019    Influenza, Unspecified 02/08/2019, 10/10/2023    MMR 02/08/2019    Pneumococcal Polysaccharide (PPSV23) 10/07/2022, 11/16/2023    Tdap 02/08/2019        Medications:     Current Outpatient Medications:     albuterol sulfate  (90 Base) MCG/ACT inhaler, INHALE 3 PUFFS BY MOUTH EVERY 6 HOURS AS NEEDED, Disp: 8.5 g, Rfl: 12    aspirin 81 MG EC tablet, Take 1 tablet by mouth Daily., Disp: , Rfl:     clotrimazole-betamethasone (Lotrisone) 1-0.05 % cream, Apply 1 Application topically to the appropriate area as directed 2 (Two) Times a Day., Disp: 45 g, Rfl: 1    esomeprazole (nexIUM) 40 MG capsule, Take 1 capsule by mouth 2 (Two) Times a Day., Disp: 180 capsule, Rfl: 1    estradiol (ESTRACE VAGINAL) 0.1 MG/GM vaginal cream, Insert 1 g into the vagina Daily. Insert 1 g into the vagina daily for 2 weeks, then decrease to insert 1 g into vagina 2 times per week, Disp: 42.5 g, Rfl: 1    losartan (COZAAR) 25 MG tablet, Take 1 tablet by mouth Daily., Disp: 90 tablet, Rfl: 12    ondansetron (ZOFRAN) 8 MG tablet, Take 1 tablet by mouth Every 8 (Eight) Hours As Needed for Nausea or Vomiting., Disp: 20 tablet, Rfl: 0    rosuvastatin (CRESTOR) 40 MG tablet, Take 1 tablet by mouth Daily., Disp: 90 tablet, Rfl: 3    Tirzepatide (Mounjaro) 2.5 MG/0.5ML solution pen-injector pen, Inject 0.5 mL under the skin into the appropriate area as directed 1 (One) Time Per Week., Disp: 2 mL, Rfl: 11    amoxicillin-clavulanate (AUGMENTIN) 875-125 MG per tablet, Take 1 tablet  "by mouth 2 (Two) Times a Day., Disp: 14 tablet, Rfl: 0    fluconazole (Diflucan) 150 MG tablet, Take 1 tablet by mouth 1 (One) Time for 1 dose., Disp: 1 tablet, Rfl: 0    Allergies:   Allergies   Allergen Reactions    Sulfa Antibiotics Unknown - High Severity     high fever - pt states 106F    Morphine Other (See Comments)     drop in O2 sats; needed oxygent; pt states she has to be reminded to breathe;        Family History:   Family History   Problem Relation Age of Onset    Diabetes Mother     Esophageal cancer Father     Hypertension Father     Cancer Father     Cancer Maternal Grandmother     Diabetes Maternal Grandmother     Cancer Paternal Grandmother     Cancer Paternal Grandfather     Stroke Other     Cervical cancer Other     Diabetes Other     Esophageal cancer Other     Hypertension Other     Lung cancer Other     Pancreatic cancer Other     Skin cancer Other     Breast cancer Neg Hx        Social History:   Social History     Socioeconomic History    Marital status:    Tobacco Use    Smoking status: Every Day     Current packs/day: 1.00     Average packs/day: 1 pack/day for 31.8 years (31.8 ttl pk-yrs)     Types: Cigarettes     Start date: 1993     Passive exposure: Current    Smokeless tobacco: Never   Vaping Use    Vaping status: Never Used   Substance and Sexual Activity    Alcohol use: Not Currently    Drug use: Never    Sexual activity: Yes     Partners: Male         Objective     Vital Signs  /90 (BP Location: Right arm, Patient Position: Sitting, Cuff Size: Adult)   Pulse 62   Temp 98 °F (36.7 °C) (Temporal)   Resp 18   Ht 165.1 cm (65\")   Wt 87.1 kg (192 lb)   SpO2 98%   BMI 31.95 kg/m²   Estimated body mass index is 31.95 kg/m² as calculated from the following:    Height as of this encounter: 165.1 cm (65\").    Weight as of this encounter: 87.1 kg (192 lb).            Physical Exam  Vitals and nursing note reviewed.   Constitutional:       General: She is not in acute " distress.     Appearance: Normal appearance. She is not ill-appearing or toxic-appearing.   HENT:      Head: Normocephalic and atraumatic.      Right Ear: Tympanic membrane, ear canal and external ear normal.      Left Ear: Tympanic membrane, ear canal and external ear normal.      Nose: Nose normal.      Comments: Patient verbalizes right frontal and maxillary tenderness on examination of the sinuses.     Mouth/Throat:      Mouth: Mucous membranes are moist.      Pharynx: Posterior oropharyngeal erythema present. No oropharyngeal exudate.      Comments: Patient has pale gum tissue at the site of the tooth removal.  No purulent drainage.  Eyes:      General: No scleral icterus.        Right eye: No discharge.         Left eye: No discharge.      Conjunctiva/sclera: Conjunctivae normal.   Cardiovascular:      Rate and Rhythm: Normal rate and regular rhythm.      Heart sounds: Normal heart sounds.   Pulmonary:      Effort: Pulmonary effort is normal.      Breath sounds: Normal breath sounds.   Musculoskeletal:      Cervical back: Normal range of motion and neck supple. No rigidity or tenderness.   Lymphadenopathy:      Cervical: No cervical adenopathy.   Skin:     General: Skin is warm.      Findings: Bruising present.      Comments: There is localized soft tissue swelling at the right mid lower mandible proximal to the site of the tooth extraction, approximate size 1 and a 4 cm round.  There is an area of ecchymosis just below the soft tissue swelling under the mandible.   Neurological:      General: No focal deficit present.      Mental Status: She is alert.   Psychiatric:         Mood and Affect: Mood normal.         Behavior: Behavior normal.         Thought Content: Thought content normal.         Judgment: Judgment normal.          Assessment and Plan     Procedures      Lab Results (last 72 hours)       ** No results found for the last 72 hours. **                  Diagnoses and all orders for this visit:    1.  Acute maxillary sinusitis, recurrence not specified (Primary)  -     amoxicillin-clavulanate (AUGMENTIN) 875-125 MG per tablet; Take 1 tablet by mouth 2 (Two) Times a Day.  Dispense: 14 tablet; Refill: 0    2. Vaginal yeast infection  -     fluconazole (Diflucan) 150 MG tablet; Take 1 tablet by mouth 1 (One) Time for 1 dose.  Dispense: 1 tablet; Refill: 0    Reviewed exam findings with the patient.  The patient wants to proceed with antibiotic therapy. Take medication as directed.  The patient request a Diflucan in the event she develops vaginal candidiasis due to antibiotic therapy.  Recommend patient schedule return appointment if symptoms persist/progress..      Follow Up  Return if symptoms worsen or fail to improve.    SAULO Sarmiento [facility]  [address] [place] [address] [place] [address] [phone]

## 2024-10-28 NOTE — PROGRESS NOTES
Follow Up Office Visit      Date: 10/08/2024   Patient Name: Veronica Goldsmith  : 1975   MRN: [id number]     Chief Complaint:    Chief Complaint   Patient presents with    Foot Swelling     Both feet        History of Present Illness: Veronica Goldsmith is a 49 y.o. female who is here today for concerns about her incision from her nephrectomy for renal cell carcinoma.  She reports that she has had 1 incidence of surgical infection associated with the incision with an abscess formation.  That started as fullness in her abdominal wall as well as some swelling in her ankles.  She is again having this symptomatology but no fever chills or night sweats.  No true increase in pain.  She was told always seek evaluation if she was having symptomatology because of possible return of infection in a place that had had an abscess previously.  She present for evaluation.    Subjective      Review of Systems:   Review of Systems   Constitutional: Negative.    HENT: Negative.     Respiratory: Negative.     Cardiovascular: Negative.    Gastrointestinal:  Positive for abdominal distention and abdominal pain. Negative for anal bleeding, blood in stool, constipation, diarrhea and GERD.     I have reviewed the patients family history, social history, past medical history, past surgical history and have updated it as appropriate.     Medications:     Current Outpatient Medications:     albuterol sulfate  (90 Base) MCG/ACT inhaler, INHALE 3 PUFFS BY MOUTH EVERY 6 HOURS AS NEEDED, Disp: 8.5 g, Rfl: 12    aspirin 81 MG EC tablet, Take 1 tablet by mouth Daily., Disp: , Rfl:     clotrimazole-betamethasone (Lotrisone) 1-0.05 % cream, Apply 1 Application topically to the appropriate area as directed 2 (Two) Times a Day., Disp: 45 g, Rfl: 1    esomeprazole (nexIUM) 40 MG capsule, Take 1 capsule by mouth 2 (Two) Times a Day., Disp: 180 capsule, Rfl: 1    estradiol (ESTRACE VAGINAL) 0.1 MG/GM vaginal cream, Insert 1 g into the vagina  "Daily. Insert 1 g into the vagina daily for 2 weeks, then decrease to insert 1 g into vagina 2 times per week, Disp: 42.5 g, Rfl: 1    losartan (COZAAR) 25 MG tablet, Take 1 tablet by mouth Daily., Disp: 90 tablet, Rfl: 12    ondansetron (ZOFRAN) 8 MG tablet, Take 1 tablet by mouth Every 8 (Eight) Hours As Needed for Nausea or Vomiting., Disp: 20 tablet, Rfl: 0    rosuvastatin (CRESTOR) 40 MG tablet, Take 1 tablet by mouth Daily., Disp: 90 tablet, Rfl: 3    Tirzepatide (Mounjaro) 2.5 MG/0.5ML solution pen-injector pen, Inject 0.5 mL under the skin into the appropriate area as directed 1 (One) Time Per Week., Disp: 2 mL, Rfl: 11    amoxicillin-clavulanate (AUGMENTIN) 875-125 MG per tablet, Take 1 tablet by mouth 2 (Two) Times a Day., Disp: 14 tablet, Rfl: 0    Allergies:   Allergies   Allergen Reactions    Sulfa Antibiotics Unknown - High Severity     high fever - pt states 106F    Morphine Other (See Comments)     drop in O2 sats; needed oxygent; pt states she has to be reminded to breathe;        Objective     Physical Exam: Please see above  Vital Signs:   Vitals:    10/08/24 1511   BP: 136/84   BP Location: Left arm   Patient Position: Sitting   Cuff Size: Adult   Pulse: 90   Resp: 18   Temp: 98.4 °F (36.9 °C)   TempSrc: Temporal   SpO2: 94%   Weight: 87.5 kg (192 lb 12.8 oz)   Height: 165.1 cm (65\")     Body mass index is 32.08 kg/m².    Physical Exam  Vitals and nursing note reviewed.   Constitutional:       General: She is not in acute distress.     Appearance: Normal appearance. She is not ill-appearing or toxic-appearing.   Cardiovascular:      Rate and Rhythm: Normal rate and regular rhythm.      Heart sounds: No murmur heard.     No friction rub. No gallop.   Pulmonary:      Effort: Pulmonary effort is normal. No respiratory distress.      Breath sounds: Normal breath sounds. No wheezing or rales.   Abdominal:      General: There is no distension.      Palpations: Abdomen is soft.      Tenderness: There is " abdominal tenderness (mild).   Neurological:      Mental Status: She is alert.     Procedures    Assessment / Plan      Assessment/Plan:   1. Abdominal wall pain in right flank  With history of previously previous abscess in the surgical site.  Obtain laboratory data as well as a CT of her abdomen and follow  - CBC With Manual Differential; Future  - Comprehensive Metabolic Panel; Future  - Sedimentation Rate; Future  - CT Abdomen Pelvis Without Contrast; Future       Follow Up:   No follow-ups on file.      At [facility], we believe that sharing information builds trust and better relationships. You are receiving this note because you recently visited [facility]. It is possible you will see health information before a provider has talked with you about it. This kind of information can be easy to misunderstand. To help you fully understand what it means for your health, we urge you to discuss this note with your provider.    Anastasiya Levine PA-C  [facility]

## 2024-12-09 ENCOUNTER — LAB (OUTPATIENT)
Dept: FAMILY MEDICINE CLINIC | Facility: CLINIC | Age: 49
End: 2024-12-09
Payer: COMMERCIAL

## 2024-12-09 DIAGNOSIS — N18.2 CHRONIC KIDNEY DISEASE, STAGE II (MILD): Primary | ICD-10-CM

## 2024-12-09 LAB
25(OH)D3 SERPL-MCNC: 28.8 NG/ML (ref 30–100)
ALBUMIN UR-MCNC: <1.2 MG/DL
BACTERIA UR QL AUTO: ABNORMAL /HPF
BILIRUB UR QL STRIP: NEGATIVE
CLARITY UR: CLEAR
COLOR UR: YELLOW
GLUCOSE UR STRIP-MCNC: NEGATIVE MG/DL
HGB UR QL STRIP.AUTO: NEGATIVE
HYALINE CASTS UR QL AUTO: ABNORMAL /LPF
KETONES UR QL STRIP: NEGATIVE
LEUKOCYTE ESTERASE UR QL STRIP.AUTO: ABNORMAL
NITRITE UR QL STRIP: NEGATIVE
PH UR STRIP.AUTO: 6 [PH] (ref 5–8)
PROT UR QL STRIP: ABNORMAL
PTH-INTACT SERPL-MCNC: 45.9 PG/ML (ref 15–65)
RBC # UR STRIP: ABNORMAL /HPF
REF LAB TEST METHOD: ABNORMAL
SP GR UR STRIP: 1.02 (ref 1–1.03)
SQUAMOUS #/AREA URNS HPF: ABNORMAL /HPF
URATE SERPL-MCNC: 5 MG/DL (ref 2.4–5.7)
UROBILINOGEN UR QL STRIP: ABNORMAL
WBC # UR STRIP: ABNORMAL /HPF

## 2024-12-09 PROCEDURE — 82306 VITAMIN D 25 HYDROXY: CPT | Performed by: NURSE PRACTITIONER

## 2024-12-09 PROCEDURE — 83970 ASSAY OF PARATHORMONE: CPT | Performed by: NURSE PRACTITIONER

## 2024-12-09 PROCEDURE — 36415 COLL VENOUS BLD VENIPUNCTURE: CPT | Performed by: FAMILY MEDICINE

## 2024-12-09 PROCEDURE — 81001 URINALYSIS AUTO W/SCOPE: CPT | Performed by: NURSE PRACTITIONER

## 2024-12-09 PROCEDURE — 84550 ASSAY OF BLOOD/URIC ACID: CPT | Performed by: NURSE PRACTITIONER

## 2024-12-09 PROCEDURE — 82043 UR ALBUMIN QUANTITATIVE: CPT | Performed by: NURSE PRACTITIONER

## 2024-12-19 ENCOUNTER — OFFICE VISIT (OUTPATIENT)
Dept: FAMILY MEDICINE CLINIC | Facility: CLINIC | Age: 49
End: 2024-12-19
Payer: COMMERCIAL

## 2024-12-19 VITALS
HEART RATE: 95 BPM | TEMPERATURE: 97.6 F | HEIGHT: 65 IN | RESPIRATION RATE: 18 BRPM | BODY MASS INDEX: 32.72 KG/M2 | WEIGHT: 196.4 LBS | DIASTOLIC BLOOD PRESSURE: 86 MMHG | OXYGEN SATURATION: 95 % | SYSTOLIC BLOOD PRESSURE: 140 MMHG

## 2024-12-19 DIAGNOSIS — E11.9 TYPE 2 DIABETES MELLITUS WITHOUT COMPLICATION, WITHOUT LONG-TERM CURRENT USE OF INSULIN: ICD-10-CM

## 2024-12-19 DIAGNOSIS — E78.2 MIXED HYPERLIPIDEMIA: ICD-10-CM

## 2024-12-19 DIAGNOSIS — K21.9 GASTROESOPHAGEAL REFLUX DISEASE WITHOUT ESOPHAGITIS: ICD-10-CM

## 2024-12-19 DIAGNOSIS — Z23 NEED FOR VACCINATION AGAINST STREPTOCOCCUS PNEUMONIAE: ICD-10-CM

## 2024-12-19 DIAGNOSIS — N18.2 CHRONIC KIDNEY DISEASE, STAGE II (MILD): ICD-10-CM

## 2024-12-19 DIAGNOSIS — Z00.00 WELL ADULT EXAM: Primary | ICD-10-CM

## 2024-12-19 DIAGNOSIS — E55.9 VITAMIN D DEFICIENCY: ICD-10-CM

## 2024-12-19 DIAGNOSIS — I10 PRIMARY HYPERTENSION: ICD-10-CM

## 2024-12-19 DIAGNOSIS — J45.30 MILD PERSISTENT ASTHMA, UNSPECIFIED WHETHER COMPLICATED: ICD-10-CM

## 2024-12-19 DIAGNOSIS — F17.210 CIGARETTE NICOTINE DEPENDENCE WITHOUT COMPLICATION: ICD-10-CM

## 2024-12-19 DIAGNOSIS — K75.81 METABOLIC DYSFUNCTION-ASSOCIATED STEATOHEPATITIS (MASH): ICD-10-CM

## 2024-12-19 PROCEDURE — 99214 OFFICE O/P EST MOD 30 MIN: CPT | Performed by: FAMILY MEDICINE

## 2024-12-19 PROCEDURE — 99396 PREV VISIT EST AGE 40-64: CPT | Performed by: FAMILY MEDICINE

## 2024-12-19 PROCEDURE — 90677 PCV20 VACCINE IM: CPT | Performed by: FAMILY MEDICINE

## 2024-12-19 PROCEDURE — 90471 IMMUNIZATION ADMIN: CPT | Performed by: FAMILY MEDICINE

## 2024-12-19 RX ORDER — LOSARTAN POTASSIUM 50 MG/1
50 TABLET ORAL DAILY
Qty: 90 TABLET | Refills: 3 | Status: SHIPPED | OUTPATIENT
Start: 2024-12-19

## 2024-12-19 RX ORDER — SEMAGLUTIDE 1.34 MG/ML
INJECTION, SOLUTION SUBCUTANEOUS
COMMUNITY
Start: 2024-12-04 | End: 2024-12-19

## 2024-12-19 RX ORDER — PREDNISONE 10 MG/1
60 TABLET ORAL DAILY
Qty: 18 TABLET | Refills: 0 | Status: SHIPPED | OUTPATIENT
Start: 2024-12-19

## 2024-12-19 RX ORDER — HYDROCHLOROTHIAZIDE 12.5 MG/1
TABLET ORAL EVERY 24 HOURS
COMMUNITY
Start: 2024-12-04 | End: 2025-06-02

## 2024-12-19 NOTE — PROGRESS NOTES
Female Physical Note      Date: 2024   Patient Name: Veronica Goldsmith  : 1975   MRN: [id number]     Chief Complaint:    Chief Complaint   Patient presents with    Follow-up     3 MONTH        History of Present Illness: Veronica Goldsmith is a 49 y.o. female who is here today for their annual health maintenance and physical.  Patient also returns for follow-up of her chronic medical conditions.    History of Present Illness  The patient is a 49-year-old female who presents for evaluation of diabetes, hypertension, hyperlipidemia, respiratory symptoms, foot calluses, and health maintenance.    She has been unable to obtain Mounjaro 2.5 due to insurance coverage issues. Her most recent hemoglobin A1c level was elevated at 6.1, indicating a worsening of her diabetes. She is currently on Jardiance. She reports that Ozempic made her sick.    She continues her regimen of Crestor without any adverse effects.    Her losartan dosage has been increased to achieve a target blood pressure below 130. She experienced a single episode of lightheadedness when her blood pressure was recorded as 110/60, which she attributes to the need for adjustment to the new medication dosage. She has been prescribed hydrochlorothiazide but discontinued it due to severe cramping in her feet, which interfered with her work. She has not experienced any symptoms of restless legs syndrome. She has been advised to increase her water intake to 75 ounces per day.    She reports no symptoms of anxiety or depression. She is under the care of a nephrologist, who has conducted basic blood tests revealing a slight elevation in creatinine levels and a mildly decreased eGFR. A urinalysis and microalbumin test were also performed. She has been advised to start a multivitamin supplement but has not yet done so. She has tripled her water intake. She is scheduled for a CT scan on [facility] [place].    She reports no cough, wheezing, or shortness of breath.  She does not have fever or chills. She does not have chest pain, palpitations, or arrhythmias. She reports no bowel or bladder issues. She reports no muscle aches or pains, except when she was taking hydrochlorothiazide. She reports no joint pain. She reports good appetite and sleep. She reports no weakness or tremors. She reports no history of restless legs syndrome. She has been informed that her iron levels are low. She has completed two courses of antibiotics, Augmentin and doxycycline, and has also taken steroids. She has a history of a ruptured eardrum and recurrent abscesses under her abdomen, which have been managed with Augmentin and doxycycline. She has ceased smoking.    She has calluses on her feet and has not been trimming her nails due to a sensation of ingrown nails. She has not been soaking her feet, using a pumice stone, or applying Vaseline at night. She experiences soreness in her feet by the end of the day.    SOCIAL HISTORY  The patient has stopped smoking.    MEDICATIONS  Current: Crestor, losartan, Jardiance, Nexium, Augmentin, doxycycline.  Discontinued: Hydrochlorothiazide.     Patient appears to be doing otherwise well.  They have continue with their medications without any side effects.  They have not had any changes in their usual activity, appetite and sleep.  Patient denies any other cardiovascular, respiratory, gastrointestinal, urologic or neurologic complaints.    Subjective      Review of Systems:   Review of Systems   Constitutional:  Negative for activity change, appetite change and fatigue.   Respiratory:  Positive for cough and wheezing. Negative for chest tightness and shortness of breath.    Cardiovascular:  Negative for chest pain, palpitations and leg swelling.   Gastrointestinal:  Negative for abdominal distention, abdominal pain, blood in stool, constipation, diarrhea, nausea, vomiting, GERD and indigestion.   Genitourinary:  Negative for difficulty urinating, dysuria,  flank pain, frequency, hematuria and urgency.   Musculoskeletal:  Positive for arthralgias and myalgias. Negative for back pain, gait problem and joint swelling.   Neurological:  Positive for light-headedness. Negative for dizziness, tremors, seizures, syncope, weakness, numbness, headache and memory problem.   Psychiatric/Behavioral:  Negative for sleep disturbance and depressed mood. The patient is not nervous/anxious.        Past Medical History, Social History, Family History and Care Team were all reviewed with patient and updated as appropriate.     Medications:     Current Outpatient Medications:     albuterol sulfate  (90 Base) MCG/ACT inhaler, INHALE 3 PUFFS BY MOUTH EVERY 6 HOURS AS NEEDED, Disp: 8.5 g, Rfl: 12    aspirin 81 MG EC tablet, Take 1 tablet by mouth Daily., Disp: , Rfl:     esomeprazole (nexIUM) 40 MG capsule, Take 1 capsule by mouth 2 (Two) Times a Day., Disp: 180 capsule, Rfl: 1    estradiol (ESTRACE VAGINAL) 0.1 MG/GM vaginal cream, Insert 1 g into the vagina Daily. Insert 1 g into the vagina daily for 2 weeks, then decrease to insert 1 g into vagina 2 times per week, Disp: 42.5 g, Rfl: 1    hydroCHLOROthiazide 12.5 MG tablet, Daily., Disp: , Rfl:     losartan (COZAAR) 50 MG tablet, Take 1 tablet by mouth Daily., Disp: 90 tablet, Rfl: 3    ondansetron (ZOFRAN) 8 MG tablet, Take 1 tablet by mouth Every 8 (Eight) Hours As Needed for Nausea or Vomiting., Disp: 20 tablet, Rfl: 0    rosuvastatin (CRESTOR) 40 MG tablet, Take 1 tablet by mouth Daily., Disp: 90 tablet, Rfl: 3    predniSONE (DELTASONE) 10 MG tablet, Take 6 tablets by mouth Daily., Disp: 18 tablet, Rfl: 0    Allergies:   Allergies   Allergen Reactions    Sulfa Antibiotics Unknown - High Severity     high fever - pt states 106F    Morphine Other (See Comments)     drop in O2 sats; needed oxygent; pt states she has to be reminded to breathe;        Immunizations:  Health Maintenance Summary            DIABETIC EYE EXAM (Yearly)  Due soon on 2/5/2025 02/05/2024  Done    01/04/2023  SCANNED - EYE EXAM    11/23/2020   DIABETES EYE EXAM              Ordered - HEMOGLOBIN A1C (Every 6 Months) Ordered on 12/19/2024      09/10/2024  Hemoglobin A1C component of Hemoglobin A1c    03/20/2024  Hemoglobin A1C component of Hemoglobin A1c    12/21/2023  Hemoglobin A1C component of Hemoglobin A1c    11/16/2023  Hemoglobin A1C component of Hemoglobin A1c    08/16/2023  Hemoglobin A1C component of Hemoglobin A1c    Only the first 5 history entries have been loaded, but more history exists.              Postponed - INFLUENZA VACCINE (Yearly - July to March) Postponed until 3/31/2025      09/10/2024  Postponed until 3/31/2025 by Jessika Cormier MA (Product Unavailable)    10/10/2023  Imm Admin: Influenza, Unspecified    10/04/2022  Imm Admin: Fluzone (or Fluarix & Flulaval for VFC) >6mos    02/08/2019  Imm Admin: Influenza, Unspecified    02/08/2019  Imm Admin: Flu Vaccine Quad PF >36MO    Only the first 5 history entries have been loaded, but more history exists.              Postponed - COVID-19 Vaccine (3 - 2024-25 season) Postponed until 9/10/2025      09/10/2024  Postponed until 9/10/2025 by Jessika Cormier MA (Patient Refused)    06/04/2024  Postponed until 6/4/2025 by Greg Levine MD (Patient Refused)    11/16/2023  Postponed until 4/11/2024 by Greg Levine MD (Patient Refused)    02/10/2023  Postponed until 3/1/2024 by Greg Levine MD (Pending event - Advised.)    06/20/2022  Imm Admin: Covid-19 (Pfizer) Gray Cap Monovalent    Only the first 5 history entries have been loaded, but more history exists.              BMI FOLLOWUP (Yearly) Next due on 6/4/2025 06/04/2024  SmartData: WORKFLOW - QUALITY MEASUREMENT - DOCUMENTED WEIGHT FOLLOW-UP PLAN    05/11/2023  SmartData: WORKFLOW - QUALITY MEASUREMENT - BMI FOLLOW UP CARE PLAN DOCUMENTED    05/11/2023  SmartData: WORKFLOW - QUALITY MEASUREMENT - DOCUMENTED  WEIGHT FOLLOW-UP PLAN    04/21/2023  SmartData: WORKFLOW - QUALITY MEASUREMENT - DOCUMENTED WEIGHT FOLLOW-UP PLAN    04/13/2023  SmartData: WORKFLOW - QUALITY MEASUREMENT - DOCUMENTED WEIGHT FOLLOW-UP PLAN    Only the first 5 history entries have been loaded, but more history exists.              LIPID PANEL (Yearly) Next due on 6/19/2025 06/19/2024  Lipid Panel    03/20/2024  Lipid Panel    11/16/2023  Lipid Panel    02/10/2023  Lipid Panel    10/07/2022  Lipid Panel    Only the first 5 history entries have been loaded, but more history exists.              ANNUAL PHYSICAL (Yearly) Next due on 12/19/2025 12/19/2024  Done    11/16/2023  Done    10/07/2022  Done              DIABETIC FOOT EXAM (Yearly) Next due on 12/19/2025 12/19/2024  Done    11/16/2023  Done    10/07/2022  Done              MAMMOGRAM (Every 2 Years) Next due on 9/20/2026 09/20/2024  Mammo Screening Digital Tomosynthesis Bilateral With CAD              COLORECTAL CANCER SCREENING (COLONOSCOPY - Every 10 Years) Tentatively due on 3/30/2028      03/30/2018  HM COLONOSCOPY              TDAP/TD VACCINES (2 - Td or Tdap) Next due on 2/8/2029 02/08/2019  Imm Admin: Tdap              HEPATITIS C SCREENING  Completed      10/07/2022  Hepatitis C Antibody              Pneumococcal Vaccine 0-64 (Series Information) Completed      12/19/2024  Imm Admin: Pneumococcal Conjugate 20-Valent (PCV20)    11/16/2023  Imm Admin: Pneumococcal Polysaccharide (PPSV23)    10/07/2022  Imm Admin: Pneumococcal Polysaccharide (PPSV23)              Discontinued - Hepatitis B  Discontinued      02/10/2023  Frequency changed to Never by Greg Levine MD (Immunity confirmed 7/7/2022.)              Discontinued - URINE MICROALBUMIN  Discontinued        Frequency changed to Never automatically (Topic No Longer Applies)    12/09/2024  Microalbumin, Urine component of MicroAlbumin, Urine, Random - Urine, Clean Catch    09/10/2024  Microalbumin,  "Urine component of MicroAlbumin, Urine, Random - Urine, Clean Catch    08/16/2023  Microalbumin, Urine component of MicroAlbumin, Urine, Random - Urine, Clean Catch    10/07/2022  Microalbumin, Urine component of MicroAlbumin, Urine, Random - Urine, Clean Catch    Only the first 5 history entries have been loaded, but more history exists.                     Orders Placed This Encounter   Procedures    Pneumococcal Conjugate Vaccine 20-Valent All        Colorectal Screening:   Up-to-date.  Last Completed Colonoscopy            COLORECTAL CANCER SCREENING (COLONOSCOPY - Every 10 Years) Tentatively due on 3/30/2028      03/30/2018   COLONOSCOPY                  Pap: Patient has had total abdominal hysterectomy.  Last Completed Pap Smear       This patient has no relevant Health Maintenance data.           Mammogram: Up-to-date.  Last Completed Mammogram            MAMMOGRAM (Every 2 Years) Next due on 9/20/2026 09/20/2024  Mammo Screening Digital Tomosynthesis Bilateral With CAD                     CT for Smoker (Age 50-80, 20 pk yr):   Up-to-date.  Bone Density/DEXA (Age 65 or high risk): Deferred.  Hep C (Age 18-79 once): Previously ordered.  HIV (Age 15-65 once): No results found for: \"HIV1X2\"  A1c:   Hemoglobin A1C   Date Value Ref Range Status   09/10/2024 5.30 4.80 - 5.60 % Final      Lipid panel:  No results found for: \"LIPIDEXCLUSI\"    The 10-year ASCVD risk score (Aguilar FELIZ, et al., 2019) is: 9.6%    Values used to calculate the score:      Age: 49 years      Sex: Female      Is Non- : No      Diabetic: Yes      Tobacco smoker: Yes      Systolic Blood Pressure: 140 mmHg      Is BP treated: Yes      HDL Cholesterol: 37 mg/dL      Total Cholesterol: 142 mg/dL    Dermatology: Advised if necessary.  Ophthalmologist: Advised.  Dentist: Advised.    Tobacco Use: High Risk (12/19/2024)    Patient History     Smoking Tobacco Use: Every Day     Smokeless Tobacco Use: Never     Passive " "Exposure: Current       Social History     Substance and Sexual Activity   Alcohol Use Not Currently        Social History     Substance and Sexual Activity   Drug Use Never        Diet/Physical activity: Well-balanced diet/daily exercise.    Sexual Health: No issues at present time.   Menopause: No issues at present time.  Menstrual Cycles: No issues at present time.    Depression: PHQ-2 Depression Screening  PHQ-9 Total Score:  0    Measures:   Advanced Care Planning:   Patient has an advance directive in EMR which is still valid.     Smoking Cessation:   less than 3 minutes spent counseling Will try to cut down    Objective     Physical Exam:  Vital Signs:   Vitals:    12/19/24 1413   BP: 140/86   BP Location: Left arm   Patient Position: Sitting   Cuff Size: Large Adult   Pulse: 95   Resp: 18   Temp: 97.6 °F (36.4 °C)   TempSrc: Temporal   SpO2: 95%   Weight: 89.1 kg (196 lb 6.4 oz)   Height: 165.1 cm (65\")     Body mass index is 32.68 kg/m².   Facility age limit for growth %karli is 20 years.    Physical Exam  Vitals and nursing note reviewed.   Constitutional:       Appearance: Normal appearance.   HENT:      Head: Normocephalic and atraumatic.      Nose: Nose normal.      Mouth/Throat:      Pharynx: Oropharynx is clear.   Eyes:      Extraocular Movements: Extraocular movements intact.      Pupils: Pupils are equal, round, and reactive to light.   Neck:      Thyroid: No thyroid mass or thyromegaly.      Trachea: Trachea normal.   Cardiovascular:      Rate and Rhythm: Normal rate and regular rhythm.      Pulses: Normal pulses. No decreased pulses.           Dorsalis pedis pulses are 2+ on the right side and 2+ on the left side.        Posterior tibial pulses are 2+ on the right side and 2+ on the left side.      Heart sounds: Normal heart sounds.   Pulmonary:      Effort: Pulmonary effort is normal.      Breath sounds: Normal breath sounds. Examination of the right-upper field reveals rhonchi.   Abdominal:      " General: Abdomen is flat. Bowel sounds are normal.      Palpations: Abdomen is soft.      Tenderness: There is no abdominal tenderness.   Musculoskeletal:      Cervical back: Neck supple.      Right lower leg: No edema.      Left lower leg: No edema.      Right foot: No deformity.      Left foot: No deformity.   Feet:      Right foot:      Protective Sensation: 5 sites tested.  5 sites sensed.      Skin integrity: Skin integrity normal. Callus present. No ulcer, skin breakdown or erythema.      Toenail Condition: Right toenails are normal.      Left foot:      Protective Sensation: 5 sites tested.  5 sites sensed.      Skin integrity: Skin integrity normal. Callus present. No ulcer, skin breakdown or erythema.      Toenail Condition: Left toenails are normal.   Lymphadenopathy:      Cervical: No cervical adenopathy.   Skin:     General: Skin is warm and dry.   Neurological:      General: No focal deficit present.      Mental Status: She is alert and oriented to person, place, and time.      Sensory: Sensation is intact.      Motor: Motor function is intact.      Coordination: Coordination is intact.   Psychiatric:         Attention and Perception: Attention normal.         Mood and Affect: Mood normal.         Speech: Speech normal.         Behavior: Behavior normal.         POCT Results (if applicable);   Results for orders placed or performed in visit on 12/09/24   Vitamin D,25-Hydroxy    Collection Time: 12/09/24  1:00 PM    Specimen: Arm, Left; Blood   Result Value Ref Range    25 Hydroxy, Vitamin D 28.8 (L) 30.0 - 100.0 ng/ml   PTH, Intact    Collection Time: 12/09/24  1:00 PM    Specimen: Arm, Left; Blood   Result Value Ref Range    PTH, Intact 45.9 15.0 - 65.0 pg/mL   Uric Acid    Collection Time: 12/09/24  1:00 PM    Specimen: Arm, Left; Blood   Result Value Ref Range    Uric Acid 5.0 2.4 - 5.7 mg/dL   MicroAlbumin, Urine, Random - Urine, Clean Catch    Collection Time: 12/09/24  1:50 PM    Specimen: Urine,  Clean Catch   Result Value Ref Range    Microalbumin, Urine <1.2 mg/dL   Urinalysis without microscopic (no culture) - Urine, Clean Catch    Collection Time: 12/09/24  1:50 PM    Specimen: Urine, Clean Catch   Result Value Ref Range    Color, UA Yellow Yellow, Straw    Appearance, UA Clear Clear    pH, UA 6.0 5.0 - 8.0    Specific Gravity, UA 1.019 1.005 - 1.030    Glucose, UA Negative Negative    Ketones, UA Negative Negative    Bilirubin, UA Negative Negative    Blood, UA Negative Negative    Protein, UA Trace (A) Negative    Leuk Esterase, UA Trace (A) Negative    Nitrite, UA Negative Negative    Urobilinogen, UA 1.0 E.U./dL 0.2 - 1.0 E.U./dL   Urinalysis, Microscopic Only - Urine, Clean Catch    Collection Time: 12/09/24  1:50 PM    Specimen: Urine, Clean Catch   Result Value Ref Range    RBC, UA 0-2 None Seen, 0-2 /HPF    WBC, UA 0-2 None Seen, 0-2 /HPF    Bacteria, UA None Seen None Seen /HPF    Squamous Epithelial Cells, UA 13-20 (A) None Seen, 0-2 /HPF    Hyaline Casts, UA 0-2 None Seen /LPF    Methodology Automated Microscopy         Procedures    Assessment / Plan      Assessment/Plan:   Diagnoses and all orders for this visit:    1. Well adult exam (Primary)  Patient did have a wellness exam performed today that did not reveal any abnormality.  Patient's anxiety/depression scores were reviewed.  We will continue to monitor laboratory data as well as symptomatology and if there are any other questions or concerns prior to the next scheduled follow-up they will contact us.  -     CBC (No Diff); Future  -     Comprehensive Metabolic Panel; Future  -     Hemoglobin A1c; Future  -     Vitamin B12; Future  -     TSH Rfx On Abnormal To Free T4; Future  -     Magnesium; Future  -     Phosphorus; Future  -     Iron Profile; Future  -     Ferritin; Future    2. Type 2 diabetes mellitus without complication, without long-term current use of insulin   Patient does appear to be doing relatively well with respect to  their diabetes.  They are tolerating their medications without complaints.  We will continue to follow their hemoglobin A1c and will make adjustments to keep their level less than 7%.    3. Primary hypertension  Patient appears to be tolerating their blood pressure medicine without any side effects.  We will continue to monitor their blood pressure and will adjust to keep there is systolic blood pressure less than 130.  We will continue to monitor renal function and will make adjustments based on these findings.  -     losartan (COZAAR) 50 MG tablet; Take 1 tablet by mouth Daily.  Dispense: 90 tablet; Refill: 3    4. Mixed hyperlipidemia   Patient does appear to be tolerating their lipid-lowering agent without difficulty.  We will obtain the lipid profile as well as liver function test to observe for any abnormalities.  We will continue to adjust medications to keep their LDL less than 70.    5. Chronic kidney disease, stage II (mild)   Patient appears to be doing well at present time with respect to their renal insufficiency.  They are pushing fluids without difficulty and have been avoiding anti-inflammatories.  We will continue to monitor renal function and if there does appear to be any abnormality we will consider renal ultrasound as well as possible referral to a nephrologist.    6. Metabolic dysfunction-associated steatohepatitis (MASH)   Patient does appear to have metabolic dysfunction of your liver secondary to fatty liver disease.  They understands the importance of a low carbohydrate/low-fat diet as well as 300 minutes of aerobic exercise weekly.  We will continue to monitor and will obtain laboratory data intermittently to assure us there is no progression of the disease.  A liver ultrasound as well elastography may be consider intermittently.  Patient understands the importance of weight loss.  There may be an indication of use of a GLP-1 agonist.    7. Mild persistent asthma, unspecified whether  complicated  Patient is having no some increase in wheezing.  She has had 2 courses of antibiotics and some steroids that has not changed her symptoms completely.  She does continue to have slight wheeze on exam today.  We will try another course of prednisone and will monitor.  We have advised for her to discontinue her smoking.  If she has other issues further assessment treatment will be necessary.  She will continue with her respiratory medications.  -     predniSONE (DELTASONE) 10 MG tablet; Take 6 tablets by mouth Daily.  Dispense: 18 tablet; Refill: 0    8. Cigarette nicotine dependence without complication   Patient has been advised to discontinue smoking.    9. Gastroesophageal reflux disease without esophagitis   Patient is doing well at present time with respect to the reflux symptomatology.  They are tolerating their medications without any complaints at present time.  We will continue to monitor their symptoms and if they develop dysphagia, alarm symptoms or worsening symptomatology further evaluation and treatment may be necessary as well as possible referral for an EGD.    10. Vitamin D deficiency   Patient does have history of vitamin D deficiency.  We will obtain vitamin D level and pursue and treat coronary.    11. Need for vaccination against Streptococcus pneumoniae  Patient is due for Prevnar 20.  -     Pneumococcal Conjugate Vaccine 20-Valent All         Healthcare Maintenance:  Counseling provided based on age appropriate USPSTF guidelines.       Veronica Goldsmith voices understanding and acceptance of this advice and will call back with any further questions or concerns. AVS with preventive healthcare tips printed for patient.     Follow Up:   Return in about 3 months (around 3/19/2025).    I have spent     At [facility], we believe that sharing information builds trust and better relationships. You are receiving this note because you recently visited [facility]. It is possible you will  see health information before a provider has talked with you about it. This kind of information can be easy to misunderstand. To help you fully understand what it means for your health, we urge you to discuss this note with your provider.    Greg Levine MD  [facility]

## 2024-12-24 ENCOUNTER — HOSPITAL ENCOUNTER (OUTPATIENT)
Dept: CT IMAGING | Facility: HOSPITAL | Age: 49
Discharge: HOME | End: 2024-12-24
Payer: COMMERCIAL

## 2024-12-24 ENCOUNTER — LAB (OUTPATIENT)
Dept: LAB | Facility: HOSPITAL | Age: 49
End: 2024-12-24
Payer: COMMERCIAL

## 2024-12-24 DIAGNOSIS — N18.31 STAGE 3A CHRONIC KIDNEY DISEASE: ICD-10-CM

## 2024-12-24 DIAGNOSIS — Z00.00 WELL ADULT EXAM: ICD-10-CM

## 2024-12-24 DIAGNOSIS — C64.1 RENAL CELL CARCINOMA OF RIGHT KIDNEY: ICD-10-CM

## 2024-12-24 LAB
ALBUMIN SERPL-MCNC: 4.2 G/DL (ref 3.5–5.2)
ALBUMIN/GLOB SERPL: 1.5 G/DL
ALP SERPL-CCNC: 76 U/L (ref 39–117)
ALT SERPL W P-5'-P-CCNC: 21 U/L (ref 1–33)
ANION GAP SERPL CALCULATED.3IONS-SCNC: 12.3 MMOL/L (ref 5–15)
AST SERPL-CCNC: 19 U/L (ref 1–32)
BILIRUB SERPL-MCNC: 0.3 MG/DL (ref 0–1.2)
BUN SERPL-MCNC: 23 MG/DL (ref 6–20)
BUN/CREAT SERPL: 16.8 (ref 7–25)
CALCIUM SPEC-SCNC: 9.2 MG/DL (ref 8.6–10.5)
CHLORIDE SERPL-SCNC: 106 MMOL/L (ref 98–107)
CO2 SERPL-SCNC: 26.7 MMOL/L (ref 22–29)
CREAT SERPL-MCNC: 1.37 MG/DL (ref 0.57–1)
DEPRECATED RDW RBC AUTO: 47.6 FL (ref 37–54)
EGFRCR SERPLBLD CKD-EPI 2021: 48 ML/MIN/1.73
ERYTHROCYTE [DISTWIDTH] IN BLOOD BY AUTOMATED COUNT: 13.3 % (ref 12.3–15.4)
FERRITIN SERPL-MCNC: 62 NG/ML (ref 13–150)
GLOBULIN UR ELPH-MCNC: 2.8 GM/DL
GLUCOSE SERPL-MCNC: 86 MG/DL (ref 65–99)
HBA1C MFR BLD: 6 % (ref 4.8–5.6)
HCT VFR BLD AUTO: 47.5 % (ref 34–46.6)
HGB BLD-MCNC: 16 G/DL (ref 12–15.9)
IRON 24H UR-MRATE: 99 MCG/DL (ref 37–145)
IRON SATN MFR SERPL: 23 % (ref 20–50)
MAGNESIUM SERPL-MCNC: 2.3 MG/DL (ref 1.6–2.6)
MCH RBC QN AUTO: 32.6 PG (ref 26.6–33)
MCHC RBC AUTO-ENTMCNC: 33.7 G/DL (ref 31.5–35.7)
MCV RBC AUTO: 96.7 FL (ref 79–97)
PHOSPHATE SERPL-MCNC: 3.6 MG/DL (ref 2.5–4.5)
PLATELET # BLD AUTO: 201 10*3/MM3 (ref 140–450)
PMV BLD AUTO: 10.8 FL (ref 6–12)
POTASSIUM SERPL-SCNC: 3.9 MMOL/L (ref 3.5–5.2)
PROT SERPL-MCNC: 7 G/DL (ref 6–8.5)
RBC # BLD AUTO: 4.91 10*6/MM3 (ref 3.77–5.28)
SODIUM SERPL-SCNC: 145 MMOL/L (ref 136–145)
TIBC SERPL-MCNC: 429 MCG/DL (ref 298–536)
TRANSFERRIN SERPL-MCNC: 288 MG/DL (ref 200–360)
TSH SERPL DL<=0.05 MIU/L-ACNC: 1.45 UIU/ML (ref 0.27–4.2)
VIT B12 BLD-MCNC: 232 PG/ML (ref 211–946)
WBC NRBC COR # BLD AUTO: 11.92 10*3/MM3 (ref 3.4–10.8)

## 2024-12-24 PROCEDURE — 74177 CT ABD & PELVIS W/CONTRAST: CPT

## 2024-12-24 PROCEDURE — 84100 ASSAY OF PHOSPHORUS: CPT

## 2024-12-24 PROCEDURE — 84466 ASSAY OF TRANSFERRIN: CPT

## 2024-12-24 PROCEDURE — 82607 VITAMIN B-12: CPT

## 2024-12-24 PROCEDURE — 80050 GENERAL HEALTH PANEL: CPT

## 2024-12-24 PROCEDURE — 83540 ASSAY OF IRON: CPT

## 2024-12-24 PROCEDURE — 71260 CT THORAX DX C+: CPT

## 2024-12-24 PROCEDURE — 82728 ASSAY OF FERRITIN: CPT

## 2024-12-24 PROCEDURE — 83735 ASSAY OF MAGNESIUM: CPT

## 2024-12-24 PROCEDURE — 25510000001 IOPAMIDOL 61 % SOLUTION: Performed by: UROLOGY

## 2024-12-24 RX ORDER — IOPAMIDOL 612 MG/ML
100 INJECTION, SOLUTION INTRAVASCULAR
Status: COMPLETED | OUTPATIENT
Start: 2024-12-24 | End: 2024-12-24

## 2024-12-24 RX ADMIN — IOPAMIDOL 100 ML: 612 INJECTION, SOLUTION INTRAVENOUS at 09:16

## 2024-12-26 ENCOUNTER — OFFICE VISIT (OUTPATIENT)
Dept: FAMILY MEDICINE CLINIC | Facility: CLINIC | Age: 49
End: 2024-12-26
Payer: COMMERCIAL

## 2024-12-26 VITALS
WEIGHT: 192 LBS | SYSTOLIC BLOOD PRESSURE: 104 MMHG | DIASTOLIC BLOOD PRESSURE: 78 MMHG | OXYGEN SATURATION: 97 % | BODY MASS INDEX: 31.99 KG/M2 | HEIGHT: 65 IN | HEART RATE: 90 BPM | TEMPERATURE: 97.8 F

## 2024-12-26 DIAGNOSIS — R50.9 FEVER, UNSPECIFIED FEVER CAUSE: Primary | ICD-10-CM

## 2024-12-26 DIAGNOSIS — U07.1 COVID: ICD-10-CM

## 2024-12-26 LAB
EXPIRATION DATE: ABNORMAL
EXPIRATION DATE: NORMAL
FLUAV AG UPPER RESP QL IA.RAPID: NOT DETECTED
FLUBV AG UPPER RESP QL IA.RAPID: NOT DETECTED
INTERNAL CONTROL: ABNORMAL
INTERNAL CONTROL: NORMAL
Lab: ABNORMAL
Lab: NORMAL
S PYO AG THROAT QL: NEGATIVE
SARS-COV-2 AG UPPER RESP QL IA.RAPID: DETECTED

## 2024-12-26 PROCEDURE — 99213 OFFICE O/P EST LOW 20 MIN: CPT | Performed by: FAMILY MEDICINE

## 2024-12-26 PROCEDURE — 87428 SARSCOV & INF VIR A&B AG IA: CPT | Performed by: FAMILY MEDICINE

## 2024-12-26 PROCEDURE — 87880 STREP A ASSAY W/OPTIC: CPT | Performed by: FAMILY MEDICINE

## 2024-12-26 RX ORDER — DOXYCYCLINE 100 MG/1
100 CAPSULE ORAL 2 TIMES DAILY
Qty: 20 CAPSULE | Refills: 0 | Status: SHIPPED | OUTPATIENT
Start: 2024-12-26

## 2024-12-26 RX ORDER — PREDNISONE 10 MG/1
60 TABLET ORAL DAILY
Qty: 18 TABLET | Refills: 0 | Status: SHIPPED | OUTPATIENT
Start: 2024-12-26

## 2024-12-26 NOTE — PROGRESS NOTES
Follow Up Office Visit      Date: 2024   Patient Name: Veronica Goldsmith  : 1975   MRN: [id number]     Chief Complaint:    Chief Complaint   Patient presents with    Fever     Cough, sore throat       History of Present Illness: Veronica Goldsmith is a 49 y.o. female who is here today for assessment of cough, congestion with fever.    History of Present Illness  The patient is a 49-year-old female who presents with symptoms of being sick.    She began experiencing symptoms on Tuesday afternoon, initially manifesting as fatigue and flushing around 3:00 PM. Despite these symptoms, she continued her activities, including a CAT scan at the hospital and shopping. Upon returning home, she experienced a significant increase in temperature to 102.7 degrees, accompanied by a cough. She also reported congestion, drainage, sore throat, and generalized body aches, particularly in her legs. She has not experienced any nausea or vomiting but has had headaches. Her , who has not been tested for COVID-19, exhibited similar symptoms two days prior to her onset. She describes a sensation of heaviness in her eyelids and facial pressure, likening it to feeling like her face might explode. She has been monitoring her oxygen levels at home, which have remained stable at 97, and reports no respiratory distress. However, she finds the drainage, cough, and rhinorrhea particularly bothersome. She also reported diarrhea, which she attributes to the saline from her CAT scan. She was scheduled for a follow-up appointment tomorrow but has postponed it due to her illness. She recently completed a course of prednisone, which was prescribed last week. She is scheduled to return to work on Monday. She has been managing her symptoms with Tylenol and Mucinex.    MEDICATIONS  Current: Tylenol, Mucinex, prednisone     Patient appears to be doing otherwise well.  They have continue with their medications without any side effects.  They  have not had any changes in their usual activity, appetite and sleep.  Patient denies any other cardiovascular, respiratory, gastrointestinal, urologic or neurologic complaints.    Subjective      Review of Systems:   Review of Systems   Constitutional:  Positive for chills, fatigue and fever. Negative for activity change and appetite change.   HENT:  Positive for congestion, postnasal drip and sore throat.    Respiratory:  Positive for cough. Negative for chest tightness, shortness of breath and wheezing.    Cardiovascular:  Negative for chest pain, palpitations and leg swelling.   Gastrointestinal:  Negative for abdominal distention, abdominal pain, blood in stool, constipation, diarrhea, nausea, vomiting, GERD and indigestion.   Genitourinary:  Negative for difficulty urinating, dysuria, flank pain, frequency, hematuria and urgency.   Musculoskeletal:  Positive for arthralgias and myalgias. Negative for back pain, gait problem and joint swelling.   Neurological:  Positive for weakness and headache. Negative for dizziness, tremors, seizures, syncope, light-headedness, numbness and memory problem.   Psychiatric/Behavioral:  Negative for sleep disturbance and depressed mood. The patient is not nervous/anxious.        I have reviewed the patients family history, social history, past medical history, past surgical history and have updated it as appropriate.     Medications:     Current Outpatient Medications:     albuterol sulfate  (90 Base) MCG/ACT inhaler, INHALE 3 PUFFS BY MOUTH EVERY 6 HOURS AS NEEDED, Disp: 8.5 g, Rfl: 12    aspirin 81 MG EC tablet, Take 1 tablet by mouth Daily., Disp: , Rfl:     esomeprazole (nexIUM) 40 MG capsule, Take 1 capsule by mouth 2 (Two) Times a Day., Disp: 180 capsule, Rfl: 1    estradiol (ESTRACE VAGINAL) 0.1 MG/GM vaginal cream, Insert 1 g into the vagina Daily. Insert 1 g into the vagina daily for 2 weeks, then decrease to insert 1 g into vagina 2 times per week, Disp: 42.5  "g, Rfl: 1    hydroCHLOROthiazide 12.5 MG tablet, Daily., Disp: , Rfl:     losartan (COZAAR) 50 MG tablet, Take 1 tablet by mouth Daily., Disp: 90 tablet, Rfl: 3    ondansetron (ZOFRAN) 8 MG tablet, Take 1 tablet by mouth Every 8 (Eight) Hours As Needed for Nausea or Vomiting., Disp: 20 tablet, Rfl: 0    rosuvastatin (CRESTOR) 40 MG tablet, Take 1 tablet by mouth Daily., Disp: 90 tablet, Rfl: 3    doxycycline (VIBRAMYCIN) 100 MG capsule, Take 1 capsule by mouth 2 (Two) Times a Day., Disp: 20 capsule, Rfl: 0    predniSONE (DELTASONE) 10 MG tablet, Take 6 tablets by mouth Daily., Disp: 18 tablet, Rfl: 0    Allergies:   Allergies   Allergen Reactions    Sulfa Antibiotics Unknown - High Severity     high fever - pt states 106F    Morphine Other (See Comments)     drop in O2 sats; needed oxygent; pt states she has to be reminded to breathe;        Immunizations:   Immunization History   Administered Date(s) Administered    Covid-19 (Pfizer) Gray Cap Monovalent 05/24/2022, 06/20/2022    Flu Vaccine Quad PF >36MO 02/08/2019    Flu Vaccine Split Quad 01/09/2019    Flublok 18+yrs 01/09/2019    Fluzone (or Fluarix & Flulaval for VFC) >6mos 10/04/2022    Hepatitis A 01/09/2019    Influenza, Unspecified 02/08/2019, 10/10/2023    MMR 02/08/2019    Pneumococcal Conjugate 20-Valent (PCV20) 12/19/2024    Pneumococcal Polysaccharide (PPSV23) 10/07/2022, 11/16/2023    Tdap 02/08/2019        Objective     Physical Exam: Please see above  Vital Signs:   Vitals:    12/26/24 1311   BP: 104/78   BP Location: Left arm   Patient Position: Sitting   Cuff Size: Adult   Pulse: 90   Temp: 97.8 °F (36.6 °C)   TempSrc: Temporal   SpO2: 97%   Weight: 87.1 kg (192 lb)   Height: 165.1 cm (65\")     Body mass index is 31.95 kg/m².          Physical Exam  Vitals and nursing note reviewed.   Constitutional:       Appearance: Normal appearance.   HENT:      Head: Normocephalic and atraumatic.      Nose: Nose normal.      Mouth/Throat:      Pharynx: " Oropharynx is clear.   Eyes:      Extraocular Movements: Extraocular movements intact.      Pupils: Pupils are equal, round, and reactive to light.   Neck:      Thyroid: No thyroid mass or thyromegaly.      Trachea: Trachea normal.   Cardiovascular:      Rate and Rhythm: Normal rate and regular rhythm.      Pulses: Normal pulses. No decreased pulses.      Heart sounds: Normal heart sounds.   Pulmonary:      Effort: Pulmonary effort is normal.      Breath sounds: Normal breath sounds.   Abdominal:      General: Abdomen is flat. Bowel sounds are normal.      Palpations: Abdomen is soft.      Tenderness: There is no abdominal tenderness.   Musculoskeletal:      Cervical back: Neck supple.      Right lower leg: No edema.      Left lower leg: No edema.   Lymphadenopathy:      Cervical: No cervical adenopathy.   Skin:     General: Skin is warm and dry.   Neurological:      General: No focal deficit present.      Mental Status: She is alert and oriented to person, place, and time.      Sensory: Sensation is intact.      Motor: Motor function is intact.      Coordination: Coordination is intact.   Psychiatric:         Attention and Perception: Attention normal.         Mood and Affect: Mood normal.         Speech: Speech normal.         Behavior: Behavior normal.         Procedures    Results:   Labs:   Hemoglobin A1C   Date Value Ref Range Status   12/24/2024 6.00 (H) 4.80 - 5.60 % Final     TSH   Date Value Ref Range Status   12/24/2024 1.450 0.270 - 4.200 uIU/mL Final        POCT Results (if applicable):   Results for orders placed or performed in visit on 12/26/24   Covid-19 + Flu A&B AG, Veritor    Collection Time: 12/26/24  1:22 PM    Specimen: Swab   Result Value Ref Range    SARS Antigen Detected (A) Not Detected, Presumptive Negative    Influenza A Antigen JADE Not Detected Not Detected    Influenza B Antigen JADE Not Detected Not Detected    Internal Control Passed Passed    Lot Number [id number]     Expiration Date  10,295,573    POC Rapid Strep A    Collection Time: 12/26/24  1:22 PM    Specimen: Swab   Result Value Ref Range    Rapid Strep A Screen Negative Negative, VALID, INVALID, Not Performed    Internal Control Passed Passed    Lot Number [id number]     Expiration Date 1,032,027        Imaging:   No valid procedures specified.     Measures:   Advanced Care Planning:   Patient does not have an advance directive, information provided.    Smoking Cessation:   less than 3 minutes spent counseling Will try to cut down    Assessment / Plan      Assessment/Plan:   Diagnoses and all orders for this visit:    1. Fever, unspecified fever cause (Primary)  Patient was negative for strep as well as flu but was positive for COVID.  -     Covid-19 + Flu A&B AG, Veritor  -     POC Rapid Strep A    2. COVID  Patient tested positive for COVID.  She shows no signs of dehydration and has not had any problems with hypoxia.  We have discussed that she needs to continue to push fluids and remain active and watch closely her oxygen level.  We will try a course of prednisone as well as doxycycline as she does continue to have some symptomatology from last week.  She has an effusion in her right ear.  We will monitor her symptoms and if she becomes hypoxic or has worsening symptoms she is to contact us immediately for evaluation including probable chest x-ray and laboratory data.  She is also going to have her  tested for COVID.  -     doxycycline (VIBRAMYCIN) 100 MG capsule; Take 1 capsule by mouth 2 (Two) Times a Day.  Dispense: 20 capsule; Refill: 0  -     predniSONE (DELTASONE) 10 MG tablet; Take 6 tablets by mouth Daily.  Dispense: 18 tablet; Refill: 0        Follow Up:   Return in about 2 weeks (around 1/9/2025), or if symptoms worsen or fail to improve.      At [facility], we believe that sharing information builds trust and better relationships. You are receiving this note because you recently visited [facility]. It is  possible you will see health information before a provider has talked with you about it. This kind of information can be easy to misunderstand. To help you fully understand what it means for your health, we urge you to discuss this note with your provider.    Greg Levine MD  [facility]    Patient or patient representative verbalized consent for the use of Ambient Listening during the visit with  Greg Levine MD for chart documentation. 12/26/2024  13:25 EST

## 2024-12-30 RX ORDER — ALBUTEROL SULFATE 90 UG/1
2 INHALANT RESPIRATORY (INHALATION) EVERY 6 HOURS PRN
Qty: 18 G | Refills: 12 | Status: SHIPPED | OUTPATIENT
Start: 2024-12-30

## 2024-12-30 NOTE — TELEPHONE ENCOUNTER
Caller: Veronica Goldsmith    Relationship: Self    Best call back number: [phone]     Requested Prescriptions:   Requested Prescriptions     Pending Prescriptions Disp Refills    albuterol sulfate  (90 Base) MCG/ACT inhaler 8.5 g 12    CLOTRIMAZOLE / BETAMETHASONE CREAM     Pharm[place] where request should be sent: [facility] [place] - [address] - [phone] [facility] [phone] FX     Last office visit with prescribing clinician: 12/26/2024   Last telemedicine visit with prescribing clinician: Visit date not found   Next office visit with prescribing clinician: Visit date not found

## 2025-01-22 ENCOUNTER — OFFICE VISIT (OUTPATIENT)
Dept: UROLOGY | Facility: CLINIC | Age: 50
End: 2025-01-22
Payer: COMMERCIAL

## 2025-01-22 VITALS
SYSTOLIC BLOOD PRESSURE: 128 MMHG | HEART RATE: 75 BPM | WEIGHT: 192 LBS | DIASTOLIC BLOOD PRESSURE: 82 MMHG | BODY MASS INDEX: 31.99 KG/M2 | HEIGHT: 65 IN | OXYGEN SATURATION: 95 %

## 2025-01-22 DIAGNOSIS — N18.31 STAGE 3A CHRONIC KIDNEY DISEASE: ICD-10-CM

## 2025-01-22 DIAGNOSIS — K59.00 CONSTIPATION, UNSPECIFIED CONSTIPATION TYPE: ICD-10-CM

## 2025-01-22 DIAGNOSIS — C64.1 RENAL CELL CARCINOMA OF RIGHT KIDNEY: Primary | ICD-10-CM

## 2025-01-22 DIAGNOSIS — Z72.0 TOBACCO ABUSE: ICD-10-CM

## 2025-01-22 RX ORDER — CLOTRIMAZOLE AND BETAMETHASONE DIPROPIONATE 10; .64 MG/G; MG/G
CREAM TOPICAL
COMMUNITY
Start: 2024-12-26

## 2025-01-22 RX ORDER — SODIUM CHLORIDE 9 MG/ML
500 INJECTION, SOLUTION INTRAVENOUS ONCE
Status: SHIPPED | OUTPATIENT
Start: 2025-01-22

## 2025-01-22 NOTE — PROGRESS NOTES
Office Visit General Established Female Patient     Patient Name: Veronica Goldsmith  : 1975   MRN: [id number]     Chief Complaint:   Chief Complaint   Patient presents with    Follow-up     Lab and scan results  Still has some RLQ pain a couple of times per week       Referring Provider: No ref. provider found    History of Present Illness: Veronica Goldsmith is a 49 y.o. female with history of pT1a grade 1 clear cell renal cell carcinoma s/p laparoscopic right nephrectomy 1 year ago in 2023.      She is doing well aside from mild persistent right lower quadrant pain. She admits to having chronic constipation alternating with diarrhea.  She is drinking large volumes of water up to 1 gallon per day per her nephrologist's recommendation.      She continues to smoke and is not interested in quitting at this time.      Subjective      Review of System: ROS reviewed by me and is negative unless otherwise noted in HPI.      Past Medical History:   Past Medical History:   Diagnosis Date    Acne rosacea, erythematous telangiectatic type     Acute bronchitis     history of    Acute sinusitis     Acute upper respiratory infection     hisotry of    Allergic contact dermatitis     Allergic rhinitis     Anxiety and depression     Asthma     Back pain, chronic     Benign paroxysmal positional vertigo     Blood clots in stool     Candidal dermatitis     Candidiasis of vulva and vagina     Cellulitis     AND ABSCESS, right lower abdomen    Cervical pain     Conjunctivitis     Contusion of ear     Corneal abrasion     Depression     Diabetes mellitus     type 2 - A1C better after weight loss with use of Semiglutide    Dyspnea, unspecified     Fibromyalgia     Flu vaccine need     Foot pain, right     GERD (gastroesophageal reflux disease)     Hand pain, left     Hearing loss, right     Heart palpitations     Hemorrhoids, external     Herpes simplex without complication     Herpes zoster lesion     Hypertension      Impetigo herpetiformis     Injury to tibial blood vessels, unspecified laterality, initial encounter     Kidney cyst     Right kidney    Kidney stones     Left humeral fracture     Low back pain     Lymphadenopathy     Migraine headache     Mixed hyperlipidemia     TAM (nonalcoholic steatohepatitis)     Neuropathy     Oral candidiasis     Plantar fasciitis     Recent weight loss     60lbs with use of Ozempic - per pt 12/6/23    Salpingitis and oophoritis, unspecified     not specified as acute, subacute, or chronic    Scabies     Seasonal allergies     Stress     Swelling of limb     Symptoms involving abdomen and pelvis     OTHER SYMPTOMS INVOLVING ABDOMEN AND PELVIS, ABDOMINAL OR PELVIC SWELLING, MASS, OR LUMP, OTHER SPECIFIED S    Tattoo     Tendon pain     Tobacco use disorder     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     Urinary tract infection     Vaginitis and vulvovaginitis     Vaginitis, atrophic        Past Surgical History:   Past Surgical History:   Procedure Laterality Date    BILATERAL OOPHORECTOMY      CARPAL TUNNEL RELEASE Bilateral     CHOLECYSTECTOMY      COLONOSCOPY  03/30/2018    HUMERUS FRACTURE SURGERY Left     has implants - plate, screws    LIPOMA EXCISION      low back    NEPHRECTOMY Right 12/20/2023    Procedure: NEPHRECTOMY LAPAROSCOPIC HAND ASSISTED;  Surgeon: Aaron Mart MD;  Location: [facility];  Service: Urology;  Laterality: Right;    NEPHRECTOMY RADICAL Right     ROTATOR CUFF REPAIR Right     TOTAL ABDOMINAL HYSTERECTOMY      TUBAL ABDOMINAL LIGATION      URETEROSCOPY LASER LITHOTRIPSY WITH STENT INSERTION Right 03/02/2023    Procedure: URETEROSCOPY LASER LITHOTRIPSY WITH STENT INSERTION;  Surgeon: Aaron Mart MD;  Location: [facility] OR;  Service: Urology;  Laterality: Right;       Family History:   Family History   Problem Relation Age of Onset    Diabetes Mother     Esophageal cancer Father     Hypertension Father     Cancer Father     Cancer Maternal  Grandmother     Diabetes Maternal Grandmother     Cancer Paternal Grandmother     Cancer Paternal Grandfather     Stroke Other     Cervical cancer Other     Diabetes Other     Esophageal cancer Other     Hypertension Other     Lung cancer Other     Pancreatic cancer Other     Skin cancer Other     Breast cancer Neg Hx        Social History:   Social History     Socioeconomic History    Marital status:    Tobacco Use    Smoking status: Every Day     Current packs/day: 1.00     Average packs/day: 1 pack/day for 32.1 years (32.1 ttl pk-yrs)     Types: Cigarettes     Start date: 1993     Passive exposure: Current    Smokeless tobacco: Never   Vaping Use    Vaping status: Never Used   Substance and Sexual Activity    Alcohol use: Not Currently    Drug use: Never    Sexual activity: Yes     Partners: Male       Medications:     Current Outpatient Medications:     albuterol sulfate  (90 Base) MCG/ACT inhaler, Inhale 2 puffs Every 6 (Six) Hours As Needed for Wheezing., Disp: 18 g, Rfl: 12    aspirin 81 MG EC tablet, Take 1 tablet by mouth Daily., Disp: , Rfl:     clotrimazole-betamethasone (LOTRISONE) 1-0.05 % cream, , Disp: , Rfl:     esomeprazole (nexIUM) 40 MG capsule, Take 1 capsule by mouth 2 (Two) Times a Day., Disp: 180 capsule, Rfl: 1    hydroCHLOROthiazide 12.5 MG tablet, Daily., Disp: , Rfl:     losartan (COZAAR) 50 MG tablet, Take 1 tablet by mouth Daily., Disp: 90 tablet, Rfl: 3    ondansetron (ZOFRAN) 8 MG tablet, Take 1 tablet by mouth Every 8 (Eight) Hours As Needed for Nausea or Vomiting., Disp: 20 tablet, Rfl: 0    rosuvastatin (CRESTOR) 40 MG tablet, Take 1 tablet by mouth Daily., Disp: 90 tablet, Rfl: 3    estradiol (ESTRACE VAGINAL) 0.1 MG/GM vaginal cream, Insert 1 g into the vagina Daily. Insert 1 g into the vagina daily for 2 weeks, then decrease to insert 1 g into vagina 2 times per week (Patient not taking: Reported on 1/22/2025), Disp: 42.5 g, Rfl: 1    Current Facility-Administered  "Medications:     sodium chloride 0.9 % infusion, 500 mL/hr, Intravenous, Once, Socorro Machuca N, APRN    sodium chloride 0.9 % infusion, 500 mL/hr, Intravenous, Once, Socorro Machuca N, APRN    Allergies:   Allergies   Allergen Reactions    Sulfa Antibiotics Unknown - High Severity     high fever - pt states 106F    Morphine Other (See Comments)     drop in O2 sats; needed oxygent; pt states she has to be reminded to breathe;        Objective     Physical Exam:   Vital Signs:   Visit Vitals  /82   Pulse 75   Ht 165.1 cm (65\")   Wt 87.1 kg (192 lb)   LMP  (LMP Unknown)   SpO2 95%   Breastfeeding No   BMI 31.95 kg/m²      Body mass index is 31.95 kg/m².   Physical Exam  Vitals and nursing note reviewed.   Constitutional:       General: She is not in acute distress.     Appearance: Normal appearance. She is obese. She is not ill-appearing.   HENT:      Head: Normocephalic and atraumatic.   Pulmonary:      Effort: Pulmonary effort is normal.   Skin:     General: Skin is warm and dry.   Neurological:      General: No focal deficit present.      Mental Status: She is alert and oriented to person, place, and time.   Psychiatric:         Mood and Affect: Mood normal.         Behavior: Behavior normal.        Labs  Brief Urine Lab Results  (Last result in the past 365 days)        Color   Clarity   Blood   Leuk Est   Nitrite   Protein   CREAT   Urine HCG        12/09/24 1350 Yellow   Clear   Negative   Trace   Negative   Trace                 Lab Results   Component Value Date    GLUCOSE 86 12/24/2024    CALCIUM 9.2 12/24/2024     12/24/2024    K 3.9 12/24/2024    CO2 26.7 12/24/2024     12/24/2024    BUN 23 (H) 12/24/2024    CREATININE 1.37 (H) 12/24/2024    BCR 16.8 12/24/2024    ANIONGAP 12.3 12/24/2024       Lab Results   Component Value Date    WBC 11.92 (H) 12/24/2024    HGB 16.0 (H) 12/24/2024    HCT 47.5 (H) 12/24/2024    MCV 96.7 12/24/2024     12/24/2024            Radiographic Studies  CT " Abdomen Pelvis With Contrast    Result Date: 12/24/2024  Stable postoperative changes.  No evidence of disease progression or recurrence.  No evidence of metastatic disease to the chest.  This study was performed with techniques to keep radiation doses as low as reasonably achievable (ALARA). Individualized dose reduction techniques using automated exposure control or adjustment of mA and/or kV according to the patient size were employed.    CTDI: 9.99 mGy DLP: 665.73 mGy.cm   Images were reviewed, interpreted, and dictated by VIVI Campos Transcribed by Concepcion Jurado PA-C.  This report was signed and finalized on 12/24/2024 10:07 AM by VIVI Lopez.      CT Chest With Contrast Diagnostic    Result Date: 12/24/2024  Stable postoperative changes.  No evidence of disease progression or recurrence.  No evidence of metastatic disease to the chest.  This study was performed with techniques to keep radiation doses as low as reasonably achievable (ALARA). Individualized dose reduction techniques using automated exposure control or adjustment of mA and/or kV according to the patient size were employed.    CTDI: 9.99 mGy DLP: 665.73 mGy.cm   Images were reviewed, interpreted, and dictated by VIVI Campos Transcribed by Concepcion Jurado PA-C.  This report was signed and finalized on 12/24/2024 10:07 AM by VIVI Lopez.      CT Abdomen Pelvis Without Contrast    Result Date: 10/8/2024  IMPRESSION: Resolution of the abscess in the right renal fossa. There are nonobstructing left renal calculi. Authenticated and Electronically Signed by Joseph Ornelas on 10/08/2024 07:10:43 PM      I have reviewed the above labs and imaging.     Assessment / Plan      Assessment/Plan: Veronica Goldsmith is a very pleasant 49 y.o. female with history of pT1a grade 1 clear cell renal cell carcinoma s/p laparoscopic right nephrectomy 1 year ago in December of 2023.      She is doing well aside from mild  persistent right lower quadrant pain. She admits to having chronic constipation alternating with diarrhea.  She is drinking large volumes of water up to 1 gallon per day per her nephrologist's recommendation.      She continues to smoke and is not interested in quitting at this time.      Plan today:   1) repeat CT imaging in 6 months if clear of metastatic disease can decrease imaging to once annually for a total of 5 years.  Pre and post hydration ordered.    2) Repeat BMP in 2 weeks not fasting.  We discussed that it appears that she was acutely dehydrated at the time her labs were drawn.  If improved and stable will recheck with next CT imaging in 6 months then can go to annual BMP after.    3) Constipation:  continue drinking plenty of fluids as directed by nephrologist.  Start fiber supplement and stool softener. May need referral to GI if constipation is not improved.    4) tobacco abuse:  Encouraged smoking cessation.  She is not interested in quitting at this time.    5) follow up in 6 months with [facility] in [place].     Diagnoses and all orders for this visit:    1. Renal cell carcinoma of right kidney (Primary)  -     Notify Provider if Patient Taking Diuretics or Nephrotoxic Drugs; Standing  -     sodium chloride 0.9 % infusion  -     sodium chloride 0.9 % infusion  -     CT chest w contrast; Future  -     CT Abdomen Pelvis With Contrast; Future  -     Basic Metabolic Panel; Future  -     Notify Provider if Patient Taking Diuretics or Nephrotoxic Drugs    2. Constipation, unspecified constipation type  Comments:  start stool softener and fiber supplement daily.  If constipation persists I would like for her to discuss with Dr. Levine.    3. Stage 3a chronic kidney disease  Comments:  Think she was acutely dehydrated due to fasting. Repeat in 2 weeks not fasting.  Orders:  -     Basic Metabolic Panel; Future    4. Tobacco abuse       Follow Up:   Return in about 6 months (around 7/22/2025) for  Next scheduled follow up with [facility] in [place] to review CT imaging and BMP.  .    Socorro Machuca APRN, MSN, FNP-C  [facility]

## 2025-01-28 ENCOUNTER — LAB (OUTPATIENT)
Dept: FAMILY MEDICINE CLINIC | Facility: CLINIC | Age: 50
End: 2025-01-28
Payer: COMMERCIAL

## 2025-01-28 DIAGNOSIS — N18.31 STAGE 3A CHRONIC KIDNEY DISEASE: ICD-10-CM

## 2025-01-28 DIAGNOSIS — C64.1 RENAL CELL CARCINOMA OF RIGHT KIDNEY: ICD-10-CM

## 2025-01-28 PROCEDURE — 36415 COLL VENOUS BLD VENIPUNCTURE: CPT | Performed by: FAMILY MEDICINE

## 2025-01-28 PROCEDURE — 80048 BASIC METABOLIC PNL TOTAL CA: CPT | Performed by: NURSE PRACTITIONER

## 2025-01-30 LAB
BUN SERPL-MCNC: 13 MG/DL (ref 6–24)
BUN/CREAT SERPL: 10 (ref 9–23)
CALCIUM SERPL-MCNC: 9.3 MG/DL (ref 8.7–10.2)
CHLORIDE SERPL-SCNC: 104 MMOL/L (ref 96–106)
CO2 SERPL-SCNC: 24 MMOL/L (ref 20–29)
CREAT SERPL-MCNC: 1.26 MG/DL (ref 0.57–1)
EGFRCR SERPLBLD CKD-EPI 2021: 53 ML/MIN/1.73
GLUCOSE SERPL-MCNC: 150 MG/DL (ref 70–99)
POTASSIUM SERPL-SCNC: 4.5 MMOL/L (ref 3.5–5.2)
SODIUM SERPL-SCNC: 143 MMOL/L (ref 134–144)

## 2025-02-07 DIAGNOSIS — I10 PRIMARY HYPERTENSION: ICD-10-CM

## 2025-02-07 RX ORDER — LOSARTAN POTASSIUM 50 MG/1
50 TABLET ORAL DAILY
Qty: 90 TABLET | Refills: 3 | Status: SHIPPED | OUTPATIENT
Start: 2025-02-07

## 2025-02-07 RX ORDER — CLOTRIMAZOLE AND BETAMETHASONE DIPROPIONATE 10; .64 MG/G; MG/G
CREAM TOPICAL 2 TIMES DAILY
Qty: 45 G | Refills: 0 | Status: SHIPPED | OUTPATIENT
Start: 2025-02-07

## 2025-02-07 RX ORDER — ALBUTEROL SULFATE 90 UG/1
2 INHALANT RESPIRATORY (INHALATION) EVERY 6 HOURS PRN
Qty: 18 G | Refills: 12 | Status: SHIPPED | OUTPATIENT
Start: 2025-02-07

## 2025-02-07 NOTE — TELEPHONE ENCOUNTER
Caller: Veronica Goldsmith    Relationship: Self    Best call back number: [phone]    Requested Prescriptions:   Requested Prescriptions     Pending Prescriptions Disp Refills    losartan (COZAAR) 50 MG tablet 90 tablet 3     Sig: Take 1 tablet by mouth Daily.    albuterol sulfate  (90 Base) MCG/ACT inhaler 18 g 12     Sig: Inhale 2 puffs Every 6 (Six) Hours As Needed for Wheezing.    clotrimazole-betamethasone (LOTRISONE) 1-0.05 % cream          Pharmacy where request should be sent: [facility] [place] - [place] - [address] [phone] [facility] [phone]      Last office visit with prescribing clinician: 12/26/2024   Last telemedicine visit with prescribing clinician: Visit date not found   Next office visit with prescribing clinician: Visit date not found     Additional details provided by patient: PATIENT IS OUT OF MEDICATIONS    Does the patient have less than a 3 day supply:  [x] Yes  [] No      Larry Mjeia Rep   02/07/25 08:13 EST

## 2025-02-12 ENCOUNTER — OFFICE VISIT (OUTPATIENT)
Dept: FAMILY MEDICINE CLINIC | Facility: CLINIC | Age: 50
End: 2025-02-12
Payer: COMMERCIAL

## 2025-02-12 VITALS
SYSTOLIC BLOOD PRESSURE: 126 MMHG | HEIGHT: 65 IN | DIASTOLIC BLOOD PRESSURE: 84 MMHG | BODY MASS INDEX: 32.55 KG/M2 | TEMPERATURE: 97.9 F | HEART RATE: 72 BPM | WEIGHT: 195.4 LBS | OXYGEN SATURATION: 97 %

## 2025-02-12 DIAGNOSIS — B37.31 VAGINAL YEAST INFECTION: ICD-10-CM

## 2025-02-12 DIAGNOSIS — J45.901 EXACERBATION OF ASTHMA, UNSPECIFIED ASTHMA SEVERITY, UNSPECIFIED WHETHER PERSISTENT: Primary | ICD-10-CM

## 2025-02-12 PROCEDURE — 99213 OFFICE O/P EST LOW 20 MIN: CPT | Performed by: NURSE PRACTITIONER

## 2025-02-12 RX ORDER — FLUCONAZOLE 150 MG/1
150 TABLET ORAL ONCE
Qty: 1 TABLET | Refills: 0 | Status: SHIPPED | OUTPATIENT
Start: 2025-02-12 | End: 2025-02-12

## 2025-02-12 RX ORDER — METHYLPREDNISOLONE 4 MG/1
TABLET ORAL
Qty: 21 TABLET | Refills: 0 | Status: SHIPPED | OUTPATIENT
Start: 2025-02-12 | End: 2025-02-17

## 2025-02-12 NOTE — PROGRESS NOTES
Office Note     Name: Veronica Goldsmith    : 1975     MRN: [id number]     Chief Complaint  Cough (Had covid 2 or 3 weeks ago. Can not get rid of cough.) and Hearing Problem (Ear clogged. Hard time hearing. )    Subjective     History of Present Illness:  Veronica Goldsmith is a 49 y.o. female who presents today for persistent upper respiratory symptoms since having COVID.   History of Present Illness  The patient is a 49-year-old female who presents for evaluation of cough, wheezing, sinus pressure, and right ear hearing loss.    She contracted COVID-19 approximately 2 to 3 weeks ago, which was accompanied by severe coughing episodes that led to the rupture of her right eardrum. This is the fifth occurrence of such an incident. She reports total hearing loss in the affected ear, with no associated pain. She describes a sensation of fluid running in her ear and perceives sounds as if they are emanating from a barrel. She also reports clear postnasal drainage, but no nosebleeds or sore throat. She experiences mild eye discharge in the mornings. She has been experiencing facial swelling and increased sneezing. She reports no fever, sweats, chills, nausea, vomiting, diarrhea, headaches, lightheadedness, dizziness, body aches, or skin changes.    She has been experiencing sinus pressure, particularly on the right side, since her COVID-19 infection. She has been experiencing facial swelling and increased sneezing.    She has a history of asthma and uses a rescue inhaler, which provides temporary relief. She reports wheezing and a cough. She administered her inhaler approximately an hour prior to this visit.    She is diabetic and does not regularly monitor her blood sugar levels, but her last A1c reading was 5.3.    ALLERGIES  The patient has no known allergies.    MEDICATIONS  Current: rescue inhaler  Past: doxycycline    Review of Systems:   Review of Systems   Constitutional:  Negative for chills, diaphoresis and  fever.   HENT:  Positive for congestion, ear discharge, hearing loss, postnasal drip, rhinorrhea and sinus pressure. Negative for ear pain, nosebleeds and sore throat.    Eyes:  Negative for discharge.   Respiratory:  Positive for cough and wheezing. Negative for shortness of breath.    Gastrointestinal:  Negative for diarrhea, nausea and vomiting.   Musculoskeletal:  Negative for myalgias.   Skin: Negative.    Neurological:  Negative for dizziness, light-headedness and headache.       Past Medical History:   Past Medical History:   Diagnosis Date    Acne rosacea, erythematous telangiectatic type     Acute bronchitis     history of    Acute sinusitis     Acute upper respiratory infection     hisotry of    Allergic contact dermatitis     Allergic rhinitis     Anxiety and depression     Asthma     Back pain, chronic     Benign paroxysmal positional vertigo     Blood clots in stool     Candidal dermatitis     Candidiasis of vulva and vagina     Cellulitis     AND ABSCESS, right lower abdomen    Cervical pain     Conjunctivitis     Contusion of ear     Corneal abrasion     Depression     Diabetes mellitus     type 2 - A1C better after weight loss with use of Semiglutide    Dyspnea, unspecified     Fibromyalgia     Flu vaccine need     Foot pain, right     GERD (gastroesophageal reflux disease)     Hand pain, left     Hearing loss, right     Heart palpitations     Hemorrhoids, external     Herpes simplex without complication     Herpes zoster lesion     Hypertension     Impetigo herpetiformis     Injury to tibial blood vessels, unspecified laterality, initial encounter     Kidney cyst     Right kidney    Kidney stones     Left humeral fracture     Low back pain     Lymphadenopathy     Migraine headache     Mixed hyperlipidemia     TAM (nonalcoholic steatohepatitis)     Neuropathy     Oral candidiasis     Plantar fasciitis     Recent weight loss     60lbs with use of Ozempic - per pt 12/6/23    Salpingitis and  oophoritis, unspecified     not specified as acute, subacute, or chronic    Scabies     Seasonal allergies     Stress     Swelling of limb     Symptoms involving abdomen and pelvis     OTHER SYMPTOMS INVOLVING ABDOMEN AND PELVIS, ABDOMINAL OR PELVIC SWELLING, MASS, OR LUMP, OTHER SPECIFIED S    Tattoo     Tendon pain     Tobacco use disorder     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     Urinary tract infection     Vaginitis and vulvovaginitis     Vaginitis, atrophic        Past Surgical History:   Past Surgical History:   Procedure Laterality Date    BILATERAL OOPHORECTOMY      CARPAL TUNNEL RELEASE Bilateral     CHOLECYSTECTOMY      COLONOSCOPY  03/30/2018    HUMERUS FRACTURE SURGERY Left     has implants - plate, screws    LIPOMA EXCISION      low back    NEPHRECTOMY Right 12/20/2023    Procedure: NEPHRECTOMY LAPAROSCOPIC HAND ASSISTED;  Surgeon: Aaron Mart MD;  Location: [facility] OR;  Service: Urology;  Laterality: Right;    NEPHRECTOMY RADICAL Right     ROTATOR CUFF REPAIR Right     TOTAL ABDOMINAL HYSTERECTOMY      TUBAL ABDOMINAL LIGATION      URETEROSCOPY LASER LITHOTRIPSY WITH STENT INSERTION Right 03/02/2023    Procedure: URETEROSCOPY LASER LITHOTRIPSY WITH STENT INSERTION;  Surgeon: Aaron Mart MD;  Location: [facility] OR;  Service: Urology;  Laterality: Right;       Immunizations:   Immunization History   Administered Date(s) Administered    Covid-19 (Pfizer) Gray Cap Monovalent 05/24/2022, 06/20/2022    Flu Vaccine Quad PF >36MO 02/08/2019    Flu Vaccine Split Quad 01/09/2019    Flublok 18+yrs 01/09/2019    Fluzone (or Fluarix & Flulaval for VFC) >6mos 10/04/2022    Hepatitis A 01/09/2019    Influenza, Unspecified 02/08/2019, 10/10/2023    MMR 02/08/2019    Pneumococcal Conjugate 20-Valent (PCV20) 12/19/2024    Pneumococcal Polysaccharide (PPSV23) 10/07/2022, 11/16/2023    Tdap 02/08/2019        Medications:     Current Outpatient Medications:     albuterol sulfate  (90  Base) MCG/ACT inhaler, Inhale 2 puffs Every 6 (Six) Hours As Needed for Wheezing., Disp: 18 g, Rfl: 12    amoxicillin-clavulanate (AUGMENTIN) 875-125 MG per tablet, Take 1 tablet by mouth 2 (Two) Times a Day., Disp: 14 tablet, Rfl: 0    aspirin 81 MG EC tablet, Take 1 tablet by mouth Daily., Disp: , Rfl:     clotrimazole-betamethasone (LOTRISONE) 1-0.05 % cream, Apply  topically to the appropriate area as directed 2 (Two) Times a Day., Disp: 45 g, Rfl: 0    esomeprazole (nexIUM) 40 MG capsule, Take 1 capsule by mouth 2 (Two) Times a Day., Disp: 180 capsule, Rfl: 1    fluconazole (Diflucan) 150 MG tablet, Take 1 tablet by mouth 1 (One) Time for 1 dose., Disp: 1 tablet, Rfl: 0    hydroCHLOROthiazide 12.5 MG tablet, Daily., Disp: , Rfl:     losartan (COZAAR) 50 MG tablet, Take 1 tablet by mouth Daily., Disp: 90 tablet, Rfl: 3    methylPREDNISolone (MEDROL) 4 MG dose pack, Take as directed on package instructions., Disp: 21 tablet, Rfl: 0    ondansetron (ZOFRAN) 8 MG tablet, Take 1 tablet by mouth Every 8 (Eight) Hours As Needed for Nausea or Vomiting., Disp: 20 tablet, Rfl: 0    rosuvastatin (CRESTOR) 40 MG tablet, Take 1 tablet by mouth Daily., Disp: 90 tablet, Rfl: 3    Current Facility-Administered Medications:     sodium chloride 0.9 % infusion, 500 mL/hr, Intravenous, Once, Socorro Machuca N, APRN    sodium chloride 0.9 % infusion, 500 mL/hr, Intravenous, Once, Socorro Machuca N, APRN    Allergies:   Allergies   Allergen Reactions    Sulfa Antibiotics Unknown - High Severity     high fever - pt states 106F    Morphine Other (See Comments)     drop in O2 sats; needed oxygent; pt states she has to be reminded to breathe;        Family History:   Family History   Problem Relation Age of Onset    Diabetes Mother     Esophageal cancer Father     Hypertension Father     Cancer Father     Cancer Maternal Grandmother     Diabetes Maternal Grandmother     Cancer Paternal Grandmother     Cancer Paternal Grandfather     Stroke  "Other     Cervical cancer Other     Diabetes Other     Esophageal cancer Other     Hypertension Other     Lung cancer Other     Pancreatic cancer Other     Skin cancer Other     Breast cancer Neg Hx        Social History:   Social History     Socioeconomic History    Marital status:    Tobacco Use    Smoking status: Every Day     Current packs/day: 1.00     Average packs/day: 1 pack/day for 32.1 years (32.1 ttl pk-yrs)     Types: Cigarettes     Start date: 1993     Passive exposure: Current    Smokeless tobacco: Never   Vaping Use    Vaping status: Never Used   Substance and Sexual Activity    Alcohol use: Not Currently    Drug use: Never    Sexual activity: Yes     Partners: Male         Objective     Vital Signs  /84 (BP Location: Right arm, Patient Position: Sitting, Cuff Size: Large Adult)   Pulse 72   Temp 97.9 °F (36.6 °C) (Temporal)   Ht 165.1 cm (65\")   Wt 88.6 kg (195 lb 6.4 oz)   SpO2 97%   BMI 32.52 kg/m²   Estimated body mass index is 32.52 kg/m² as calculated from the following:    Height as of this encounter: 165.1 cm (65\").    Weight as of this encounter: 88.6 kg (195 lb 6.4 oz).            Physical Exam  Vitals and nursing note reviewed.   Constitutional:       General: She is not in acute distress.     Appearance: Normal appearance. She is not ill-appearing or toxic-appearing.   HENT:      Head: Normocephalic and atraumatic.      Right Ear: Tympanic membrane, ear canal and external ear normal.      Left Ear: Tympanic membrane, ear canal and external ear normal.      Nose: Nose normal.      Mouth/Throat:      Mouth: Mucous membranes are moist.      Pharynx: Oropharynx is clear.   Eyes:      General: No scleral icterus.        Right eye: No discharge.         Left eye: No discharge.      Conjunctiva/sclera: Conjunctivae normal.      Comments: No frontal sinus tenderness. Right maxillary sinus tenderness on palpation of the right sinus.    Cardiovascular:      Rate and Rhythm: Normal " rate and regular rhythm.   Pulmonary:      Breath sounds: Wheezing present.   Musculoskeletal:         General: Normal range of motion.      Cervical back: Normal range of motion and neck supple. No rigidity or tenderness.   Lymphadenopathy:      Cervical: No cervical adenopathy.   Skin:     General: Skin is warm.   Neurological:      General: No focal deficit present.      Mental Status: She is alert.   Psychiatric:         Mood and Affect: Mood normal.         Behavior: Behavior normal.         Thought Content: Thought content normal.         Judgment: Judgment normal.          Assessment and Plan     Procedures      Lab Results (last 72 hours)       ** No results found for the last 72 hours. **                  Diagnoses and all orders for this visit:    1. Exacerbation of asthma, unspecified asthma severity, unspecified whether persistent (Primary)  -     amoxicillin-clavulanate (AUGMENTIN) 875-125 MG per tablet; Take 1 tablet by mouth 2 (Two) Times a Day.  Dispense: 14 tablet; Refill: 0  -     methylPREDNISolone (MEDROL) 4 MG dose pack; Take as directed on package instructions.  Dispense: 21 tablet; Refill: 0    2. Vaginal yeast infection  -     fluconazole (Diflucan) 150 MG tablet; Take 1 tablet by mouth 1 (One) Time for 1 dose.  Dispense: 1 tablet; Refill: 0        Assessment & Plan  1. Right ear hearing loss.  The patient's right ear hearing loss is likely due to sinus pressure and inflammation following a recent COVID-19 infection. No current evidence of a ruptured eardrum. Augmentin 875 mg will be prescribed, to be taken every 12 hours for 7 days. She is advised to keep Benadryl on hand and discontinue Augmentin if any allergic symptoms arise. Diflucan 150 mg will also be prescribed due to her history of vaginal yeast infection post antibiotic therapy.     2. Sinusitis.  The patient reports sinus pressure, congestion, and clear nasal drainage. The sinus issues appear to be contributing to her ear symptoms.  Augmentin 875 mg will be prescribed, to be taken every 12 hours for 7 days. She is advised to keep Benadryl on hand and discontinue Augmentin if any allergic symptoms arise.     3. Asthma.  The patient has a history of asthma and is currently experiencing wheezing. A Medrol Dosepak (methylprednisolone) will be prescribed, to be taken over 6 days. She is advised to monitor her blood sugar levels while on the steroid due to her diabetes. She should continue using her rescue inhaler as needed.    4. Diabetes Mellitus.  The patient is diabetic with a recent A1c of 5.3. She is advised to monitor her blood sugar levels while on the steroid. An over-the-counter probiotic is recommended to mitigate potential side effects of antibiotics.      Follow Up  Return if symptoms worsen or fail to improve.    Patient or patient representative verbalized consent for the use of Ambient Listening during the visit with  SAULO Sarmiento for chart documentation. 2/12/2025  11:52 EST    SAULO Sarmiento [facility]  [address] [place] [address] [address] [place] [address] [phone]

## 2025-04-03 RX ORDER — CLOTRIMAZOLE AND BETAMETHASONE DIPROPIONATE 10; .64 MG/G; MG/G
CREAM TOPICAL 2 TIMES DAILY
Qty: 45 G | Refills: 0 | Status: SHIPPED | OUTPATIENT
Start: 2025-04-03

## 2025-04-30 DIAGNOSIS — I10 PRIMARY HYPERTENSION: ICD-10-CM

## 2025-04-30 RX ORDER — CLOTRIMAZOLE AND BETAMETHASONE DIPROPIONATE 10; .64 MG/G; MG/G
CREAM TOPICAL 2 TIMES DAILY
Qty: 45 G | Refills: 0 | Status: SHIPPED | OUTPATIENT
Start: 2025-04-30

## 2025-04-30 RX ORDER — ALBUTEROL SULFATE 90 UG/1
2 INHALANT RESPIRATORY (INHALATION) EVERY 6 HOURS PRN
Qty: 18 G | Refills: 12 | Status: SHIPPED | OUTPATIENT
Start: 2025-04-30

## 2025-04-30 RX ORDER — LOSARTAN POTASSIUM 50 MG/1
50 TABLET ORAL DAILY
Qty: 90 TABLET | Refills: 3 | Status: SHIPPED | OUTPATIENT
Start: 2025-04-30

## 2025-04-30 RX ORDER — ESOMEPRAZOLE MAGNESIUM 40 MG/1
40 CAPSULE, DELAYED RELEASE ORAL 2 TIMES DAILY
Qty: 180 CAPSULE | Refills: 3 | Status: SHIPPED | OUTPATIENT
Start: 2025-04-30

## 2025-04-30 RX ORDER — ROSUVASTATIN CALCIUM 40 MG/1
40 TABLET, COATED ORAL DAILY
Qty: 90 TABLET | Refills: 3 | Status: SHIPPED | OUTPATIENT
Start: 2025-04-30

## 2025-04-30 RX ORDER — NYSTATIN 100000 [USP'U]/G
POWDER TOPICAL 3 TIMES DAILY
Qty: 60 G | Refills: 11 | Status: SHIPPED | OUTPATIENT
Start: 2025-04-30

## 2025-05-14 ENCOUNTER — OFFICE VISIT (OUTPATIENT)
Dept: FAMILY MEDICINE CLINIC | Facility: CLINIC | Age: 50
End: 2025-05-14
Payer: COMMERCIAL

## 2025-05-14 ENCOUNTER — LAB (OUTPATIENT)
Dept: FAMILY MEDICINE CLINIC | Facility: CLINIC | Age: 50
End: 2025-05-14
Payer: COMMERCIAL

## 2025-05-14 VITALS
HEIGHT: 65 IN | HEART RATE: 80 BPM | WEIGHT: 200 LBS | RESPIRATION RATE: 18 BRPM | BODY MASS INDEX: 33.32 KG/M2 | TEMPERATURE: 98 F | DIASTOLIC BLOOD PRESSURE: 82 MMHG | SYSTOLIC BLOOD PRESSURE: 124 MMHG | OXYGEN SATURATION: 96 %

## 2025-05-14 DIAGNOSIS — E55.9 VITAMIN D DEFICIENCY: ICD-10-CM

## 2025-05-14 DIAGNOSIS — E78.2 MIXED HYPERLIPIDEMIA: ICD-10-CM

## 2025-05-14 DIAGNOSIS — N18.31 STAGE 3A CHRONIC KIDNEY DISEASE: ICD-10-CM

## 2025-05-14 DIAGNOSIS — E11.9 TYPE 2 DIABETES MELLITUS WITHOUT COMPLICATION, WITHOUT LONG-TERM CURRENT USE OF INSULIN: Primary | ICD-10-CM

## 2025-05-14 DIAGNOSIS — K75.81 METABOLIC DYSFUNCTION-ASSOCIATED STEATOHEPATITIS (MASH): ICD-10-CM

## 2025-05-14 DIAGNOSIS — J01.00 ACUTE NON-RECURRENT MAXILLARY SINUSITIS: ICD-10-CM

## 2025-05-14 DIAGNOSIS — J45.30 MILD PERSISTENT ASTHMA, UNSPECIFIED WHETHER COMPLICATED: ICD-10-CM

## 2025-05-14 DIAGNOSIS — I10 PRIMARY HYPERTENSION: ICD-10-CM

## 2025-05-14 DIAGNOSIS — F17.210 CIGARETTE NICOTINE DEPENDENCE WITHOUT COMPLICATION: ICD-10-CM

## 2025-05-14 DIAGNOSIS — K21.9 GASTROESOPHAGEAL REFLUX DISEASE WITHOUT ESOPHAGITIS: ICD-10-CM

## 2025-05-14 DIAGNOSIS — E11.9 TYPE 2 DIABETES MELLITUS WITHOUT COMPLICATION, WITHOUT LONG-TERM CURRENT USE OF INSULIN: ICD-10-CM

## 2025-05-14 LAB
25(OH)D3 SERPL-MCNC: 39.9 NG/ML (ref 30–100)
ALBUMIN SERPL-MCNC: 4.5 G/DL (ref 3.5–5.2)
ALBUMIN/GLOB SERPL: 1.4 G/DL
ALP SERPL-CCNC: 92 U/L (ref 39–117)
ALT SERPL W P-5'-P-CCNC: 26 U/L (ref 1–33)
ANION GAP SERPL CALCULATED.3IONS-SCNC: 9.8 MMOL/L (ref 5–15)
AST SERPL-CCNC: 25 U/L (ref 1–32)
BILIRUB SERPL-MCNC: 0.3 MG/DL (ref 0–1.2)
BUN SERPL-MCNC: 13 MG/DL (ref 6–20)
BUN/CREAT SERPL: 9.3 (ref 7–25)
CALCIUM SPEC-SCNC: 9.5 MG/DL (ref 8.6–10.5)
CHLORIDE SERPL-SCNC: 105 MMOL/L (ref 98–107)
CO2 SERPL-SCNC: 27.2 MMOL/L (ref 22–29)
CREAT SERPL-MCNC: 1.4 MG/DL (ref 0.57–1)
EGFRCR SERPLBLD CKD-EPI 2021: 46.8 ML/MIN/1.73
GLOBULIN UR ELPH-MCNC: 3.2 GM/DL
GLUCOSE SERPL-MCNC: 125 MG/DL (ref 65–99)
HBA1C MFR BLD: 6.3 % (ref 4.8–5.6)
MAGNESIUM SERPL-MCNC: 2.4 MG/DL (ref 1.6–2.6)
PHOSPHATE SERPL-MCNC: 2.6 MG/DL (ref 2.5–4.5)
POTASSIUM SERPL-SCNC: 4 MMOL/L (ref 3.5–5.2)
PROT SERPL-MCNC: 7.7 G/DL (ref 6–8.5)
SODIUM SERPL-SCNC: 142 MMOL/L (ref 136–145)

## 2025-05-14 PROCEDURE — 82306 VITAMIN D 25 HYDROXY: CPT | Performed by: FAMILY MEDICINE

## 2025-05-14 PROCEDURE — 82043 UR ALBUMIN QUANTITATIVE: CPT | Performed by: FAMILY MEDICINE

## 2025-05-14 PROCEDURE — 83970 ASSAY OF PARATHORMONE: CPT | Performed by: FAMILY MEDICINE

## 2025-05-14 PROCEDURE — 84100 ASSAY OF PHOSPHORUS: CPT | Performed by: FAMILY MEDICINE

## 2025-05-14 PROCEDURE — 80053 COMPREHEN METABOLIC PANEL: CPT | Performed by: FAMILY MEDICINE

## 2025-05-14 PROCEDURE — 83036 HEMOGLOBIN GLYCOSYLATED A1C: CPT | Performed by: FAMILY MEDICINE

## 2025-05-14 PROCEDURE — 82570 ASSAY OF URINE CREATININE: CPT | Performed by: FAMILY MEDICINE

## 2025-05-14 PROCEDURE — 83735 ASSAY OF MAGNESIUM: CPT | Performed by: FAMILY MEDICINE

## 2025-05-14 RX ORDER — SEMAGLUTIDE 1.34 MG/ML
1 INJECTION, SOLUTION SUBCUTANEOUS WEEKLY
Qty: 3 ML | Refills: 11 | Status: SHIPPED | OUTPATIENT
Start: 2025-05-14

## 2025-05-14 NOTE — PROGRESS NOTES
Follow Up Office Visit      Date: 2025   Patient Name: Veronica Goldsmith  : 1975   MRN: [id number]     Chief Complaint:    Chief Complaint   Patient presents with    Annual Exam       History of Present Illness: Veroncia Goldsmith is a 49 y.o. female who is here today for follow-up.    History of Present Illness  The patient is a 49-year-old female who presents for evaluation of diabetes, hypertension, asthma, sinusitis, and GERD. She is accompanied by her .    She reports satisfactory blood glucose levels. She has not had a recent consultation with her nephrologist. She discontinued Ozempic due to a nephrectomy and experienced adverse effects when attempting to resume the medication. Her insurance does not cover Ozempic as her A1c levels are within the normal range. She has gained approximately 29 pounds since discontinuing Ozempic. She has a 4-month supply of Ozempic 0.25 mg and is considering resuming it. She experiences nausea but no vomiting. She underwent a diabetic eye examination in 2025 with Dr. Baez in [place] and was advised to return in a year.    Her blood pressure is well-controlled at home, and she tolerates her antihypertensive medications without any issues. She is currently on a low dose of losartan 50 mg.    Her asthma is currently uncontrolled, with increased tightness over the past few weeks. She attributes this to allergies. She has been experiencing a cough and itchy eyes, necessitating increased use of her short-acting inhaler. She reports a rattling sensation during respiration and difficulty achieving full inspiration. She does not believe her symptoms warrant steroid treatment.    She has been experiencing sinus issues for the past 2 to 3 weeks, characterized by thick yellow-green nasal discharge, occasionally tinged with blood. She reports no fever, chills, nausea, vomiting, aches, pains, chest pain, or palpitations.    Her gastroesophageal reflux disease (GERD) is  well-managed.    PAST SURGICAL HISTORY:  - Nephrectomy  - Appendectomy  - Cholecystectomy    SOCIAL HISTORY  The patient admits to smoking about 1 pack a day.     Patient appears to be doing otherwise well.  They have continue with their medications without any side effects.  They have not had any changes in their usual activity, appetite and sleep.  Patient denies any other cardiovascular, respiratory, gastrointestinal, urologic or neurologic complaints.    Subjective      Review of Systems:   Review of Systems   Constitutional:  Negative for activity change, appetite change, fatigue and fever.   HENT:  Positive for congestion and sneezing.    Respiratory:  Positive for cough. Negative for chest tightness, shortness of breath and wheezing.    Cardiovascular:  Negative for chest pain, palpitations and leg swelling.   Gastrointestinal:  Negative for abdominal distention, abdominal pain, blood in stool, constipation, diarrhea, nausea, vomiting, GERD and indigestion.   Genitourinary:  Negative for difficulty urinating, dysuria, flank pain, frequency, hematuria and urgency.   Musculoskeletal:  Negative for arthralgias, back pain, gait problem, joint swelling and myalgias.   Neurological:  Negative for dizziness, tremors, seizures, syncope, weakness, light-headedness, numbness, headache and memory problem.   Psychiatric/Behavioral:  Negative for sleep disturbance and depressed mood. The patient is not nervous/anxious.        I have reviewed the patients family history, social history, past medical history, past surgical history and have updated it as appropriate.     Medications:     Current Outpatient Medications:     albuterol sulfate  (90 Base) MCG/ACT inhaler, Inhale 2 puffs Every 6 (Six) Hours As Needed for Wheezing., Disp: 18 g, Rfl: 12    aspirin 81 MG EC tablet, Take 1 tablet by mouth Daily., Disp: , Rfl:     clotrimazole-betamethasone (LOTRISONE) 1-0.05 % cream, Apply  topically to the appropriate area as  directed 2 (Two) Times a Day., Disp: 45 g, Rfl: 0    esomeprazole (nexIUM) 40 MG capsule, Take 1 capsule by mouth 2 (Two) Times a Day., Disp: 180 capsule, Rfl: 3    losartan (COZAAR) 50 MG tablet, Take 1 tablet by mouth Daily., Disp: 90 tablet, Rfl: 3    nystatin (MYCOSTATIN) 962049 UNIT/GM powder, Apply  topically to the appropriate area as directed 3 (Three) Times a Day., Disp: 60 g, Rfl: 11    ondansetron (ZOFRAN) 8 MG tablet, Take 1 tablet by mouth Every 8 (Eight) Hours As Needed for Nausea or Vomiting., Disp: 20 tablet, Rfl: 0    rosuvastatin (CRESTOR) 40 MG tablet, Take 1 tablet by mouth Daily., Disp: 90 tablet, Rfl: 3    amoxicillin-clavulanate (AUGMENTIN) 875-125 MG per tablet, Take 1 tablet by mouth 2 (Two) Times a Day., Disp: 20 tablet, Rfl: 0    Semaglutide, 1 MG/DOSE, (Ozempic, 1 MG/DOSE,) 4 MG/3ML solution pen-injector, Inject 1 mg under the skin into the appropriate area as directed 1 (One) Time Per Week., Disp: 3 mL, Rfl: 11    Current Facility-Administered Medications:     sodium chloride 0.9 % infusion, 500 mL/hr, Intravenous, Once, Socorro Machuca N, APRN    sodium chloride 0.9 % infusion, 500 mL/hr, Intravenous, Once, Socorro Machuca N, APRN    Allergies:   Allergies   Allergen Reactions    Sulfa Antibiotics Unknown - High Severity     high fever - pt states 106F    Morphine Other (See Comments)     drop in O2 sats; needed oxygent; pt states she has to be reminded to breathe;        Immunizations:   Immunization History   Administered Date(s) Administered    Covid-19 (Pfizer) Gray Cap Monovalent 05/24/2022, 06/20/2022    Flu Vaccine Quad PF >36MO 02/08/2019    Flu Vaccine Split Quad 01/09/2019    Flublok 18+yrs 01/09/2019    Fluzone (or Fluarix & Flulaval for VFC) >6mos 10/04/2022    Hepatitis A 01/09/2019    Influenza, Unspecified 02/08/2019, 10/10/2023    MMR 02/08/2019    Pneumococcal Conjugate 20-Valent (PCV20) 12/19/2024    Pneumococcal Polysaccharide (PPSV23) 10/07/2022, 11/16/2023    Tdap  "02/08/2019        Objective     Physical Exam: Please see above  Vital Signs:   Vitals:    05/14/25 1311   BP: 124/82   BP Location: Left arm   Patient Position: Sitting   Cuff Size: Adult   Pulse: 80   Resp: 18   Temp: 98 °F (36.7 °C)   TempSrc: Temporal   SpO2: 96%   Weight: 90.7 kg (200 lb)   Height: 165.1 cm (65\")     Body mass index is 33.28 kg/m².          Physical Exam  Vitals and nursing note reviewed.   Constitutional:       Appearance: Normal appearance.   HENT:      Head: Normocephalic and atraumatic.      Nose: Nose normal.      Mouth/Throat:      Pharynx: Oropharynx is clear.   Eyes:      Extraocular Movements: Extraocular movements intact.      Pupils: Pupils are equal, round, and reactive to light.   Neck:      Thyroid: No thyroid mass or thyromegaly.      Trachea: Trachea normal.   Cardiovascular:      Rate and Rhythm: Normal rate and regular rhythm.      Pulses: Normal pulses. No decreased pulses.      Heart sounds: Normal heart sounds.   Pulmonary:      Effort: Pulmonary effort is normal.      Breath sounds: Normal breath sounds.   Abdominal:      General: Abdomen is flat. Bowel sounds are normal.      Palpations: Abdomen is soft.      Tenderness: There is no abdominal tenderness.   Musculoskeletal:      Cervical back: Neck supple.      Right lower leg: No edema.      Left lower leg: No edema.   Lymphadenopathy:      Cervical: No cervical adenopathy.   Skin:     General: Skin is warm and dry.   Neurological:      General: No focal deficit present.      Mental Status: She is alert and oriented to person, place, and time.      Sensory: Sensation is intact.      Motor: Motor function is intact.      Coordination: Coordination is intact.   Psychiatric:         Attention and Perception: Attention normal.         Mood and Affect: Mood normal.         Speech: Speech normal.         Behavior: Behavior normal.         Procedures    Results:   Labs:   Hemoglobin A1C   Date Value Ref Range Status   12/24/2024 " 6.00 (H) 4.80 - 5.60 % Final     TSH   Date Value Ref Range Status   12/24/2024 1.450 0.270 - 4.200 uIU/mL Final        POCT Results (if applicable):   Results for orders placed or performed in visit on 01/28/25   Basic Metabolic Panel    Collection Time: 01/28/25  3:34 PM    Specimen: Arm, Left; Blood   Result Value Ref Range    Glucose 150 (H) 70 - 99 mg/dL    BUN 13 6 - 24 mg/dL    Creatinine 1.26 (H) 0.57 - 1.00 mg/dL    EGFR Result 53 (L) >59 mL/min/1.73    BUN/Creatinine Ratio 10 9 - 23    Sodium 143 134 - 144 mmol/L    Potassium 4.5 3.5 - 5.2 mmol/L    Chloride 104 96 - 106 mmol/L    Total CO2 24 20 - 29 mmol/L    Calcium 9.3 8.7 - 10.2 mg/dL       Imaging:   No valid procedures specified.     Measures:   Advanced Care Planning:   Patient has an advance directive in EMR which is still valid.     Smoking Cessation:   less than 3 minutes spent counseling Will try to cut down    Assessment / Plan      Assessment/Plan:   Diagnoses and all orders for this visit:    1. Type 2 diabetes mellitus without complication, without long-term current use of insulin (Primary)  Patient does appear to be doing relatively well with respect to their diabetes.  They are tolerating their medications without complaints.  We will continue to follow their hemoglobin A1c and will make adjustments to keep their level less than 7%.  -     Hemoglobin A1c; Future  -     Microalbumin / Creatinine Urine Ratio - Urine, Clean Catch; Future    2. Primary hypertension  Patient appears to be tolerating their blood pressure medicine without any side effects.  We will continue to monitor their blood pressure and will adjust to keep there is systolic blood pressure less than 130.  We will continue to monitor renal function and will make adjustments based on these findings.  -     Comprehensive Metabolic Panel; Future    3. Mixed hyperlipidemia   Patient does appear to be tolerating their lipid-lowering agent without difficulty.  We will obtain the  lipid profile as well as liver function test to observe for any abnormalities.  We will continue to adjust medications to keep their LDL either less than 70 or 45 based on their cardiovascular risk.    4. Stage 3a chronic kidney disease  Patient appears to be doing well at present time with respect to their renal insufficiency.  They are pushing fluids without difficulty and have been avoiding anti-inflammatories.  We will continue to monitor renal function and have her keep follow-up with the nephrologist.  Patient does have stage IIIa chronic kidney disease.  Patient will be a candidate for a GLP-1 agonist.  We will initiate to help protect her kidney function.  -     Semaglutide, 1 MG/DOSE, (Ozempic, 1 MG/DOSE,) 4 MG/3ML solution pen-injector; Inject 1 mg under the skin into the appropriate area as directed 1 (One) Time Per Week.  Dispense: 3 mL; Refill: 11  -     Magnesium; Future  -     Phosphorus; Future  -     PTH, Intact; Future    5. Metabolic dysfunction-associated steatohepatitis (MASH)   Patient does appear to have metabolic dysfunction of your liver secondary to fatty liver disease.  They understands the importance of a low carbohydrate/low-fat diet as well as 300 minutes of aerobic exercise weekly.  We will continue to monitor and will obtain laboratory data intermittently to assure us there is no progression of the disease.  A liver ultrasound as well elastography may be consider intermittently.  Patient understands the importance of weight loss.  There may be an indication of use of a GLP-1 agonist.    6. Mild persistent asthma, unspecified whether complicated   Patient has had some increase in her asthma symptoms due to change in the pollen count.  She is doing well at present time with respect to use of her short acting inhaler.  She does not have any increase in wheezing at present time.  We will continue to monitor her symptoms and it does not appear she needs steroids currently.  If she has any  worsening complaints further assessment treatment may be necessary.    7. Cigarette nicotine dependence without complication   Patient does continue to smoke.  We have discussed the risk of smoking with respect to increased cardiovascular disease and lung cancer excetra.  We have encouraged this continuation and will provide medical assistance if necessary.    8. Gastroesophageal reflux disease without esophagitis   Patient is doing well at present time with respect to the reflux symptomatology.  They are tolerating their medications without any complaints at present time.  We will continue to monitor their symptoms and if they develop dysphagia, alarm symptoms or worsening symptomatology further evaluation and treatment may be necessary as well as possible referral for an EGD.    9. Vitamin D deficiency  -     Vitamin D,25-Hydroxy; Future    10. Acute non-recurrent maxillary sinusitis  Patient has had respiratory symptoms that have worsened over the previous 2 to 3 weeks.  She is now having thick drainage without any fever.  We will have her use nasal saline as well as a course of Augmentin.  We will monitor her symptoms and if they worsen we may consider the initiation of steroids.  She has other problems meantime she will contact us.  -     amoxicillin-clavulanate (AUGMENTIN) 875-125 MG per tablet; Take 1 tablet by mouth 2 (Two) Times a Day.  Dispense: 20 tablet; Refill: 0        Follow Up:   Return in about 3 months (around 8/14/2025).      At [facility], we believe that sharing information builds trust and better relationships. You are receiving this note because you recently visited [facility]. It is possible you will see health information before a provider has talked with you about it. This kind of information can be easy to misunderstand. To help you fully understand what it means for your health, we urge you to discuss this note with your provider.    Greg Levine MD  [facility]  [place]    Patient or patient representative verbalized consent for the use of Ambient Listening during the visit with  Greg Levine MD for chart documentation. 5/14/2025  13:28 EDT

## 2025-05-15 LAB
ALBUMIN UR-MCNC: <1.2 MG/DL
CREAT UR-MCNC: 69.4 MG/DL
MICROALBUMIN/CREAT UR: NORMAL MG/G{CREAT}
PTH-INTACT SERPL-MCNC: 33.7 PG/ML (ref 15–65)

## 2025-06-05 ENCOUNTER — OFFICE VISIT (OUTPATIENT)
Dept: FAMILY MEDICINE CLINIC | Facility: CLINIC | Age: 50
End: 2025-06-05
Payer: COMMERCIAL

## 2025-06-05 VITALS
TEMPERATURE: 97.8 F | OXYGEN SATURATION: 97 % | HEART RATE: 63 BPM | HEIGHT: 65 IN | RESPIRATION RATE: 16 BRPM | SYSTOLIC BLOOD PRESSURE: 132 MMHG | WEIGHT: 198.2 LBS | DIASTOLIC BLOOD PRESSURE: 88 MMHG | BODY MASS INDEX: 33.02 KG/M2

## 2025-06-05 DIAGNOSIS — L98.9 DERMATOSIS: ICD-10-CM

## 2025-06-05 DIAGNOSIS — J32.1 FRONTAL SINUSITIS, UNSPECIFIED CHRONICITY: Primary | ICD-10-CM

## 2025-06-05 PROCEDURE — 99214 OFFICE O/P EST MOD 30 MIN: CPT | Performed by: NURSE PRACTITIONER

## 2025-06-05 RX ORDER — PREDNISONE 10 MG/1
TABLET ORAL
Qty: 36 TABLET | Refills: 0 | Status: SHIPPED | OUTPATIENT
Start: 2025-06-05 | End: 2025-06-13

## 2025-06-05 NOTE — PROGRESS NOTES
Office Note     Name: Veronica Goldsmith    : 1975     MRN: [id number]     Chief Complaint  Rash (Would like a referral to dermatologist. Sinuses have still been bothering her. )    Subjective     History of Present Illness:  Veronica Goldsmith is a 50 y.o. female who presents today for sinus symptoms. She completed a course of antibiotics last week.   History of Present Illness  The patient presents for evaluation of sinus issues and a chronic skin condition.    She reports experiencing sinus issues, including congestion, rhinorrhea, and postnasal drip. The nasal discharge is predominantly clear, with occasional yellowish tinges. She also reports ocular discharge and intermittent pruritus, particularly when blinking. She has been experiencing wheezing but does not report any ear pain or sore throat. She does not experience nausea, vomiting, or diarrhea. She occasionally experiences lightheadedness upon standing up quickly or bending over. She is currently recovering from nasal sores. She typically requires a course of prednisone for symptom management. She last completed a steroid course in 2025.    She has been dealing with a skin condition for the past 2 years, which she describes as not being a rash. It is located in the lower portion of there abdomen in a fold of skin. The condition does not improve with Diflucan. It  eventually begins to drainage liquid and bleed. She has been using a steroid cream prescribed by a previous physician, but the condition has been deteriorating. The condition is localized and does not affect her armpits. It manifests as abscesses that start at one location and then spread down into the groin, turning bright red. The condition is not constant and can become severe, causing the skin to split apart. She has considered the possibility of the condition being due to friction from her size. The condition does not improve with antibiotics but responds positively to prednisone. The  condition can become so severe that it causes water to be expelled, making the skin tight and sensitive to touch. She does not experience any facial rash. She was previously diagnosed with rosacea and treated with creams, but the condition remains unchanged. She does not have a red or bumpy nose. 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 She has been using cold compresses to manage the condition. She has been prescribed a steroid fungal cream and powder with no improvement in symptoms.    Review of Systems:   Review of Systems   Constitutional:  Negative for chills, diaphoresis and fever.   HENT:  Positive for congestion, postnasal drip, rhinorrhea and sinus pressure. Negative for ear pain and sore throat.    Eyes:  Positive for discharge. Negative for itching.   Respiratory:  Positive for cough, shortness of breath and wheezing.    Gastrointestinal:  Negative for diarrhea, nausea and vomiting.   Musculoskeletal:  Negative for myalgias.   Neurological:  Positive for light-headedness. Negative for dizziness and headache.       Past Medical History:   Past Medical History:   Diagnosis Date    Acne rosacea, erythematous telangiectatic type     Acute bronchitis     history of    Acute sinusitis     Acute upper respiratory infection     hisotry of    Allergic contact dermatitis     Allergic rhinitis     Anxiety and depression     Asthma     Back pain, chronic     Benign paroxysmal positional vertigo     Blood clots in stool     Candidal dermatitis     Candidiasis of vulva and vagina     Cellulitis     AND ABSCESS, right lower abdomen    Cervical pain     Conjunctivitis     Contusion of ear     Corneal abrasion     Depression     Diabetes mellitus     type 2 - A1C better after weight loss with use of Semiglutide    Dyspnea, unspecified     Fibromyalgia     Flu vaccine need     Foot pain, right     GERD (gastroesophageal reflux disease)     Hand pain, left      Hearing loss, right     Heart palpitations     Hemorrhoids, external     Herpes simplex without complication     Herpes zoster lesion     Hypertension     Impetigo herpetiformis     Injury to tibial blood vessels, unspecified laterality, initial encounter     Kidney cyst     Right kidney    Kidney stones     Left humeral fracture     Low back pain     Lymphadenopathy     Migraine headache     Mixed hyperlipidemia     TAM (nonalcoholic steatohepatitis)     Neuropathy     Oral candidiasis     Plantar fasciitis     Recent weight loss     60lbs with use of Ozempic - per pt 12/6/23    Salpingitis and oophoritis, unspecified     not specified as acute, subacute, or chronic    Scabies     Seasonal allergies     Stress     Swelling of limb     Symptoms involving abdomen and pelvis     OTHER SYMPTOMS INVOLVING ABDOMEN AND PELVIS, ABDOMINAL OR PELVIC SWELLING, MASS, OR LUMP, OTHER SPECIFIED S    Tattoo     Tendon pain     Tobacco use disorder     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     Urinary tract infection     Vaginitis and vulvovaginitis     Vaginitis, atrophic        Past Surgical History:   Past Surgical History:   Procedure Laterality Date    BILATERAL OOPHORECTOMY      CARPAL TUNNEL RELEASE Bilateral     CHOLECYSTECTOMY      COLONOSCOPY  03/30/2018    HUMERUS FRACTURE SURGERY Left     has implants - plate, screws    LIPOMA EXCISION      low back    NEPHRECTOMY Right 12/20/2023    Procedure: NEPHRECTOMY LAPAROSCOPIC HAND ASSISTED;  Surgeon: Aaron Mart MD;  Location: [facility] OR;  Service: Urology;  Laterality: Right;    NEPHRECTOMY RADICAL Right     ROTATOR CUFF REPAIR Right     TOTAL ABDOMINAL HYSTERECTOMY      TUBAL ABDOMINAL LIGATION      URETEROSCOPY LASER LITHOTRIPSY WITH STENT INSERTION Right 03/02/2023    Procedure: URETEROSCOPY LASER LITHOTRIPSY WITH STENT INSERTION;  Surgeon: Aaron Mart MD;  Location: [facility] OR;  Service: Urology;  Laterality: Right;       Immunizations:    Immunization History   Administered Date(s) Administered    Covid-19 (Pfizer) Gray Cap Monovalent 05/24/2022, 06/20/2022    Flu Vaccine Quad PF >36MO 02/08/2019    Flu Vaccine Split Quad 01/09/2019    Flublok 18+yrs 01/09/2019    Fluzone (or Fluarix & Flulaval for VFC) >6mos 10/04/2022    Hepatitis A 01/09/2019    Influenza, Unspecified 02/08/2019, 10/10/2023    MMR 02/08/2019    Pneumococcal Conjugate 20-Valent (PCV20) 12/19/2024    Pneumococcal Polysaccharide (PPSV23) 10/07/2022, 11/16/2023    Tdap 02/08/2019        Medications:     Current Outpatient Medications:     albuterol sulfate  (90 Base) MCG/ACT inhaler, Inhale 2 puffs Every 6 (Six) Hours As Needed for Wheezing., Disp: 18 g, Rfl: 12    amoxicillin-clavulanate (AUGMENTIN) 875-125 MG per tablet, Take 1 tablet by mouth 2 (Two) Times a Day., Disp: 20 tablet, Rfl: 0    aspirin 81 MG EC tablet, Take 1 tablet by mouth Daily., Disp: , Rfl:     clotrimazole-betamethasone (LOTRISONE) 1-0.05 % cream, Apply  topically to the appropriate area as directed 2 (Two) Times a Day., Disp: 45 g, Rfl: 0    esomeprazole (nexIUM) 40 MG capsule, Take 1 capsule by mouth 2 (Two) Times a Day., Disp: 180 capsule, Rfl: 3    losartan (COZAAR) 50 MG tablet, Take 1 tablet by mouth Daily., Disp: 90 tablet, Rfl: 3    nystatin (MYCOSTATIN) 868448 UNIT/GM powder, Apply  topically to the appropriate area as directed 3 (Three) Times a Day., Disp: 60 g, Rfl: 11    ondansetron (ZOFRAN) 8 MG tablet, Take 1 tablet by mouth Every 8 (Eight) Hours As Needed for Nausea or Vomiting., Disp: 20 tablet, Rfl: 0    predniSONE (DELTASONE) 10 MG tablet, Take 8 tablets by mouth Daily for 1 day, THEN 7 tablets Daily for 1 day, THEN 6 tablets Daily for 1 day, THEN 5 tablets Daily for 1 day, THEN 4 tablets Daily for 1 day, THEN 3 tablets Daily for 1 day, THEN 2 tablets Daily for 1 day, THEN 1 tablet Daily for 1 day., Disp: 36 tablet, Rfl: 0    rosuvastatin (CRESTOR) 40 MG tablet, Take 1 tablet by mouth  "Daily., Disp: 90 tablet, Rfl: 3    Semaglutide, 1 MG/DOSE, (Ozempic, 1 MG/DOSE,) 4 MG/3ML solution pen-injector, Inject 1 mg under the skin into the appropriate area as directed 1 (One) Time Per Week., Disp: 3 mL, Rfl: 11    Current Facility-Administered Medications:     sodium chloride 0.9 % infusion, 500 mL/hr, Intravenous, Once, Brigette, Socorro N, APRN    sodium chloride 0.9 % infusion, 500 mL/hr, Intravenous, Once, Brigette, Socorro N, APRN    Allergies:   Allergies   Allergen Reactions    Sulfa Antibiotics Unknown - High Severity     high fever - pt states 106F    Morphine Other (See Comments)     drop in O2 sats; needed oxygent; pt states she has to be reminded to breathe;        Family History:   Family History   Problem Relation Age of Onset    Diabetes Mother     Esophageal cancer Father     Hypertension Father     Cancer Father     Cancer Maternal Grandmother     Diabetes Maternal Grandmother     Cancer Paternal Grandmother     Cancer Paternal Grandfather     Stroke Other     Cervical cancer Other     Diabetes Other     Esophageal cancer Other     Hypertension Other     Lung cancer Other     Pancreatic cancer Other     Skin cancer Other     Breast cancer Neg Hx        Social History:   Social History     Socioeconomic History    Marital status:    Tobacco Use    Smoking status: Every Day     Current packs/day: 1.00     Average packs/day: 1 pack/day for 32.4 years (32.4 ttl pk-yrs)     Types: Cigarettes     Start date: 1993     Passive exposure: Current    Smokeless tobacco: Never   Vaping Use    Vaping status: Never Used   Substance and Sexual Activity    Alcohol use: Not Currently    Drug use: Never    Sexual activity: Yes     Partners: Male         Objective     Vital Signs  /88 (BP Location: Right arm, Patient Position: Sitting, Cuff Size: Adult)   Pulse 63   Temp 97.8 °F (36.6 °C) (Temporal)   Resp 16   Ht 165.1 cm (65\")   Wt 89.9 kg (198 lb 3.2 oz)   SpO2 97%   BMI 32.98 kg/m² " "  Estimated body mass index is 32.98 kg/m² as calculated from the following:    Height as of this encounter: 165.1 cm (65\").    Weight as of this encounter: 89.9 kg (198 lb 3.2 oz).            Physical Exam  Vitals and nursing note reviewed.   Constitutional:       General: She is not in acute distress.     Appearance: Normal appearance. She is not ill-appearing or toxic-appearing.   HENT:      Head: Normocephalic and atraumatic.      Right Ear: Tympanic membrane, ear canal and external ear normal.      Left Ear: Tympanic membrane, ear canal and external ear normal.      Nose: Nose normal.      Comments: Patient verbalizes bilateral frontal sinus tenderness with palpation.      Mouth/Throat:      Mouth: Mucous membranes are moist.      Pharynx: Oropharynx is clear.   Eyes:      General: No scleral icterus.        Right eye: No discharge.         Left eye: No discharge.      Conjunctiva/sclera: Conjunctivae normal.   Cardiovascular:      Rate and Rhythm: Normal rate and regular rhythm.      Heart sounds: Normal heart sounds.   Pulmonary:      Effort: Pulmonary effort is normal.      Breath sounds: Normal breath sounds.   Musculoskeletal:         General: Normal range of motion.      Cervical back: Normal range of motion and neck supple. No rigidity or tenderness.   Lymphadenopathy:      Cervical: No cervical adenopathy.   Skin:     General: Skin is warm.      Comments: Bilateral lower abdomen has erythematous areas in the fold of skin. Area is mildly edematous with a 5 mm round open wound  on the right side of the abdomen and fluid filled bullous on the right lower abdomen. There is telangiectasias on left lower abdomen at the site of the lesion. The patient has bilateral cheek telangiectasia.   Neurological:      General: No focal deficit present.      Mental Status: She is alert.   Psychiatric:         Mood and Affect: Mood normal.         Behavior: Behavior normal.         Thought Content: Thought content normal.  "        Judgment: Judgment normal.          Assessment and Plan     Procedures      Lab Results (last 72 hours)       ** No results found for the last 72 hours. **               Diagnoses and all orders for this visit:    1. Frontal sinusitis, unspecified chronicity (Primary)  -     predniSONE (DELTASONE) 10 MG tablet; Take 8 tablets by mouth Daily for 1 day, THEN 7 tablets Daily for 1 day, THEN 6 tablets Daily for 1 day, THEN 5 tablets Daily for 1 day, THEN 4 tablets Daily for 1 day, THEN 3 tablets Daily for 1 day, THEN 2 tablets Daily for 1 day, THEN 1 tablet Daily for 1 day.  Dispense: 36 tablet; Refill: 0    2. Dermatosis  Comments:  Refer to dermatology for consult.  Orders:  -     predniSONE (DELTASONE) 10 MG tablet; Take 8 tablets by mouth Daily for 1 day, THEN 7 tablets Daily for 1 day, THEN 6 tablets Daily for 1 day, THEN 5 tablets Daily for 1 day, THEN 4 tablets Daily for 1 day, THEN 3 tablets Daily for 1 day, THEN 2 tablets Daily for 1 day, THEN 1 tablet Daily for 1 day.  Dispense: 36 tablet; Refill: 0  -     Ambulatory Referral to Dermatology        Assessment & Plan  1. Sinusitis.  - Reports sinus issues with clear and occasional yellowish discharge, congestion, runny nose, postnasal drip, and wheezing.  - Recently completed a course of Augmentin with residual symptoms.  - Discussed symptoms and previous treatment with Augmentin.  - An 8-day tapering course of prednisone will be initiated to alleviate symptoms.    2. Dermatosis  - Presents with recurrent abscesses and fluid-filled pockets in the lower bilateral abdomen and groin area, which improve with prednisone.  - Physical examination reveals enlarged vessels and areas of tenderness, bullous on the abdomen.  - Referral to dermatology will be made for further evaluation and management.  - An 8-day tapering course of prednisone will be prescribed to manage symptoms in the interim.      Follow Up  Return for Keep your follow up appointment with   Julianna..    Patient or patient representative verbalized consent for the use of Ambient Listening during the visit with  SAULO Sarmiento for chart documentation. 6/5/2025  10:39 EDT    SAULO Sarmiento [facility]  [address] [place] [address] [address] [place] [id number] [phone]

## 2025-06-09 RX ORDER — CLOTRIMAZOLE AND BETAMETHASONE DIPROPIONATE 10; .64 MG/G; MG/G
CREAM TOPICAL 2 TIMES DAILY
Qty: 45 G | Refills: 0 | Status: SHIPPED | OUTPATIENT
Start: 2025-06-09

## 2025-06-16 ENCOUNTER — HOSPITAL ENCOUNTER (OUTPATIENT)
Dept: CT IMAGING | Facility: HOSPITAL | Age: 50
Discharge: HOME | End: 2025-06-16
Payer: COMMERCIAL

## 2025-06-16 ENCOUNTER — TELEPHONE (OUTPATIENT)
Dept: UROLOGY | Facility: CLINIC | Age: 50
End: 2025-06-16
Payer: COMMERCIAL

## 2025-06-16 DIAGNOSIS — C64.1 RENAL CELL CARCINOMA OF RIGHT KIDNEY: ICD-10-CM

## 2025-06-16 PROCEDURE — 71260 CT THORAX DX C+: CPT

## 2025-06-16 PROCEDURE — 25510000001 IOPAMIDOL 61 % SOLUTION: Performed by: NURSE PRACTITIONER

## 2025-06-16 PROCEDURE — 74177 CT ABD & PELVIS W/CONTRAST: CPT

## 2025-06-16 RX ORDER — IOPAMIDOL 612 MG/ML
100 INJECTION, SOLUTION INTRAVASCULAR
Status: COMPLETED | OUTPATIENT
Start: 2025-06-16 | End: 2025-06-16

## 2025-06-16 RX ADMIN — IOPAMIDOL 100 ML: 612 INJECTION, SOLUTION INTRAVENOUS at 14:57

## 2025-06-16 NOTE — TELEPHONE ENCOUNTER
Provider: CHARLES MANRIQUE    Caller: SANDRA    # [phone]    Relationship to Patient: Provider    Reason for Call: RECVD CALL FROM RADIOLOGY NEEDING TO CONFIRM IF PRE AND POST IV FLUIDS SHOULD HAVE BEEN ORDERED WITH CT SCANS COMPLETED TODAY.    IN THE PAST PT HAS GOTTEN FLUIDS BEFORE AND AFTER IMAGING.    PLEASE CALL TO CONFIRM, UNABLE TO WARM LANDRY TO CLINICAL.

## 2025-07-01 ENCOUNTER — LAB (OUTPATIENT)
Dept: FAMILY MEDICINE CLINIC | Facility: CLINIC | Age: 50
End: 2025-07-01
Payer: COMMERCIAL

## 2025-07-01 ENCOUNTER — OFFICE VISIT (OUTPATIENT)
Age: 50
End: 2025-07-01
Payer: COMMERCIAL

## 2025-07-01 VITALS
DIASTOLIC BLOOD PRESSURE: 84 MMHG | BODY MASS INDEX: 33.02 KG/M2 | SYSTOLIC BLOOD PRESSURE: 140 MMHG | HEIGHT: 65 IN | RESPIRATION RATE: 14 BRPM | TEMPERATURE: 97.6 F | OXYGEN SATURATION: 97 % | WEIGHT: 198.19 LBS | HEART RATE: 76 BPM

## 2025-07-01 DIAGNOSIS — N18.31 STAGE 3A CHRONIC KIDNEY DISEASE: ICD-10-CM

## 2025-07-01 DIAGNOSIS — C64.1 RENAL CELL CARCINOMA OF RIGHT KIDNEY: Primary | ICD-10-CM

## 2025-07-01 DIAGNOSIS — Z72.0 TOBACCO ABUSE: ICD-10-CM

## 2025-07-01 DIAGNOSIS — E04.1 RIGHT THYROID NODULE: ICD-10-CM

## 2025-07-01 DIAGNOSIS — N20.0 LEFT NEPHROLITHIASIS: ICD-10-CM

## 2025-07-01 PROCEDURE — 80069 RENAL FUNCTION PANEL: CPT | Performed by: NURSE PRACTITIONER

## 2025-07-01 PROCEDURE — 36415 COLL VENOUS BLD VENIPUNCTURE: CPT | Performed by: FAMILY MEDICINE

## 2025-07-01 RX ORDER — SODIUM CHLORIDE 9 MG/ML
1000 INJECTION, SOLUTION INTRAVENOUS ONCE
Status: SHIPPED | OUTPATIENT
Start: 2026-07-01

## 2025-07-01 NOTE — PROGRESS NOTES
Office Visit     Patient: Veronica Goldsmith 50 y.o. female   : 1975   MRN: [id number]      Patient or patient representative verbalized consent for the use of Ambient Listening during the visit with  SAULO Sevilla for chart documentation. 2025  11:00 EDT     Chief Complaint   Patient presents with    Renal cell carcinoma     Referring Provider: No ref. provider found    Primary Care Provider: Greg Levine MD     History of Present Illness  The patient presents for evaluation of RCC and left nephrolithiasis.    Left Nephrolithiasis  - The patient is being evaluated for left nephrolithiasis.  - Punctate stone noted on recent CT, unable to visualize other punctate stone that was seen on CT without due to contrast    History of Clear-Cell Renal Cell Carcinoma  - History of pT1a grade 1 clear cell renal cell carcinoma   - Underwent complete right nephrectomy laparoscopically in 2023.  - Most recent CT scan on 2025 showed no metastatic disease but revealed one small stones.  - Initial condition discovered due to a 7 cm stone, leading to cancer detection.  - Referred for stone removal, during which an abnormal cyst was found on CT scan.    Follow-Up and Current Status  - Reports no new issues since last visit.  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  - Last visit procedure performed without fluid due to lack of order.  - Reports being told by radiology that she did not have order for IV hydration and CT was performed without but reports drinking plenty of water.    Supplemental information: Maintains health by consuming a gallon of water daily and annual visits to nephrologist, Dr. Singleton.    SOCIAL HISTORY  Works as a dialysis technician.      Subjective   Review of System:   As noted in HPI.    Past Medical History:   Diagnosis Date    Acne rosacea, erythematous telangiectatic type     Acute  bronchitis     history of    Acute sinusitis     Acute upper respiratory infection     hisotry of    Allergic contact dermatitis     Allergic rhinitis     Anxiety and depression     Asthma     Back pain, chronic     Benign paroxysmal positional vertigo     Blood clots in stool     Candidal dermatitis     Candidiasis of vulva and vagina     Cellulitis     AND ABSCESS, right lower abdomen    Cervical pain     Conjunctivitis     Contusion of ear     Corneal abrasion     Depression     Diabetes mellitus     type 2 - A1C better after weight loss with use of Semiglutide    Dyspnea, unspecified     Fibromyalgia     Flu vaccine need     Foot pain, right     GERD (gastroesophageal reflux disease)     Hand pain, left     Hearing loss, right     Heart palpitations     Hemorrhoids, external     Herpes simplex without complication     Herpes zoster lesion     Hypertension     Impetigo herpetiformis     Injury to tibial blood vessels, unspecified laterality, initial encounter     Kidney cyst     Right kidney    Kidney stones     Left humeral fracture     Left nephrolithiasis 07/01/2025    Low back pain     Lymphadenopathy     Migraine headache     Mixed hyperlipidemia     TAM (nonalcoholic steatohepatitis)     Neuropathy     Oral candidiasis     Plantar fasciitis     Recent weight loss     60lbs with use of Ozempic - per pt 12/6/23    Renal cell carcinoma of right kidney 07/01/2025    Right thyroid nodule 07/01/2025    Salpingitis and oophoritis, unspecified     not specified as acute, subacute, or chronic    Scabies     Seasonal allergies     Stage 3a chronic kidney disease 07/01/2025    Stress     Swelling of limb     Symptoms involving abdomen and pelvis     OTHER SYMPTOMS INVOLVING ABDOMEN AND PELVIS, ABDOMINAL OR PELVIC SWELLING, MASS, OR LUMP, OTHER SPECIFIED S    Tattoo     Tendon pain     Tobacco use disorder     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     Urinary tract infection     Vaginitis and vulvovaginitis      Vaginitis, atrophic      Past Surgical History:   Procedure Laterality Date    BILATERAL OOPHORECTOMY      CARPAL TUNNEL RELEASE Bilateral     CHOLECYSTECTOMY      COLONOSCOPY  03/30/2018    HUMERUS FRACTURE SURGERY Left     has implants - plate, screws    LIPOMA EXCISION      low back    NEPHRECTOMY Right 12/20/2023    Procedure: NEPHRECTOMY LAPAROSCOPIC HAND ASSISTED;  Surgeon: Aaron Mart MD;  Location: [facility];  Service: Urology;  Laterality: Right;    NEPHRECTOMY RADICAL Right     ROTATOR CUFF REPAIR Right     TOTAL ABDOMINAL HYSTERECTOMY      TUBAL ABDOMINAL LIGATION      URETEROSCOPY LASER LITHOTRIPSY WITH STENT INSERTION Right 03/02/2023    Procedure: URETEROSCOPY LASER LITHOTRIPSY WITH STENT INSERTION;  Surgeon: Aaron Mart MD;  Location: [facility] OR;  Service: Urology;  Laterality: Right;     Family History   Problem Relation Age of Onset    Diabetes Mother     Esophageal cancer Father     Hypertension Father     Cancer Father     Cancer Maternal Grandmother     Diabetes Maternal Grandmother     Cancer Paternal Grandmother     Cancer Paternal Grandfather     Stroke Other     Cervical cancer Other     Diabetes Other     Esophageal cancer Other     Hypertension Other     Lung cancer Other     Pancreatic cancer Other     Skin cancer Other     Breast cancer Neg Hx      Social History     Socioeconomic History    Marital status:    Tobacco Use    Smoking status: Every Day     Current packs/day: 1.00     Average packs/day: 1 pack/day for 32.5 years (32.5 ttl pk-yrs)     Types: Cigarettes     Start date: 1993     Passive exposure: Current    Smokeless tobacco: Never   Vaping Use    Vaping status: Never Used   Substance and Sexual Activity    Alcohol use: Not Currently    Drug use: Never    Sexual activity: Yes     Partners: Male       Current Outpatient Medications:     albuterol sulfate  (90 Base) MCG/ACT inhaler, Inhale 2 puffs Every 6 (Six) Hours As Needed for Wheezing.,  "Disp: 18 g, Rfl: 12    aspirin 81 MG EC tablet, Take 1 tablet by mouth Daily., Disp: , Rfl:     esomeprazole (nexIUM) 40 MG capsule, Take 1 capsule by mouth 2 (Two) Times a Day., Disp: 180 capsule, Rfl: 3    losartan (COZAAR) 50 MG tablet, Take 1 tablet by mouth Daily., Disp: 90 tablet, Rfl: 3    nystatin (MYCOSTATIN) 476684 UNIT/GM powder, Apply  topically to the appropriate area as directed 3 (Three) Times a Day., Disp: 60 g, Rfl: 11    ondansetron (ZOFRAN) 8 MG tablet, Take 1 tablet by mouth Every 8 (Eight) Hours As Needed for Nausea or Vomiting., Disp: 20 tablet, Rfl: 0    rosuvastatin (CRESTOR) 40 MG tablet, Take 1 tablet by mouth Daily., Disp: 90 tablet, Rfl: 3    Semaglutide, 1 MG/DOSE, (Ozempic, 1 MG/DOSE,) 4 MG/3ML solution pen-injector, Inject 1 mg under the skin into the appropriate area as directed 1 (One) Time Per Week., Disp: 3 mL, Rfl: 11    Current Facility-Administered Medications:     [START ON 7/1/2026] sodium chloride 0.9 % infusion 1,000 mL, 1,000 mL, Intravenous, Once, Ksenia Mobley APRN    [START ON 7/1/2026] sodium chloride 0.9 % infusion 1,000 mL, 1,000 mL, Intravenous, Once, Ksenia Mobley APRN    Allergies   Allergen Reactions    Sulfa Antibiotics Unknown - High Severity     high fever - pt states 106F    Morphine Other (See Comments)     drop in O2 sats; needed oxygent; pt states she has to be reminded to breathe;      Objective   Visit Vitals  /84 (BP Location: Right arm, Patient Position: Sitting, Cuff Size: Adult)   Pulse 76   Temp 97.6 °F (36.4 °C) (Infrared)   Resp 14   Ht 165.1 cm (65\")   Wt 89.9 kg (198 lb 3.1 oz)   LMP  (LMP Unknown)   SpO2 97%   BMI 32.98 kg/m²        Body mass index is 32.98 kg/m².     Physical Exam  Vitals and nursing note reviewed.   Constitutional:       General: She is awake. She is not in acute distress.     Appearance: She is obese.   Pulmonary:      Effort: Pulmonary effort is normal.   Neurological:      Mental Status: She is alert and " "oriented to person, place, and time. Mental status is at baseline.   Psychiatric:         Mood and Affect: Mood normal.         Behavior: Behavior is cooperative.          Labs  Lab Results   Component Value Date    COLORU Yellow 12/09/2024    CLARITYU Clear 12/09/2024    SPECGRAV 1.015 04/18/2024    PHUR 6.0 12/09/2024    LEUKOCYTESUR Trace (A) 12/09/2024    NITRITE Negative 04/18/2024    PROTEINPOCUA Negative 04/18/2024    GLUCOSEUR Negative 04/18/2024    KETONESU Negative 12/09/2024    UROBILINOGEN 1.0 E.U./dL 12/09/2024    BILIRUBINUR Negative 12/09/2024    RBCUR Negative 04/18/2024      Lab Results   Component Value Date    WBCUA 0-2 12/09/2024    WBCUA None Seen 01/03/2024    RBCUA 0-2 12/09/2024    RBCUA None Seen 01/03/2024    BACTERIA None Seen 12/09/2024    BACTERIA Trace (A) 01/03/2024    HYALCASTU 0-2 12/09/2024    HYALCASTU None Seen 01/03/2024    SQUAMEPIUA 13-20 (A) 12/09/2024    SQUAMEPIUA 7-12 (A) 01/03/2024        Lab Results   Component Value Date    WBC 11.92 (H) 12/24/2024    HGB 16.0 (H) 12/24/2024    HCT 47.5 (H) 12/24/2024    MCV 96.7 12/24/2024     12/24/2024     Lab Results   Component Value Date    GLUCOSE 125 (H) 05/14/2025    CALCIUM 9.5 05/14/2025     05/14/2025    K 4.0 05/14/2025    CO2 27.2 05/14/2025     05/14/2025    BUN 13 05/14/2025    BUN 13 01/28/2025    CREATININE 1.40 (H) 05/14/2025    CREATININE 1.26 (H) 01/28/2025    EGFR 46.8 (L) 05/14/2025    EGFR 53 (L) 01/28/2025    BCR 9.3 05/14/2025    ANIONGAP 9.8 05/14/2025    ALT 26 05/14/2025    AST 25 05/14/2025       Lab Results   Component Value Date    HGBA1C 6.30 (H) 05/14/2025        No results found for: \"URICACIDSTN\", \"URLS4VWCFIA\", \"CGXS1GHWJM\", \"LABMAGN\", \"CAPHOSSTONE\", \"HYDROXYAPATI\"  Lab Results   Component Value Date    AUCK05MS 39.9 05/14/2025    PTH 33.7 05/14/2025    URICACID 5.0 12/09/2024     No results found for: \"CYSTINE\", \"URINEVOLUM\", \"CALCIUMUR\", \"OXALATEU\", \"CITRATEUR\", \"LABPH\", \"URICUR\", " "\"NAUR\", \"KUR\", \"MAGNESIUMUR\", \"PHOSUR\", \"AMMONIUMUR\", \"CLUR\", \"TKGETNXRX43O\", \"UREAUR\", \"LABCREAUR\", \"CAOXSAT\", \"PROTEINCATRT\"    No results found for: \"ATOPOBIUMV\", \"BVAB2\", \"MEGASPHAER\", \"CALBICANSN\", \"CGLABRATAN\", \"CPARAPSILOS\", \"CLUSITANIAE\", \"CKRUSEI\", \"TRICHVAG\", \"CHLAMNAA\", \"NGONORRHON\", \"UREAPLASMA\", \"MYCOPLASMAG\"    Lab Results   Component Value Date    FERRITIN 62.00 12/24/2024    TSH 1.450 12/24/2024    NJGIHBRI82 232 12/24/2024    ODKQ74UZ 39.9 05/14/2025         Radiographic Studies  CT Abdomen Pelvis With Contrast  Result Date: 6/17/2025  Stable exam without evidence of metastatic disease.      This study was performed with techniques to keep radiation doses as low as reasonably achievable (ALARA). Individualized dose reduction techniques using automated exposure control or adjustment of mA and/or kV according to the patient size were employed.    Images were reviewed, interpreted, and dictated by Dr. Ivan Melo MD Transcribed by Shane Roe PA-C.  This report was signed and finalized on 6/17/2025 2:47 PM by Ivan Melo MD.      CT Chest With Contrast Diagnostic  Result Date: 6/17/2025  Stable exam without evidence of metastatic disease.      This study was performed with techniques to keep radiation doses as low as reasonably achievable (ALARA). Individualized dose reduction techniques using automated exposure control or adjustment of mA and/or kV according to the patient size were employed.    Images were reviewed, interpreted, and dictated by Dr. Ivan Melo MD Transcribed by Shane Roe PA-C.  This report was signed and finalized on 6/17/2025 2:47 PM by Ivan Melo MD.        I have reviewed the above imaging.   I have reviewed the above labs.      Assessment / Plan      Diagnoses and all orders for this visit:    1. Renal cell carcinoma of right kidney (Primary)  Comments:  history of pT1a grade 1 clear cell renal cell carcinoma s/p laparoscopic right nephrectomy in December of " 2023.  Orders:  -     Notify Provider if Patient Taking Diuretics or Nephrotoxic Drugs; Standing  -     sodium chloride 0.9 % infusion 1,000 mL  -     sodium chloride 0.9 % infusion 1,000 mL  -     Notify Provider if Patient Taking Diuretics or Nephrotoxic Drugs  -     CT Abdomen Pelvis With & Without Contrast; Future    2. Stage 3a chronic kidney disease  -     Notify Provider if Patient Taking Diuretics or Nephrotoxic Drugs; Standing  -     sodium chloride 0.9 % infusion 1,000 mL  -     sodium chloride 0.9 % infusion 1,000 mL  -     Notify Provider if Patient Taking Diuretics or Nephrotoxic Drugs  -     Renal Function Panel    3. Left nephrolithiasis  -     CT Abdomen Pelvis With & Without Contrast; Future    4. Tobacco abuse    5. Right thyroid nodule         Assessment & Plan  Left nephrolithiasis:  - Kidney function stable, fluctuating between 1.1 and 1.4  - Small stone in left kidney noted, not causing issues, no intervention needed  - Unable to visualize other punctate stone as previously seen on non-contrast CT  - Repeat imaging in a year with and without contrast for RCC and stone survellience  - Pre and post-hydration with 1000 mL IV fluids before and after procedure  - Hold losartan on procedure day if BP allows to decrease risk of MERLIN  - Order renal function panel today as CT on 6/16 she did not receive IV hydration    Grade 1 clear-cell renal cell carcinoma status post right nephrectomy:  - Repeat CT scan on 06/16/2025 showed no metastatic disease  - Continue annual imaging surveillance  - Maintain pre and post-hydration protocols during imaging    Right thyroid nodule:  - Hypodense right thyroid nodule unchanged at 9 mm, not a concern currently  - No further action unless changes in future imaging  - Follow up with PCP    Tobacco abuse  - Current smoker not interested in cessation       Return in about 1 year (around 7/1/2026) for f/u with Cristhian Mccormack, w/CT w/wo for RCC and left nephrolithiasis  , UA.    Ksenia Mobley, MSN, APRN, FNP-C  [facility]

## 2025-07-03 LAB
ALBUMIN SERPL-MCNC: 4.3 G/DL (ref 3.9–4.9)
BUN SERPL-MCNC: 11 MG/DL (ref 6–24)
BUN/CREAT SERPL: 10 (ref 9–23)
CALCIUM SERPL-MCNC: 9.4 MG/DL (ref 8.7–10.2)
CHLORIDE SERPL-SCNC: 105 MMOL/L (ref 96–106)
CO2 SERPL-SCNC: 25 MMOL/L (ref 20–29)
CREAT SERPL-MCNC: 1.1 MG/DL (ref 0.57–1)
EGFRCR SERPLBLD CKD-EPI 2021: 62 ML/MIN/1.73
GLUCOSE SERPL-MCNC: 133 MG/DL (ref 70–99)
PHOSPHATE SERPL-MCNC: 2.8 MG/DL (ref 3–4.3)
POTASSIUM SERPL-SCNC: 4.5 MMOL/L (ref 3.5–5.2)
SODIUM SERPL-SCNC: 142 MMOL/L (ref 134–144)

## 2025-08-19 ENCOUNTER — LAB (OUTPATIENT)
Dept: FAMILY MEDICINE CLINIC | Facility: CLINIC | Age: 50
End: 2025-08-19
Payer: COMMERCIAL

## 2025-08-19 ENCOUNTER — OFFICE VISIT (OUTPATIENT)
Dept: FAMILY MEDICINE CLINIC | Facility: CLINIC | Age: 50
End: 2025-08-19
Payer: COMMERCIAL

## 2025-08-19 VITALS
RESPIRATION RATE: 16 BRPM | BODY MASS INDEX: 32.65 KG/M2 | OXYGEN SATURATION: 97 % | SYSTOLIC BLOOD PRESSURE: 112 MMHG | TEMPERATURE: 98 F | WEIGHT: 196 LBS | HEIGHT: 65 IN | HEART RATE: 68 BPM | DIASTOLIC BLOOD PRESSURE: 74 MMHG

## 2025-08-19 DIAGNOSIS — N28.9 RENAL INSUFFICIENCY: ICD-10-CM

## 2025-08-19 DIAGNOSIS — E11.9 TYPE 2 DIABETES MELLITUS WITHOUT COMPLICATION, WITHOUT LONG-TERM CURRENT USE OF INSULIN: ICD-10-CM

## 2025-08-19 DIAGNOSIS — E11.9 TYPE 2 DIABETES MELLITUS WITHOUT COMPLICATION, WITHOUT LONG-TERM CURRENT USE OF INSULIN: Primary | ICD-10-CM

## 2025-08-19 DIAGNOSIS — K75.81 METABOLIC DYSFUNCTION-ASSOCIATED STEATOHEPATITIS (MASH): ICD-10-CM

## 2025-08-19 DIAGNOSIS — E78.2 MIXED HYPERLIPIDEMIA: ICD-10-CM

## 2025-08-19 DIAGNOSIS — J45.30 MILD PERSISTENT ASTHMA, UNSPECIFIED WHETHER COMPLICATED: ICD-10-CM

## 2025-08-19 DIAGNOSIS — F17.210 CIGARETTE NICOTINE DEPENDENCE WITHOUT COMPLICATION: ICD-10-CM

## 2025-08-19 DIAGNOSIS — I10 PRIMARY HYPERTENSION: ICD-10-CM

## 2025-08-19 DIAGNOSIS — K21.9 GASTROESOPHAGEAL REFLUX DISEASE WITHOUT ESOPHAGITIS: ICD-10-CM

## 2025-08-19 LAB
ALBUMIN SERPL-MCNC: 4.2 G/DL (ref 3.5–5.2)
ALBUMIN/GLOB SERPL: 1.4 G/DL
ALP SERPL-CCNC: 86 U/L (ref 39–117)
ALT SERPL W P-5'-P-CCNC: 28 U/L (ref 1–33)
ANION GAP SERPL CALCULATED.3IONS-SCNC: 9.1 MMOL/L (ref 5–15)
AST SERPL-CCNC: 26 U/L (ref 1–32)
BILIRUB SERPL-MCNC: 0.3 MG/DL (ref 0–1.2)
BUN SERPL-MCNC: 11 MG/DL (ref 6–20)
BUN/CREAT SERPL: 9.2 (ref 7–25)
CALCIUM SPEC-SCNC: 9.2 MG/DL (ref 8.6–10.5)
CHLORIDE SERPL-SCNC: 106 MMOL/L (ref 98–107)
CHOLEST SERPL-MCNC: 109 MG/DL (ref 0–200)
CO2 SERPL-SCNC: 25.9 MMOL/L (ref 22–29)
CREAT SERPL-MCNC: 1.19 MG/DL (ref 0.57–1)
EGFRCR SERPLBLD CKD-EPI 2021: 56.5 ML/MIN/1.73
GLOBULIN UR ELPH-MCNC: 2.9 GM/DL
GLUCOSE SERPL-MCNC: 91 MG/DL (ref 65–99)
HDLC SERPL-MCNC: 31 MG/DL (ref 40–60)
LDLC SERPL CALC-MCNC: 55 MG/DL (ref 0–100)
LDLC/HDLC SERPL: 1.7 {RATIO}
PHOSPHATE SERPL-MCNC: 3.1 MG/DL (ref 2.5–4.5)
POTASSIUM SERPL-SCNC: 4.3 MMOL/L (ref 3.5–5.2)
PROT SERPL-MCNC: 7.1 G/DL (ref 6–8.5)
SODIUM SERPL-SCNC: 141 MMOL/L (ref 136–145)
TRIGL SERPL-MCNC: 127 MG/DL (ref 0–150)
VLDLC SERPL-MCNC: 23 MG/DL (ref 5–40)

## 2025-08-19 PROCEDURE — 80053 COMPREHEN METABOLIC PANEL: CPT | Performed by: FAMILY MEDICINE

## 2025-08-19 PROCEDURE — 84100 ASSAY OF PHOSPHORUS: CPT | Performed by: FAMILY MEDICINE

## 2025-08-19 PROCEDURE — 83695 ASSAY OF LIPOPROTEIN(A): CPT | Performed by: FAMILY MEDICINE

## 2025-08-19 PROCEDURE — 83036 HEMOGLOBIN GLYCOSYLATED A1C: CPT | Performed by: FAMILY MEDICINE

## 2025-08-19 PROCEDURE — 99214 OFFICE O/P EST MOD 30 MIN: CPT | Performed by: FAMILY MEDICINE

## 2025-08-19 PROCEDURE — 80061 LIPID PANEL: CPT | Performed by: FAMILY MEDICINE

## 2025-08-19 PROCEDURE — 36415 COLL VENOUS BLD VENIPUNCTURE: CPT | Performed by: FAMILY MEDICINE

## 2025-08-20 LAB — HBA1C MFR BLD: 6.2 % (ref 4.8–5.6)

## 2025-08-21 LAB — LPA SERPL-SCNC: 17.4 NMOL/L

## (undated) DEVICE — FIBR LASR HOLMIUM SLIMLINE SIS EZ 365U

## (undated) DEVICE — TUBING, SUCTION, 1/4" X 12', STRAIGHT: Brand: MEDLINE

## (undated) DEVICE — ADAPT/Y SCPE GATEWAY ADVNTGE

## (undated) DEVICE — ENDOPATH XCEL BLADELESS TROCARS WITH STABILITY SLEEVES: Brand: ENDOPATH XCEL

## (undated) DEVICE — SYS CLS SKIN PREMIERPRO EXOFINFUSION 22CM

## (undated) DEVICE — 2, DISPOSABLE SUCTION/IRRIGATOR WITHOUT DISPOSABLE TIP: Brand: STRYKEFLOW

## (undated) DEVICE — PROXIMATE SKIN STAPLERS (35 WIDE) CONTAINS 35 STAINLESS STEEL STAPLES (FIXED HEAD): Brand: PROXIMATE

## (undated) DEVICE — CATHETER,URETHRAL,REDRUBBER,STRL,18FR: Brand: MEDLINE

## (undated) DEVICE — SYS CLS PORTSITE CT CLOSESURE 5AND10/12

## (undated) DEVICE — DISPOSABLE MONOPOLAR ENDOSCOPIC CORD 10 FT. (3M): Brand: KIRWAN

## (undated) DEVICE — ECHELON FLEX POWERED PLUS ARTICULATING ENDOSCOPIC LINEAR CUTTER , 60MM: Brand: ECHELON FLEX

## (undated) DEVICE — ST URO THERMEDX/FLUIDSMART IRR/FLD/WARM PNT/PRESS/300MMHG

## (undated) DEVICE — SKIN AFFIX SURG ADHESIVE 72/CS 0.55ML: Brand: MEDLINE

## (undated) DEVICE — SOL IRR NACL 0.9PCT 3000ML

## (undated) DEVICE — TP ELECTRD LAP L WR SPLIT33CM

## (undated) DEVICE — ENDOPATH XCEL UNIVERSAL TROCAR STABLILITY SLEEVES: Brand: ENDOPATH XCEL

## (undated) DEVICE — MARKR SKIN W/RULR

## (undated) DEVICE — TBG PENCL TELESCP MEGADYNE SMOKE EVAC 10FT

## (undated) DEVICE — SYRINGE,TOOMEY,IRRIGATION,70CC,STERILE: Brand: MEDLINE

## (undated) DEVICE — PERCUTANEOUS NEPHRO-LITHOTOMY: Brand: MEDLINE INDUSTRIES, INC.

## (undated) DEVICE — NITINOL WIRE WITH HYDROPHILIC TIP: Brand: SENSOR

## (undated) DEVICE — GW AMPLTZ SUPRSTF PTFE JB .035 7X145CM

## (undated) DEVICE — DILATOR/SHEATH SET: Brand: 8/10 DILATOR/SHEATH SET

## (undated) DEVICE — SYR LL TP 10ML STRL

## (undated) DEVICE — ST IRR CYSTO W/SPK 77IN LF

## (undated) DEVICE — UROLOGY TORQUE CATHETER: Brand: IMAGER II

## (undated) DEVICE — SOL IRR NACL 0.9PCT 1000ML

## (undated) DEVICE — CVR PROB ULTRASND CIVFLEX GEN/PURP TELESCP/FOLD 5.5X58IN LF

## (undated) DEVICE — DEV CAP FEMALE/MALE LL

## (undated) DEVICE — RICH CYSTO: Brand: MEDLINE INDUSTRIES, INC.

## (undated) DEVICE — ACCESS PLATFORM FOR MINIMALLY INVASIVE SURGERY.: Brand: GELPORT® LAPAROSCOPIC  SYSTEM

## (undated) DEVICE — NEPHROSTOMY CATHETER: Brand: PERCUFLEX LOCKING LOOP

## (undated) DEVICE — ST TBG PNEUMOCLEAR EVAC SMOKE HIFLO

## (undated) DEVICE — TP SILK DURAPORE 2 IN

## (undated) DEVICE — SUT VIC 3/0 SH 27IN J416H

## (undated) DEVICE — GLV SURG SENSICARE W/ALOE PF LF 7 STRL

## (undated) DEVICE — SPNG LAP 18X18IN LF STRL PK/5

## (undated) DEVICE — PAD ARMBRD SURG CONVOL 7.5X20X2IN

## (undated) DEVICE — MARKR UTIL W/RULR W/LBL REGTP STRL

## (undated) DEVICE — SPNG GZ WOVN 4X4IN 12PLY 10/BX STRL

## (undated) DEVICE — BASKT RETRV STN SWISS LITHOCLAST W/TB

## (undated) DEVICE — ANTIBACTERIAL VIOLET BRAIDED (POLYGLACTIN 910), SYNTHETIC ABSORBABLE SUTURE: Brand: COATED VICRYL

## (undated) DEVICE — ENSEAL X1 TISSUE SEALER, CURVED JAW, 37 CM SHAFT LENGTH: Brand: ENSEAL

## (undated) DEVICE — Device

## (undated) DEVICE — BARIATRIC KIT: Brand: MEDLINE INDUSTRIES, INC.

## (undated) DEVICE — PERCUTANEOUS ACCESS NEEDLE

## (undated) DEVICE — CATH IV INSYTE AUTOGARD 14G 1 1/2IN ORNG

## (undated) DEVICE — TISSUE RETRIEVAL SYSTEM: Brand: INZII RETRIEVAL SYSTEM

## (undated) DEVICE — APPL COTN TP PLSTC 6IN STRL LF PK/2

## (undated) DEVICE — TOWEL,OR,DSP,ST,BLUE,STD,4/PK,20PK/CS: Brand: MEDLINE

## (undated) DEVICE — ANTIBACTERIAL UNDYED BRAIDED (POLYGLACTIN 910), SYNTHETIC ABSORBABLE SUTURE: Brand: COATED VICRYL

## (undated) DEVICE — APPL HEMOS FOR DELIVERY FLOSEAL

## (undated) DEVICE — GLV SURG SENSICARE PI LF PF 7.5 GRN STRL

## (undated) DEVICE — SUT PDS 1 TP1 48IN Z880G BX/12

## (undated) DEVICE — ST DIL URETH 8TO24F

## (undated) DEVICE — GAUZE,SPONGE,4"X4",16PLY,XRAY,STRL,LF: Brand: MEDLINE

## (undated) DEVICE — DUAL LUMEN URETERAL CATHETER

## (undated) DEVICE — LAPAROSCOPIC DISSECTOR: Brand: DEROYAL

## (undated) DEVICE — 3M™ STERI-DRAPE™ INSTRUMENT POUCH 1018: Brand: STERI-DRAPE™

## (undated) DEVICE — GLV SURG RAD SENSICARE SHLD PF LF SZ7 STRL

## (undated) DEVICE — CUSH INSRT PRONE VIEW LG

## (undated) DEVICE — TOTAL TRAY, 16FR 10ML SIL FOLEY, URN: Brand: MEDLINE

## (undated) DEVICE — GLV SURG SENSICARE LT W/ALOE PF LF 7 STRL

## (undated) DEVICE — SUT SILK 2/0 SH 30IN K833H

## (undated) DEVICE — SLV SCD CALF HEMOFORCE DVT THERP REPROC MD

## (undated) DEVICE — MONOPOLAR METZENBAUM SCISSOR, MINI BLADE TIP, DISPOSABLE: Brand: MONOPOLAR METZENBAUM SCISSOR, MINI BLADE TIP, DISPOSABLE

## (undated) DEVICE — ASCOPE 4 CYSTO - REVERSE DEFLECTION: Brand: ASCOPE™ 4 CYSTO